# Patient Record
Sex: FEMALE | Race: WHITE
[De-identification: names, ages, dates, MRNs, and addresses within clinical notes are randomized per-mention and may not be internally consistent; named-entity substitution may affect disease eponyms.]

---

## 2017-03-21 ENCOUNTER — HOSPITAL ENCOUNTER (OUTPATIENT)
Dept: HOSPITAL 47 - ORWHC2ENDO | Age: 62
Discharge: HOME | End: 2017-03-21
Payer: MEDICARE

## 2017-03-21 VITALS — SYSTOLIC BLOOD PRESSURE: 137 MMHG | DIASTOLIC BLOOD PRESSURE: 77 MMHG

## 2017-03-21 VITALS — BODY MASS INDEX: 28.3 KG/M2

## 2017-03-21 VITALS — RESPIRATION RATE: 18 BRPM | TEMPERATURE: 98.4 F

## 2017-03-21 VITALS — HEART RATE: 77 BPM

## 2017-03-21 DIAGNOSIS — F32.9: ICD-10-CM

## 2017-03-21 DIAGNOSIS — K29.50: ICD-10-CM

## 2017-03-21 DIAGNOSIS — Z79.51: ICD-10-CM

## 2017-03-21 DIAGNOSIS — K57.30: ICD-10-CM

## 2017-03-21 DIAGNOSIS — K44.9: ICD-10-CM

## 2017-03-21 DIAGNOSIS — Z79.891: ICD-10-CM

## 2017-03-21 DIAGNOSIS — F17.200: ICD-10-CM

## 2017-03-21 DIAGNOSIS — K21.0: Primary | ICD-10-CM

## 2017-03-21 DIAGNOSIS — Z79.899: ICD-10-CM

## 2017-03-21 DIAGNOSIS — K29.80: ICD-10-CM

## 2017-03-21 PROCEDURE — 94640 AIRWAY INHALATION TREATMENT: CPT

## 2017-03-21 PROCEDURE — 88305 TISSUE EXAM BY PATHOLOGIST: CPT

## 2017-03-21 PROCEDURE — 43239 EGD BIOPSY SINGLE/MULTIPLE: CPT

## 2017-03-21 PROCEDURE — 88342 IMHCHEM/IMCYTCHM 1ST ANTB: CPT

## 2017-03-21 PROCEDURE — 45378 DIAGNOSTIC COLONOSCOPY: CPT

## 2017-03-21 NOTE — P.PCN
Date of Procedure: 03/21/17


Procedure(s) Performed: 


Procedure: 1. Esophagogastroduodenoscopy and biopsy.  2. Total colonoscopy.





Preoperative diagnosis: Unexplained weight loss and change in bowel habits.





Postoperative diagnosis: 1. Small sliding hiatal hernia with no evidence of 

esophagitis or complicated reflux disease.  2. Mild gastritis and duodenitis.  

               3. Diverticulosis of the colon with no evidence of acute 

diverticulitis, strictures, polyps or cancer.





Preparation: HalfLytely prep.





Sedation: Was provided by anesthesia.





Brief clinical history: The patient is a 60-year-old female who is referred for 

this evaluation for the above reasons.  She had a colonoscopy around 5 years 

ago.  The patient has lost around 20-25 pounds over the last couple months.  

Occasional difficulty swallowing but no history of bleeding.  No prior upper 

endoscopy.





Procedure: With the patient on her left lateral decubitus position and after 

informed consent and adequate sedation, I passed the Olympus- video 

upper endoscope through the cricopharyngeus down the esophagus.  GE junction 

was around 38 cm from the incisors and there was a small sliding hiatal hernia.

  The esophagus did not show any erosions, ulcers, strictures or Castillo's 

esophagus.  The endoscope was then passed into the stomach which was 

insufflated with air and inspected in detail including the retroflex view in 

the cardia.  There was some mottling and erythema in the antrum but no ulcers 

or erosions.  Pyloric channel did not show any ulcers.  Duodenal bulb, post 

bulbar area and descending duodenum showed minimal erythema.  I obtained 

biopsies from the duodenum, antrum and esophagus then the endoscope was 

withdrawn and I proceeded with the colonoscopy.





Perianal area did not show any fissures or fistulas.  There were no masses felt 

on digital rectal examination.  The Olympus CFQ 160L video colonoscope was then 

inserted in the rectum in the usual fashion and advanced to the cecum.  There 

were multiple diverticular orifices seen scattered along the length of the 

bowel mostly on the left side with no evidence of acute diverticulitis or 

strictures.  The mucosa appeared healthy.  No polyps or tumors were seen.  I 

retroflexed endoscope in the rectum before the endoscope was withdrawn.





The patient tolerated the procedure well.





Plan: The patient was reassured.  Will await pathology results.  She will follow

-up with you as planned and I will be happy to see in the office of her 

symptoms persist.

## 2017-05-16 ENCOUNTER — HOSPITAL ENCOUNTER (OUTPATIENT)
Dept: HOSPITAL 47 - RADCTMAIN | Age: 62
Discharge: HOME | End: 2017-05-16
Payer: MEDICARE

## 2017-05-16 DIAGNOSIS — R91.8: Primary | ICD-10-CM

## 2017-05-16 DIAGNOSIS — R59.0: ICD-10-CM

## 2017-05-16 PROCEDURE — 71260 CT THORAX DX C+: CPT

## 2017-05-16 NOTE — CT
EXAMINATION TYPE: CT chest w con

 

DATE OF EXAM: 5/16/2017 3:57 PM

 

COMPARISON: 6/25/2015

 

HISTORY: ABNORMAL FINDINGS ON CXR.

 

CT DLP: 216.4 mGycm

Automated exposure control for dose reduction was used.

 

CONTRAST: 

CT scan of the chest is performed with IV Contrast, patient injected with 90 mL of Omnipaque 300.

 

FINDINGS:

 

LUNGS: There is a new left upper lobe pulmonary mass measuring 5.4 x 4.3 cm with peripheral spiculati
on and focal extension to the pleural surface. This mass is felt to reflect malignancy until proven o
therwise. No additional pulmonary nodules or masses are seen. Small focal pleural-based calcification
 right midlung zone.

 

MEDIASTINUM: AP window adenopathy measuring up to 1 cm. Left hilar adenopathy measuring 1 cm. No cassandra
tional greater than 1 cm lymph nodes are seen at this time.

 

UPPER ABDOMEN: Heterogenous left adrenal mass is again noted and measures of 4.5 x 3.6 cm versus 4.2 
x 3.5 cm. Right adrenal gland is unremarkable.

 

OTHER:  No additional significant abnormality is seen.

 

IMPRESSION:  

1. New left upper lobe mass felt to reflect malignancy until proven otherwise. Mild mediastinal and l
eft hilar adenopathy.

2. Enlarging heterogenous left adrenal mass. Metastatic disease is not excluded.

## 2017-05-23 ENCOUNTER — HOSPITAL ENCOUNTER (OUTPATIENT)
Dept: HOSPITAL 47 - RADCTMAIN | Age: 62
Discharge: HOME | End: 2017-05-23
Payer: MEDICARE

## 2017-05-23 DIAGNOSIS — R91.8: Primary | ICD-10-CM

## 2017-05-23 PROCEDURE — 70470 CT HEAD/BRAIN W/O & W/DYE: CPT

## 2017-05-23 PROCEDURE — 80053 COMPREHEN METABOLIC PANEL: CPT

## 2017-05-23 NOTE — CT
EXAMINATION TYPE: CT brain wo/w con

 

DATE OF EXAM: 5/23/2017 6:38 PM

 

COMPARISON: NONE

 

HISTORY: Lung mass. Headache, dizziness, and weakness.

 

CT DLP: 2336.00 mGycm

Automated exposure control for dose reduction was used.

 

CONTRAST: 

CT scan of the head is performed without and with IV Contrast, patient injected with 100 mL of Omnipa
que 300.

 

FINDINGS: 

The ventricles of normal size. There is no mass effect nor midline shift. There is no sign of intracr
anial hemorrhage. The calvarium appears intact. There is no pathologic enhancement.

 

IMPRESSION: 

Negative head CT scan. No evidence of metastatic disease.

## 2017-05-31 ENCOUNTER — HOSPITAL ENCOUNTER (OUTPATIENT)
Dept: HOSPITAL 47 - RADPROMAIN | Age: 62
End: 2017-05-31
Payer: MEDICARE

## 2017-05-31 VITALS — HEART RATE: 66 BPM | SYSTOLIC BLOOD PRESSURE: 118 MMHG | TEMPERATURE: 98.2 F | DIASTOLIC BLOOD PRESSURE: 68 MMHG

## 2017-05-31 VITALS — RESPIRATION RATE: 20 BRPM

## 2017-05-31 DIAGNOSIS — R91.8: ICD-10-CM

## 2017-05-31 DIAGNOSIS — Z79.51: ICD-10-CM

## 2017-05-31 DIAGNOSIS — Z79.1: ICD-10-CM

## 2017-05-31 DIAGNOSIS — Z79.891: ICD-10-CM

## 2017-05-31 DIAGNOSIS — C34.12: Primary | ICD-10-CM

## 2017-05-31 DIAGNOSIS — Z79.899: ICD-10-CM

## 2017-05-31 DIAGNOSIS — Z87.891: ICD-10-CM

## 2017-05-31 DIAGNOSIS — M79.7: ICD-10-CM

## 2017-05-31 DIAGNOSIS — J44.9: ICD-10-CM

## 2017-05-31 LAB
INR PPP: 1.1 (ref ?–1.1)
PT BLD: 10.7 SEC (ref 9–12)

## 2017-05-31 PROCEDURE — 88173 CYTOPATH EVAL FNA REPORT: CPT

## 2017-05-31 PROCEDURE — 88305 TISSUE EXAM BY PATHOLOGIST: CPT

## 2017-05-31 PROCEDURE — 85049 AUTOMATED PLATELET COUNT: CPT

## 2017-05-31 PROCEDURE — 77012 CT SCAN FOR NEEDLE BIOPSY: CPT

## 2017-05-31 PROCEDURE — 71010: CPT

## 2017-05-31 PROCEDURE — 85610 PROTHROMBIN TIME: CPT

## 2017-05-31 PROCEDURE — 88342 IMHCHEM/IMCYTCHM 1ST ANTB: CPT

## 2017-05-31 PROCEDURE — 88341 IMHCHEM/IMCYTCHM EA ADD ANTB: CPT

## 2017-05-31 PROCEDURE — 36415 COLL VENOUS BLD VENIPUNCTURE: CPT

## 2017-05-31 NOTE — CT
EXAMINATION TYPE: CT biopsy lung LT

 

DATE OF EXAM: 5/31/2017

 

HISTORY: Left upper lobe lung mass

 

COMPARISON: CT 5/16/2017

 

Maximal barrier technique was utilized.  The skin overlying a suitable path to the lesion was localiz
ed using CT and the overlying skin was prepped and draped.  Lidocaine used for local anesthesia.  A s
kin nick made with a scalpel.  Using CT guidance, access was gained to the lesion with a 22-gauge nee
dle.  Aspirated specimen submitted to cytology.  5 passes were performed in all.  Following the proce
dure no immediate complications.   The patient is discharged in stable condition.  Hemostasis achieve
d.  

 

IMPRESSION: 

 

SUCCESSFUL CT GUIDED BIOPSY.  PATHOLOGY PENDING.  THIS PROCEDURE WAS PERFORMED BY THE UNDERSIGNED.

## 2017-05-31 NOTE — XR
EXAMINATION TYPE: XR chest 1V portable

 

DATE OF EXAM: 5/31/2017

 

COMPARISON: NONE

 

INDICATION: Left upper lobe fine-needle aspiration

 

TECHNIQUE: Single frontal view of the chest is obtained. Images upright view

 

FINDINGS:  

The heart size is normal.  

The pulmonary vasculature is normal.  

There is a 5.2 cm spiculated mass left apex. No pneumothorax is evident.  

 

 

IMPRESSION:  

1. Left apical mass.

2. No pneumothorax post needle aspiration.

## 2017-06-03 ENCOUNTER — HOSPITAL ENCOUNTER (OUTPATIENT)
Dept: HOSPITAL 47 - RADPETMAIN | Age: 62
Discharge: HOME | End: 2017-06-03
Payer: MEDICARE

## 2017-06-03 DIAGNOSIS — R91.1: Primary | ICD-10-CM

## 2017-06-03 DIAGNOSIS — Z85.118: ICD-10-CM

## 2017-06-03 PROCEDURE — 78815 PET IMAGE W/CT SKULL-THIGH: CPT

## 2017-06-05 NOTE — PE
Nuclear medicine PET/CT

 

HISTORY: Lung carcinoma

 

Patient received 13.3 mCi F-18 FDG intravenously and delayed scanning performed from the skull base t
o the mid thighs. Localization and attenuation correction CT scan was also performed.

 

Correlation to CT brain 5/23/2017, CT chest 6/25/2015 and 5/16/2017, chest x-ray 5/31/2017

 

Neck and chest: At the upper lobe lung mass is again noted and shows pleural extension as well as ext
ension toward the mediastinum at the upper aspect of the chest. Some prevascular lymph nodes are note
d. Retrocaval pretracheal nodes are not enlarged. Calcified left hilar, subcarinal nodes are present.
 Left hilar node shows SUV 5.2 and aorticopulmonary window node shows hypermetabolic uptake and aorti
copulmonary window node is enlarged. SUV is 3.8. The left upper lobe lung mass measures approximately
 6 to 7 cm in size in the axial plane, SUV is 21-22. Calcified subpleural nodule noted on the right l
aterally within the lungs, axial image 90.

 

Abdomen pelvis: There is a left adrenal mass present which shows low attenuation, there is some assoc
iated calcification, no suspicious hypermetabolic uptake, the mass measures approximately 4.2 cm in g
reatest dimension. Calcifications are associated within the spleen. No retroperitoneal adenopathy, no
 suspicious hypermetabolic uptake. Uptake within the right colon thought to be physiologic.

 

Osseous structures within normal limits.

 

IMPRESSION: Findings compatible with patient's history of lung carcinoma. Additional findings above.

## 2017-08-17 ENCOUNTER — HOSPITAL ENCOUNTER (EMERGENCY)
Dept: HOSPITAL 47 - EC | Age: 62
Discharge: HOME | End: 2017-08-17
Payer: MEDICARE

## 2017-08-17 VITALS
TEMPERATURE: 98 F | DIASTOLIC BLOOD PRESSURE: 57 MMHG | SYSTOLIC BLOOD PRESSURE: 110 MMHG | HEART RATE: 87 BPM | RESPIRATION RATE: 18 BRPM

## 2017-08-17 DIAGNOSIS — Z79.51: ICD-10-CM

## 2017-08-17 DIAGNOSIS — K21.9: ICD-10-CM

## 2017-08-17 DIAGNOSIS — Z79.899: ICD-10-CM

## 2017-08-17 DIAGNOSIS — Z87.891: ICD-10-CM

## 2017-08-17 DIAGNOSIS — Z87.448: ICD-10-CM

## 2017-08-17 DIAGNOSIS — R53.1: ICD-10-CM

## 2017-08-17 DIAGNOSIS — R11.2: Primary | ICD-10-CM

## 2017-08-17 DIAGNOSIS — J44.9: ICD-10-CM

## 2017-08-17 DIAGNOSIS — F32.9: ICD-10-CM

## 2017-08-17 DIAGNOSIS — R19.7: ICD-10-CM

## 2017-08-17 LAB
ALP SERPL-CCNC: 109 U/L (ref 38–126)
ALT SERPL-CCNC: 31 U/L (ref 9–52)
AMYLASE SERPL-CCNC: 35 U/L (ref 30–110)
ANION GAP SERPL CALC-SCNC: 11 MMOL/L
APTT BLD: 25.1 SEC (ref 22–30)
AST SERPL-CCNC: 18 U/L (ref 14–36)
BASOPHILS # BLD AUTO: 0 K/UL (ref 0–0.2)
BASOPHILS NFR BLD AUTO: 0 %
BUN SERPL-SCNC: 9 MG/DL (ref 7–17)
CALCIUM SPEC-MCNC: 9.3 MG/DL (ref 8.4–10.2)
CH: 29.3
CHCM: 33.3
CHLORIDE SERPL-SCNC: 104 MMOL/L (ref 98–107)
CO2 SERPL-SCNC: 27 MMOL/L (ref 22–30)
EOSINOPHIL # BLD AUTO: 0 K/UL (ref 0–0.7)
EOSINOPHIL NFR BLD AUTO: 0 %
ERYTHROCYTE [DISTWIDTH] IN BLOOD BY AUTOMATED COUNT: 5.16 M/UL (ref 3.8–5.4)
ERYTHROCYTE [DISTWIDTH] IN BLOOD: 18.9 % (ref 11.5–15.5)
GLUCOSE SERPL-MCNC: 128 MG/DL (ref 74–99)
HCT VFR BLD AUTO: 45.6 % (ref 34–46)
HDW: 2.55
HGB BLD-MCNC: 15.2 GM/DL (ref 11.4–16)
INR PPP: 1 (ref ?–1.2)
LUC NFR BLD AUTO: 1 %
LYMPHOCYTES # SPEC AUTO: 1 K/UL (ref 1–4.8)
LYMPHOCYTES NFR SPEC AUTO: 10 %
MAGNESIUM SPEC-SCNC: 1.8 MG/DL (ref 1.6–2.3)
MCH RBC QN AUTO: 29.4 PG (ref 25–35)
MCHC RBC AUTO-ENTMCNC: 33.3 G/DL (ref 31–37)
MCV RBC AUTO: 88.4 FL (ref 80–100)
MONOCYTES # BLD AUTO: 0.3 K/UL (ref 0–1)
MONOCYTES NFR BLD AUTO: 3 %
NEUTROPHILS # BLD AUTO: 8.6 K/UL (ref 1.3–7.7)
NEUTROPHILS NFR BLD AUTO: 85 %
NON-AFRICAN AMERICAN GFR(MDRD): >60
PARTICLE COUNT: 9185
PH UR: 8 [PH] (ref 5–8)
PHOSPHATE SERPL-MCNC: 3.5 MG/DL (ref 2.5–4.5)
POTASSIUM SERPL-SCNC: 4 MMOL/L (ref 3.5–5.1)
PROT SERPL-MCNC: 7.8 G/DL (ref 6.3–8.2)
PT BLD: 10 SEC (ref 9–12)
SODIUM SERPL-SCNC: 142 MMOL/L (ref 137–145)
SP GR UR: 1.01 (ref 1–1.03)
UA BILLING (MACRO VS. MICRO): (no result)
UROBILINOGEN UR QL STRIP: <2 MG/DL (ref ?–2)
WBC # BLD AUTO: 0.11 10*3/UL
WBC # BLD AUTO: 10.1 K/UL (ref 3.8–10.6)
WBC (PEROX): 9.95

## 2017-08-17 PROCEDURE — 85730 THROMBOPLASTIN TIME PARTIAL: CPT

## 2017-08-17 PROCEDURE — 99285 EMERGENCY DEPT VISIT HI MDM: CPT

## 2017-08-17 PROCEDURE — 96376 TX/PRO/DX INJ SAME DRUG ADON: CPT

## 2017-08-17 PROCEDURE — 83690 ASSAY OF LIPASE: CPT

## 2017-08-17 PROCEDURE — 83735 ASSAY OF MAGNESIUM: CPT

## 2017-08-17 PROCEDURE — 85025 COMPLETE CBC W/AUTO DIFF WBC: CPT

## 2017-08-17 PROCEDURE — 94640 AIRWAY INHALATION TREATMENT: CPT

## 2017-08-17 PROCEDURE — 81001 URINALYSIS AUTO W/SCOPE: CPT

## 2017-08-17 PROCEDURE — 71020: CPT

## 2017-08-17 PROCEDURE — 80053 COMPREHEN METABOLIC PANEL: CPT

## 2017-08-17 PROCEDURE — 87040 BLOOD CULTURE FOR BACTERIA: CPT

## 2017-08-17 PROCEDURE — 84100 ASSAY OF PHOSPHORUS: CPT

## 2017-08-17 PROCEDURE — 96374 THER/PROPH/DIAG INJ IV PUSH: CPT

## 2017-08-17 PROCEDURE — 36415 COLL VENOUS BLD VENIPUNCTURE: CPT

## 2017-08-17 PROCEDURE — 87086 URINE CULTURE/COLONY COUNT: CPT

## 2017-08-17 PROCEDURE — 83605 ASSAY OF LACTIC ACID: CPT

## 2017-08-17 PROCEDURE — 82150 ASSAY OF AMYLASE: CPT

## 2017-08-17 PROCEDURE — 96361 HYDRATE IV INFUSION ADD-ON: CPT

## 2017-08-17 PROCEDURE — 96375 TX/PRO/DX INJ NEW DRUG ADDON: CPT

## 2017-08-17 PROCEDURE — 74020: CPT

## 2017-08-17 PROCEDURE — 85610 PROTHROMBIN TIME: CPT

## 2017-08-17 NOTE — XR
2 view abdomen

 

HISTORY: Pain

 

2 views of the abdomen correlated with chest x-ray same date

 

Degenerative disc changes are present in the visualized spine, bone mineralization is reduced. There 
is no pneumoperitoneum or bowel obstruction. Probable vascular calcifications within the pelvis. Left
 proximal femur shows an apparent foreshortening view possibly due to rotation. Partial sacralization
 of L5 on the left.

 

IMPRESSION: No acute abnormalities evident. Additional findings above.

## 2017-08-17 NOTE — ED
General Adult HPI





- General


Chief complaint: Weakness


Stated complaint: Vomiting


Time Seen by Provider: 08/17/17 12:45


Source: patient, family, EMS, RN notes reviewed


Mode of arrival: EMS





- History of Present Illness


Initial comments: 





61-year-old female has history of lung cancer presents to the emergency 

department with a chief complaint of nausea vomiting and diarrhea.  Patient 

states she woke up yesterday morning states she's been sick with this for the 

past 24 hours.  Patient states her last chemo radiation treatment was about a 

week and a half.  Patient states she's never had this reaction to that.  

Patient states she hasn't had any fever or chills.  Patient states she does 

have soreness from throwing up so much.  Patient states she was concerned due 

to hydration. Patient denies any recent fever, chills, shortness of breath, 

chest pain,  numbness or tingling, dysuria or hematuria, headaches or visual 

changes, or any other current symptoms.





- Related Data


 Home Medications











 Medication  Instructions  Recorded  Confirmed


 


Albuterol Inhaler [Ventolin Hfa 2 puff INHALATION QID PRN 05/27/14 08/17/17





Inhaler]   


 


Cevimeline [Evoxac] 30 mg PO BID 05/27/14 08/17/17


 


Escitalopram [Lexapro] 20 mg PO DAILY 05/27/14 08/17/17


 


Esomeprazole Magnesium [NexIUM] 40 mg PO BID 05/27/14 08/17/17


 


Fesoterodine Fumarate [Toviaz] 8 mg PO DAILY 05/27/14 08/17/17


 


Ibuprofen [Motrin] 600 mg PO Q6HR PRN 05/27/14 08/17/17


 


Multivit with Calcium,Iron,Min 1 tab PO DAILY 06/22/15 08/17/17





[Women's Daily Multivitamin]   


 


Latanoprost Ophth [Xalatan 0.005%] 1 drops BOTH EYES HS 02/27/17 08/17/17


 


oxyCODONE-APAP 10-325MG [Percocet 1 tab PO Q4-6H PRN 02/27/17 08/17/17





 mg]   


 


clonazePAM [KlonoPIN] 0.5 mg PO BID PRN 05/24/17 08/17/17


 


Albuterol Nebulized [Ventolin 2.5 mg INHALATION RT-Q4H PRN 08/17/17 08/17/17





Nebulized]   


 


Budesonide-Formot 160-4.5 Mcg 2 puff INHALATION RT-BID 08/17/17 08/17/17





[Symbicort 160-4.5 Mcg Inhaler]   


 


Fluticasone Propionate 2 spray EA NOSTRIL DAILY 08/17/17 08/17/17


 


Lidocaine 5% Oint [Xylocaine 5% 1 applic TOPICAL BID PRN 08/17/17 08/17/17





Oint]   


 


Magic Mouthwash 1 dose PO AS DIRECTED PRN 08/17/17 08/17/17


 


Ondansetron Odt [Zofran ODT] 8 mg PO Q6H PRN 08/17/17 08/17/17


 


Prochlorperazine [Compazine] 10 mg PO Q6H PRN 08/17/17 08/17/17


 


Umeclidinium Bromide [Incruse 1 puff INHALATION RT-DAILY 08/17/17 08/17/17





Ellipta]   








 Previous Rx's











 Medication  Instructions  Recorded


 


Morphine Sulfate [Ms Contin] 30 mg PO BID #60 tablet.er 09/02/15











 Allergies











Allergy/AdvReac Type Severity Reaction Status Date / Time


 


No Known Allergies Allergy   Verified 08/17/17 14:31














Review of Systems


ROS Statement: 


Those systems with pertinent positive or pertinent negative responses have been 

documented in the HPI.





ROS Other: All systems not noted in ROS Statement are negative.





Past Medical History


Past Medical History: Cancer, COPD, CVA/TIA, Fibromyalgia, GERD/Reflux, 

Hyperlipidemia, Osteoarthritis (OA), Pneumonia


Additional Past Medical History / Comment(s): ADRENAL TUMOR, hx migraines, TIA, 

hiatal hernia,  constipation, urinary incontinence,. DDD, BACK PAIN.,PT STATES 

RESCHEDULED FOR EGD AND COLONOSCOPY DUE TO COLD SYMPTOMS BUT SHE IS BETTER NOW. 

Lung cancer diagnosed May 2017.


History of Any Multi-Drug Resistant Organisms: None Reported


Additional Past Surgical History / Comment(s): SINUS surgery


Past Anesthesia/Blood Transfusion Reactions: No Reported Reaction


Past Psychological History: Depression


Smoking Status: Former smoker


Past Alcohol Use History: None Reported


Past Drug Use History: None Reported





- Past Family History


  ** Mother


Family Medical History: Cancer





  ** Father


Family Medical History: Cancer





General Exam





- General Exam Comments


Initial Comments: 





General:  The patient is awake and alert, in no distress, and does not appear 

acutely ill. 


Eye:  Pupils are equal, round and reactive to light, extra-ocular movements are 

intact; there is normal conjunctiva bilaterally.  No signs of icterus.  


Ears, nose, mouth and throat:  There are moist mucous membranes.


Neck:  The neck is supple, there is no tenderness.


Cardiovascular:  There is a regular rate and rhythm. No murmur, rub or gallop 

is appreciated.


Respiratory:  Lungs are clear to auscultation, respirations are non-labored, 

breath sounds are equal.  No wheezes, stridor, rales, or rhonchi.


Gastrointestinal:  Soft, non-distended, non-tender abdomen without masses or 

organomegaly noted. There is no rebound or guarding present.  No CVA 

tenderness. Bowel sounds are unremarkable.


Back:  There is no tenderness to palpation in the midline. There is no obvious 

deformity. No rashes noted. 


Musculoskeletal:  Normal ROM, no tenderness, There is no pedal edema. There is 

no calf tenderness or swelling. Sensation intact. Pulses equal bilaterally 2+.  


Neurological:  CN II-XII intact, There are no obvious motor or sensory 

deficits. Coordination appears grossly intact. Speech is normal.


Skin:  Skin is warm and dry and no rashes or lesions are noted. 


Psychiatric:  Cooperative, appropriate mood & affect, normal judgment.  








Course


 Vital Signs











  08/17/17 08/17/17 08/17/17





  12:42 13:59 14:41


 


Temperature 98.5 F 98.3 F 97.7 F


 


Pulse Rate 64 66 81


 


Respiratory 20 18 16





Rate   


 


Blood Pressure 169/79 165/82 165/94


 


O2 Sat by Pulse 98 97 98





Oximetry   














  08/17/17 08/17/17





  15:03 15:18


 


Temperature  


 


Pulse Rate 66 69


 


Respiratory  





Rate  


 


Blood Pressure  


 


O2 Sat by Pulse  





Oximetry  














Medical Decision Making





- Medical Decision Making





61-year-old female presents to the emergency department with a chief complaint 

of nausea vomiting diarrhea.at this time patient was reevaluated states she has 

improved.  She has not had vomiting.  This time she was offered admission for 

continued supportive care.  She states she does not want this and she would 

like to go home.  At this time we will give her another fluid bolus and more 

nausea and pain medication.  We discussed close follow-up and return parameters 

all patient's questions.  She stated that she understood and she screamed this 

plan.  This time she will be discharged home.





- Lab Data


Result diagrams: 


 08/17/17 13:15





 08/17/17 13:15


 Lab Results











  08/17/17 08/17/17 08/17/17 Range/Units





  13:15 13:15 13:15 


 


WBC    10.1  (3.8-10.6)  k/uL


 


RBC    5.16  (3.80-5.40)  m/uL


 


Hgb    15.2  (11.4-16.0)  gm/dL


 


Hct    45.6  (34.0-46.0)  %


 


MCV    88.4  (80.0-100.0)  fL


 


MCH    29.4  (25.0-35.0)  pg


 


MCHC    33.3  (31.0-37.0)  g/dL


 


RDW    18.9 H  (11.5-15.5)  %


 


Plt Count    408  (150-450)  k/uL


 


Neutrophils %    85  %


 


Lymphocytes %    10  %


 


Monocytes %    3  %


 


Eosinophils %    0  %


 


Basophils %    0  %


 


Neutrophils #    8.6 H  (1.3-7.7)  k/uL


 


Lymphocytes #    1.0  (1.0-4.8)  k/uL


 


Monocytes #    0.3  (0-1.0)  k/uL


 


Eosinophils #    0.0  (0-0.7)  k/uL


 


Basophils #    0.0  (0-0.2)  k/uL


 


Anisocytosis    Slight  


 


PT     (9.0-12.0)  sec


 


INR     (<1.2)  


 


APTT     (22.0-30.0)  sec


 


Sodium   142   (137-145)  mmol/L


 


Potassium   4.0   (3.5-5.1)  mmol/L


 


Chloride   104   ()  mmol/L


 


Carbon Dioxide   27   (22-30)  mmol/L


 


Anion Gap   11   mmol/L


 


BUN   9   (7-17)  mg/dL


 


Creatinine   0.62   (0.52-1.04)  mg/dL


 


Est GFR (MDRD) Af Amer   >60   (>60 ml/min/1.73 sqM)  


 


Est GFR (MDRD) Non-Af   >60   (>60 ml/min/1.73 sqM)  


 


Glucose   128 H   (74-99)  mg/dL


 


Plasma Lactic Acid Ebenezer     (0.7-2.0)  mmol/L


 


Calcium   9.3   (8.4-10.2)  mg/dL


 


Phosphorus  3.5    (2.5-4.5)  mg/dL


 


Magnesium  1.8    (1.6-2.3)  mg/dL


 


Total Bilirubin   0.4   (0.2-1.3)  mg/dL


 


AST   18   (14-36)  U/L


 


ALT   31   (9-52)  U/L


 


Alkaline Phosphatase   109   ()  U/L


 


Total Protein   7.8   (6.3-8.2)  g/dL


 


Albumin   4.3   (3.5-5.0)  g/dL


 


Amylase   35   ()  U/L


 


Lipase   25   ()  U/L


 


Urine Color     


 


Urine Appearance     (Clear)  


 


Urine pH     (5.0-8.0)  


 


Ur Specific Gravity     (1.001-1.035)  


 


Urine Protein     (Negative)  


 


Urine Glucose (UA)     (Negative)  


 


Urine Ketones     (Negative)  


 


Urine Blood     (Negative)  


 


Urine Nitrite     (Negative)  


 


Urine Bilirubin     (Negative)  


 


Urine Urobilinogen     (<2.0)  mg/dL


 


Ur Leukocyte Esterase     (Negative)  


 


Amorphous Sediment     (None)  /hpf


 


Urine Mucus     (None)  /hpf














  08/17/17 08/17/17 08/17/17 Range/Units





  13:15 13:19 13:47 


 


WBC     (3.8-10.6)  k/uL


 


RBC     (3.80-5.40)  m/uL


 


Hgb     (11.4-16.0)  gm/dL


 


Hct     (34.0-46.0)  %


 


MCV     (80.0-100.0)  fL


 


MCH     (25.0-35.0)  pg


 


MCHC     (31.0-37.0)  g/dL


 


RDW     (11.5-15.5)  %


 


Plt Count     (150-450)  k/uL


 


Neutrophils %     %


 


Lymphocytes %     %


 


Monocytes %     %


 


Eosinophils %     %


 


Basophils %     %


 


Neutrophils #     (1.3-7.7)  k/uL


 


Lymphocytes #     (1.0-4.8)  k/uL


 


Monocytes #     (0-1.0)  k/uL


 


Eosinophils #     (0-0.7)  k/uL


 


Basophils #     (0-0.2)  k/uL


 


Anisocytosis     


 


PT  10.0    (9.0-12.0)  sec


 


INR  1.0    (<1.2)  


 


APTT  25.1    (22.0-30.0)  sec


 


Sodium     (137-145)  mmol/L


 


Potassium     (3.5-5.1)  mmol/L


 


Chloride     ()  mmol/L


 


Carbon Dioxide     (22-30)  mmol/L


 


Anion Gap     mmol/L


 


BUN     (7-17)  mg/dL


 


Creatinine     (0.52-1.04)  mg/dL


 


Est GFR (MDRD) Af Amer     (>60 ml/min/1.73 sqM)  


 


Est GFR (MDRD) Non-Af     (>60 ml/min/1.73 sqM)  


 


Glucose     (74-99)  mg/dL


 


Plasma Lactic Acid Ebenezer   1.0   (0.7-2.0)  mmol/L


 


Calcium     (8.4-10.2)  mg/dL


 


Phosphorus     (2.5-4.5)  mg/dL


 


Magnesium     (1.6-2.3)  mg/dL


 


Total Bilirubin     (0.2-1.3)  mg/dL


 


AST     (14-36)  U/L


 


ALT     (9-52)  U/L


 


Alkaline Phosphatase     ()  U/L


 


Total Protein     (6.3-8.2)  g/dL


 


Albumin     (3.5-5.0)  g/dL


 


Amylase     ()  U/L


 


Lipase     ()  U/L


 


Urine Color    Light Yellow  


 


Urine Appearance    Cloudy H  (Clear)  


 


Urine pH    8.0  (5.0-8.0)  


 


Ur Specific Gravity    1.008  (1.001-1.035)  


 


Urine Protein    Negative  (Negative)  


 


Urine Glucose (UA)    Negative  (Negative)  


 


Urine Ketones    Negative  (Negative)  


 


Urine Blood    Negative  (Negative)  


 


Urine Nitrite    Negative  (Negative)  


 


Urine Bilirubin    Negative  (Negative)  


 


Urine Urobilinogen    <2.0  (<2.0)  mg/dL


 


Ur Leukocyte Esterase    Negative  (Negative)  


 


Amorphous Sediment    Rare H  (None)  /hpf


 


Urine Mucus    Rare H  (None)  /hpf














Disposition


Clinical Impression: 


 Nausea & vomiting, Diarrhea





Disposition: HOME SELF-CARE


Condition: Stable


Instructions:  Acute Nausea and Vomiting (ED)


Additional Instructions: 


Please use medication as discussed. Please follow up with family doctor if 

symptoms have not improved over the next two days. Please return to the 

emergency room if your symptoms increase or worsen or for any other concerns. 


Referrals: 


Rebecca Amaya MD [Primary Care Provider] - 1-2 days


Time of Disposition: 15:54

## 2017-08-17 NOTE — XR
EXAMINATION TYPE: XR chest 2V

 

DATE OF EXAM: 8/17/2017

 

COMPARISON: 5/31/2017

 

HISTORY: Stage III left lung cancer treated with chemotherapy and radiation.

 

TECHNIQUE:  Frontal and lateral views of the chest are obtained.

 

FINDINGS:  Spiculated left upper lobe pulmonary mass is redemonstrated, decreased in size in the inte
rim. Prominent sub-pleural fat and pleural reaction at the cardiophrenic angle on the left is redemon
strated blunting the costophrenic angle.

 

No focal consolidation, pleural effusion, or pneumothorax are appreciated. Cardiomediastinal silhouet
te is within normal limits. Osseous structures are intact.

 

IMPRESSION:  

1. Interval decrease in size of the spiculated left upper lobe pulmonary mass corresponding to the kn
own lung carcinoma. No new focal consolidation.

## 2017-08-26 ENCOUNTER — HOSPITAL ENCOUNTER (OUTPATIENT)
Dept: HOSPITAL 47 - RADPETMAIN | Age: 62
Discharge: HOME | End: 2017-08-26
Payer: MEDICARE

## 2017-08-26 DIAGNOSIS — C34.12: Primary | ICD-10-CM

## 2017-08-26 DIAGNOSIS — E27.8: ICD-10-CM

## 2017-08-26 PROCEDURE — 78815 PET IMAGE W/CT SKULL-THIGH: CPT

## 2017-08-27 NOTE — PE
EXAMINATION TYPE: PET CT fusion skull to thigh

 

DATE OF EXAM: 8/26/2017

 

COMPARISON: 5/31/2017, 5/16/2017, CT abdomen pelvis 8/12/2009

                            Prior PET/CT: 6/3/2017

 

HISTORY: Lung cancer   

 

TECHNIQUE:  Following the intravenous administration of 15.6 mCi of F-18 FDG, whole body images are p
erformed from the skull base to the midthigh.  Images are reviewed on the computer in the coronal, ax
ial, and sagittal planes.  Reconstructed rotating images are created on independent workstation and r
eviewed on the computer.   A localization and attenuation correction CT is performed in conjunction w
ith the PET scan.

 

DLP: 285.29 mGycm

 

SCAN: Subsequent

 

Blood glucose: 97 mg/dL

 

Average Mediastinum SUV: 1.3

Average Liver SUV: 2.0

 

FINDINGS: 

NECK:  No abnormal uptake

 

THORAX: There is focal increased radiotracer accumulation within the left upper lobe mass with an SUV
 value of 4.8. No abnormal uptake within the mediastinum or hilar regions is evident.

 

ABDOMEN: No abnormal uptake the enlarged left adrenal gland has a normal radiotracer accumulation anurag
suring SUV 1.5.

 

PELVIS: No abnormal uptake

 

OSSEOUS STRUCTURES: No abnormal uptake

 

LOCALIZATION CT: Left upper lobe mass spiculated. There may be a left peribronchial lymph node which 
is not enlarged by CT criteria and has an SUV value 1.5. Previous aortopulmonic window lymph node is 
not identified. Coronary artery calcification is present. The left adrenal gland is enlarged measurin
g 4.0 cm in size.

 

COMPARISON: Left upper lobe lung mass has diminished in size. Previous aortopulmonic window lymph nod
e which are elevated SUV previous left peribronchial node which has elevated SUV are not hyperintense
 on the current study. The left adrenal gland is been enlarged since 2009.

 

IMPRESSION:

1. Solitary pulmonary mass left upper lobe compatible with neoplasm.

2. No suspicious findings suggestive for metastatic disease.

3. Enlarged left adrenal gland has normal radiotracer without uptake to suggest hypermetabolic metast
atic disease

## 2017-08-29 ENCOUNTER — HOSPITAL ENCOUNTER (OUTPATIENT)
Dept: HOSPITAL 47 - LABWHC1 | Age: 62
Discharge: HOME | End: 2017-08-29
Payer: MEDICARE

## 2017-08-29 DIAGNOSIS — R19.12: ICD-10-CM

## 2017-08-29 DIAGNOSIS — R19.4: Primary | ICD-10-CM

## 2017-08-29 PROCEDURE — 87324 CLOSTRIDIUM AG IA: CPT

## 2017-10-19 ENCOUNTER — HOSPITAL ENCOUNTER (OUTPATIENT)
Dept: HOSPITAL 47 - RADCTMAIN | Age: 62
Discharge: HOME | End: 2017-10-19
Payer: MEDICARE

## 2017-10-19 DIAGNOSIS — M79.7: ICD-10-CM

## 2017-10-19 DIAGNOSIS — G31.9: Primary | ICD-10-CM

## 2017-10-19 DIAGNOSIS — C34.12: ICD-10-CM

## 2017-10-19 DIAGNOSIS — E07.9: ICD-10-CM

## 2017-10-19 DIAGNOSIS — J44.9: ICD-10-CM

## 2017-10-19 DIAGNOSIS — R93.0: ICD-10-CM

## 2017-10-19 LAB
ALP SERPL-CCNC: 94 U/L (ref 38–126)
ALT SERPL-CCNC: 27 U/L (ref 9–52)
ANION GAP SERPL CALC-SCNC: 8 MMOL/L
AST SERPL-CCNC: 16 U/L (ref 14–36)
BUN SERPL-SCNC: 10 MG/DL (ref 7–17)
CALCIUM SPEC-MCNC: 9.2 MG/DL (ref 8.4–10.2)
CHLORIDE SERPL-SCNC: 104 MMOL/L (ref 98–107)
CO2 SERPL-SCNC: 27 MMOL/L (ref 22–30)
GLUCOSE SERPL-MCNC: 147 MG/DL (ref 74–99)
MAGNESIUM SPEC-SCNC: 2 MG/DL (ref 1.6–2.3)
NON-AFRICAN AMERICAN GFR(MDRD): >60
POTASSIUM SERPL-SCNC: 5.4 MMOL/L (ref 3.5–5.1)
PROT SERPL-MCNC: 7.2 G/DL (ref 6.3–8.2)
SODIUM SERPL-SCNC: 139 MMOL/L (ref 137–145)

## 2017-10-19 PROCEDURE — 70460 CT HEAD/BRAIN W/DYE: CPT

## 2017-10-19 PROCEDURE — 84443 ASSAY THYROID STIM HORMONE: CPT

## 2017-10-19 PROCEDURE — 83735 ASSAY OF MAGNESIUM: CPT

## 2017-10-19 PROCEDURE — 80053 COMPREHEN METABOLIC PANEL: CPT

## 2017-10-19 PROCEDURE — 84481 FREE ASSAY (FT-3): CPT

## 2017-10-19 PROCEDURE — 84439 ASSAY OF FREE THYROXINE: CPT

## 2017-10-19 PROCEDURE — 36415 COLL VENOUS BLD VENIPUNCTURE: CPT

## 2017-10-19 NOTE — CT
EXAMINATION TYPE: CT brain w con

 

DATE OF EXAM: 10/19/2017

 

COMPARISON: NONE

 

HISTORY: 61-year-old female with headache and dizziness x1 week. Patient with history of lung cancer,
 assess for metastatic disease.

 

CT DLP: 995.6 mGycm

Automated exposure control for dose reduction was used.

 

Technique:  CT scan of the head is performed with IV Contrast, patient injected with 100 mL of Omnipa
que 300.

 

FINDINGS: 

There is no abnormal enhancing mass or midline shift identified.  The ventricles and sulci are within
 normal limits in size.  

 

Mild cerebral cortical atrophy.  Dural venous sinuses are patent.

 

Incidental megacisterna magna. Also, incidental partially empty sella.

 

The globes are intact.  Some frothy partial opacification of the diminutive right sphenoid sinus.

 

 

IMPRESSION: 

 

1. No suspicious findings to suggest intracranial metastases.

2. Mild atrophy. No mass effect or midline shift.

2. Some frothy opacification of the right sphenoid sinus. Findings can be seen in the setting of acut
e viral sinusitis.

## 2018-01-12 ENCOUNTER — HOSPITAL ENCOUNTER (OUTPATIENT)
Dept: HOSPITAL 47 - RADCTMAIN | Age: 63
Discharge: HOME | End: 2018-01-12
Payer: MEDICARE

## 2018-01-12 DIAGNOSIS — C34.12: Primary | ICD-10-CM

## 2018-01-12 LAB — BUN SERPL-SCNC: 10 MG/DL (ref 7–17)

## 2018-01-12 PROCEDURE — 82565 ASSAY OF CREATININE: CPT

## 2018-01-12 PROCEDURE — 71260 CT THORAX DX C+: CPT

## 2018-01-12 PROCEDURE — 84520 ASSAY OF UREA NITROGEN: CPT

## 2018-01-12 PROCEDURE — 36415 COLL VENOUS BLD VENIPUNCTURE: CPT

## 2018-01-12 NOTE — CT
EXAMINATION TYPE: CT chest w con

 

DATE OF EXAM: 1/12/2018

 

COMPARISON: Prior CT chest 5/16/2017, PET CT 8/26/2017

 

HISTORY: Lung Cancer

 

CT DLP: 369 mGycm

Automated exposure control for dose reduction was used.

 

CONTRAST: 

CT scan of the chest is performed with IV Contrast, patient injected with 100 ml mL of Omnipaque 300.


 

FINDINGS:

 

LUNGS: The left upper lobe lung nodule now only measures approximately 2.3 x 2.9 x 2.7 cm centrally a
lthough there are some spiculated margins which extend more peripherally towards the pleura in the le
ft upper hemithorax, this is decreased in size as compared to previous exams. There is no pleural eff
usion, no pericardial effusion. No axillary, hilar, or mediastinal adenopathy. No evident additional 
lung mass. No endobronchial lesion.

 

MEDIASTINUM: There are no greater than 1 cm hilar or mediastinal lymph nodes.  Calcifications in the 
mediastinum and left hilar region compatible with old granulomatous disease. No pericardial effusion 
is seen.  

 

AORTA:  No additional significant abnormality is seen.

 

 

OTHER:  Calcifications noted within the spleen compatible with old granulomatous disease as on previo
us exam. Left adrenal mass shows benign characteristics and is stable in size.

 

IMPRESSION:  Interval improvement in the size of patient's left upper lobe lung mass.

## 2018-04-28 ENCOUNTER — HOSPITAL ENCOUNTER (OUTPATIENT)
Dept: HOSPITAL 47 - RADPETMAIN | Age: 63
Discharge: HOME | End: 2018-04-28
Payer: MEDICARE

## 2018-04-28 DIAGNOSIS — R93.3: ICD-10-CM

## 2018-04-28 DIAGNOSIS — Z92.3: ICD-10-CM

## 2018-04-28 DIAGNOSIS — C34.12: Primary | ICD-10-CM

## 2018-04-28 DIAGNOSIS — Z92.21: ICD-10-CM

## 2018-04-28 PROCEDURE — 78815 PET IMAGE W/CT SKULL-THIGH: CPT

## 2018-04-29 NOTE — PE
EXAMINATION TYPE: PET CT fusion skull to thigh

 

DATE OF EXAM: 4/28/2018

 

CLINICAL HISTORY: 62-year-old female restaging left lung cancer. Patient completed chemotherapy Decem
ber 2017 and radiation therapy August 2017.

 

TECHNIQUE:  Following the intravenous administration of  10.9 mCi of F-18 FDG, whole body images are 
performed from the skull base to the midthigh.  Images are reviewed on the computer in the coronal, a
xial, and sagittal planes.  Reconstructed rotating images are created on independent workstation and 
reviewed on the computer.   A localization and attenuation correction CT is performed in conjunction 
with the PET scan.

 

Glucose level: 99 mg/dL

CTDI: 2.05 mGy

DLP: 158.31 mGy-cm

 

COMPARISON: 8/26/2017 and CT chest 4/18/2018, 1/12/2018.

 

 

FINDINGS: 

 

PET:

Physiologic FDG uptake within the neck.

 

Patient's spiculated left upper lobe lung mass is unchanged from 4/18/2018 measuring 2.4 cm, smaller 
from PET/CT of 8/26/2017 where it measured 3.7 cm. There is minimal FDG uptake, max SUV 1.9 versus 4.
8 on prior PET/CT.

 

Average liver uptake is 2.3.

 

Stable mixed fat and soft tissue density mass measuring 4.5 cm there is no discrete FDG uptake with f
indings most compatible with a myelolipoma.

 

Variable FDG uptake in the right hemicolon with segments of intense activity along the ascending colo
n likely physiologic muscular uptake. This can be correlated with patient's routine screening colonos
copy.

 

Otherwise, physiologic FDG uptake within the abdomen and pelvis.

 

T5 and T7 endplate fractures are unchanged without discrete uptake.

 

 

 

ATTENUATION CORRECTION CT:

Paranasal sinuses and mastoid air cells are well pneumatized. Atelectatic calcifications within the b
ilateral carotid bifurcations. No cervical lymphadenopathy.

 

Heart is normal size without pericardial effusion. Borderline ectatic ascending aorta at 3.5 cm. Mild
 atherosclerotic arch calcifications with conventional arch vessel branching anatomy. No thoracic lym
phadenopathy. Some calcified subcarinal and left hilar lymph nodes suggest prior granulomatous diseas
e. Mild emphysematous change is present. Mild diffuse bronchial wall thickening is similar. No consol
idation or pleural effusion.

 

Gallbladder is hydropic measuring 4.3 cm wide. Moderate atherosclerotic calcifications infrarenal abd
ominal aorta and iliac arteries.  A couple calcified granulomas in the spleen. Mild-to-moderate stool
 scattered throughout the abdomen.  No dilated small bowel, free fluid, or free air. No mesenteric or
 retroperitoneal lymphadenopathy.

 

Bladder nondistended. No abnormal fluid collection in the pelvis.

 

Bones: Mild degenerative changes at the hips. Degenerative changes mid to lower lumbar spine. Endplat
e spondylosis mid to lower thoracic spine. Cervical spondylosis. No osseous destructive process.

 

 

 

IMPRESSION: 

 

1. Spiculated left upper lobe mass essentially stable from 1/12/2018 measuring 2.4 cm (smaller from 8
/26/2017 where it measured 3.7 cm). There is minimal residual uptake here, max SUV 1.9 (versus 4.8 on
 prior PET/CT) likely reflecting treated disease. Follow-up as clinically indicated.

2. No evidence for metastatic disease.

3. Stable probable myelolipoma left adrenal gland shows no FDG uptake.

4. Some variable uptake along the ascending colon likely physiologic muscular activity. This can be c
orrelated with routine screening colonoscopy.

5. T5 and T7 endplate fractures are unchanged from 4/18/2018 and show no discrete uptake.

6. Hydropic gallbladder likely relating to fasting state. If right upper quadrant pain or concern for
 early acute cholecystitis, follow-up ultrasound or HIDA scan.

## 2018-07-14 ENCOUNTER — HOSPITAL ENCOUNTER (INPATIENT)
Dept: HOSPITAL 47 - EC | Age: 63
LOS: 3 days | Discharge: HOME | DRG: 392 | End: 2018-07-17
Payer: MEDICARE

## 2018-07-14 DIAGNOSIS — G89.29: ICD-10-CM

## 2018-07-14 DIAGNOSIS — J98.11: ICD-10-CM

## 2018-07-14 DIAGNOSIS — Z87.01: ICD-10-CM

## 2018-07-14 DIAGNOSIS — Z80.9: ICD-10-CM

## 2018-07-14 DIAGNOSIS — M19.90: ICD-10-CM

## 2018-07-14 DIAGNOSIS — M54.9: ICD-10-CM

## 2018-07-14 DIAGNOSIS — M79.7: ICD-10-CM

## 2018-07-14 DIAGNOSIS — Z86.73: ICD-10-CM

## 2018-07-14 DIAGNOSIS — R06.00: ICD-10-CM

## 2018-07-14 DIAGNOSIS — Z79.891: ICD-10-CM

## 2018-07-14 DIAGNOSIS — K44.9: ICD-10-CM

## 2018-07-14 DIAGNOSIS — Z92.21: ICD-10-CM

## 2018-07-14 DIAGNOSIS — Z79.899: ICD-10-CM

## 2018-07-14 DIAGNOSIS — C34.12: ICD-10-CM

## 2018-07-14 DIAGNOSIS — F32.9: ICD-10-CM

## 2018-07-14 DIAGNOSIS — J44.9: ICD-10-CM

## 2018-07-14 DIAGNOSIS — K21.9: ICD-10-CM

## 2018-07-14 DIAGNOSIS — K52.9: Primary | ICD-10-CM

## 2018-07-14 DIAGNOSIS — E78.5: ICD-10-CM

## 2018-07-14 DIAGNOSIS — Z79.51: ICD-10-CM

## 2018-07-14 DIAGNOSIS — G43.909: ICD-10-CM

## 2018-07-14 DIAGNOSIS — Z87.891: ICD-10-CM

## 2018-07-14 DIAGNOSIS — E27.9: ICD-10-CM

## 2018-07-14 DIAGNOSIS — Z79.52: ICD-10-CM

## 2018-07-14 DIAGNOSIS — J90: ICD-10-CM

## 2018-07-14 DIAGNOSIS — R32: ICD-10-CM

## 2018-07-14 DIAGNOSIS — Z92.3: ICD-10-CM

## 2018-07-14 LAB
ALBUMIN SERPL-MCNC: 4.4 G/DL (ref 3.5–5)
ALP SERPL-CCNC: 62 U/L (ref 38–126)
ALT SERPL-CCNC: 40 U/L (ref 9–52)
ANION GAP SERPL CALC-SCNC: 12 MMOL/L
APTT BLD: 21.6 SEC (ref 22–30)
AST SERPL-CCNC: 27 U/L (ref 14–36)
BASOPHILS # BLD AUTO: 0 K/UL (ref 0–0.2)
BASOPHILS NFR BLD AUTO: 0 %
BUN SERPL-SCNC: 20 MG/DL (ref 7–17)
CALCIUM SPEC-MCNC: 9.7 MG/DL (ref 8.4–10.2)
CHLORIDE SERPL-SCNC: 107 MMOL/L (ref 98–107)
CO2 SERPL-SCNC: 22 MMOL/L (ref 22–30)
EOSINOPHIL # BLD AUTO: 0.1 K/UL (ref 0–0.7)
EOSINOPHIL NFR BLD AUTO: 0 %
ERYTHROCYTE [DISTWIDTH] IN BLOOD BY AUTOMATED COUNT: 5.68 M/UL (ref 3.8–5.4)
ERYTHROCYTE [DISTWIDTH] IN BLOOD: 14.5 % (ref 11.5–15.5)
GLUCOSE SERPL-MCNC: 179 MG/DL (ref 74–99)
GLUCOSE UR QL: (no result)
HCT VFR BLD AUTO: 54.1 % (ref 34–46)
HGB BLD-MCNC: 18.1 GM/DL (ref 11.4–16)
INR PPP: 1.1 (ref ?–1.2)
KETONES UR QL STRIP.AUTO: (no result)
LYMPHOCYTES # SPEC AUTO: 0.4 K/UL (ref 1–4.8)
LYMPHOCYTES NFR SPEC AUTO: 3 %
MCH RBC QN AUTO: 31.9 PG (ref 25–35)
MCHC RBC AUTO-ENTMCNC: 33.5 G/DL (ref 31–37)
MCV RBC AUTO: 95.1 FL (ref 80–100)
MONOCYTES # BLD AUTO: 0.6 K/UL (ref 0–1)
MONOCYTES NFR BLD AUTO: 4 %
NEUTROPHILS # BLD AUTO: 14.3 K/UL (ref 1.3–7.7)
NEUTROPHILS NFR BLD AUTO: 92 %
PH UR: 7.5 [PH] (ref 5–8)
PLATELET # BLD AUTO: 369 K/UL (ref 150–450)
POTASSIUM SERPL-SCNC: 3.8 MMOL/L (ref 3.5–5.1)
PROT SERPL-MCNC: 7.2 G/DL (ref 6.3–8.2)
PT BLD: 10.7 SEC (ref 9–12)
RBC UR QL: 2 /HPF (ref 0–5)
SODIUM SERPL-SCNC: 141 MMOL/L (ref 137–145)
SP GR UR: 1.01 (ref 1–1.03)
SQUAMOUS UR QL AUTO: <1 /HPF (ref 0–4)
UROBILINOGEN UR QL STRIP: <2 MG/DL (ref ?–2)
WBC # BLD AUTO: 15.5 K/UL (ref 3.8–10.6)

## 2018-07-14 PROCEDURE — 74177 CT ABD & PELVIS W/CONTRAST: CPT

## 2018-07-14 PROCEDURE — 94640 AIRWAY INHALATION TREATMENT: CPT

## 2018-07-14 PROCEDURE — 85610 PROTHROMBIN TIME: CPT

## 2018-07-14 PROCEDURE — 93005 ELECTROCARDIOGRAM TRACING: CPT

## 2018-07-14 PROCEDURE — 99291 CRITICAL CARE FIRST HOUR: CPT

## 2018-07-14 PROCEDURE — 71046 X-RAY EXAM CHEST 2 VIEWS: CPT

## 2018-07-14 PROCEDURE — 83605 ASSAY OF LACTIC ACID: CPT

## 2018-07-14 PROCEDURE — 96375 TX/PRO/DX INJ NEW DRUG ADDON: CPT

## 2018-07-14 PROCEDURE — 85730 THROMBOPLASTIN TIME PARTIAL: CPT

## 2018-07-14 PROCEDURE — 85025 COMPLETE CBC W/AUTO DIFF WBC: CPT

## 2018-07-14 PROCEDURE — 96367 TX/PROPH/DG ADDL SEQ IV INF: CPT

## 2018-07-14 PROCEDURE — 81001 URINALYSIS AUTO W/SCOPE: CPT

## 2018-07-14 PROCEDURE — 87086 URINE CULTURE/COLONY COUNT: CPT

## 2018-07-14 PROCEDURE — 36415 COLL VENOUS BLD VENIPUNCTURE: CPT

## 2018-07-14 PROCEDURE — 96365 THER/PROPH/DIAG IV INF INIT: CPT

## 2018-07-14 PROCEDURE — 87205 SMEAR GRAM STAIN: CPT

## 2018-07-14 PROCEDURE — 87070 CULTURE OTHR SPECIMN AEROBIC: CPT

## 2018-07-14 PROCEDURE — 96376 TX/PRO/DX INJ SAME DRUG ADON: CPT

## 2018-07-14 PROCEDURE — 87040 BLOOD CULTURE FOR BACTERIA: CPT

## 2018-07-14 PROCEDURE — 80053 COMPREHEN METABOLIC PANEL: CPT

## 2018-07-14 RX ADMIN — MORPHINE SULFATE SCH MG: 30 TABLET, FILM COATED, EXTENDED RELEASE ORAL at 20:38

## 2018-07-14 RX ADMIN — CEFAZOLIN SCH MLS/HR: 330 INJECTION, POWDER, FOR SOLUTION INTRAMUSCULAR; INTRAVENOUS at 14:38

## 2018-07-14 RX ADMIN — DRONABINOL SCH MG: 2.5 CAPSULE ORAL at 20:40

## 2018-07-14 RX ADMIN — CEVIMELINE HYDROCHLORIDE SCH MG: 30 CAPSULE ORAL at 20:40

## 2018-07-14 RX ADMIN — ONDANSETRON STA MG: 2 INJECTION INTRAMUSCULAR; INTRAVENOUS at 15:34

## 2018-07-14 RX ADMIN — ISODIUM CHLORIDE PRN MG: 0.03 SOLUTION RESPIRATORY (INHALATION) at 20:15

## 2018-07-14 RX ADMIN — ONDANSETRON STA MG: 2 INJECTION INTRAMUSCULAR; INTRAVENOUS at 11:31

## 2018-07-14 RX ADMIN — HYDROMORPHONE HYDROCHLORIDE PRN MG: 1 INJECTION, SOLUTION INTRAMUSCULAR; INTRAVENOUS; SUBCUTANEOUS at 22:45

## 2018-07-14 RX ADMIN — LATANOPROST SCH DROPS: 50 SOLUTION OPHTHALMIC at 20:39

## 2018-07-14 RX ADMIN — BUDESONIDE AND FORMOTEROL FUMARATE DIHYDRATE SCH PUFF: 160; 4.5 AEROSOL RESPIRATORY (INHALATION) at 20:14

## 2018-07-14 NOTE — ED
General Adult HPI





- General


Chief complaint: Shortness of Breath


Stated complaint: GARRY


Time Seen by Provider: 07/14/18 11:03


Source: patient, family, EMS, RN notes reviewed


Mode of arrival: EMS


Limitations: no limitations





- History of Present Illness


Initial comments: 





Patient is a pleasant 62-year-old female presenting to the emergency department 

with nausea vomiting diarrhea.  Onset was around 24 hours ago.  Patient has 

been fatigued.  Patient does admit also to increased dyspnea.  No fevers at 

home.  Patient is on immunosuppressive therapy with history of lung cancer.





- Related Data


 Home Medications











 Medication  Instructions  Recorded  Confirmed


 


Albuterol Inhaler [Ventolin Hfa 2 puff INHALATION RT-QID PRN 05/27/14 07/14/18





Inhaler]   


 


Cevimeline [Evoxac] 30 mg PO BID 05/27/14 07/14/18


 


Escitalopram [Lexapro] 20 mg PO DAILY 05/27/14 07/14/18


 


Esomeprazole Magnesium [NexIUM] 40 mg PO BID 05/27/14 07/14/18


 


Fesoterodine Fumarate [Toviaz] 8 mg PO DAILY 05/27/14 07/14/18


 


Ibuprofen [Motrin] 600 mg PO Q6HR PRN 05/27/14 07/14/18


 


Latanoprost Ophth [Xalatan 0.005%] 1 drops BOTH EYES HS 02/27/17 07/14/18


 


oxyCODONE-APAP 10-325MG [Percocet 1 tab PO Q6H PRN 02/27/17 07/14/18





 mg]   


 


Albuterol Nebulized [Ventolin 2.5 mg INHALATION RT-Q4H PRN 08/17/17 07/14/18





Nebulized]   


 


Budesonide-Formot 160-4.5 Mcg 2 puff INHALATION RT-DAILY 08/17/17 07/14/18





[Symbicort 160-4.5 Mcg Inhaler]   


 


Ondansetron Odt [Zofran ODT] 8 mg PO Q6H PRN 08/17/17 07/14/18


 


Prochlorperazine [Compazine] 10 mg PO Q6H PRN 08/17/17 07/14/18


 


predniSONE 5 mg PO DAILY 09/28/17 07/14/18


 


ALPRAZolam [Xanax] 0.25 mg PO BID PRN 07/14/18 07/14/18


 


Dronabinol [Marinol] 5 mg PO BID 07/14/18 07/14/18


 


Megestrol [Megace] 600 mg PO DAILY PRN 07/14/18 07/14/18


 


Pregabalin [Lyrica] 300 mg PO BID PRN 07/14/18 07/14/18








 Previous Rx's











 Medication  Instructions  Recorded


 


Morphine Sulfate [Ms Contin] 30 mg PO BID #60 tablet.er 09/02/15











 Allergies











Allergy/AdvReac Type Severity Reaction Status Date / Time


 


No Known Allergies Allergy   Verified 07/14/18 12:55














Review of Systems


ROS Statement: 


Those systems with pertinent positive or pertinent negative responses have been 

documented in the HPI.





ROS Other: All systems not noted in ROS Statement are negative.


Constitutional: Denies: fever


Eyes: Denies: eye pain


ENT: Denies: ear pain


Respiratory: Reports: cough, dyspnea


Cardiovascular: Denies: chest pain


Endocrine: Reports: fatigue


Gastrointestinal: Reports: nausea, vomiting, diarrhea.  Denies: abdominal pain


Genitourinary: Denies: dysuria


Musculoskeletal: Denies: back pain


Skin: Denies: rash


Neurological: Denies: headache





Past Medical History


Past Medical History: Cancer, COPD, CVA/TIA, Fibromyalgia, GERD/Reflux, 

Hyperlipidemia, Osteoarthritis (OA), Pneumonia


Additional Past Medical History / Comment(s): ADRENAL TUMOR, hx migraines, TIA, 

hiatal hernia,  constipation, urinary incontinence,. DDD, BACK PAIN.,PT STATES 

RESCHEDULED FOR EGD AND COLONOSCOPY DUE TO COLD SYMPTOMS BUT SHE IS BETTER NOW. 

Lung cancer diagnosed May 2017.


History of Any Multi-Drug Resistant Organisms: None Reported


Additional Past Surgical History / Comment(s): SINUS surgery


Past Anesthesia/Blood Transfusion Reactions: No Reported Reaction


Past Psychological History: Depression


Smoking Status: Former smoker


Past Alcohol Use History: None Reported


Past Drug Use History: None Reported





- Past Family History


  ** Mother


Family Medical History: Cancer





  ** Father


Family Medical History: Cancer





General Exam


Limitations: no limitations


General appearance: alert, in no apparent distress


Head exam: Present: atraumatic


Eye exam: Present: normal appearance, PERRL


ENT exam: Present: mucous membranes dry


Neck exam: Present: normal inspection


Respiratory exam: Present: rales (Mostly at the left base)


Cardiovascular Exam: Present: regular rate, normal rhythm


GI/Abdominal exam: Present: soft, normal bowel sounds.  Absent: tenderness, 

guarding, rigid


Extremities exam: Present: normal inspection


Neurological exam: Present: alert


Psychiatric exam: Present: normal affect, normal mood


Skin exam: Present: normal color





Course


 Vital Signs











  07/14/18 07/14/18 07/14/18





  10:46 11:35 11:37


 


Temperature 99.0 F  


 


Pulse Rate 94 95 87


 


Respiratory 20 24 





Rate   


 


Blood Pressure 168/103 182/88 


 


O2 Sat by Pulse 97 97 





Oximetry   














  07/14/18 07/14/18 07/14/18





  11:47 12:19 12:59


 


Temperature   100.8 F H


 


Pulse Rate 90 96 101 H


 


Respiratory  20 24





Rate   


 


Blood Pressure  192/103 176/104


 


O2 Sat by Pulse  97 92 L





Oximetry   














- Reevaluation(s)


Reevaluation #1: 





07/14/18 13:27


There is concern for possible sepsis at 1327.  Blood culture and lactic acid 

and IV antibiotics will be ordered.





EKG Findings





- EKG Comments:


EKG Findings:: Normal sinus rhythm 95.  .  QRS 78.  .  QTc 447.  

Normal axis.  Poor R-wave progression.  Right atrial enlargement.  No acute ST 

change.





Medical Decision Making





- Medical Decision Making





Patient is on immunosuppressive therapy, Imfinzi.  Case was discussed in detail 

with Dr. Higuera, who would like patient admitted.  He agrees with Levaquin.  He 

does request cultures and computed tomography scan of the abdomen and pelvis.  

Dr. Garza has been paged for admission for Dr. salvador.





- Lab Data


Result diagrams: 


 07/14/18 10:55





 07/14/18 10:55


 Lab Results











  07/14/18 07/14/18 07/14/18 Range/Units





  10:55 10:55 10:55 


 


WBC  15.5 H    (3.8-10.6)  k/uL


 


RBC  5.68 H    (3.80-5.40)  m/uL


 


Hgb  18.1 H    (11.4-16.0)  gm/dL


 


Hct  54.1 H    (34.0-46.0)  %


 


MCV  95.1    (80.0-100.0)  fL


 


MCH  31.9    (25.0-35.0)  pg


 


MCHC  33.5    (31.0-37.0)  g/dL


 


RDW  14.5    (11.5-15.5)  %


 


Plt Count  369    (150-450)  k/uL


 


Neutrophils %  92    %


 


Lymphocytes %  3    %


 


Monocytes %  4    %


 


Eosinophils %  0    %


 


Basophils %  0    %


 


Neutrophils #  14.3 H    (1.3-7.7)  k/uL


 


Lymphocytes #  0.4 L    (1.0-4.8)  k/uL


 


Monocytes #  0.6    (0-1.0)  k/uL


 


Eosinophils #  0.1    (0-0.7)  k/uL


 


Basophils #  0.0    (0-0.2)  k/uL


 


PT     (9.0-12.0)  sec


 


INR     (<1.2)  


 


APTT     (22.0-30.0)  sec


 


Sodium   141   (137-145)  mmol/L


 


Potassium   3.8   (3.5-5.1)  mmol/L


 


Chloride   107   ()  mmol/L


 


Carbon Dioxide   22   (22-30)  mmol/L


 


Anion Gap   12   mmol/L


 


BUN   20 H   (7-17)  mg/dL


 


Creatinine   0.70   (0.52-1.04)  mg/dL


 


Est GFR (CKD-EPI)AfAm   >90   (>60 ml/min/1.73 sqM)  


 


Est GFR (CKD-EPI)NonAf   >90   (>60 ml/min/1.73 sqM)  


 


Glucose   179 H   (74-99)  mg/dL


 


Plasma Lactic Acid Ebenezer    1.9  (0.7-2.0)  mmol/L


 


Calcium   9.7   (8.4-10.2)  mg/dL


 


Total Bilirubin   1.1   (0.2-1.3)  mg/dL


 


AST   27   (14-36)  U/L


 


ALT   40   (9-52)  U/L


 


Alkaline Phosphatase   62   ()  U/L


 


Total Protein   7.2   (6.3-8.2)  g/dL


 


Albumin   4.4   (3.5-5.0)  g/dL


 


Urine Color     


 


Urine Appearance     (Clear)  


 


Urine pH     (5.0-8.0)  


 


Ur Specific Gravity     (1.001-1.035)  


 


Urine Protein     (Negative)  


 


Urine Glucose (UA)     (Negative)  


 


Urine Ketones     (Negative)  


 


Urine Blood     (Negative)  


 


Urine Nitrite     (Negative)  


 


Urine Bilirubin     (Negative)  


 


Urine Urobilinogen     (<2.0)  mg/dL


 


Ur Leukocyte Esterase     (Negative)  


 


Urine RBC     (0-5)  /hpf


 


Ur Squamous Epith Cells     (0-4)  /hpf


 


Amorphous Sediment     (None)  /hpf














  07/14/18 07/14/18 Range/Units





  10:55 11:37 


 


WBC    (3.8-10.6)  k/uL


 


RBC    (3.80-5.40)  m/uL


 


Hgb    (11.4-16.0)  gm/dL


 


Hct    (34.0-46.0)  %


 


MCV    (80.0-100.0)  fL


 


MCH    (25.0-35.0)  pg


 


MCHC    (31.0-37.0)  g/dL


 


RDW    (11.5-15.5)  %


 


Plt Count    (150-450)  k/uL


 


Neutrophils %    %


 


Lymphocytes %    %


 


Monocytes %    %


 


Eosinophils %    %


 


Basophils %    %


 


Neutrophils #    (1.3-7.7)  k/uL


 


Lymphocytes #    (1.0-4.8)  k/uL


 


Monocytes #    (0-1.0)  k/uL


 


Eosinophils #    (0-0.7)  k/uL


 


Basophils #    (0-0.2)  k/uL


 


PT  10.7   (9.0-12.0)  sec


 


INR  1.1   (<1.2)  


 


APTT  21.6 L   (22.0-30.0)  sec


 


Sodium    (137-145)  mmol/L


 


Potassium    (3.5-5.1)  mmol/L


 


Chloride    ()  mmol/L


 


Carbon Dioxide    (22-30)  mmol/L


 


Anion Gap    mmol/L


 


BUN    (7-17)  mg/dL


 


Creatinine    (0.52-1.04)  mg/dL


 


Est GFR (CKD-EPI)AfAm    (>60 ml/min/1.73 sqM)  


 


Est GFR (CKD-EPI)NonAf    (>60 ml/min/1.73 sqM)  


 


Glucose    (74-99)  mg/dL


 


Plasma Lactic Acid Ebenezer    (0.7-2.0)  mmol/L


 


Calcium    (8.4-10.2)  mg/dL


 


Total Bilirubin    (0.2-1.3)  mg/dL


 


AST    (14-36)  U/L


 


ALT    (9-52)  U/L


 


Alkaline Phosphatase    ()  U/L


 


Total Protein    (6.3-8.2)  g/dL


 


Albumin    (3.5-5.0)  g/dL


 


Urine Color   Light Yellow  


 


Urine Appearance   Clear  (Clear)  


 


Urine pH   7.5  (5.0-8.0)  


 


Ur Specific Gravity   1.007  (1.001-1.035)  


 


Urine Protein   Negative  (Negative)  


 


Urine Glucose (UA)   2+ H  (Negative)  


 


Urine Ketones   1+ H  (Negative)  


 


Urine Blood   Trace H  (Negative)  


 


Urine Nitrite   Negative  (Negative)  


 


Urine Bilirubin   Negative  (Negative)  


 


Urine Urobilinogen   <2.0  (<2.0)  mg/dL


 


Ur Leukocyte Esterase   Negative  (Negative)  


 


Urine RBC   2  (0-5)  /hpf


 


Ur Squamous Epith Cells   <1  (0-4)  /hpf


 


Amorphous Sediment   Rare H  (None)  /hpf














Critical Care Time


Critical Care Time: Yes


Total Critical Care Time: 32





Disposition


Clinical Impression: 


 Diarrhea, Dyspnea





Disposition: ADMITTED AS IP TO THIS HOSP


Referrals: 


Rebecca Amaya MD [Primary Care Provider] - 1-2 days


Decision Time: 13:28

## 2018-07-14 NOTE — XR
EXAMINATION TYPE: XR chest 2V

 

DATE OF EXAM: 7/14/2018

 

HISTORY: Fever.

 

REFERENCE: Previous study dated 10/30/2017.

 

FINDINGS: The lungs are overinflated. There is a left upper lobe mass similar in size since the previ
ous study. The lungs are otherwise clear. There is a left-sided pleural reaction likely representing 
fluid. The heart is not enlarged.

 

IMPRESSION: 

1. COPD.

2. STABLE LEFT UPPER LOBE PULMONARY MASS.

3. PROBABLE LEFT EFFUSION.

## 2018-07-14 NOTE — CT
EXAMINATION TYPE: CT abdomen pelvis w con

 

DATE OF EXAM: 7/14/2018

 

HISTORY: Diarrhea, nausea, vomiting, and fever [facility].

 

CT DLP: 310mGycm  Automated Exposure Control for Dose Reduction was Utilized.

 

CONTRAST: 

CT scan of the abdomen and pelvis is performed with oral and with IV Contrast, patient injected with 
100 mL of Isovue 300.

 

COMPARISON: PET/CT August 26, 2017. CT chest August 18, 2018

 

FINDINGS:

 

LUNG BASES: No significant abnormality is appreciated.

 

LIVER/GB: No significant abnormality is appreciated.

 

PANCREAS: No significant abnormality is seen.

 

SPLEEN: A few calcifications are redemonstrated scattered throughout the spleen presumed product of o
ld granulomatous disease.

 

ADRENALS: There is persistent heterogeneous enhancing left adrenal mass measuring roughly 4.3 x 3.9 c
m significantly changed in size from most recent chest CT

 

KIDNEYS: No significant abnormality is seen.

 

BOWEL: The oral contrast does not reach colonic level making evaluation slightly suboptimal. There is
 no suspicious small or large bowel dilatation. There is mild wall thickening in the mid to distal si
gmoid colon axial images 54 through 56 for reference. Finding could be product of poor distention how
ever a mild acute colitis at this level cannot be excluded.

 

UTERUS/ADNEXA: Retroverted uterus is present. A few scattered pelvic phleboliths are present.

 

LYMPH NODES: No greater than 1cm abdominal or pelvic lymph nodes are appreciated.

 

OSSEOUS STRUCTURES: No significant abnormality is seen.

 

OTHER: No significant additional abnormality is seen.

 

IMPRESSION: No bowel obstruction is seen. Possible mild colitis mid to distal sigmoid colon. Correlat
e clinically.

## 2018-07-15 LAB
ALBUMIN SERPL-MCNC: 3.8 G/DL (ref 3.5–5)
ALP SERPL-CCNC: 42 U/L (ref 38–126)
ALT SERPL-CCNC: 43 U/L (ref 9–52)
ANION GAP SERPL CALC-SCNC: 9 MMOL/L
AST SERPL-CCNC: 41 U/L (ref 14–36)
BUN SERPL-SCNC: 26 MG/DL (ref 7–17)
CALCIUM SPEC-MCNC: 9.3 MG/DL (ref 8.4–10.2)
CHLORIDE SERPL-SCNC: 104 MMOL/L (ref 98–107)
CO2 SERPL-SCNC: 22 MMOL/L (ref 22–30)
GLUCOSE SERPL-MCNC: 126 MG/DL (ref 74–99)
POTASSIUM SERPL-SCNC: 3.7 MMOL/L (ref 3.5–5.1)
PROT SERPL-MCNC: 6.4 G/DL (ref 6.3–8.2)
SODIUM SERPL-SCNC: 135 MMOL/L (ref 137–145)

## 2018-07-15 RX ADMIN — BUDESONIDE AND FORMOTEROL FUMARATE DIHYDRATE SCH PUFF: 160; 4.5 AEROSOL RESPIRATORY (INHALATION) at 08:42

## 2018-07-15 RX ADMIN — DRONABINOL SCH: 2.5 CAPSULE ORAL at 21:56

## 2018-07-15 RX ADMIN — DRONABINOL SCH MG: 2.5 CAPSULE ORAL at 09:48

## 2018-07-15 RX ADMIN — CEFAZOLIN SCH: 330 INJECTION, POWDER, FOR SOLUTION INTRAMUSCULAR; INTRAVENOUS at 09:47

## 2018-07-15 RX ADMIN — CEFTRIAXONE SODIUM SCH MG: 1 INJECTION, POWDER, FOR SOLUTION INTRAMUSCULAR; INTRAVENOUS at 16:52

## 2018-07-15 RX ADMIN — MORPHINE SULFATE SCH MG: 30 TABLET, FILM COATED, EXTENDED RELEASE ORAL at 21:55

## 2018-07-15 RX ADMIN — HYDROMORPHONE HYDROCHLORIDE PRN MG: 1 INJECTION, SOLUTION INTRAMUSCULAR; INTRAVENOUS; SUBCUTANEOUS at 18:07

## 2018-07-15 RX ADMIN — HYDROMORPHONE HYDROCHLORIDE PRN MG: 1 INJECTION, SOLUTION INTRAMUSCULAR; INTRAVENOUS; SUBCUTANEOUS at 05:44

## 2018-07-15 RX ADMIN — HYDROMORPHONE HYDROCHLORIDE PRN MG: 1 INJECTION, SOLUTION INTRAMUSCULAR; INTRAVENOUS; SUBCUTANEOUS at 22:57

## 2018-07-15 RX ADMIN — CEFAZOLIN SCH MLS/HR: 330 INJECTION, POWDER, FOR SOLUTION INTRAMUSCULAR; INTRAVENOUS at 21:54

## 2018-07-15 RX ADMIN — TROSPIUM CHLORIDE SCH MG: 20 TABLET, FILM COATED ORAL at 21:54

## 2018-07-15 RX ADMIN — CEFAZOLIN SCH: 330 INJECTION, POWDER, FOR SOLUTION INTRAMUSCULAR; INTRAVENOUS at 06:23

## 2018-07-15 RX ADMIN — CEVIMELINE HYDROCHLORIDE SCH MG: 30 CAPSULE ORAL at 21:53

## 2018-07-15 RX ADMIN — OXYCODONE HYDROCHLORIDE AND ACETAMINOPHEN PRN EACH: 10; 325 TABLET ORAL at 07:14

## 2018-07-15 RX ADMIN — MORPHINE SULFATE SCH MG: 30 TABLET, FILM COATED, EXTENDED RELEASE ORAL at 09:48

## 2018-07-15 RX ADMIN — ESCITALOPRAM OXALATE SCH MG: 20 TABLET, FILM COATED ORAL at 09:34

## 2018-07-15 RX ADMIN — PANTOPRAZOLE SODIUM SCH MG: 40 TABLET, DELAYED RELEASE ORAL at 09:34

## 2018-07-15 RX ADMIN — LEVOFLOXACIN SCH MLS/HR: 750 INJECTION, SOLUTION INTRAVENOUS at 12:23

## 2018-07-15 RX ADMIN — HYDROMORPHONE HYDROCHLORIDE PRN MG: 1 INJECTION, SOLUTION INTRAMUSCULAR; INTRAVENOUS; SUBCUTANEOUS at 13:59

## 2018-07-15 RX ADMIN — ONDANSETRON PRN MG: 8 TABLET, ORALLY DISINTEGRATING ORAL at 11:49

## 2018-07-15 RX ADMIN — LATANOPROST SCH: 50 SOLUTION OPHTHALMIC at 21:56

## 2018-07-15 RX ADMIN — CEVIMELINE HYDROCHLORIDE SCH MG: 30 CAPSULE ORAL at 09:49

## 2018-07-15 RX ADMIN — ISODIUM CHLORIDE PRN MG: 0.03 SOLUTION RESPIRATORY (INHALATION) at 08:42

## 2018-07-15 RX ADMIN — PANTOPRAZOLE SODIUM SCH MG: 40 TABLET, DELAYED RELEASE ORAL at 18:07

## 2018-07-15 RX ADMIN — ISODIUM CHLORIDE PRN MG: 0.03 SOLUTION RESPIRATORY (INHALATION) at 17:02

## 2018-07-15 RX ADMIN — ENOXAPARIN SODIUM SCH MG: 40 INJECTION SUBCUTANEOUS at 16:52

## 2018-07-15 RX ADMIN — TROSPIUM CHLORIDE SCH MG: 20 TABLET, FILM COATED ORAL at 09:33

## 2018-07-15 NOTE — P.CONS
History of Present Illness





- Reason for Consult


Consult date: 07/15/18


Lung cancer on Immunotherapy. N/V/D/fever





- History of Present Illness





   the patient is a 62-year-old white  female, who was diagnosed with stage III 

adenocarcinoma of the lung in mid 2017.  The patient was treated with 

concurrent chemoradiation, and subsequently additional chemotherapy.  She was 

then placed on maintenance immunotherapy with Durvalumab.  She has had 4 cycles 

of the same, with the most recent on 7/12/18.   The patient developed nausea 

and vomiting as well as loose stools starting 7/13/18.  Her oral intake 

worsened to where she was unable to keep anything down.  She also noted mildly 

increased shortness of breath or baseline.  She states that she felt feverish 

at home.  She therefore came into the emergency room, where chest x-ray showed 

worsening left lower lobe atelectasis versus possible pneumonia.  The case was 

discussed with the emergency room physician.  Abdominal exam is fairly benign.  

The patient did have a low-grade fever at 100.8 in the ER.  She was started on 

antibiotics, and was admitted for further management.


  Consult was placed for further evaluation and recommendations.


  





Review of Systems


Constitutional: Reports fever, Reports weakness


Eyes: denies blurred vision, denies pain


Ears: deny: decreased hearing, ear discharge, earache, tinnitus


Ears, nose, mouth and throat: Denies headache, Denies sore throat


Cardiovascular: Reports dyspnea on exertion


Respiratory: Reports as per HPI, Reports dyspnea


Gastrointestinal: Reports diarrhea, Reports nausea, Reports vomiting


Genitourinary: Denies dysuria, Denies hematuria


Menstruation: Reports postmenopausal


Musculoskeletal: Reports muscle weakness


Integumentary: Denies pruritus, Denies rash


Neurological: Reports weakness


Psychiatric: Denies anxiety, Denies depression


Endocrine: Reports fatigue


Hematologic/Lymphatic: Reports as per HPI





Past Medical History


Past Medical History: Cancer, COPD, CVA/TIA, Fibromyalgia, GERD/Reflux, 

Hyperlipidemia, Osteoarthritis (OA), Pneumonia


Additional Past Medical History / Comment(s): Non-small cell lung cancer stage 

3 adenocarcinoma, COPD, fibromyalgia, acid reflux, hyperlipidemia, 

osteoarthritis, adrenal mass that has remained stable over some time, TIA 

history of, hiatal hernia, chronic constipation, chronic back pain, 

degenerative arthritis


History of Any Multi-Drug Resistant Organisms: None Reported


Additional Past Surgical History / Comment(s): SINUS surgery


Past Anesthesia/Blood Transfusion Reactions: No Reported Reaction


Past Psychological History: Depression


Smoking Status: Former smoker


Past Alcohol Use History: None Reported


Past Drug Use History: None Reported





- Past Family History


  ** Mother


Family Medical History: Cancer





  ** Father


Family Medical History: Cancer





Medications and Allergies


 Home Medications











 Medication  Instructions  Recorded  Confirmed  Type


 


Albuterol Inhaler [Ventolin Hfa 2 puff INHALATION RT-QID PRN 05/27/14 07/14/18 

History





Inhaler]    


 


Cevimeline [Evoxac] 30 mg PO BID 05/27/14 07/14/18 History


 


Escitalopram [Lexapro] 20 mg PO DAILY 05/27/14 07/14/18 History


 


Esomeprazole Magnesium [NexIUM] 40 mg PO BID 05/27/14 07/14/18 History


 


Fesoterodine Fumarate [Toviaz] 8 mg PO DAILY 05/27/14 07/14/18 History


 


Ibuprofen [Motrin] 600 mg PO Q6HR PRN 05/27/14 07/14/18 History


 


Morphine Sulfate [Ms Contin] 30 mg PO BID #60 tablet.er 09/02/15 07/14/18 Rx


 


Latanoprost Ophth [Xalatan 0.005%] 1 drops BOTH EYES HS 02/27/17 07/14/18 

History


 


oxyCODONE-APAP 10-325MG [Percocet 1 tab PO Q6H PRN 02/27/17 07/14/18 History





 mg]    


 


Albuterol Nebulized [Ventolin 2.5 mg INHALATION RT-Q4H PRN 08/17/17 07/14/18 

History





Nebulized]    


 


Budesonide-Formot 160-4.5 Mcg 2 puff INHALATION RT-DAILY 08/17/17 07/14/18 

History





[Symbicort 160-4.5 Mcg Inhaler]    


 


Ondansetron Odt [Zofran ODT] 8 mg PO Q6H PRN 08/17/17 07/14/18 History


 


Prochlorperazine [Compazine] 10 mg PO Q6H PRN 08/17/17 07/14/18 History


 


predniSONE 5 mg PO DAILY 09/28/17 07/14/18 History


 


ALPRAZolam [Xanax] 0.25 mg PO BID PRN 07/14/18 07/14/18 History


 


Dronabinol [Marinol] 5 mg PO BID 07/14/18 07/14/18 History


 


Megestrol [Megace] 600 mg PO DAILY PRN 07/14/18 07/14/18 History


 


Pregabalin [Lyrica] 300 mg PO BID PRN 07/14/18 07/14/18 History











 Allergies











Allergy/AdvReac Type Severity Reaction Status Date / Time


 


No Known Allergies Allergy   Verified 07/14/18 12:55














Physical Exam


Vitals: 


 Vital Signs











  Temp Pulse Pulse Resp BP Pulse Ox


 


 07/15/18 21:33   100    


 


 07/15/18 21:13   100    


 


 07/15/18 17:22   100    


 


 07/15/18 17:03   100    


 


 07/15/18 15:00  98.7 F   99  18  113/77  96


 


 07/15/18 08:55   98    


 


 07/15/18 08:42   90    


 


 07/15/18 07:00  98.5 F   81  20  125/81  97


 


 07/14/18 23:00  96.8 F L   102 H  16  139/96  95








 Intake and Output











 07/15/18 07/15/18 07/15/18





 06:59 14:59 22:59


 


Intake Total  280 


 


Balance  280 


 


Intake:   


 


  Intake, IV Titration  280 





  Amount   


 


    Levofloxacin 750Mg-D5w  150 





    Pmx 750 mg In Dextrose/   





    Water 1 150ml.bag @ 100   





    mls/hr IVPB Q24H [facility] Rx#:   





    [id number]   


 


    Sodium Chloride 0.9% 1,  130 





    000 ml @ 100 mls/hr IV .   





    Q10H [facility]ARY Rx#:[id number]   


 


Other:   


 


  Voiding Method  Toilet Toilet


 


  # Voids 2  














- Constitutional


General appearance: no acute distress





- EENT


Eyes: EOMI, PERRLA


ENT: hearing grossly normal, normal oropharynx





- Neck


Neck: no lymphadenopathy


Thyroid: bilateral: normal size





- Respiratory


Respiratory: left: diminished





- Cardiovascular


Rhythm: regular


Heart sounds: normal: S1, S2





- Gastrointestinal


General gastrointestinal: normal bowel sounds, soft





- Integumentary


Integumentary: normal





- Neurologic


Neurologic: CNII-XII intact





- Musculoskeletal


Musculoskeletal: generalized weakness, strength equal bilaterally





- Psychiatric


Psychiatric: A&O x's 3





Results


CBC & Chem 7: 


 07/14/18 10:55





 07/15/18 15:00


Labs: 


 Abnormal Lab Results - Last 24 Hours (Table)











  07/15/18 Range/Units





  15:00 


 


Sodium  135 L  (137-145)  mmol/L


 


BUN  26 H  (7-17)  mg/dL


 


Glucose  126 H  (74-99)  mg/dL


 


AST  41 H  (14-36)  U/L








 Microbiology - Last 24 Hours (Table)











 07/15/18 16:50 Urine Culture - Preliminary





 Urine,Clean Catch 


 


 07/14/18 10:55 Blood Culture Gram Stain - Preliminary





 Blood Blood Culture - Preliminary





    Coagulase Negative Staph


 


 07/14/18 10:55 Blood Culture - Final





 Blood 











Chest x-ray: report reviewed


CT scan - abdomen: report reviewed


CT scan - pelvis: report reviewed





Assessment and Plan


(1) Diarrhea


Narrative/Plan: 


 the patient's diarrhea has resolved today.  CT of the abdomen and pelvis  show 

mild thickening of the mid to distal sigmoid, felt to be more likely due to 

nondistention.  A mild colitis is not ruled out but appears to be less likely, 

given rapid resolution of diarrhea, and benign abdominal exam.  An infectious 

cause is therefore felt to be more likely.


Current Visit: Yes   Status: Acute   Code(s): R19.7 - DIARRHEA, UNSPECIFIED   

SNOMED Code(s): 62645640


   





(2) Dyspnea


Narrative/Plan: 


    The patient's respiratory status is back to baseline.  The initial exam in 

the ER, and chest x-ray there is a possibility of an infection in the left 

lower lobe.  The patient was therefore started on Levaquin.  Chest x-ray does 

not show any evidence of generalized pneumonitis.  Therefore side effects from 

her medication appears to be less likely.  Pulmonary evaluation is pending


Current Visit: Yes   Status: Acute   Code(s): R06.00 - DYSPNEA, UNSPECIFIED   

SNOMED Code(s): 661706600


   





(3) Gram-positive bacteremia


Narrative/Plan: 


  The patient's blood cultures are growing gram-positive cocci.  Fever has 

resolved on current antibiotics.  Await identification of susceptibility, to 

rule out contaminant versus true infection.


Current Visit: Yes   Status: Acute   Code(s): R78.81 - BACTEREMIA   SNOMED Code(

s): 783006062826


   





(4) Non-small cell lung cancer


Narrative/Plan: 


   therapeutic and diagnostic circumstances   as described.  The main concern 

was therefore presentation was due to her medication, which can cause immune 

mediated colitis and pneumonitis.  However the clinical presentation, 

especially rapid resolution of symptoms, without any immunosuppressive 

medications, is much more suggestive of an infection.  Therefore the plan will 

be to continue her current treatment post discharge


Current Visit: Yes   Status: Acute   Code(s): C34.90 - MALIGNANT NEOPLASM OF 

UNSP PART OF UNSP BRONCHUS OR LUNG   SNOMED Code(s): 234012788

## 2018-07-15 NOTE — P.CNPUL
History of Present Illness


Consult date: 07/15/18


Reason for consult: lung mass


History of present illness: 





62-year-old female patient with known history of adenocarcinoma of the left 

upper lobe, stage III disease, treated with accommodation of chemoradiation 

therapy following that the patient was placed on immunotherapy with a PT L1 

antagonist initially with Opdivo and later on with Infinzi.  Noted the patient 

has been responding very nicely to immunotherapy and there has been 

considerable drop in the size of the left upper lobe mass on follow-up CAT scan 

that was done in April 2018.  The patient has started on Infinzi approximately 

a month ago and she has received 3 sessions thus far.  She came into the 

hospital because of increased nausea vomiting and diarrhea.  This started 

approximately 24 hours ago.  No fever or chills.  She was fatigued.  She was 

having some increased chest congestion and no pleurisy.  No hemoptysis.  No 

hemodynamic instability pH was given IV fluids and she was given a dose of 

Levaquin.  This morning the patient is feeling much better and she is back to 

her baseline.  No other new complaints otherwise for now.  All of the blood 

work is within normal limits.  White cell count is slightly elevated at 15.5.  

The UA is showing +2 ketones and +1 blood otherwise there is no infection.





Review of Systems





12 point review of system was done and the positive findings are almost above 

in history of present illness.  The patient is feeling better today.  No nausea 

or vomiting and she was able to have breakfast and does not have any GI 

symptoms.





Past Medical History


Past Medical History: Cancer, COPD, CVA/TIA, Fibromyalgia, GERD/Reflux, 

Hyperlipidemia, Osteoarthritis (OA), Pneumonia


Additional Past Medical History / Comment(s): Non-small cell lung cancer stage 

3 adenocarcinoma, COPD, fibromyalgia, acid reflux, hyperlipidemia, 

osteoarthritis, adrenal mass that has remained stable over some time, TIA 

history of, hiatal hernia, chronic constipation, chronic back pain, 

degenerative arthritis


History of Any Multi-Drug Resistant Organisms: None Reported


Additional Past Surgical History / Comment(s): SINUS surgery


Past Anesthesia/Blood Transfusion Reactions: No Reported Reaction


Past Psychological History: Depression


Smoking Status: Former smoker


Past Alcohol Use History: None Reported


Past Drug Use History: None Reported





- Past Family History


  ** Mother


Family Medical History: Cancer





  ** Father


Family Medical History: Cancer





Medications and Allergies


 Home Medications











 Medication  Instructions  Recorded  Confirmed  Type


 


Albuterol Inhaler [Ventolin Hfa 2 puff INHALATION RT-QID PRN 05/27/14 07/14/18 

History





Inhaler]    


 


Cevimeline [Evoxac] 30 mg PO BID 05/27/14 07/14/18 History


 


Escitalopram [Lexapro] 20 mg PO DAILY 05/27/14 07/14/18 History


 


Esomeprazole Magnesium [NexIUM] 40 mg PO BID 05/27/14 07/14/18 History


 


Fesoterodine Fumarate [Toviaz] 8 mg PO DAILY 05/27/14 07/14/18 History


 


Ibuprofen [Motrin] 600 mg PO Q6HR PRN 05/27/14 07/14/18 History


 


Morphine Sulfate [Ms Contin] 30 mg PO BID #60 tablet.er 09/02/15 07/14/18 Rx


 


Latanoprost Ophth [Xalatan 0.005%] 1 drops BOTH EYES HS 02/27/17 07/14/18 

History


 


oxyCODONE-APAP 10-325MG [Percocet 1 tab PO Q6H PRN 02/27/17 07/14/18 History





 mg]    


 


Albuterol Nebulized [Ventolin 2.5 mg INHALATION RT-Q4H PRN 08/17/17 07/14/18 

History





Nebulized]    


 


Budesonide-Formot 160-4.5 Mcg 2 puff INHALATION RT-DAILY 08/17/17 07/14/18 

History





[Symbicort 160-4.5 Mcg Inhaler]    


 


Ondansetron Odt [Zofran ODT] 8 mg PO Q6H PRN 08/17/17 07/14/18 History


 


Prochlorperazine [Compazine] 10 mg PO Q6H PRN 08/17/17 07/14/18 History


 


predniSONE 5 mg PO DAILY 09/28/17 07/14/18 History


 


ALPRAZolam [Xanax] 0.25 mg PO BID PRN 07/14/18 07/14/18 History


 


Dronabinol [Marinol] 5 mg PO BID 07/14/18 07/14/18 History


 


Megestrol [Megace] 600 mg PO DAILY PRN 07/14/18 07/14/18 History


 


Pregabalin [Lyrica] 300 mg PO BID PRN 07/14/18 07/14/18 History











 Allergies











Allergy/AdvReac Type Severity Reaction Status Date / Time


 


No Known Allergies Allergy   Verified 07/14/18 12:55














Physical Exam


Vitals: 


 Vital Signs











  Temp Pulse Pulse Resp BP BP Pulse Ox


 


 07/15/18 08:55   98     


 


 07/15/18 08:42   90     


 


 07/15/18 07:00  98.5 F   81  20   125/81  97


 


 07/14/18 23:00  96.8 F L   102 H  16   139/96  95


 


 07/14/18 20:31   100     


 


 07/14/18 20:16   100     


 


 07/14/18 16:55  98.6 F   96  20   179/87  95


 


 07/14/18 16:05   92   18  174/85   98


 


 07/14/18 15:40  100.4 F H      


 


 07/14/18 15:30   91   18  185/82   99


 


 07/14/18 14:34   93   18  181/93   94 L


 


 07/14/18 12:59  100.8 F H  101 H   24  176/104   92 L


 


 07/14/18 12:19   96   20  192/103   97


 


 07/14/18 11:47   90     


 


 07/14/18 11:37   87     


 


 07/14/18 11:35   95   24  182/88   97








 Intake and Output











 07/14/18 07/15/18 07/15/18





 22:59 06:59 14:59


 


Intake Total 590  


 


Balance 590  


 


Intake:   


 


  Oral 590  


 


Other:   


 


  Voiding Method   Toilet


 


  # Voids 2 2 














The patient appeared well nourished and normally developed. Vital signs as 

documented. Head exam is unremarkable. No scleral icterus or corneal arcus 

noted. Neck is without jugular venous distension, thyromegaly, or carotid 

bruits. Carotid upstrokes are brisk bilaterally. Lungs few scattered rhonchi 

and few scattered expiratory wheezes.  Cardiac exam reveals the PMI to be 

normally sized and situated. Rhythm is regular. First and second heart sounds 

normal. No murmurs, rubs or gallops. Abdominal exam reveals normal bowel sounds

, no masses, no organomegaly and no aortic enlargement. Extremities are 

nonedematous and both femoral and pedal pulses are normal.  Neurologically the 

patient is awake and alert and there is no focal neurological deficits.  

Psychiatric appropriate.  SkinExamination of the skin revealed no evidence of 

significant rashes, suspicious appearing nevi or other concerning lesions.





Results





- Laboratory Findings


CBC and BMP: 


 07/14/18 10:55





 07/14/18 10:55


PT/INR, D-dimer











PT  10.7 sec (9.0-12.0)   07/14/18  10:55    


 


INR  1.1  (<1.2)   07/14/18  10:55    








Abnormal lab findings: 


 Abnormal Labs











  07/14/18 07/14/18 07/14/18





  10:55 10:55 10:55


 


WBC  15.5 H  


 


RBC  5.68 H  


 


Hgb  18.1 H  


 


Hct  54.1 H  


 


Neutrophils #  14.3 H  


 


Lymphocytes #  0.4 L  


 


APTT    21.6 L


 


BUN   20 H 


 


Glucose   179 H 


 


Urine Glucose (UA)   


 


Urine Ketones   


 


Urine Blood   


 


Amorphous Sediment   














  07/14/18





  11:37


 


WBC 


 


RBC 


 


Hgb 


 


Hct 


 


Neutrophils # 


 


Lymphocytes # 


 


APTT 


 


BUN 


 


Glucose 


 


Urine Glucose (UA)  2+ H


 


Urine Ketones  1+ H


 


Urine Blood  Trace H


 


Amorphous Sediment  Rare H














- Diagnostic Findings


Chest x-ray: image reviewed





Assessment and Plan


Plan: 








1 acute gastroenteritis, unlikely to be a side effect of immunotherapy for lung 

cancer.  Symptoms are improved and the patient is was resuscitated.  No signs 

of any septicemia





2 COPD currently inactive in stable





3 stage III non-small cell lung cancer/adenocarcinoma currently on immunotherapy





4 hyperlipidemia





5 degenerative arthritis





6 chronic back pain





7 migraine





8 history of TIA





9 fibromyalgia





Plan





Continue IV fluids.  Adequate resuscitation.  Increase oral intake as 

tolerated.  No need for antibiotics.  No need for further pulmonary workup.  

Chest x-ray was reviewed and the left upper lobe mass is noted however this has 

been decreasing in size and the patient has been treated with immunotherapy and 

she has responded nicely with significant interval response and decrease in the 

size of the lesion.

## 2018-07-15 NOTE — P.HPIM
History of Present Illness


H&P Date: 07/15/18


Chief Complaint: Nausea vomiting and diarrhea





Hemalatha Piper is a 62-year-old female patient of Dr. Amaya who presented to 

[facility] emergency room with a chief complaint of worsening 

nausea vomiting and diarrhea.  Patient has known history of adenocarcinoma of 

the lung stage III with known history of left upper lobe lung mass, treated 

with chemotherapy and currently started on immunotherapy with improvement in 

tumor size.  Patient states that she developed worsening nausea vomiting and 

diarrhea about 24 hours prior to presentation her symptoms were worsening and 

she decided to come to emergency room, she was evaluated in the emergency room 

white blood count was elevated at 15.5 computed tomography scan of the abdomen 

and pelvis revealed evidence of mild colitis in the distal sigmoid colon, 

patient was given a dose of Levaquin in the emergency room and was admitted to 

medical floor she was also given IV fluid.  Symptoms improved significantly 

patient has been able to tolerate diet there has been no nausea or vomiting 

since presentation, stool for C. diff was ordered in the emergency room, 

however patient did not have any further bowel movements since admission.  This 

morning blood culture came back positive for gram-positive cocci.





Past Medical History


Past Medical History: Cancer, COPD, CVA/TIA, Fibromyalgia, GERD/Reflux, 

Hyperlipidemia, Osteoarthritis (OA), Pneumonia


Additional Past Medical History / Comment(s): Non-small cell lung cancer stage 

3 adenocarcinoma, COPD, fibromyalgia, acid reflux, hyperlipidemia, 

osteoarthritis, adrenal mass that has remained stable over some time, TIA 

history of, hiatal hernia, chronic constipation, chronic back pain, 

degenerative arthritis


History of Any Multi-Drug Resistant Organisms: None Reported


Additional Past Surgical History / Comment(s): SINUS surgery


Past Anesthesia/Blood Transfusion Reactions: No Reported Reaction


Past Psychological History: Depression


Smoking Status: Former smoker


Past Alcohol Use History: None Reported


Past Drug Use History: None Reported





- Past Family History


  ** Mother


Family Medical History: Cancer





  ** Father


Family Medical History: Cancer





Medications and Allergies


 Home Medications











 Medication  Instructions  Recorded  Confirmed  Type


 


Albuterol Inhaler [Ventolin Hfa 2 puff INHALATION RT-QID PRN 05/27/14 07/14/18 

History





Inhaler]    


 


Cevimeline [Evoxac] 30 mg PO BID 05/27/14 07/14/18 History


 


Escitalopram [Lexapro] 20 mg PO DAILY 05/27/14 07/14/18 History


 


Esomeprazole Magnesium [NexIUM] 40 mg PO BID 05/27/14 07/14/18 History


 


Fesoterodine Fumarate [Toviaz] 8 mg PO DAILY 05/27/14 07/14/18 History


 


Ibuprofen [Motrin] 600 mg PO Q6HR PRN 05/27/14 07/14/18 History


 


Morphine Sulfate [Ms Contin] 30 mg PO BID #60 tablet.er 09/02/15 07/14/18 Rx


 


Latanoprost Ophth [Xalatan 0.005%] 1 drops BOTH EYES HS 02/27/17 07/14/18 

History


 


oxyCODONE-APAP 10-325MG [Percocet 1 tab PO Q6H PRN 02/27/17 07/14/18 History





 mg]    


 


Albuterol Nebulized [Ventolin 2.5 mg INHALATION RT-Q4H PRN 08/17/17 07/14/18 

History





Nebulized]    


 


Budesonide-Formot 160-4.5 Mcg 2 puff INHALATION RT-DAILY 08/17/17 07/14/18 

History





[Symbicort 160-4.5 Mcg Inhaler]    


 


Ondansetron Odt [Zofran ODT] 8 mg PO Q6H PRN 08/17/17 07/14/18 History


 


Prochlorperazine [Compazine] 10 mg PO Q6H PRN 08/17/17 07/14/18 History


 


predniSONE 5 mg PO DAILY 09/28/17 07/14/18 History


 


ALPRAZolam [Xanax] 0.25 mg PO BID PRN 07/14/18 07/14/18 History


 


Dronabinol [Marinol] 5 mg PO BID 07/14/18 07/14/18 History


 


Megestrol [Megace] 600 mg PO DAILY PRN 07/14/18 07/14/18 History


 


Pregabalin [Lyrica] 300 mg PO BID PRN 07/14/18 07/14/18 History











 Allergies











Allergy/AdvReac Type Severity Reaction Status Date / Time


 


No Known Allergies Allergy   Verified 07/14/18 12:55














Physical Exam


Vitals: 


 Vital Signs











  Temp Pulse Pulse Resp BP BP Pulse Ox


 


 07/15/18 08:55   98     


 


 07/15/18 08:42   90     


 


 07/15/18 07:00  98.5 F   81  20   125/81  97


 


 07/14/18 23:00  96.8 F L   102 H  16   139/96  95


 


 07/14/18 20:31   100     


 


 07/14/18 20:16   100     


 


 07/14/18 16:55  98.6 F   96  20   179/87  95


 


 07/14/18 16:05   92   18  174/85   98


 


 07/14/18 15:40  100.4 F H      


 


 07/14/18 15:30   91   18  185/82   99








 Intake and Output











 07/14/18 07/15/18 07/15/18





 22:59 06:59 14:59


 


Intake Total 590  280


 


Balance 590  280


 


Intake:   


 


  Intake, IV Titration   280





  Amount   


 


    Levofloxacin 750Mg-D5w   150





    Pmx 750 mg In Dextrose/   





    Water 1 150ml.bag @ 100   





    mls/hr IVPB Q24H [facility] Rx#:   





    [id number]   


 


    Sodium Chloride 0.9% 1,   130





    000 ml @ 100 mls/hr IV .   





    Q10H MARY Rx#:[id number]   


 


  Oral 590  


 


Other:   


 


  Voiding Method   Toilet


 


  # Voids 2 2 














In general patient is alert and oriented 3 in no apparent distress


HEENT head normocephalic and atraumatic


Neck is supple no JVD no goiter no lymphadenopathy


Chest exam reveals a few scattered crackles no wheezing


Cardiac exam reveals regular heart sounds no gallops no murmurs


Abdomen is soft nontender no organomegaly no palpable masses normal bowel 

sounds in all 4 quadrants


Extremity exam reveals no edema no cyanosis or clubbing





Results


CBC & Chem 7: 


 07/14/18 10:55





 07/14/18 10:55


Labs: 


 Microbiology - Last 24 Hours (Table)











 07/14/18 10:55 Blood Culture Gram Stain - Preliminary





 Blood 


 


 07/14/18 10:55 Blood Culture - Final





 Blood 














Thrombosis Risk Factor Assmnt





- Choose All That Apply


Any of the Below Risk Factors Present?: Yes


Each Factor Represents 1 point: Abnormal pulmonary function (COPD)


Each Risk Factor Represents 2 Points: Malignancy


Thrombosis Risk Factor Assessment Total Risk Factor Score: 3


Thrombosis Risk Factor Assessment Level: Moderate Risk





Assessment and Plan


Plan: 





#1 gastroenteritis with nausea vomiting and diarrhea, there is also 

leukocytosis and findings on computed tomography scan of the abdomen and pelvis 

suggestive of colitis.  At this time awaiting stool sample to check for C. diff 

patient symptoms improved significantly since admission.





#2 positive blood culture for gram-positive cocci could be a contaminant 

however in view of leukocytosis will start patient on IV Rocephin and monitor 

closely will repeat labs and blood cultures today





#3 underlying history of adenocarcinoma of the lung with left upper lobe lung 

mass received chemotherapy in the past and recently has been on immunotherapy





#4 evidence of small left sided pleural effusion and atelectasis in the left 

lower lobe without evidence of deborah infiltrate





#4 for DVT prophylaxis will place patient on subcu Lovenox for GI prophylaxis 

we will use by mouth Protonix will follow during this admission.


Pulmonary and oncology consultation requested

## 2018-07-16 VITALS — RESPIRATION RATE: 18 BRPM

## 2018-07-16 LAB
ALBUMIN SERPL-MCNC: 3.4 G/DL (ref 3.5–5)
ALP SERPL-CCNC: 48 U/L (ref 38–126)
ALT SERPL-CCNC: 38 U/L (ref 9–52)
ANION GAP SERPL CALC-SCNC: 8 MMOL/L
AST SERPL-CCNC: 30 U/L (ref 14–36)
BASOPHILS # BLD AUTO: 0 K/UL (ref 0–0.2)
BASOPHILS NFR BLD AUTO: 0 %
BUN SERPL-SCNC: 20 MG/DL (ref 7–17)
CALCIUM SPEC-MCNC: 8.7 MG/DL (ref 8.4–10.2)
CHLORIDE SERPL-SCNC: 103 MMOL/L (ref 98–107)
CO2 SERPL-SCNC: 25 MMOL/L (ref 22–30)
EOSINOPHIL # BLD AUTO: 0.1 K/UL (ref 0–0.7)
EOSINOPHIL NFR BLD AUTO: 1 %
ERYTHROCYTE [DISTWIDTH] IN BLOOD BY AUTOMATED COUNT: 5.01 M/UL (ref 3.8–5.4)
ERYTHROCYTE [DISTWIDTH] IN BLOOD: 14.1 % (ref 11.5–15.5)
GLUCOSE SERPL-MCNC: 92 MG/DL (ref 74–99)
HCT VFR BLD AUTO: 47 % (ref 34–46)
HGB BLD-MCNC: 16 GM/DL (ref 11.4–16)
LYMPHOCYTES # SPEC AUTO: 1.3 K/UL (ref 1–4.8)
LYMPHOCYTES NFR SPEC AUTO: 11 %
MCH RBC QN AUTO: 31.8 PG (ref 25–35)
MCHC RBC AUTO-ENTMCNC: 33.9 G/DL (ref 31–37)
MCV RBC AUTO: 93.9 FL (ref 80–100)
MONOCYTES # BLD AUTO: 0.7 K/UL (ref 0–1)
MONOCYTES NFR BLD AUTO: 6 %
NEUTROPHILS # BLD AUTO: 9.4 K/UL (ref 1.3–7.7)
NEUTROPHILS NFR BLD AUTO: 81 %
PLATELET # BLD AUTO: 303 K/UL (ref 150–450)
POTASSIUM SERPL-SCNC: 3.3 MMOL/L (ref 3.5–5.1)
PROT SERPL-MCNC: 5.9 G/DL (ref 6.3–8.2)
SODIUM SERPL-SCNC: 136 MMOL/L (ref 137–145)
WBC # BLD AUTO: 11.7 K/UL (ref 3.8–10.6)

## 2018-07-16 RX ADMIN — HYDROMORPHONE HYDROCHLORIDE PRN MG: 1 INJECTION, SOLUTION INTRAMUSCULAR; INTRAVENOUS; SUBCUTANEOUS at 15:59

## 2018-07-16 RX ADMIN — DRONABINOL SCH MG: 2.5 CAPSULE ORAL at 21:07

## 2018-07-16 RX ADMIN — PANTOPRAZOLE SODIUM SCH MG: 40 TABLET, DELAYED RELEASE ORAL at 17:56

## 2018-07-16 RX ADMIN — HYDROMORPHONE HYDROCHLORIDE PRN MG: 1 INJECTION, SOLUTION INTRAMUSCULAR; INTRAVENOUS; SUBCUTANEOUS at 06:29

## 2018-07-16 RX ADMIN — MORPHINE SULFATE SCH MG: 30 TABLET, FILM COATED, EXTENDED RELEASE ORAL at 08:28

## 2018-07-16 RX ADMIN — PANTOPRAZOLE SODIUM SCH MG: 40 TABLET, DELAYED RELEASE ORAL at 08:30

## 2018-07-16 RX ADMIN — CEVIMELINE HYDROCHLORIDE SCH MG: 30 CAPSULE ORAL at 21:07

## 2018-07-16 RX ADMIN — ESCITALOPRAM OXALATE SCH MG: 20 TABLET, FILM COATED ORAL at 08:29

## 2018-07-16 RX ADMIN — BUDESONIDE AND FORMOTEROL FUMARATE DIHYDRATE SCH PUFF: 160; 4.5 AEROSOL RESPIRATORY (INHALATION) at 07:54

## 2018-07-16 RX ADMIN — CEFTRIAXONE SODIUM SCH MG: 1 INJECTION, POWDER, FOR SOLUTION INTRAMUSCULAR; INTRAVENOUS at 08:30

## 2018-07-16 RX ADMIN — HYDROMORPHONE HYDROCHLORIDE PRN MG: 1 INJECTION, SOLUTION INTRAMUSCULAR; INTRAVENOUS; SUBCUTANEOUS at 02:29

## 2018-07-16 RX ADMIN — HYDROMORPHONE HYDROCHLORIDE PRN MG: 1 INJECTION, SOLUTION INTRAMUSCULAR; INTRAVENOUS; SUBCUTANEOUS at 20:10

## 2018-07-16 RX ADMIN — TROSPIUM CHLORIDE SCH MG: 20 TABLET, FILM COATED ORAL at 21:07

## 2018-07-16 RX ADMIN — TROSPIUM CHLORIDE SCH MG: 20 TABLET, FILM COATED ORAL at 08:28

## 2018-07-16 RX ADMIN — OXYCODONE HYDROCHLORIDE AND ACETAMINOPHEN PRN EACH: 10; 325 TABLET ORAL at 14:40

## 2018-07-16 RX ADMIN — DRONABINOL SCH MG: 2.5 CAPSULE ORAL at 08:29

## 2018-07-16 RX ADMIN — MORPHINE SULFATE SCH MG: 30 TABLET, FILM COATED, EXTENDED RELEASE ORAL at 21:07

## 2018-07-16 RX ADMIN — CEFAZOLIN SCH: 330 INJECTION, POWDER, FOR SOLUTION INTRAMUSCULAR; INTRAVENOUS at 16:49

## 2018-07-16 RX ADMIN — ENOXAPARIN SODIUM SCH MG: 40 INJECTION SUBCUTANEOUS at 08:29

## 2018-07-16 RX ADMIN — CEVIMELINE HYDROCHLORIDE SCH MG: 30 CAPSULE ORAL at 08:29

## 2018-07-16 RX ADMIN — OXYCODONE HYDROCHLORIDE AND ACETAMINOPHEN PRN EACH: 10; 325 TABLET ORAL at 08:39

## 2018-07-16 RX ADMIN — ONDANSETRON PRN MG: 8 TABLET, ORALLY DISINTEGRATING ORAL at 09:11

## 2018-07-16 RX ADMIN — CEFAZOLIN SCH: 330 INJECTION, POWDER, FOR SOLUTION INTRAMUSCULAR; INTRAVENOUS at 06:46

## 2018-07-16 RX ADMIN — HYDROMORPHONE HYDROCHLORIDE PRN MG: 1 INJECTION, SOLUTION INTRAMUSCULAR; INTRAVENOUS; SUBCUTANEOUS at 11:13

## 2018-07-16 RX ADMIN — LEVOFLOXACIN SCH MLS/HR: 750 INJECTION, SOLUTION INTRAVENOUS at 14:36

## 2018-07-16 NOTE — P.PN
Subjective


Progress Note Date: 07/16/18


Principal diagnosis: 





diarrhea, GARRY





Pt seen in f/u, she states feeling much better today an dis anxious to go home.

  She can ambulate without difficulty in breathing, cough persists but near 

baseline, she is tolerating food and fluids, no nausea, her diarrhea has stopped

, no abd pain, cramping, dysuria, swelling or pain.   





Objective





- Vital Signs


Vital signs: 


 Vital Signs











Temp  98.4 F   07/16/18 07:00


 


Pulse  82   07/16/18 07:00


 


Resp  16   07/16/18 07:00


 


BP  129/76   07/16/18 07:00


 


Pulse Ox  96   07/16/18 07:00








 Intake & Output











 07/15/18 07/16/18 07/16/18





 18:59 06:59 18:59


 


Intake Total 280 1780 


 


Balance 280 1780 


 


Intake:   


 


  Intake, IV Titration 280 240 





  Amount   


 


    Levofloxacin 750Mg-D5w 150  





    Pmx 750 mg In Dextrose/   





    Water 1 150ml.bag @ 100   





    mls/hr IVPB Q24H [facility] Rx#:   





    [id number]   


 


    Sodium Chloride 0.9% 1, 130 240 





    000 ml @ 100 mls/hr IV .   





    Q10H MARY Rx#:[id number]   


 


  Oral  1540 


 


Other:   


 


  Voiding Method Toilet Toilet Toilet


 


  # Voids  1 














- Constitutional


General appearance: Present: average body habitus, cooperative, no acute 

distress





- EENT


Eyes: Present: anicteric sclerae


ENT: Present: normal oropharynx





- Respiratory


Respiratory: bilateral: wheezing (scattered, expiratory)





- Cardiovascular


Heart sounds: normal: S1, S2





- Peripheral edema


  ** leg


Peripheral Edema: bilateral: None





- Gastrointestinal


General gastrointestinal: Present: normal bowel sounds, soft.  Absent: absent 

bowel sounds, decreased bowel sounds, distended, hepatomegaly, hyperactive 

bowel sounds, organomegaly, rigid, scaphoid, splenomegaly, tenderness, 

umbilical hernia, ventral hernia





- Integumentary


Integumentary: Present: normal





- Neurologic


Neurologic: Present: CNII-XII intact





- Musculoskeletal


Musculoskeletal: Present: strength equal bilaterally





- Psychiatric


Psychiatric: Present: A&O x's 3, appropriate affect, intact judgment & insight





- Labs


CBC & Chem 7: 


 07/16/18 07:26





 07/16/18 07:26


Labs: 


 Abnormal Lab Results - Last 24 Hours (Table)











  07/15/18 07/16/18 07/16/18 Range/Units





  15:00 07:26 07:26 


 


WBC   11.7 H   (3.8-10.6)  k/uL


 


Hct   47.0 H   (34.0-46.0)  %


 


Neutrophils #   9.4 H   (1.3-7.7)  k/uL


 


Sodium  135 L   136 L  (137-145)  mmol/L


 


Potassium    3.3 L  (3.5-5.1)  mmol/L


 


BUN  26 H   20 H  (7-17)  mg/dL


 


Glucose  126 H    (74-99)  mg/dL


 


AST  41 H    (14-36)  U/L


 


Total Protein    5.9 L  (6.3-8.2)  g/dL


 


Albumin    3.4 L  (3.5-5.0)  g/dL








 Microbiology - Last 24 Hours (Table)











 07/14/18 10:55 Blood Culture Gram Stain - Preliminary





 Blood Blood Culture - Preliminary





    Coagulase Negative Staph


 


 07/14/18 10:00 Gram Stain - Preliminary





 Sputum 


 


 07/15/18 16:50 Urine Culture - Preliminary





 Urine,Clean Catch 














Assessment and Plan


(1) Non-small cell lung cancer


Narrative/Plan: 


Pt is currently on maintenance therapy with durvalumab immunotherapy.  Her 

presenting symptoms were questionable as to if this was infection vs 

immunotherapy SE but, pt improved greatly with abx therapy, did end up with 

bactremia.  Her symptoms have resolved without the use of steroids so, 

recommendation is continuation of treatment.  Pt agrees and will keep her 

appts.     


Current Visit: Yes   Status: Acute   Priority: Medium   Code(s): C34.90 - 

MALIGNANT NEOPLASM OF UNSP PART OF UNSP BRONCHUS OR LUNG   SNOMED Code(s): 

871178106


   


Plan: 





Pt ok from Hem/Onc standpoint to be discharged once cleared by Attending and 

Consulting Physicians

## 2018-07-16 NOTE — P.PN
Subjective


Progress Note Date: 07/16/18


Hemalatha Piper is a 62-year-old female patient of Dr. Amaya who presented to 

[facility] emergency room with a chief complaint of worsening 

nausea vomiting and diarrhea.  Patient has known history of adenocarcinoma of 

the lung stage III with known history of left upper lobe lung mass, treated 

with chemotherapy and currently started on immunotherapy with improvement in 

tumor size.  Patient states that she developed worsening nausea vomiting and 

diarrhea about 24 hours prior to presentation her symptoms were worsening and 

she decided to come to emergency room, she was evaluated in the emergency room 

white blood count was elevated at 15.5 computed tomography scan of the abdomen 

and pelvis revealed evidence of mild colitis in the distal sigmoid colon, 

patient was given a dose of Levaquin in the emergency room and was admitted to 

medical floor she was also given IV fluid.  Symptoms improved significantly 

patient has been able to tolerate diet there has been no nausea or vomiting 

since presentation, stool for C. diff was ordered in the emergency room, 

however patient did not have any further bowel movements since admission.  This 

morning blood culture came back positive for gram-positive cocci.





On 07/16/2018 Patient currently sitting in bed without any complaints. Patient 

expresses that she is eager to go home.  Blood culture positive for Coagulase 

negative Staph.  Dr. Mendenhall has been consulted for infectious disease.  WBC 

trending down to 11.7.


Patient does state that she is not having diarrhea.  Patient denies chest pain 

or shortness of breath at this time.  Awaiting infectious disease consult.  

Patient currently on Levaquin and Rocephin.








Objective





- Vital Signs


Vital signs: 


 Vital Signs











Temp  98.4 F   07/16/18 07:00


 


Pulse  82   07/16/18 07:00


 


Resp  16   07/16/18 07:00


 


BP  129/76   07/16/18 07:00


 


Pulse Ox  96   07/16/18 07:00








 Intake & Output











 07/15/18 07/16/18 07/16/18





 18:59 06:59 18:59


 


Intake Total 280 1780 


 


Balance 280 1780 


 


Intake:   


 


  Intake, IV Titration 280 240 





  Amount   


 


    Levofloxacin 750Mg-D5w 150  





    Pmx 750 mg In Dextrose/   





    Water 1 150ml.bag @ 100   





    mls/hr IVPB Q24H [facility] Rx#:   





    [id number]   


 


    Sodium Chloride 0.9% 1, 130 240 





    000 ml @ 100 mls/hr IV .   





    Q10H [facility]ARY Rx#:[id number]   


 


  Oral  1540 


 


Other:   


 


  Voiding Method Toilet Toilet Toilet


 


  # Voids  1 














- Exam


Head normocephalic


Neck supple


Lungs clear to auscultation bilaterally no wheezing or crackles


Heart regular rate and rhythm S1-S2, no rub or gallop


Abdomen is soft nontender nondistended positive bowel sounds no 

hepatosplenomegaly


Extremities no edema


Neuro alert and orientated to 3








- Labs


CBC & Chem 7: 


 07/16/18 07:26





 07/16/18 07:26


Labs: 


 Abnormal Lab Results - Last 24 Hours (Table)











  07/15/18 07/16/18 07/16/18 Range/Units





  15:00 07:26 07:26 


 


WBC   11.7 H   (3.8-10.6)  k/uL


 


Hct   47.0 H   (34.0-46.0)  %


 


Neutrophils #   9.4 H   (1.3-7.7)  k/uL


 


Sodium  135 L   136 L  (137-145)  mmol/L


 


Potassium    3.3 L  (3.5-5.1)  mmol/L


 


BUN  26 H   20 H  (7-17)  mg/dL


 


Glucose  126 H    (74-99)  mg/dL


 


AST  41 H    (14-36)  U/L


 


Total Protein    5.9 L  (6.3-8.2)  g/dL


 


Albumin    3.4 L  (3.5-5.0)  g/dL








 Microbiology - Last 24 Hours (Table)











 07/14/18 10:55 Blood Culture Gram Stain - Preliminary





 Blood Blood Culture - Preliminary





    Coagulase Negative Staph


 


 07/14/18 10:00 Gram Stain - Preliminary





 Sputum 


 


 07/15/18 16:50 Urine Culture - Preliminary





 Urine,Clean Catch 














Assessment and Plan


Assessment: 


#1 gastroenteritis with nausea vomiting and diarrhea, there is also 

leukocytosis and findings on computed tomography scan of the abdomen and pelvis 

suggestive of colitis.  At this time awaiting stool sample to check for C. diff 

patient symptoms improved significantly since admission.





#2 positive blood culture for gram-positive cocci could be a contaminant 

however in view of leukocytosis will start patient on IV Rocephin and monitor 

closely will repeat labs and blood cultures today.  Culture positive for 

coagulase-negative staph.  Dr. Mendenhall has been consulted.





#3 underlying history of adenocarcinoma of the lung with left upper lobe lung 

mass received chemotherapy in the past and recently has been on immunotherapy.  

Dr. Bartlett has been consulted.  Plan to continue her current treatment post 

discharge per oncology





#4 evidence of small left sided pleural effusion and atelectasis in the left 

lower lobe without evidence of deborah infiltrate.  Dr. castañeda consulted for 

pulmonary no need for further pulmonary workup at this time.  Left upper lobe 

masses noted however this has been decreasing in size and the patient has been 

treated with immunotherapy per pulmonary.





#5 history of COPD.  No exacerbation at this time





#6 history of TIA





#7 history of migraine





#8 history of chronic back pain.  Home Percocet and MS Contin ordered.








 DVT prophylixis will place patient on subcu Lovenox for GI prophylaxis we will 

use by mouth Protonix will follow during this admission.





I performed an examination of the patient and discussed their management with 

the Nurse Practitioner.  I have reviewed the Nurse Practitioner's notes and 

agree with the documented findings and plan of care

## 2018-07-16 NOTE — P.PN
Subjective


Progress Note Date: 07/16/18


Principal diagnosis: 


Acute gastroenteritis








62-year-old female patient with known history of adenocarcinoma of the left 

upper lobe, stage III disease, treated with accommodation of chemoradiation 

therapy following that the patient was placed on immunotherapy with a PT L1 

antagonist initially with Opdivo and later on with Infinzi.  Noted the patient 

has been responding very nicely to immunotherapy and there has been 

considerable drop in the size of the left upper lobe mass on follow-up CAT scan 

that was done in April 2018.  The patient has started on Infinzi approximately 

a month ago and she has received 3 sessions thus far.  She came into the 

hospital because of increased nausea vomiting and diarrhea.  This started 

approximately 24 hours ago.  No fever or chills.  She was fatigued.  She was 

having some increased chest congestion and no pleurisy.  No hemoptysis.  No 

hemodynamic instability pH was given IV fluids and she was given a dose of 

Levaquin.  This morning the patient is feeling much better and she is back to 

her baseline.  No other new complaints otherwise for now.  All of the blood 

work is within normal limits.  White cell count is slightly elevated at 15.5.  

The UA is showing +2 ketones and +1 blood otherwise there is no infection.





Patient was reevaluated today on 7/16/2018, seems to be doing relatively well, 

all her GI symptoms have resolved, and she has no active pulmonary symptoms as 

her COPD seems to be quiescent and stable.  No cough no wheezing no shortness 

of breath.











Objective





- Vital Signs


Vital signs: 


 Vital Signs











Temp  98.4 F   07/16/18 07:00


 


Pulse  82   07/16/18 07:00


 


Resp  16   07/16/18 07:00


 


BP  129/76   07/16/18 07:00


 


Pulse Ox  96   07/16/18 07:00








 Intake & Output











 07/15/18 07/16/18 07/16/18





 18:59 06:59 18:59


 


Intake Total 280 1780 


 


Balance 280 1780 


 


Intake:   


 


  Intake, IV Titration 280 240 





  Amount   


 


    Levofloxacin 750Mg-D5w 150  





    Pmx 750 mg In Dextrose/   





    Water 1 150ml.bag @ 100   





    mls/hr IVPB Q24H [facility] Rx#:   





    [id number]   


 


    Sodium Chloride 0.9% 1, 130 240 





    000 ml @ 100 mls/hr IV .   





    Q10H MARY Rx#:[id number]   


 


  Oral  1540 


 


Other:   


 


  Voiding Method Toilet Toilet Toilet


 


  # Voids  1 














- Exam





Physical Exam: Revealed a 62-year-old female in no distress.


Head: Atraumatic, normocephalic.


HEENT:[Neck is supple.] [No neck masses.] [No thyromegaly.] [No JVD.]  PERRLA, 

EOMI, no icterus.


Chest: [Clear throughout, no crackles, no rhonchi, no wheezes.]


Cardiac Exam: [Normal S1 and S2, no S3 gallop, no murmur.]


Abdomen: [Soft, nontender,  no megaly, no rebound, no guarding, normal bowel 

sounds.]


Extremities: [No clubbing, no edema, no cyanosis.]


Neurological Exam: [No focal neurologic deficit.]


Psychiatric: Normal mood affect and mental status examination.


Skin: No rashes.


Lymphatics: No lymphadenopathy.





- Labs


CBC & Chem 7: 


 07/16/18 07:26





 07/16/18 07:26


Labs: 


 Abnormal Lab Results - Last 24 Hours (Table)











  07/15/18 07/16/18 07/16/18 Range/Units





  15:00 07:26 07:26 


 


WBC   11.7 H   (3.8-10.6)  k/uL


 


Hct   47.0 H   (34.0-46.0)  %


 


Neutrophils #   9.4 H   (1.3-7.7)  k/uL


 


Sodium  135 L   136 L  (137-145)  mmol/L


 


Potassium    3.3 L  (3.5-5.1)  mmol/L


 


BUN  26 H   20 H  (7-17)  mg/dL


 


Glucose  126 H    (74-99)  mg/dL


 


AST  41 H    (14-36)  U/L


 


Total Protein    5.9 L  (6.3-8.2)  g/dL


 


Albumin    3.4 L  (3.5-5.0)  g/dL








 Microbiology - Last 24 Hours (Table)











 07/14/18 10:55 Blood Culture Gram Stain - Preliminary





 Blood Blood Culture - Preliminary





    Coagulase Negative Staph


 


 07/14/18 10:00 Gram Stain - Preliminary





 Sputum 


 


 07/15/18 16:50 Urine Culture - Preliminary





 Urine,Clean Catch 














Assessment and Plan


Assessment: 





1 acute gastroenteritis, unlikely to be a side effect of immunotherapy for lung 

cancer.  Symptoms are improved and the patient is was resuscitated.  No signs 

of any septicemia





2 COPD currently inactive in stable





3 stage III non-small cell lung cancer/adenocarcinoma currently on immunotherapy





4 hyperlipidemia





5 degenerative arthritis





6 chronic back pain





7 migraine





8 history of TIA





9 fibromyalgia





Recommendation: Agree with discharge planning, follow-up on outpatient basis 

regarding her lungs cancer and COPD.





Time with Patient: Less than 30

## 2018-07-17 VITALS — DIASTOLIC BLOOD PRESSURE: 81 MMHG | TEMPERATURE: 98.1 F | SYSTOLIC BLOOD PRESSURE: 164 MMHG | HEART RATE: 86 BPM

## 2018-07-17 LAB
ALBUMIN SERPL-MCNC: 3.6 G/DL (ref 3.5–5)
ALP SERPL-CCNC: 46 U/L (ref 38–126)
ALT SERPL-CCNC: 37 U/L (ref 9–52)
ANION GAP SERPL CALC-SCNC: 5 MMOL/L
AST SERPL-CCNC: 27 U/L (ref 14–36)
BASOPHILS # BLD AUTO: 0 K/UL (ref 0–0.2)
BASOPHILS NFR BLD AUTO: 0 %
BUN SERPL-SCNC: 15 MG/DL (ref 7–17)
CALCIUM SPEC-MCNC: 8.7 MG/DL (ref 8.4–10.2)
CHLORIDE SERPL-SCNC: 105 MMOL/L (ref 98–107)
CO2 SERPL-SCNC: 27 MMOL/L (ref 22–30)
EOSINOPHIL # BLD AUTO: 0.1 K/UL (ref 0–0.7)
EOSINOPHIL NFR BLD AUTO: 1 %
ERYTHROCYTE [DISTWIDTH] IN BLOOD BY AUTOMATED COUNT: 5.03 M/UL (ref 3.8–5.4)
ERYTHROCYTE [DISTWIDTH] IN BLOOD: 14 % (ref 11.5–15.5)
GLUCOSE SERPL-MCNC: 77 MG/DL (ref 74–99)
HCT VFR BLD AUTO: 47.8 % (ref 34–46)
HGB BLD-MCNC: 15.7 GM/DL (ref 11.4–16)
LYMPHOCYTES # SPEC AUTO: 1.3 K/UL (ref 1–4.8)
LYMPHOCYTES NFR SPEC AUTO: 14 %
MCH RBC QN AUTO: 31.1 PG (ref 25–35)
MCHC RBC AUTO-ENTMCNC: 32.7 G/DL (ref 31–37)
MCV RBC AUTO: 95.1 FL (ref 80–100)
MONOCYTES # BLD AUTO: 0.7 K/UL (ref 0–1)
MONOCYTES NFR BLD AUTO: 7 %
NEUTROPHILS # BLD AUTO: 7.2 K/UL (ref 1.3–7.7)
NEUTROPHILS NFR BLD AUTO: 77 %
PLATELET # BLD AUTO: 296 K/UL (ref 150–450)
POTASSIUM SERPL-SCNC: 4.3 MMOL/L (ref 3.5–5.1)
PROT SERPL-MCNC: 6.1 G/DL (ref 6.3–8.2)
SODIUM SERPL-SCNC: 137 MMOL/L (ref 137–145)
WBC # BLD AUTO: 9.4 K/UL (ref 3.8–10.6)

## 2018-07-17 RX ADMIN — PANTOPRAZOLE SODIUM SCH MG: 40 TABLET, DELAYED RELEASE ORAL at 07:59

## 2018-07-17 RX ADMIN — ONDANSETRON PRN MG: 8 TABLET, ORALLY DISINTEGRATING ORAL at 09:58

## 2018-07-17 RX ADMIN — CEFAZOLIN SCH MLS/HR: 330 INJECTION, POWDER, FOR SOLUTION INTRAMUSCULAR; INTRAVENOUS at 06:32

## 2018-07-17 RX ADMIN — ENOXAPARIN SODIUM SCH MG: 40 INJECTION SUBCUTANEOUS at 07:59

## 2018-07-17 RX ADMIN — DRONABINOL SCH MG: 2.5 CAPSULE ORAL at 07:58

## 2018-07-17 RX ADMIN — CEVIMELINE HYDROCHLORIDE SCH MG: 30 CAPSULE ORAL at 07:59

## 2018-07-17 RX ADMIN — OXYCODONE HYDROCHLORIDE AND ACETAMINOPHEN PRN EACH: 10; 325 TABLET ORAL at 08:04

## 2018-07-17 RX ADMIN — BUDESONIDE AND FORMOTEROL FUMARATE DIHYDRATE SCH PUFF: 160; 4.5 AEROSOL RESPIRATORY (INHALATION) at 09:19

## 2018-07-17 RX ADMIN — MORPHINE SULFATE SCH MG: 30 TABLET, FILM COATED, EXTENDED RELEASE ORAL at 08:00

## 2018-07-17 RX ADMIN — HYDROMORPHONE HYDROCHLORIDE PRN MG: 1 INJECTION, SOLUTION INTRAMUSCULAR; INTRAVENOUS; SUBCUTANEOUS at 06:03

## 2018-07-17 RX ADMIN — HYDROMORPHONE HYDROCHLORIDE PRN MG: 1 INJECTION, SOLUTION INTRAMUSCULAR; INTRAVENOUS; SUBCUTANEOUS at 01:39

## 2018-07-17 RX ADMIN — LATANOPROST SCH DROPS: 50 SOLUTION OPHTHALMIC at 01:43

## 2018-07-17 RX ADMIN — CEFTRIAXONE SODIUM SCH MG: 1 INJECTION, POWDER, FOR SOLUTION INTRAMUSCULAR; INTRAVENOUS at 07:58

## 2018-07-17 RX ADMIN — TROSPIUM CHLORIDE SCH MG: 20 TABLET, FILM COATED ORAL at 07:59

## 2018-07-17 RX ADMIN — ESCITALOPRAM OXALATE SCH MG: 20 TABLET, FILM COATED ORAL at 07:59

## 2018-07-17 NOTE — CONS
CONSULTATION



DATE OF SERVICE:

07/17/2018



REASON FOR CONSULTATION:

Positive blood culture.



HISTORY OF PRESENT ILLNESS:

The patient is a 62-year-old  female with a past medical history 
significant

for stage III adenocarcinoma of the lung diagnosed in mid 2017, for which the 
patient

has been on chemoradiation, subsequently additional chemotherapy.  The patient 
received

her last chemo on 7/20/2018.  The patient is presenting to the [facility] ER on

7/14/2018 with the chief complaints of nausea, vomiting and diarrhea of one day

duration.  The patient feels very fatigued and weak; no energy.  The patient 
denies any

high-grade fever, rigors or chills.  Denies having any URI symptoms.  The 
patient did

have fever of 100.8 on presentation to the hospital.  The patient had a chest x-
ray

which showed COPD, stable left upper lobe mass and probable left effusion.  The 
patient

also had a CT of abdomen and pelvis; for the lung bases no significant 
abnormality was

appreciated.  No bowel obstruction was seen.  Mild possible colitis, mid to 
distal

sigmoid colon.  The patient did have elevated white count when admitted to 
hospital of

15.5.  She had blood cultures obtained which were showing gram-positive cocci; 
hence

Infectious Disease was consulted.  She had a sputum culture obtained which has 
been

negative.  The patient has been treated with ceftriaxone, levofloxacin. 
Infectious

Disease was consulted for further recommendations regarding antibiotic therapy. 
The

patient was seen on rounds this morning.  The patient has been feeling much 
better.

Her nausea and vomiting have resolved.  Denies having any chest pain or any 
cough.  No

abdominal pain or any diarrhea.



REVIEW OF SYSTEMS:

CONSTITUTIONAL: Positive for weakness and fever that resolved.

EYES: No complaint.

ENT: No complaint.

RESPIRATORY: As per HPI.

CARDIOVASCULAR: No complaint.

GENITOURINARY: No complaint.

GASTROINTESTINAL: As per HPI.

MUSCULOSKELETAL:  No complaint.

INTEGUMENTARY: No complaint.

PSYCHOLOGICAL: No complaint.

ENDOCRINE: No complaint.

NEUROLOGICAL: No complaint.



PAST MEDICAL HISTORY:

1. COPD.

2. CVA, TIA.

3. Fibromyalgia.

4. Gastroesophageal reflux disease.

5. Hyperlipidemia.

6. Osteoarthritis.

7. Pneumonia.

8. Stage III non-small-cell lung cancer.

9. Hiatal hernia.

10.Chronic back pain.

11.Osteoarthritis.

12.Depression.



PAST SURGICAL HISTORY:

1. Sinus surgery.

2. Lung biopsy.



SOCIAL HISTORY:

Positive for smoking. No drinking or drug use.



FAMILY HISTORY:

Positive for history of cancer, type unknown.



ALLERGIES:

NO KNOWN DRUG ALLERGIES.



CURRENT MEDICATIONS:

1. Sanctura.

2. Compazine.

3. Lyrica.

4. Prednisone.

5. Protonix.

6. Percocet.

7. Zofran.

8. Megace.

9. Levaquin.

10.Dilaudid.

11.Rocephin.

12.Symbicort.

13.Xanax.

14.Ventolin.



PHYSICAL EXAMINATION:

Blood pressure is 164/81 with a pulse of 86, temperature 98.1. She is 96% on 
room air.

General description is an elderly female lying in bed in no distress.

HEENT examination shows no pallor or scleral icterus.  Oral mucosa membrane is 
dry.  No

pharyngeal erythema or thrush.

NECK: Trachea is central. No thyromegaly.

LUNGS: Unlabored breathing. Clear to auscultation anteriorly.  No wheeze or 
crackle.

HEART: S1, S2.  Regular rate and rhythm.

ABDOMEN:  Soft. No tenderness.  No guarding or rigidity.

EXTREMITIES:  No edema of feet.

SKIN EXAMINATION: No rash or mass palpable.

Neurologically the patient is awake, alert oriented 2 mood and affect is normal



LABS:

Hemoglobin 15.7, white count 9.4 with a BUN of 15, creatinine 0.72.  
Electrolytes have

been normal.  Urine is negative.  Blood culture with coagulase-negative staph.  
X-ray

report as mentioned above.



DIAGNOSTIC IMPRESSION AND PLAN:

1. Patient admitted to hospital with acute nausea, vomiting and diarrhea.  
Patient has

    recently gotten her chemotherapy. Could have been related to chemotherapy. 
CT of

    abdomen and pelvis did show questionable mild colitis, though symptoms 
resolved.

2. Patient with a question of possible pneumonia as patient has responded to the

    Rocephin and Levaquin.

3. Positive blood culture with coagulase-negative Staphylococcus, likely a skin

    contamination.



PLAN:

1. No need for antibiotic therapy for the positive blood culture.

2. In view of the overall improvement and the patient's resolution of symptoms, 
to

    finish therapy with oral Levaquin for another 7days.  Patient was cleared 
to be

    discharged from an infectious disease standpoint, as the patient is 
insisting on

    going home. Communicated with nurse practitioner for the primary team.





MMODL / IJN: [id number] / Job#: [id number]

SERGE

## 2018-07-17 NOTE — P.PN
Subjective


Progress Note Date: 07/17/18


Principal diagnosis: 


Acute gastroenteritis, likely related to a side effect of immunotherapy for 

lung cancer, recovered





62-year-old female patient with known history of adenocarcinoma of the left 

upper lobe, stage III disease, treated with accommodation of chemoradiation 

therapy following that the patient was placed on immunotherapy with a PT L1 

antagonist initially with Opdivo and later on with Infinzi.  Noted the patient 

has been responding very nicely to immunotherapy and there has been 

considerable drop in the size of the left upper lobe mass on follow-up CAT scan 

that was done in April 2018.  The patient has started on Infinzi approximately 

a month ago and she has received 3 sessions thus far.  She came into the 

hospital because of increased nausea vomiting and diarrhea.  This started 

approximately 24 hours ago.  No fever or chills.  She was fatigued.  She was 

having some increased chest congestion and no pleurisy.  No hemoptysis.  No 

hemodynamic instability pH was given IV fluids and she was given a dose of 

Levaquin.  This morning the patient is feeling much better and she is back to 

her baseline.  No other new complaints otherwise for now.  All of the blood 

work is within normal limits.  White cell count is slightly elevated at 15.5.  

The UA is showing +2 ketones and +1 blood otherwise there is no infection.





Patient was reevaluated today on 7/16/2018, seems to be doing relatively well, 

all her GI symptoms have resolved, and she has no active pulmonary symptoms as 

her COPD seems to be quiescent and stable.  No cough no wheezing no shortness 

of breath.





On 07/17/2018 patient seen in follow-up on medical surgical floor.  Her nausea 

vomiting and diarrhea have resolved, she states she only had a 1 day of symptoms

, and that seems to have resolved completely.  She is tolerating oral intake, 

she denies any pacific complaints, denies any dyspnea, her pulse ox is 96%, 

vital signs are stable, she is afebrile.  Lungs are clear to auscultation.  His 

lab work shows WBC of 9.4, hemoglobin of 15.7, electrolytes and renal profile 

are all within normal limits, and is ambulating within the room, and tolerating 

activity well, blood cultures showed coagulase-negative staph, negative sputum, 

and urine cultures, follow-up blood cultures were negative.  Patient responded 

well to medical treatments, and she is hoping to be able to go home today.








Objective





- Vital Signs


Vital signs: 


 Vital Signs











Temp  98.1 F   07/17/18 06:18


 


Pulse  86   07/17/18 06:18


 


Resp  18   07/17/18 06:18


 


BP  164/81   07/17/18 06:18


 


Pulse Ox  96   07/17/18 06:18








 Intake & Output











 07/16/18 07/17/18 07/17/18





 18:59 06:59 18:59


 


Intake Total  880 


 


Balance  880 


 


Intake:   


 


  Intake, IV Titration  880 





  Amount   


 


    Sodium Chloride 0.9% 1,  880 





    000 ml @ 100 mls/hr IV .   





    Q10H Replaced by [facility] Rx#:[id number]   


 


Other:   


 


  Voiding Method Toilet Toilet Toilet


 


  # Voids 2  














- Exam


Physical Exam: Revealed a 62-year-old female in no distress.


Head: Atraumatic, normocephalic.


HEENT:[Neck is supple.] [No neck masses.] [No thyromegaly.] [No JVD.]  PERRLA, 

EOMI, no icterus.


Chest: [Clear throughout, no crackles, no rhonchi, no wheezes.]


Cardiac Exam: [Normal S1 and S2, no S3 gallop, no murmur.]


Abdomen: [Soft, nontender,  no megaly, no rebound, no guarding, normal bowel 

sounds.]


Extremities: [No clubbing, no edema, no cyanosis.]


Neurological Exam: [No focal neurologic deficit.]


Psychiatric: Normal mood affect and mental status examination.


Skin: No rashes.


Lymphatics: No lymphadenopathy.








- Labs


CBC & Chem 7: 


 07/17/18 07:49





 07/17/18 07:49


Labs: 


 Abnormal Lab Results - Last 24 Hours (Table)











  07/17/18 07/17/18 Range/Units





  07:49 07:49 


 


Hct  47.8 H   (34.0-46.0)  %


 


Total Protein   6.1 L  (6.3-8.2)  g/dL








 Microbiology - Last 24 Hours (Table)











 07/14/18 10:00 Gram Stain - Final





 Sputum Sputum Culture - Final


 


 07/15/18 16:50 Urine Culture - Final





 Urine,Clean Catch 


 


 07/14/18 10:55 Blood Culture Gram Stain - Final





 Blood Blood Culture - Final





    Coagulase Negative Staph


 


 07/15/18 15:00 Blood Culture - Preliminary





 Blood    No Growth after 24 hours














Assessment and Plan


Plan: 


Assessment:





1 acute gastroenteritis, unlikely to be a side effect of immunotherapy for lung 

cancer.  Symptoms are improved and the patient is was resuscitated.  No signs 

of any septicemia





2 COPD currently inactive in stable





3 stage III non-small cell lung cancer/adenocarcinoma currently on immunotherapy





4 hyperlipidemia





5 degenerative arthritis





6 chronic back pain





7 migraine





8 history of TIA





9 fibromyalgia





Recommendation: Patient's GI symptoms have completely resolved, she is 

tolerating oral intake, no specific complaints, denies any dyspnea.  Her 

pulmonary standpoint patient is stable for discharge home today. 





I performed a history & physical examination of the patient and discussed their 

management with my nurse practitioner, Jhoana Winston.  I reviewed the nurse 

practitioner's note and agree with the documented findings and plan of care.  

Lung sounds are clear.  The findings and the impression was discussed with the 

patient.  I attest to the documentation by the nurse practitioner. 





Time with Patient: Less than 30

## 2018-07-17 NOTE — P.DS
Providers


Date of admission: 


07/14/18 13:29





Expected date of discharge: 07/17/18


Attending physician: 


Miguel Garza





Consults: 





 





07/14/18 13:28


Consult Physician Stat 


   Consulting Provider: Cosmo Bartlett


   Consult Reason/Comments: Oncological care


   Do you want consulting provider notified?: Already Contacted





07/15/18 05:27


Consult Physician Routine 


   Consulting Provider: Kenna Love


   Consult Reason/Comments: dyspnea


   Do you want consulting provider notified?: Yes, Notify in am





07/16/18 13:08


Consult Physician Routine 


   Consulting Provider: Darya Mendenhall


   Consult Reason/Comments: BC- coagulase negative staph


   Do you want consulting provider notified?: Yes











Primary care physician: 


Rebecca Amaya





Hospital Course: 


Discharge diagnosis


#1 gastroenteritis with nausea vomiting and diarrhea, there is also 

leukocytosis and findings on computed tomography scan of the abdomen and pelvis 

suggestive of colitis.  At this time awaiting stool sample to check for C. diff 

patient symptoms improved significantly since admission.  No complaints at this 

time of any nausea, vomiting or diarrhea





#2 positive blood culture for gram-positive cocci could be a contaminant 

however in view of leukocytosis will start patient on IV Rocephin and monitor 

closely will repeat labs and blood cultures today.  Culture positive for 

coagulase-negative staph.  Dr. Mendenhall has been consulted.  Per nursing staff Dr. Mendenhall believes it is to be contamination.  Patient will be discharged home on 

levoquin antibiotic 





#3 underlying history of adenocarcinoma of the lung with left upper lobe lung 

mass received chemotherapy in the past and recently has been on immunotherapy.  

Dr. Bartlett has been consulted.  Plan to continue her current treatment post 

discharge per oncology





#4 evidence of small left sided pleural effusion and atelectasis in the left 

lower lobe without evidence of deborah infiltrate.  Dr. castañeda consulted for 

pulmonary no need for further pulmonary workup at this time.  Left upper lobe 

masses noted however this has been decreasing in size and the patient has been 

treated with immunotherapy per pulmonary.  Cleared for discharge from pulmonary 

standpoint





#5 history of COPD.  No exacerbation at this time





#6 history of TIA





#7 history of migraine





#8 history of chronic back pain.  Home Percocet and MS Contin ordered.








Hospital Course





Hemalatha Piper is a 62-year-old female patient of Dr. Amaya who presented to 

[facility] emergency room with a chief complaint of worsening 

nausea vomiting and diarrhea.  Patient has known history of adenocarcinoma of 

the lung stage III with known history of left upper lobe lung mass, treated 

with chemotherapy and currently started on immunotherapy with improvement in 

tumor size.  Patient states that she developed worsening nausea vomiting and 

diarrhea about 24 hours prior to presentation her symptoms were worsening and 

she decided to come to emergency room, she was evaluated in the emergency room 

white blood count was elevated at 15.5 computed tomography scan of the abdomen 

and pelvis revealed evidence of mild colitis in the distal sigmoid colon, 

patient was given a dose of Levaquin in the emergency room and was admitted to 

medical floor she was also given IV fluid.  Symptoms improved significantly 

patient has been able to tolerate diet there has been no nausea or vomiting 

since presentation, stool for C. diff was ordered in the emergency room, 

however patient did not have any further bowel movements since admission.  This 

morning blood culture came back positive for gram-positive cocci.





On 07/16/2018 Patient currently sitting in bed without any complaints. Patient 

expresses that she is eager to go home.  Blood culture positive for Coagulase 

negative Staph.  Dr. Mendenhall has been consulted for infectious disease.  WBC 

trending down to 11.7.


Patient does state that she is not having diarrhea.  Patient denies chest pain 

or shortness of breath at this time.  Awaiting infectious disease consult.  

Patient currently on Levaquin and Rocephin.





On 07/17/2018 patient currently sitting in bed eager to go home.  Per nursing 

staff Dr. Mendenhall has rounded on patient and believes blood culture to be 

contamination.  Patient has been cleared for discharge.  patient will be 

prescribed Levaquin antibiotic





I performed an examination of the patient and discussed their management with 

the Nurse Practitioner.  I have reviewed the Nurse Practitioner's notes and 

agree with the documented findings and plan of care


Patient Condition at Discharge: Stable





Plan - Discharge Summary


Discharge Rx Participant: No


New Discharge Prescriptions: 


New


   Levofloxacin [Levaquin] 500 mg PO DAILY 3 Days #3 tab





Continue


   Ibuprofen [Motrin] 600 mg PO Q6HR PRN


     PRN Reason: Pain


   Cevimeline [Evoxac] 30 mg PO BID


   Albuterol Inhaler [Ventolin Hfa Inhaler] 2 puff INHALATION RT-QID PRN


     PRN Reason: Shortness Of Breath


   Esomeprazole Magnesium [NexIUM] 40 mg PO BID


   Escitalopram [Lexapro] 20 mg PO DAILY


   Fesoterodine Fumarate [Toviaz] 8 mg PO DAILY


   Morphine Sulfate [Ms Contin] 30 mg PO BID #60 tablet.er


   Latanoprost Ophth [Xalatan 0.005%] 1 drops BOTH EYES HS


   oxyCODONE-APAP 10-325MG [Percocet  mg] 1 tab PO Q6H PRN


     PRN Reason: Pain


   Prochlorperazine [Compazine] 10 mg PO Q6H PRN


     PRN Reason: Nausea


   Budesonide-Formot 160-4.5 Mcg [Symbicort 160-4.5 Mcg Inhaler] 2 puff 

INHALATION RT-DAILY


   Albuterol Nebulized [Ventolin Nebulized] 2.5 mg INHALATION RT-Q4H PRN


     PRN Reason: Shortness Of Breath


   Ondansetron Odt [Zofran ODT] 8 mg PO Q6H PRN


     PRN Reason: Nausea


   predniSONE 5 mg PO DAILY


   Pregabalin [Lyrica] 300 mg PO BID PRN


     PRN Reason: Pain


   ALPRAZolam [Xanax] 0.25 mg PO BID PRN


     PRN Reason: Anxiety


   Dronabinol [Marinol] 5 mg PO BID


   Megestrol [Megace] 600 mg PO DAILY PRN


     PRN Reason: appetite


Discharge Medication List





Albuterol Inhaler [Ventolin Hfa Inhaler] 2 puff INHALATION RT-QID PRN 05/27/14 [

History]


Cevimeline [Evoxac] 30 mg PO BID 05/27/14 [History]


Escitalopram [Lexapro] 20 mg PO DAILY 05/27/14 [History]


Esomeprazole Magnesium [NexIUM] 40 mg PO BID 05/27/14 [History]


Fesoterodine Fumarate [Toviaz] 8 mg PO DAILY 05/27/14 [History]


Ibuprofen [Motrin] 600 mg PO Q6HR PRN 05/27/14 [History]


Morphine Sulfate [Ms Contin] 30 mg PO BID #60 tablet.er 09/02/15 [Rx]


Latanoprost Ophth [Xalatan 0.005%] 1 drops BOTH EYES HS 02/27/17 [History]


oxyCODONE-APAP 10-325MG [Percocet  mg] 1 tab PO Q6H PRN 02/27/17 [History]


Albuterol Nebulized [Ventolin Nebulized] 2.5 mg INHALATION RT-Q4H PRN 08/17/17 [

History]


Budesonide-Formot 160-4.5 Mcg [Symbicort 160-4.5 Mcg Inhaler] 2 puff INHALATION 

RT-DAILY 08/17/17 [History]


Ondansetron Odt [Zofran ODT] 8 mg PO Q6H PRN 08/17/17 [History]


Prochlorperazine [Compazine] 10 mg PO Q6H PRN 08/17/17 [History]


predniSONE 5 mg PO DAILY 09/28/17 [History]


ALPRAZolam [Xanax] 0.25 mg PO BID PRN 07/14/18 [History]


Dronabinol [Marinol] 5 mg PO BID 07/14/18 [History]


Megestrol [Megace] 600 mg PO DAILY PRN 07/14/18 [History]


Pregabalin [Lyrica] 300 mg PO BID PRN 07/14/18 [History]


Levofloxacin [Levaquin] 500 mg PO DAILY 3 Days #3 tab 07/17/18 [Rx]








Follow up Appointment(s)/Referral(s): 


Rebecca Amaya MD [Primary Care Provider] - 1-2 days


Nahun Segura MD [STAFF PHYSICIAN] - 07/26/18 11:00 am (this is a treatment 

appt)


Patient Instructions/Handouts:  Levofloxacin (By mouth), Dyspnea (GEN)


Activity/Diet/Wound Care/Special Instructions: 


Diet- as tolerated


Activity- as tolerated

## 2018-07-18 NOTE — CDI
Last Revision, December 2017





Documentation Clarification Form



Date:  7/18/18 

From: Denise Cervantes

Phone: If you have a question regarding this query, please contact Luisa Ariza at [phone] between 8am and 5pm.

MRN: [id number]

Admit Date: 7/14/2018 1:29:00 PM

Patient Name: Hemalatha Piper

Visit Number: [id number]

Discharge Date:  7/17/18



ATTENTION: The Clinical Documentation Specialists (CDI) and [facility] Coding Staff 
appreciate your assistance in clarifying documentation. Please respond to the 
clarification below the line at the bottom and electronically sign. The CDI & 
[facility] Coding staff will review the response and follow-up if needed. Please note: 
Queries are made part of the Legal Health Record. If you have any questions, 
please contact the author of this message via ITS.



Dr. Miguel Garza



Colitis is documented in the H&P, Dr. Bartlett's consult note, Dr. Mendenhall's consult 
note, Veronica Ramírez's 7/16 progress note and in the discharge summary.

Patient history/risk factors: Patient was admitted due to diarrhea, nausea and 
vomiting and diagnosed with acute gastroenteritis and possible colitis  Patient 
has a history of lung cancer, COPD, GERD.

Clinical Indicators: Diarrhea

Radiology:  7/14 abdomen: No bowel obstruction is seen.  Possible mild colitis 
mid to distal sigmoid colon.

Consult:  Dr. Bartlett documented that a mild colitis is not ruled out but appears 
to be less likely, given the rapid resolution of diarrhea and benign abdominal 
exam.  An infectious cause is therefor felt to be more likely. 

Treatment:  

Medication:  Patient got IV and PO Levaquin and IV Rocephin



In your professional opinion, can you please further specify the following 
regarding the colitis, if known?  



Type

Cause

Any related complications

Other, please specify __________

Unable to determine

___________________________________________________________________
MTDD

## 2018-07-23 NOTE — CDI
Last Revision, December 2017





Documentation Clarification Form



Date: 7/23/18

From: Denise Cervantes

Phone: If you have a question regarding this query, please contact Luisa Ariza at [phone] between 8am and 5pm.

MRN: [id number]

Admit Date: 7/14/2018 1:29:00 PM

Patient Name: Hemalatha Piper

Visit Number: [id number]

Discharge Date:  7/17/18



ATTENTION: The Clinical Documentation Specialists (CDI) and [facility] Coding Staff 
appreciate your assistance in clarifying documentation. Please respond to the 
clarification below the line at the bottom and electronically sign. The CDI & 
[facility] Coding staff will review the response and follow-up if needed. Please note: 
Queries are made part of the Legal Health Record. If you have any questions, 
please contact the author of this message via ITS.



Dr. Miguel Garza



Thank you for signing your previous query.  Please document a response before 
signing this query.

Colitis is documented in the H&P, Dr. Bartlett's consult note, Dr. Mendenhall's consult 
note, Veronica Ramírez's 7/16 progress note and in the discharge summary.

Patient history/risk factors: Patient was admitted due to diarrhea, nausea and 
vomiting and diagnosed with acute gastroenteritis and possible colitis  Patient 
has a history of lung cancer, COPD, GERD.

Clinical Indicators: Diarrhea

Radiology:  7/14 abdomen: No bowel obstruction is seen.  Possible mild colitis 
mid to distal sigmoid colon.

Consult:  Dr. Bartlett documented that a mild colitis is not ruled out but appears 
to be less likely, given the rapid resolution of diarrhea and benign abdominal 
exam.  An infectious cause is therefor felt to be more likely. 

Treatment:  

Medication:  Patient got IV and PO Levaquin and IV Rocephin



In your professional opinion, can you please further specify the following 
regarding the colitis, if known?  



Type

Cause

Any related complications

Other, please specify __________

Unable to determine

___________________________________________________________________
MTDD

## 2018-07-26 ENCOUNTER — HOSPITAL ENCOUNTER (OUTPATIENT)
Dept: HOSPITAL 47 - RADMRIMAIN | Age: 63
Discharge: HOME | End: 2018-07-26
Attending: INTERNAL MEDICINE
Payer: MEDICARE

## 2018-07-26 DIAGNOSIS — C34.12: ICD-10-CM

## 2018-07-26 DIAGNOSIS — G31.9: Primary | ICD-10-CM

## 2018-07-26 DIAGNOSIS — R90.82: ICD-10-CM

## 2018-07-26 PROCEDURE — 70553 MRI BRAIN STEM W/O & W/DYE: CPT

## 2018-07-26 NOTE — MR
EXAMINATION TYPE: MR brain wo/w con

 

DATE OF EXAM: 7/26/2018

 

COMPARISON: 1/18/2013

 

HISTORY: Hx of Lung CA, Headaches, Dizzy

 

TECHNIQUE: 

Multiplanar, multisequence images of the brain and brainstem is performed without and with IV contras
t, utilizing 5 mL intravenous Gadavist .

 

FINDINGS: There is some cerebral cortical atrophy. There is no mass effect nor midline shift. There i
s no sign of intracranial hemorrhage. There is patchy increased signal in the periventricular white m
atter on the T2 and FLAIR images. There are are multiple small foci of the gray-white matter junction
 of both cerebral hemispheres. Ventricles have normal size. There are small foci of increased signal 
in the brainstem on the FLAIR images. Sella turcica appears normal. There is no evidence of cortical 
infarct. There is no pathologic enhancement.

 

IMPRESSION: Cerebral atrophy. White matter changes in both cerebral hemispheres show some progression
 compared to old exam and are consistent with chronic small vessel ischemia. Demyelinating disease is
 possible. No evidence of metastatic disease.

## 2018-07-30 NOTE — CDI
Last Revision, December 2017





Documentation Clarification Form



Date: 7/30/18

From: Denise Cervantes

Phone: If you have a question regarding this query, please contact Luisa Ariza at [phone] between 8 am and 5pm.

MRN: [id number]

Admit Date: 7/14/2018 1:29:00 PM

Patient Name: Hemalatha Piper

Visit Number: [id number]

Discharge Date:  7/17/18



ATTENTION: The Clinical Documentation Specialists (CDI) and [facility] Coding Staff 
appreciate your assistance in clarifying documentation. Please respond to the 
clarification below the line at the bottom and electronically sign. The CDI & 
[facility] Coding staff will review the response and follow-up if needed. Please note: 
Queries are made part of the Legal Health Record. If you have any questions, 
please contact the author of this message via ITS.



Dr. Miguel Garza



Thank you for signing your previous query.  Please document a resonse before 
signing this query.

Colitis is documented in the H&P, Dr. Bartlett's consult note, Dr. Mendenhall's consult 
note, Veronica Ramírez's 7/16 progress note and in the discharge summary.

Patient history/risk factors: Patient was admitted due to diarrhea, nausea and 
vomiting and diagnosed with acute gastroenteritis and possible colitis  Patient 
has a history of lung cancer, COPD, GERD.

Clinical Indicators: Diarrhea

Radiology:  7/14 abdomen: No bowel obstruction is seen.  Possible mild colitis 
mid to distal sigmoid colon.

Consult:  Dr. Bartlett documented that a mild colitis is not ruled out but appears 
to be less likely, given the rapid resolution of diarrhea and benign abdominal 
exam.  An infectious cause is therefor felt to be more likely. 

Treatment:  

Medication:  Patient got IV and PO Levaquin and IV Rocephin



In your professional opinion, can you please further specify the following 
regarding the colitis, if known?  



Type

Cause

Any related complications

Other, please specify __________

Unable to determine

____________________________________________________________
MTDD

## 2019-01-17 ENCOUNTER — HOSPITAL ENCOUNTER (OUTPATIENT)
Dept: HOSPITAL 47 - RADCTMAIN | Age: 64
Discharge: HOME | End: 2019-01-17
Attending: INTERNAL MEDICINE
Payer: MEDICARE

## 2019-01-17 DIAGNOSIS — Z03.89: Primary | ICD-10-CM

## 2019-01-17 DIAGNOSIS — M43.8X4: ICD-10-CM

## 2019-01-17 DIAGNOSIS — R91.8: ICD-10-CM

## 2019-01-17 DIAGNOSIS — C34.12: ICD-10-CM

## 2019-01-17 LAB — BUN SERPL-SCNC: 18 MG/DL (ref 7–17)

## 2019-01-17 PROCEDURE — 84520 ASSAY OF UREA NITROGEN: CPT

## 2019-01-17 PROCEDURE — 36415 COLL VENOUS BLD VENIPUNCTURE: CPT

## 2019-01-17 PROCEDURE — 82565 ASSAY OF CREATININE: CPT

## 2019-01-17 PROCEDURE — 71260 CT THORAX DX C+: CPT

## 2019-01-17 NOTE — CT
EXAMINATION TYPE: CT chest w con

 

DATE OF EXAM: 1/17/2019

 

COMPARISON: PET/CT 9/1/2018 and 4/28/2018

 

HISTORY: 63-year-old female lung cancer, observation for suspected metastases, Follow up scan per pat
ient

 

TECHNIQUE: Contiguous axial scanning of the chest after the administration of 100 mL of Isovue 300.  
Coronal/sagittal reconstructions performed.

 

CT DLP: 193.6mGycm. Automatic exposure control utilized for a dose reduction.

 

 

FINDINGS:

Heart normal size without pericardial effusion.

 

Aorta normal caliber with mild arthritic arch calcifications and conventional arch vessel branching a
natomy.

 

No thoracic lymphadenopathy by CT size criteria. Calcified lymph nodes are present in the left infrah
ilar region suggesting prior granulomatous disease.

 

Redemonstrated spiculated mass left upper lobe measuring 2.5 x 2.4 cm versus approximately 3.0 x 2.6 
cm on 9/1/2018 and 2.9 x 2.4 cm unfortunately 2018 overall smaller from prior exams.

 

No consolidation or pleural effusion. No new suspicious pulmonary nodule identified at this time.

 

Redemonstrated tissue density mass of the left adrenal gland measuring up to 4.2 cm suggesting a myel
olipoma. Scattered calcified granulomas within the spleen.

 

Superior endplate deformity of T4 with mild overall height loss appears new from the patient's 9/1/20
18 PET/CT. Superior endplate deformity of L2 is new from patient's prior PET/CT.

 

Inferior compression fracture of T5 redemonstrated but now shows a horizontally oriented cleft of air
. Minimal anterior wedging of T7 is unchanged.

 

 

IMPRESSION:  

 

1. Stable to slightly smaller spiculated mass in the left upper lobe currently measuring 2.5 x 2.4 cm
 versus 3.0 x 2.6 cm, previously. Findings suggest site of treated disease. No evident disease recurr
ence or metastases.

2. Vertebral compression deformities. Minimal, chronic T7 anterior wedging. While the inferior endpla
te compression deformity of T5 was present previously, there is a new horizontal cleft of air here th
at could represent Kummel's disease.

3. New superior endplate deformities of T4 and L2 as compared to 9/1/2018 suggest possible acute or s
ubacute endplate fractures.

## 2019-03-07 ENCOUNTER — HOSPITAL ENCOUNTER (INPATIENT)
Dept: HOSPITAL 47 - EC | Age: 64
LOS: 3 days | Discharge: HOME | DRG: 190 | End: 2019-03-10
Payer: MEDICARE

## 2019-03-07 VITALS — BODY MASS INDEX: 25.4 KG/M2

## 2019-03-07 DIAGNOSIS — Z79.51: ICD-10-CM

## 2019-03-07 DIAGNOSIS — J96.20: ICD-10-CM

## 2019-03-07 DIAGNOSIS — M19.90: ICD-10-CM

## 2019-03-07 DIAGNOSIS — M79.7: ICD-10-CM

## 2019-03-07 DIAGNOSIS — Z79.899: ICD-10-CM

## 2019-03-07 DIAGNOSIS — E78.5: ICD-10-CM

## 2019-03-07 DIAGNOSIS — Z92.21: ICD-10-CM

## 2019-03-07 DIAGNOSIS — K59.00: ICD-10-CM

## 2019-03-07 DIAGNOSIS — F32.9: ICD-10-CM

## 2019-03-07 DIAGNOSIS — Z80.9: ICD-10-CM

## 2019-03-07 DIAGNOSIS — Z79.890: ICD-10-CM

## 2019-03-07 DIAGNOSIS — G89.4: ICD-10-CM

## 2019-03-07 DIAGNOSIS — J44.0: ICD-10-CM

## 2019-03-07 DIAGNOSIS — Z87.891: ICD-10-CM

## 2019-03-07 DIAGNOSIS — C34.12: ICD-10-CM

## 2019-03-07 DIAGNOSIS — K21.9: ICD-10-CM

## 2019-03-07 DIAGNOSIS — E03.9: ICD-10-CM

## 2019-03-07 DIAGNOSIS — J44.1: Primary | ICD-10-CM

## 2019-03-07 DIAGNOSIS — Z92.3: ICD-10-CM

## 2019-03-07 DIAGNOSIS — Z85.118: ICD-10-CM

## 2019-03-07 DIAGNOSIS — Z86.73: ICD-10-CM

## 2019-03-07 DIAGNOSIS — Z87.01: ICD-10-CM

## 2019-03-07 DIAGNOSIS — Z99.81: ICD-10-CM

## 2019-03-07 DIAGNOSIS — J20.9: ICD-10-CM

## 2019-03-07 LAB
ALBUMIN SERPL-MCNC: 3.8 G/DL (ref 3.5–5)
ALP SERPL-CCNC: 219 U/L (ref 38–126)
ALT SERPL-CCNC: 47 U/L (ref 9–52)
ANION GAP SERPL CALC-SCNC: 3 MMOL/L
APTT BLD: 22.4 SEC (ref 22–30)
AST SERPL-CCNC: 36 U/L (ref 14–36)
BASOPHILS # BLD AUTO: 0 K/UL (ref 0–0.2)
BASOPHILS NFR BLD AUTO: 0 %
BUN SERPL-SCNC: 15 MG/DL (ref 7–17)
CALCIUM SPEC-MCNC: 9.3 MG/DL (ref 8.4–10.2)
CHLORIDE SERPL-SCNC: 98 MMOL/L (ref 98–107)
CO2 SERPL-SCNC: 35 MMOL/L (ref 22–30)
EOSINOPHIL # BLD AUTO: 0.1 K/UL (ref 0–0.7)
EOSINOPHIL NFR BLD AUTO: 1 %
ERYTHROCYTE [DISTWIDTH] IN BLOOD BY AUTOMATED COUNT: 4.17 M/UL (ref 3.8–5.4)
ERYTHROCYTE [DISTWIDTH] IN BLOOD: 15 % (ref 11.5–15.5)
GLUCOSE SERPL-MCNC: 113 MG/DL (ref 74–99)
HCT VFR BLD AUTO: 40.8 % (ref 34–46)
HGB BLD-MCNC: 13.3 GM/DL (ref 11.4–16)
INR PPP: 1 (ref ?–1.2)
LYMPHOCYTES # SPEC AUTO: 0.6 K/UL (ref 1–4.8)
LYMPHOCYTES NFR SPEC AUTO: 5 %
MAGNESIUM SPEC-SCNC: 1.9 MG/DL (ref 1.6–2.3)
MCH RBC QN AUTO: 31.8 PG (ref 25–35)
MCHC RBC AUTO-ENTMCNC: 32.5 G/DL (ref 31–37)
MCV RBC AUTO: 97.8 FL (ref 80–100)
MONOCYTES # BLD AUTO: 0.7 K/UL (ref 0–1)
MONOCYTES NFR BLD AUTO: 6 %
NEUTROPHILS # BLD AUTO: 10.8 K/UL (ref 1.3–7.7)
NEUTROPHILS NFR BLD AUTO: 88 %
PLATELET # BLD AUTO: 334 K/UL (ref 150–450)
POTASSIUM SERPL-SCNC: 4.3 MMOL/L (ref 3.5–5.1)
PROT SERPL-MCNC: 6.5 G/DL (ref 6.3–8.2)
PT BLD: 10.5 SEC (ref 9–12)
SODIUM SERPL-SCNC: 136 MMOL/L (ref 137–145)
WBC # BLD AUTO: 12.3 K/UL (ref 3.8–10.6)

## 2019-03-07 PROCEDURE — 85730 THROMBOPLASTIN TIME PARTIAL: CPT

## 2019-03-07 PROCEDURE — 85025 COMPLETE CBC W/AUTO DIFF WBC: CPT

## 2019-03-07 PROCEDURE — 36415 COLL VENOUS BLD VENIPUNCTURE: CPT

## 2019-03-07 PROCEDURE — 83735 ASSAY OF MAGNESIUM: CPT

## 2019-03-07 PROCEDURE — 84439 ASSAY OF FREE THYROXINE: CPT

## 2019-03-07 PROCEDURE — 93306 TTE W/DOPPLER COMPLETE: CPT

## 2019-03-07 PROCEDURE — 84484 ASSAY OF TROPONIN QUANT: CPT

## 2019-03-07 PROCEDURE — 94640 AIRWAY INHALATION TREATMENT: CPT

## 2019-03-07 PROCEDURE — 71046 X-RAY EXAM CHEST 2 VIEWS: CPT

## 2019-03-07 PROCEDURE — 96374 THER/PROPH/DIAG INJ IV PUSH: CPT

## 2019-03-07 PROCEDURE — 80053 COMPREHEN METABOLIC PANEL: CPT

## 2019-03-07 PROCEDURE — 87040 BLOOD CULTURE FOR BACTERIA: CPT

## 2019-03-07 PROCEDURE — 85610 PROTHROMBIN TIME: CPT

## 2019-03-07 PROCEDURE — 93005 ELECTROCARDIOGRAM TRACING: CPT

## 2019-03-07 PROCEDURE — 83880 ASSAY OF NATRIURETIC PEPTIDE: CPT

## 2019-03-07 PROCEDURE — 94760 N-INVAS EAR/PLS OXIMETRY 1: CPT

## 2019-03-07 PROCEDURE — 94644 CONT INHLJ TX 1ST HOUR: CPT

## 2019-03-07 PROCEDURE — 84443 ASSAY THYROID STIM HORMONE: CPT

## 2019-03-07 PROCEDURE — 96375 TX/PRO/DX INJ NEW DRUG ADDON: CPT

## 2019-03-07 PROCEDURE — 99291 CRITICAL CARE FIRST HOUR: CPT

## 2019-03-07 PROCEDURE — 71260 CT THORAX DX C+: CPT

## 2019-03-07 NOTE — ED
General Adult HPI





- General


Chief complaint: Shortness of Breath


Stated complaint: GARRY


Time Seen by Provider: 03/07/19 16:00


Source: patient, EMS, RN notes reviewed


Mode of arrival: EMS


Limitations: no limitations





- History of Present Illness


Initial comments: 





This is a 63-year-old female who has a past medical history significant for 

stage III lung cancer and COPD.  Patient comes in to the emergency department 

today complaining of shortness of breath for about a week.  Patient states 

progressively work.  Patient also states that the swelling in the legs are worse

than normal.  Patient denies any fever but states she has been coughing quite a 

bit.  Patient denies any sputum production.  Patient denies any chest pain 

pressure or heaviness.  Patient denies headache patient denies numbness 

weakness.  Patient denies any abdominal pain patient denies nausea vomiting 

diarrhea.  Patient states she's been taking breathing treatments at home but 

they have not been helpful.  Patient states she gets up and ambulates her pulse 

ox drops to about 88%.





- Related Data


                                Home Medications











 Medication  Instructions  Recorded  Confirmed


 


Albuterol Inhaler [Ventolin Hfa 2 puff INHALATION RT-QID PRN 05/27/14 03/07/19





Inhaler]   


 


Cevimeline [Evoxac] 30 mg PO BID 05/27/14 03/07/19


 


Escitalopram [Lexapro] 20 mg PO DAILY 05/27/14 03/07/19


 


Esomeprazole Magnesium [NexIUM] 40 mg PO BID 05/27/14 03/07/19


 


oxyCODONE-APAP 10-325MG [Percocet 1 tab PO Q6H PRN 02/27/17 03/07/19





 mg]   


 


Albuterol Nebulized [Ventolin 2.5 mg INHALATION RT-QID PRN 08/17/17 03/07/19





Nebulized]   


 


Budesonide-Formot 160-4.5 Mcg 2 puff INHALATION RT-BID 08/17/17 03/07/19





[Symbicort 160-4.5 Mcg Inhaler]   


 


Ondansetron Odt [Zofran ODT] 8 mg PO Q6H PRN 08/17/17 03/07/19


 


Prochlorperazine [Compazine] 10 mg PO Q6H PRN 08/17/17 03/07/19


 


ALPRAZolam [Xanax] 0.25 mg PO Q6H PRN 07/14/18 03/07/19


 


Cholecalciferol [Vitamin D3] 1,000 unit PO DAILY 03/07/19 03/07/19


 


Cyanocobalamin (Vitamin B-12) 1,000 mcg PO DAILY 03/07/19 03/07/19





[Vitamin B-12]   


 


Folic Acid 0.4 mg PO DAILY 03/07/19 03/07/19


 


Furosemide [Lasix] 20 mg PO DAILY PRN 03/07/19 03/07/19


 


Levothyroxine Sodium [Synthroid] 25 mcg PO DAILY 03/07/19 03/07/19


 


Pyridoxine HCl (Vitamin B6) 100 mg PO DAILY 03/07/19 03/07/19





[Vitamin B-6]   


 


Tolterodine Tartrate [Detrol LA] 4 mg PO DAILY 03/07/19 03/07/19


 


Umeclidinium Bromide [Incruse 1 puff INHALATION RT-DAILY 03/07/19 03/07/19





Ellipta]   


 


guaiFENesin [Mucinex] 600 mg PO DAILY 03/07/19 03/07/19








                                  Previous Rx's











 Medication  Instructions  Recorded


 


Morphine Sulfate [Ms Contin] 30 mg PO BID #60 tablet.er 09/02/15











                                    Allergies











Allergy/AdvReac Type Severity Reaction Status Date / Time


 


No Known Allergies Allergy   Verified 03/07/19 15:55














Review of Systems


ROS Statement: 


Those systems with pertinent positive or pertinent negative responses have been 

documented in the HPI.





ROS Other: All systems not noted in ROS Statement are negative.





Past Medical History


Past Medical History: Cancer, COPD, CVA/TIA, Fibromyalgia, GERD/Reflux, Hyperlip

idemia, Osteoarthritis (OA), Pneumonia


Additional Past Medical History / Comment(s): Non-small cell lung cancer stage 3

adenocarcinoma, COPD, fibromyalgia, acid reflux, hyperlipidemia, osteoarthritis,

adrenal mass that has remained stable over some time, TIA history of, hiatal 

hernia, chronic constipation, chronic back pain, degenerative arthritis


History of Any Multi-Drug Resistant Organisms: None Reported


Additional Past Surgical History / Comment(s): SINUS surgery


Past Anesthesia/Blood Transfusion Reactions: No Reported Reaction


Past Psychological History: Depression


Smoking Status: Former smoker


Past Alcohol Use History: None Reported


Past Drug Use History: Marijuana





- Past Family History


  ** Mother


Family Medical History: Cancer





  ** Father


Family Medical History: Cancer





General Exam





- General Exam Comments


Initial Comments: 





GENERAL:


Patient is well-developed and well-nourished.  Patient is nontoxic and well-

hydrated and is in smile distress.





ENT:


Neck is soft and supple.  No significant lymphadenopathy is noted.  Oropharynx 

is clear.  Moist mucous membranes.  Neck has full range of motion without 

eliciting any pain. 





EYES:


The sclera were anicteric and conjunctiva were pink and moist.  Extraocular 

movements were intact and pupils were equal round and reactive to light.  

Eyelids were unremarkable.





PULMONARY:


Crackles bilaterally with symmetric very wheezing.





CARDIOVASCULAR:


There is a regular rate and rhythm without any murmurs gallops or rubs. 





ABDOMEN:


Soft and nontender with normal bowel sounds.  No palpable organomegaly was 

noted.  There is no palpable pulsatile mass.





SKIN:


Skin is clear with no lesions or rashes and otherwise unremarkable.





NEUROLOGIC:


Patient is alert and oriented x3.  Cranial nerves II through XII are grossly 

intact.  Motor and sensory are also intact.  Normal speech, volume and content. 

Symmetrical smile.





MUSCULOSKELETAL:


Normal extremities with adequate strength and full range of motion.  2+ edema 

bilaterally





LYMPHATICS:


No significant lymphadenopathy is noted





PSYCHIATRIC:


Normal psychiatric evaluation.  


Limitations: no limitations





Course


                                   Vital Signs











  03/07/19 03/07/19 03/07/19





  15:55 15:58 16:00


 


Temperature 98.4 F  


 


Pulse Rate 71  86


 


Respiratory 18  24





Rate   


 


Blood Pressure 164/90  164/90


 


O2 Sat by Pulse 98 100 100





Oximetry   














  03/07/19 03/07/19 03/07/19





  16:30 17:00 17:11


 


Temperature   


 


Pulse Rate 85 81 82


 


Respiratory 20 20 





Rate   


 


Blood Pressure 144/86 152/89 


 


O2 Sat by Pulse 99 99 





Oximetry   














  03/07/19 03/07/19 03/07/19





  17:30 17:32 17:40


 


Temperature   


 


Pulse Rate 88 88 90


 


Respiratory 19  





Rate   


 


Blood Pressure 158/84  


 


O2 Sat by Pulse 99  





Oximetry   














Medical Decision Making





- Medical Decision Making





EKG shows normal sinus rhythm at 80 bpm AL interval 212 QRS 76 QT interval 364 

QTC is 419.  Patient's EKG shows no ST segment elevation or depression.  Patient

does have Q waves in inferior leads.  These were seen on an old EKG.





Chest x-ray shows no acute abnormality.





Patient received 3 breathing treatments in the emergency department and 

steroids.  However.  Her move the option patient drops her sats down into the 

80s.





I spoke with Dr. Garza he agreed to admit the patient admitted the patient I 

wrote admitting orders I continued steroids and albuterol on the floor.  I 

consulted pulmonary





- Lab Data


Result diagrams: 


                                 03/07/19 17:00





                                 03/07/19 17:00


                                   Lab Results











  03/07/19 03/07/19 03/07/19 Range/Units





  17:00 17:00 17:00 


 


WBC  12.3 H    (3.8-10.6)  k/uL


 


RBC  4.17    (3.80-5.40)  m/uL


 


Hgb  13.3    (11.4-16.0)  gm/dL


 


Hct  40.8    (34.0-46.0)  %


 


MCV  97.8    (80.0-100.0)  fL


 


MCH  31.8    (25.0-35.0)  pg


 


MCHC  32.5    (31.0-37.0)  g/dL


 


RDW  15.0    (11.5-15.5)  %


 


Plt Count  334    (150-450)  k/uL


 


Neutrophils %  88    %


 


Lymphocytes %  5    %


 


Monocytes %  6    %


 


Eosinophils %  1    %


 


Basophils %  0    %


 


Neutrophils #  10.8 H    (1.3-7.7)  k/uL


 


Lymphocytes #  0.6 L    (1.0-4.8)  k/uL


 


Monocytes #  0.7    (0-1.0)  k/uL


 


Eosinophils #  0.1    (0-0.7)  k/uL


 


Basophils #  0.0    (0-0.2)  k/uL


 


PT     (9.0-12.0)  sec


 


INR     (<1.2)  


 


APTT     (22.0-30.0)  sec


 


Sodium   136 L   (137-145)  mmol/L


 


Potassium   4.3   (3.5-5.1)  mmol/L


 


Chloride   98   ()  mmol/L


 


Carbon Dioxide   35 H   (22-30)  mmol/L


 


Anion Gap   3   mmol/L


 


BUN   15   (7-17)  mg/dL


 


Creatinine   0.61   (0.52-1.04)  mg/dL


 


Est GFR (CKD-EPI)AfAm   >90   (>60 ml/min/1.73 sqM)  


 


Est GFR (CKD-EPI)NonAf   >90   (>60 ml/min/1.73 sqM)  


 


Glucose   113 H   (74-99)  mg/dL


 


Calcium   9.3   (8.4-10.2)  mg/dL


 


Magnesium   1.9   (1.6-2.3)  mg/dL


 


Total Bilirubin   0.7   (0.2-1.3)  mg/dL


 


AST   36   (14-36)  U/L


 


ALT   47   (9-52)  U/L


 


Alkaline Phosphatase   219 H   ()  U/L


 


Troponin I     (0.000-0.034)  ng/mL


 


NT-Pro-B Natriuret Pep    244  pg/mL


 


Total Protein   6.5   (6.3-8.2)  g/dL


 


Albumin   3.8   (3.5-5.0)  g/dL














  03/07/19 03/07/19 Range/Units





  17:00 17:00 


 


WBC    (3.8-10.6)  k/uL


 


RBC    (3.80-5.40)  m/uL


 


Hgb    (11.4-16.0)  gm/dL


 


Hct    (34.0-46.0)  %


 


MCV    (80.0-100.0)  fL


 


MCH    (25.0-35.0)  pg


 


MCHC    (31.0-37.0)  g/dL


 


RDW    (11.5-15.5)  %


 


Plt Count    (150-450)  k/uL


 


Neutrophils %    %


 


Lymphocytes %    %


 


Monocytes %    %


 


Eosinophils %    %


 


Basophils %    %


 


Neutrophils #    (1.3-7.7)  k/uL


 


Lymphocytes #    (1.0-4.8)  k/uL


 


Monocytes #    (0-1.0)  k/uL


 


Eosinophils #    (0-0.7)  k/uL


 


Basophils #    (0-0.2)  k/uL


 


PT  10.5   (9.0-12.0)  sec


 


INR  1.0   (<1.2)  


 


APTT  22.4   (22.0-30.0)  sec


 


Sodium    (137-145)  mmol/L


 


Potassium    (3.5-5.1)  mmol/L


 


Chloride    ()  mmol/L


 


Carbon Dioxide    (22-30)  mmol/L


 


Anion Gap    mmol/L


 


BUN    (7-17)  mg/dL


 


Creatinine    (0.52-1.04)  mg/dL


 


Est GFR (CKD-EPI)AfAm    (>60 ml/min/1.73 sqM)  


 


Est GFR (CKD-EPI)NonAf    (>60 ml/min/1.73 sqM)  


 


Glucose    (74-99)  mg/dL


 


Calcium    (8.4-10.2)  mg/dL


 


Magnesium    (1.6-2.3)  mg/dL


 


Total Bilirubin    (0.2-1.3)  mg/dL


 


AST    (14-36)  U/L


 


ALT    (9-52)  U/L


 


Alkaline Phosphatase    ()  U/L


 


Troponin I   <0.012  (0.000-0.034)  ng/mL


 


NT-Pro-B Natriuret Pep    pg/mL


 


Total Protein    (6.3-8.2)  g/dL


 


Albumin    (3.5-5.0)  g/dL














Critical Care Time


Critical Care Time: Yes


Total Critical Care Time: 35





Disposition


Clinical Impression: 


 Acute exacerbation of chronic obstructive airways disease, Pedal edema





Disposition: ADMITTED AS IP TO THIS HOSP


Referrals: 


Rebecca Amaya MD [Primary Care Provider] - 1-2 days


Time of Disposition: 18:37

## 2019-03-07 NOTE — XR
EXAMINATION TYPE: XR chest 2V

 

DATE OF EXAM: 3/7/2019

 

COMPARISON: 1/16/2019

 

HISTORY: Difficulty breathing

 

TECHNIQUE:  Frontal and lateral views of the chest are obtained.

 

FINDINGS:  There is a small infiltrate at the lateral left lung base. The other lung fields are fairl
y clear. There is no heart failure. Heart size is normal. Thoracic aorta is atheromatous. There are c
hest leads. There is osteopenia and compression deformity of several thoracic vertebra.

 

IMPRESSION:  There is a chronic infiltrate lateral left lung base unchanged compared to old exam. No 
heart failure.

## 2019-03-08 LAB
ALBUMIN SERPL-MCNC: 3.6 G/DL (ref 3.5–5)
ALP SERPL-CCNC: 215 U/L (ref 38–126)
ALT SERPL-CCNC: 47 U/L (ref 9–52)
ANION GAP SERPL CALC-SCNC: 4 MMOL/L
AST SERPL-CCNC: 26 U/L (ref 14–36)
BASOPHILS # BLD AUTO: 0 K/UL (ref 0–0.2)
BASOPHILS NFR BLD AUTO: 0 %
BUN SERPL-SCNC: 19 MG/DL (ref 7–17)
CALCIUM SPEC-MCNC: 9.2 MG/DL (ref 8.4–10.2)
CHLORIDE SERPL-SCNC: 98 MMOL/L (ref 98–107)
CO2 SERPL-SCNC: 35 MMOL/L (ref 22–30)
EOSINOPHIL # BLD AUTO: 0 K/UL (ref 0–0.7)
EOSINOPHIL NFR BLD AUTO: 0 %
ERYTHROCYTE [DISTWIDTH] IN BLOOD BY AUTOMATED COUNT: 4.25 M/UL (ref 3.8–5.4)
ERYTHROCYTE [DISTWIDTH] IN BLOOD: 15.3 % (ref 11.5–15.5)
GLUCOSE BLD-MCNC: 131 MG/DL (ref 75–99)
GLUCOSE BLD-MCNC: 242 MG/DL (ref 75–99)
GLUCOSE BLD-MCNC: 270 MG/DL (ref 75–99)
GLUCOSE SERPL-MCNC: 157 MG/DL (ref 74–99)
HCT VFR BLD AUTO: 41.8 % (ref 34–46)
HGB BLD-MCNC: 13.5 GM/DL (ref 11.4–16)
LYMPHOCYTES # SPEC AUTO: 0.3 K/UL (ref 1–4.8)
LYMPHOCYTES NFR SPEC AUTO: 4 %
MCH RBC QN AUTO: 31.7 PG (ref 25–35)
MCHC RBC AUTO-ENTMCNC: 32.3 G/DL (ref 31–37)
MCV RBC AUTO: 98.3 FL (ref 80–100)
MONOCYTES # BLD AUTO: 0.5 K/UL (ref 0–1)
MONOCYTES NFR BLD AUTO: 6 %
NEUTROPHILS # BLD AUTO: 7.1 K/UL (ref 1.3–7.7)
NEUTROPHILS NFR BLD AUTO: 89 %
PLATELET # BLD AUTO: 347 K/UL (ref 150–450)
POTASSIUM SERPL-SCNC: 3.7 MMOL/L (ref 3.5–5.1)
PROT SERPL-MCNC: 6.2 G/DL (ref 6.3–8.2)
SODIUM SERPL-SCNC: 137 MMOL/L (ref 137–145)
WBC # BLD AUTO: 8.1 K/UL (ref 3.8–10.6)

## 2019-03-08 RX ADMIN — LEVOTHYROXINE SODIUM SCH MCG: 25 TABLET ORAL at 09:07

## 2019-03-08 RX ADMIN — PANTOPRAZOLE SODIUM SCH MG: 40 TABLET, DELAYED RELEASE ORAL at 17:52

## 2019-03-08 RX ADMIN — INSULIN ASPART SCH: 100 INJECTION, SOLUTION INTRAVENOUS; SUBCUTANEOUS at 17:37

## 2019-03-08 RX ADMIN — MORPHINE SULFATE SCH MG: 30 TABLET, FILM COATED, EXTENDED RELEASE ORAL at 21:36

## 2019-03-08 RX ADMIN — METHYLPREDNISOLONE SODIUM SUCCINATE SCH MG: 125 INJECTION, POWDER, FOR SOLUTION INTRAMUSCULAR; INTRAVENOUS at 00:05

## 2019-03-08 RX ADMIN — ESCITALOPRAM OXALATE SCH MG: 20 TABLET, FILM COATED ORAL at 09:06

## 2019-03-08 RX ADMIN — INSULIN ASPART SCH: 100 INJECTION, SOLUTION INTRAVENOUS; SUBCUTANEOUS at 14:17

## 2019-03-08 RX ADMIN — CYANOCOBALAMIN TAB 500 MCG SCH MCG: 500 TAB at 21:30

## 2019-03-08 RX ADMIN — IPRATROPIUM BROMIDE AND ALBUTEROL SULFATE PRN ML: .5; 3 SOLUTION RESPIRATORY (INHALATION) at 07:45

## 2019-03-08 RX ADMIN — FOLIC ACID SCH MG: 1 TABLET ORAL at 09:08

## 2019-03-08 RX ADMIN — CYANOCOBALAMIN TAB 500 MCG SCH MCG: 500 TAB at 09:07

## 2019-03-08 RX ADMIN — MORPHINE SULFATE SCH MG: 30 TABLET, FILM COATED, EXTENDED RELEASE ORAL at 09:06

## 2019-03-08 RX ADMIN — METHYLPREDNISOLONE SODIUM SUCCINATE SCH MG: 125 INJECTION, POWDER, FOR SOLUTION INTRAMUSCULAR; INTRAVENOUS at 17:52

## 2019-03-08 RX ADMIN — OXYCODONE HYDROCHLORIDE AND ACETAMINOPHEN PRN EACH: 10; 325 TABLET ORAL at 17:57

## 2019-03-08 RX ADMIN — INSULIN ASPART SCH UNIT: 100 INJECTION, SOLUTION INTRAVENOUS; SUBCUTANEOUS at 21:29

## 2019-03-08 RX ADMIN — CEVIMELINE HYDROCHLORIDE SCH MG: 30 CAPSULE ORAL at 09:06

## 2019-03-08 RX ADMIN — Medication SCH UNIT: at 09:07

## 2019-03-08 RX ADMIN — BUDESONIDE AND FORMOTEROL FUMARATE DIHYDRATE SCH PUFF: 160; 4.5 AEROSOL RESPIRATORY (INHALATION) at 21:15

## 2019-03-08 RX ADMIN — METHYLPREDNISOLONE SODIUM SUCCINATE SCH MG: 125 INJECTION, POWDER, FOR SOLUTION INTRAMUSCULAR; INTRAVENOUS at 12:49

## 2019-03-08 RX ADMIN — METHYLPREDNISOLONE SODIUM SUCCINATE SCH MG: 125 INJECTION, POWDER, FOR SOLUTION INTRAMUSCULAR; INTRAVENOUS at 06:11

## 2019-03-08 RX ADMIN — CEVIMELINE HYDROCHLORIDE SCH MG: 30 CAPSULE ORAL at 22:01

## 2019-03-08 RX ADMIN — METHYLPREDNISOLONE SODIUM SUCCINATE SCH MG: 125 INJECTION, POWDER, FOR SOLUTION INTRAMUSCULAR; INTRAVENOUS at 21:30

## 2019-03-08 RX ADMIN — IPRATROPIUM BROMIDE AND ALBUTEROL SULFATE PRN ML: .5; 3 SOLUTION RESPIRATORY (INHALATION) at 21:15

## 2019-03-08 NOTE — P.CNPUL
History of Present Illness


Consult date: 03/08/19


Requesting physician: Miguel Garza


Reason for consult: dyspnea


Chief complaint: Shortness of breath, weakness, myalgias


History of present illness: 





This is a very pleasant 63-year-old female patient who follows with Dr. Amaya 

as her primary care physician.  She has a history of CVA/TIA, fibromyalgia, 

gastroesophageal reflux disease, hyperlipidemia, hypothyroidism, chronic back 

pain, depression.  She also has a history of chronic tobacco dependence, oxygen 

dependent chronic obstructive pulmonary disease with an FEV1 value of 56% of 

predicted, and non-small cell lung cancer stage III adenocarcinoma.  She follows

with Dr. Pastor in our office. This was diagnosed by an FNA of a left upper lobe 

lesion by IR in May 2017.  She had received concurrent chemoradiation with 

subsequent additional chemotherapy and then placed on maintenance mmunotherapy 

of Imfinzi back in June 2018.  Her last PET scan in September 2018 revealed 

continued left upper lobe mass with irregular margins extending to the pleural 

surface, spiculated appearance but was stable compared to previous and April 2018.  The SUV value was only 1.7.  Subsequent computed tomography scan of the 

chest in January 2019 revealed a stable and slightly smaller spiculated mass of 

the left upper lobe measuring 2.5 x 2.4 cm versus 3.0 x 2.6 previous.  No 

evidence of recurrence or metastasis.  Approximately 2-3 weeks ago the patient 

had seen Dr. Segura.  She was having ongoing issues with nausea vomiting 

profound weakness and fatigue.  She decided to stop taking the immunotherapy. 

She had been treated for his COPD exacerbation 02/13/2019 in our office by Dr. Pastor where she received steroids and Bactrim.  She did improve for the first 

week or so. She presented to the emergency room yesterday with complaints of 

increasing shortness of breath cough and congestion.  She did have some lower 

extremity edema.  She was still quite weak and fatigued.  Chest x-ray revealed 

some small infiltrate of the left lung base, which is chronic in nature.  White 

count 12.3.  Hemoglobin 13.3.  Creatinine 0.61.  ProBNP 244.  She's been ini

tiated on DuoNeb inhalations, Symbicort, IV Solu-Medrol.  She was given IV 

diuretics as well.  Presently, she is resting quite comfortably in bed.  She is 

awake and alert in no acute distress.  She is breathing a bit easier today as 

compared to yesterday.  Not quite back to her baseline.  She has a loose 

nonproductive congestive cough, some dyspnea on exertion.





Review of Systems





REVIEW OF SYSTEMS:


CONSTITUTIONAL: Well-nourished 63-year-old female Denies any recent significant 

weight loss or weight gain.


EYES: Denies change in vision.


EARS, NOSE, MOUTH, THROAT: Denies headaches, denies sore throat.


CARDIOVASCULAR: Denies chest pain, palpitations or syncopal episodes.


RESPIRATORY: Positive for shortness of breath, cough, congestion without 

hemoptysis.


GASTROINTESTINAL: Denies change in appetite, denies abdominal pain


GENITOURINARY: Denies hematuria, denies infections.


MUSKULOSKELETAL: Positive for muscle fatigue and weakness, generalized malaise


INTEGUMENTARY: Denies rash, denies eczema.


NEUROLOGICAL: Denies recent memory loss, no recent seizure activity. 


PSYCHIATRIC: Positive for depression.


HEMATOLOGIC/LYMPHATIC: Denies anemia, denies enlarged lymph nodes.





Eyes: 





Past Medical History


Past Medical History: Cancer, COPD, CVA/TIA, Fibromyalgia, GERD/Reflux, 

Hyperlipidemia, Osteoarthritis (OA), Pneumonia


Additional Past Medical History / Comment(s): Non-small cell lung cancer stage 3

adenocarcinoma, COPD, fibromyalgia, acid reflux, hyperlipidemia, osteoarthritis,

adrenal mass that has remained stable over some time, TIA history of, hiatal 

hernia, chronic constipation, chronic back pain, degenerative arthritis


History of Any Multi-Drug Resistant Organisms: None Reported


Additional Past Surgical History / Comment(s): SINUS surgery


Past Anesthesia/Blood Transfusion Reactions: No Reported Reaction


Past Psychological History: Depression


Smoking Status: Former smoker


Past Alcohol Use History: None Reported


Additional Past Alcohol Use History / Comment(s): smokes < 1/2 PPD for past 30 

yrs


Past Drug Use History: Marijuana





- Past Family History


  ** Mother


Family Medical History: Cancer





  ** Father


Family Medical History: Cancer





Medications and Allergies


                                Home Medications











 Medication  Instructions  Recorded  Confirmed  Type


 


Albuterol Inhaler [Ventolin Hfa 2 puff INHALATION RT-QID PRN 05/27/14 03/07/19 

History





Inhaler]    


 


Cevimeline [Evoxac] 30 mg PO BID 05/27/14 03/07/19 History


 


Escitalopram [Lexapro] 20 mg PO DAILY 05/27/14 03/07/19 History


 


Esomeprazole Magnesium [NexIUM] 40 mg PO BID 05/27/14 03/07/19 History


 


Morphine Sulfate [Ms Contin] 30 mg PO BID #60 tablet.er 09/02/15 03/07/19 Rx


 


oxyCODONE-APAP 10-325MG [Percocet 1 tab PO Q6H PRN 02/27/17 03/07/19 History





 mg]    


 


Albuterol Nebulized [Ventolin 2.5 mg INHALATION RT-QID PRN 08/17/17 03/07/19 

History





Nebulized]    


 


Budesonide-Formot 160-4.5 Mcg 2 puff INHALATION RT-BID 08/17/17 03/07/19 History





[Symbicort 160-4.5 Mcg Inhaler]    


 


Ondansetron Odt [Zofran ODT] 8 mg PO Q6H PRN 08/17/17 03/07/19 History


 


Prochlorperazine [Compazine] 10 mg PO Q6H PRN 08/17/17 03/07/19 History


 


ALPRAZolam [Xanax] 0.25 mg PO Q6H PRN 07/14/18 03/07/19 History


 


Cholecalciferol [Vitamin D3] 1,000 unit PO DAILY 03/07/19 03/07/19 History


 


Cyanocobalamin (Vitamin B-12) 1,000 mcg PO BID 03/07/19 03/07/19 History





[Vitamin B-12]    


 


Folic Acid 0.4 mg PO DAILY 03/07/19 03/07/19 History


 


Furosemide [Lasix] 20 mg PO DAILY PRN 03/07/19 03/07/19 History


 


Levothyroxine Sodium [Synthroid] 25 mcg PO DAILY 03/07/19 03/07/19 History


 


Umeclidinium Bromide [Incruse 1 puff INHALATION RT-DAILY 03/07/19 03/07/19 

History





Ellipta]    


 


guaiFENesin [Mucinex] 600 mg PO DAILY 03/07/19 03/07/19 History








                                    Allergies











Allergy/AdvReac Type Severity Reaction Status Date / Time


 


No Known Allergies Allergy   Verified 03/07/19 15:55














Physical Exam


Vitals: 


                                   Vital Signs











  Temp Pulse Pulse Resp BP BP Pulse Ox


 


 03/08/19 08:05  98.5 F   85  16   153/78  99


 


 03/08/19 08:00    81  16   


 


 03/08/19 07:54   94     


 


 03/08/19 07:46   88     


 


 03/07/19 21:29  98.1 F   81  16   158/83  98


 


 03/07/19 20:30     16   


 


 03/07/19 19:45  97.9 F      


 


 03/07/19 19:00   89   16  122/99   97


 


 03/07/19 18:30  98.2 F  89   16  143/87   98


 


 03/07/19 18:00   87   19  164/90   99


 


 03/07/19 17:40   90     


 


 03/07/19 17:32   88     


 


 03/07/19 17:30   88   19  158/84   99


 


 03/07/19 17:11   82     


 


 03/07/19 17:00   81   20  152/89   99


 


 03/07/19 16:30   85   20  144/86   99


 


 03/07/19 16:00   86   24  164/90   100


 


 03/07/19 15:58        100


 


 03/07/19 15:55  98.4 F  71   18  164/90   98








                                Intake and Output











 03/07/19 03/08/19 03/08/19





 22:59 06:59 14:59


 


Intake Total   200


 


Balance   200


 


Intake:   


 


  Oral   200


 


Other:   


 


  # Voids 2 1 1


 


  Weight 57.153 kg  














GENERAL EXAM: Alert, active, comfortable in no apparent distress.  On 4 L nasal 

cannula


HEAD: Normocephalic.


EYES: Normal reaction of pupils, equal size.


NOSE: Clear with pink turbinates.


THROAT: No erythema or exudates.


NECK: No masses, no JVD.


CHEST: No chest wall deformity.


LUNGS: Equal air entry with few scattered rhonchi, crackles in the bases.  

Diminished.


CVS: S1 and S2 normal with no audible murmur, regular rhythm.


ABDOMEN: No hepatosplenomegaly, normal bowel sounds, no guarding or rigidity.


SPINE: No scoliosis or deformity


SKIN: No rashes


CENTRAL NERVOUS SYSTEM: No focal deficits, tone is normal in all 4 extremities.


EXTREMITIES: There is no peripheral edema.  No clubbing, no cyanosis.  

Peripheral pulses are intact.





Results





- Laboratory Findings


CBC and BMP: 


                                 03/08/19 10:38





                                 03/08/19 10:38


PT/INR, D-dimer











PT  10.5 sec (9.0-12.0)   03/07/19  17:00    


 


INR  1.0  (<1.2)   03/07/19  17:00    








Abnormal lab findings: 


                                  Abnormal Labs











  03/07/19 03/07/19 03/08/19





  17:00 17:00 10:38


 


WBC  12.3 H  


 


Neutrophils #  10.8 H  


 


Lymphocytes #  0.6 L   0.3 L


 


Sodium   136 L 


 


Carbon Dioxide   35 H 


 


BUN   


 


Glucose   113 H 


 


Alkaline Phosphatase   219 H 


 


Total Protein   














  03/08/19





  10:38


 


WBC 


 


Neutrophils # 


 


Lymphocytes # 


 


Sodium 


 


Carbon Dioxide  35 H


 


BUN  19 H


 


Glucose  157 H


 


Alkaline Phosphatase  215 H


 


Total Protein  6.2 L














- Diagnostic Findings


Chest x-ray: image reviewed





Assessment and Plan


Assessment: 





Impression:





#1 Acute exacerbation of severe oxygen dependent chronic obstructive pulmonary 

disease, complicated by purulent tracheobronchitis.  No clear evidence of 

pneumonia.





#2 History of chronic tobacco dependence, states quit about 6 months ago.





#3 History of stage III non-small cell lung cancer, adenocarcinoma diagnosed in 

May 2017.  Status post chemoradiation with subsequent chemotherapy and eventual 

immunotherapy.  Most recently on Imfinzi but having issues with profound 

weakness and fatigue.  Most recent computed tomography scan from January 2019 

showed stable and slightly smaller left upper lobe spiculated lesion currently 

measuring 2.5 x 2.4 cm.





#4 History of CVA/TIA.





#5 Hyperlipidemia.





#6 Hypothyroidism.





#7 Fibromyalgia.





#8 Chronic pain syndrome.





#9 History of depression.





#10 History of marijuana use.





Plan:





The patient was seen and evaluated by Dr. Moe.  Chest x-ray and labs were 

reviewed.  We'll continue with the current treatment plan including DuoNeb inhal

ations, Symbicort, IV Solu-Medrol.  We'll add empiric antibiotics in the form of

 azithromycin.  She is encouraged regarding continued complete smoking 

cessation.  We will continue to titrate down the FiO2 as tolerated.  We will 

increase her activity as tolerated.  We will continue to follow and make further

 recommendations based on her clinical status.





I, the cosigning physician, performed a history & physical examination of the 

patient. Lungs sounds with few scattered rhonchi, end expiratory wheeze, 

diminished.  Maintaining good O2 saturations in the 90s on 4 L/m per nasal 

cannula.  I discussed the assessment and plan of care with my nurse 

practitioner, Nguyen Guaman. I attest to the above consultation as dictated by her.

 


Time with Patient: Greater than 30

## 2019-03-08 NOTE — P.CONS
History of Present Illness





- Reason for Consult


Consult date: 03/08/19


Lung Cancer


Requesting physician: Veronica Ramírez





- Chief Complaint


Shortness of Breath and BLE Edema





- History of Present Illness


Ms. Piper is a pleasant female patient well known to our practive for treatment 

of her known stage IIIA Adenocarcinoma of the Lung, originally diagnosed in June 2017. Primary Oncologist Dr. Segura. In 2017, she was originally referred by Dr Amaya after CT Scan of chest was suggestive of a neoplastic process in L upper 

lobe. She presented to Dr Amaya with progressive weakness, lack of stamina, 

anorexia and weight loss of 40 Lbs over 6 months. She had recent negative 

Colonoscopy, but CXR revealed DESTIN mass, CT Scan of chest done on 5/16/2017 

revealed 5.4X4.3 spiculated Pleural-based lesion highly suggestive of primary 

Bronchogenic carcinoma extending to pleural surface. AP window Lymphadenopathy 

measured 1 cm was noted, as well as, L Adrenal mass measured 4.5X3.6 cm (Was 

present on CT of June 2015 and minimally changed).


The patient C/O L shoulder pain and left upper chest wall pain X 4-6 months, as 

well as, diffuse headache and nausea X 2-3 months at the time of diagnosis.


She smoked 1 PPD X 45 years, denied ETOH , resides with her partner of 38 years.





On 6/6/17: PET Scan : Stage III-A disease, Bx: Adenocarcinoma, CT of head 

negative. Quit smoking.


June 14 July 26, 2017 - Underwent Concurrent radiation Therapy to lung and 

Systemic Weekly Chrmotherapy with Carboplatin and Taxol. 


September 28, 2017 - November 2017 - Received Carboplatin and Almta x3 cycles. 


She was placed on Observation and remained stable from October 2017 to current. 


In May 2018, CT scan failed to show active or Metastatic disease, therefore she 

was placed on maintenance therapy with Imfinzi (Darvolumab), tolerating well 

with the exception of acute COPD and Bronchitis exacerbations. 


9/2018 - Mental Status Changes and Increased body aches and MRI of Brain was 

negative for Metastatic Disease and PET Negative for active Disease. 


February 2019 - Continues on Maintenance Imfinizi and most recent CT Chest shows

further reduction in the inactive malignancy





The past three months patient has been struggling with increased weakness and 

fatigue, acute infections which she presents with acute mental status changes 

(treated with antibiotics and in past have quickly resolved to baseline). She 

now presents to Emergency department on 3/8/19 with complaints of increased 

shortness of breath that is worsened with activity, as well as increased 

Bilateral Lower Extremity edema. She wears 2Liters of oxygen at home. She has a 

known history of Depression, COPD, Recurrent COPD and Acute Bronchitis 

Exacerbations, Lifelong Tobacco Abuse, Hyperlipidema, CVA, Fibromyalgia, GERD, 

and progressive chronic illness myopathy and weakness. 





She has been on Immune therapy for over a year, will need to consider immune 

related events with hospitalizations, last treatment February 6th. Seen this 

evening and she was returning from CT, still increased respiratory effort, 

although son and daughter at bedside stated she appears improved. 





Review of Systems





A 14 point review of systems assessed and completed and all negative except HPI





Past Medical History


Past Medical History: Cancer, COPD, CVA/TIA, Fibromyalgia, GERD/Reflux, 

Hyperlipidemia, Osteoarthritis (OA), Pneumonia


Additional Past Medical History / Comment(s): Non-small cell lung cancer stage 3

adenocarcinoma, COPD, fibromyalgia, acid reflux, hyperlipidemia, osteoarthritis,

adrenal mass that has remained stable over some time, TIA history of, hiatal 

hernia, chronic constipation, chronic back pain, degenerative arthritis


History of Any Multi-Drug Resistant Organisms: None Reported


Additional Past Surgical History / Comment(s): SINUS surgery


Past Anesthesia/Blood Transfusion Reactions: No Reported Reaction


Past Psychological History: Depression


Smoking Status: Former smoker


Past Alcohol Use History: None Reported


Additional Past Alcohol Use History / Comment(s): smokes < 1/2 PPD for past 30 

yrs


Past Drug Use History: Marijuana





- Past Family History


  ** Mother


Family Medical History: Cancer





  ** Father


Family Medical History: Cancer





Medications and Allergies


                                Home Medications











 Medication  Instructions  Recorded  Confirmed  Type


 


Albuterol Inhaler [Ventolin Hfa 2 puff INHALATION RT-QID PRN 05/27/14 03/07/19 

History





Inhaler]    


 


Cevimeline [Evoxac] 30 mg PO BID 05/27/14 03/07/19 History


 


Escitalopram [Lexapro] 20 mg PO DAILY 05/27/14 03/07/19 History


 


Esomeprazole Magnesium [NexIUM] 40 mg PO BID 05/27/14 03/07/19 History


 


Morphine Sulfate [Ms Contin] 30 mg PO BID #60 tablet.er 09/02/15 03/07/19 Rx


 


oxyCODONE-APAP 10-325MG [Percocet 1 tab PO Q6H PRN 02/27/17 03/07/19 History





 mg]    


 


Albuterol Nebulized [Ventolin 2.5 mg INHALATION RT-QID PRN 08/17/17 03/07/19 

History





Nebulized]    


 


Budesonide-Formot 160-4.5 Mcg 2 puff INHALATION RT-BID 08/17/17 03/07/19 History





[Symbicort 160-4.5 Mcg Inhaler]    


 


Ondansetron Odt [Zofran ODT] 8 mg PO Q6H PRN 08/17/17 03/07/19 History


 


Prochlorperazine [Compazine] 10 mg PO Q6H PRN 08/17/17 03/07/19 History


 


ALPRAZolam [Xanax] 0.25 mg PO Q6H PRN 07/14/18 03/07/19 History


 


Cholecalciferol [Vitamin D3] 1,000 unit PO DAILY 03/07/19 03/07/19 History


 


Cyanocobalamin (Vitamin B-12) 1,000 mcg PO BID 03/07/19 03/07/19 History





[Vitamin B-12]    


 


Folic Acid 0.4 mg PO DAILY 03/07/19 03/07/19 History


 


Furosemide [Lasix] 20 mg PO DAILY PRN 03/07/19 03/07/19 History


 


Levothyroxine Sodium [Synthroid] 25 mcg PO DAILY 03/07/19 03/07/19 History


 


Umeclidinium Bromide [Incruse 1 puff INHALATION RT-DAILY 03/07/19 03/07/19 

History





Ellipta]    


 


guaiFENesin [Mucinex] 600 mg PO DAILY 03/07/19 03/07/19 History








                                    Allergies











Allergy/AdvReac Type Severity Reaction Status Date / Time


 


No Known Allergies Allergy   Verified 03/07/19 15:55














Physical Exam


Vitals: 


                                   Vital Signs











  Temp Pulse Pulse Resp BP BP Pulse Ox


 


 03/08/19 13:53    88  18   


 


 03/08/19 12:45  98.1 F   88  18   166/91  98


 


 03/08/19 08:05  98.5 F   85  16   153/78  99


 


 03/08/19 08:00    81  16   


 


 03/08/19 07:54   94     


 


 03/08/19 07:46   88     


 


 03/07/19 21:29  98.1 F   81  16   158/83  98


 


 03/07/19 20:30     16   


 


 03/07/19 19:45  97.9 F      


 


 03/07/19 19:00   89   16  122/99   97


 


 03/07/19 18:30  98.2 F  89   16  143/87   98


 


 03/07/19 18:00   87   19  164/90   99


 


 03/07/19 17:40   90     


 


 03/07/19 17:32   88     


 


 03/07/19 17:30   88   19  158/84   99


 


 03/07/19 17:11   82     


 


 03/07/19 17:00   81   20  152/89   99


 


 03/07/19 16:30   85   20  144/86   99


 


 03/07/19 16:00   86   24  164/90   100


 


 03/07/19 15:58        100


 


 03/07/19 15:55  98.4 F  71   18  164/90   98








                                Intake and Output











 03/08/19 03/08/19 03/08/19





 06:59 14:59 22:59


 


Intake Total  200 


 


Balance  200 


 


Intake:   


 


  Oral  200 


 


Other:   


 


  # Voids 1 1 


 


  Weight  57.153 kg 














Gen: Alert and Oriented, Mild Increased effort, although no acute distress


Neck: Supple, Trachea Midline, No palpable Cervical Adenopathy


Mucus Membranes: Dry with dry cracked lips, no lesions or thrush noted


Heart Tachycardia, Regular


Lungs: Expiratory wheezes, diminished bibasilar, mild increased resp effort


Abdomen: Soft, Nondistended, Non tender


Extremities: Weak although equal strength, BLE non-pitting edema. 





Results


CBC & Chem 7: 


                                 03/08/19 10:38





                                 03/08/19 10:38


Labs: 


                  Abnormal Lab Results - Last 24 Hours (Table)











  03/07/19 03/07/19 03/08/19 Range/Units





  17:00 17:00 10:38 


 


WBC  12.3 H    (3.8-10.6)  k/uL


 


Neutrophils #  10.8 H    (1.3-7.7)  k/uL


 


Lymphocytes #  0.6 L   0.3 L  (1.0-4.8)  k/uL


 


Sodium   136 L   (137-145)  mmol/L


 


Carbon Dioxide   35 H   (22-30)  mmol/L


 


BUN     (7-17)  mg/dL


 


Glucose   113 H   (74-99)  mg/dL


 


POC Glucose (mg/dL)     (75-99)  mg/dL


 


Alkaline Phosphatase   219 H   ()  U/L


 


Total Protein     (6.3-8.2)  g/dL














  03/08/19 03/08/19 Range/Units





  10:38 12:50 


 


WBC    (3.8-10.6)  k/uL


 


Neutrophils #    (1.3-7.7)  k/uL


 


Lymphocytes #    (1.0-4.8)  k/uL


 


Sodium    (137-145)  mmol/L


 


Carbon Dioxide  35 H   (22-30)  mmol/L


 


BUN  19 H   (7-17)  mg/dL


 


Glucose  157 H   (74-99)  mg/dL


 


POC Glucose (mg/dL)   242 H  (75-99)  mg/dL


 


Alkaline Phosphatase  215 H   ()  U/L


 


Total Protein  6.2 L   (6.3-8.2)  g/dL











Chest x-ray: report reviewed (Chronic Left Infiltrate. )





Assessment and Plan


Plan: 





Assessment and Recommendations:





1. Adenocarcinoma of the Lung - Stage IIIA Disease Diagnosed in June 2017


 - Received Concurrent definitive treatment with Radiation and chemotherapy 

followed by 3 cycles of chemotherapy adjuvant to definitive radiation


 - Stable disease since this time without evidence of recurrence or metastatic 

cancer


 - Maintained on Immune Therapy with Imfinzi since 5/2018. 





2. Acute on Chronic Respiratory Failure secondary to exacerbation of COPD versus

 effect of Immune therapy


 - Presentation appears most consistent with that of exacerbation of COPD, 

although given she has been receiving immune therapy for over 9 months must 

consider the differential of pneumonitis which can be induced with immune 

therapy. Given the fact she was recently treated in February for COPD exac

erbation and improved while on Higher dose steroids, now recurred when steroids 

were tapered off increases the potential relation of immune related event. 


 - Pulmonary is following and providing supportive treatments related to 

respiratory status: Diuresis, Solumedrol, Abx, Nebulizers and increased supp 

oxygen. 


 - Last CT scan in January of this year again revealed no evidence of recurrent 

or metastatic disease, discussed with NP with Pulmonology will order CT with 

Contrast to reassess for pneumonitis. If present will require from longer taper,

 high dosed steroid regimen at discharge and discontinuation or extended hold on

 immune therapy depending on severity. 





3. Lower Extremity Edema: Improved after Diuresis, continue to monitor and 

consider LE doppler if concern for underlying thrombus. 





4. Chronic Illness Myopathy: Increased weakness and decreased performance status

 over the past few months. 


 - Encourage to increase activity, may benefit from PT/OT inpatient and to 

continue as outpatient for stamina and safety





5. Tobacco Abuse: It is felt she is still smoking on and off, continued 

cessation encouraged. 





Physician Attestation: I have discussed the complete history and physical, and 

developed the complete impression and plan above. Agree with dictation, dictated

 as a scribe.

## 2019-03-08 NOTE — CT
EXAMINATION TYPE: CT chest w con

 

DATE OF EXAM: 3/8/2019

 

COMPARISON: 1/17/2019

 

HISTORY: Pneumonitis. History of lung cancer.

 

CT DLP: 329.8 mGycm

Automated exposure control for dose reduction was used.

 

CONTRAST: CT scan of the chest is performed with IV Contrast, patient injected with 100ml mL of Isovu
e 300.

 

FINDINGS:

AIRWAYS AND LUNG PARENCHYMA: Redemonstrated spiculated mass left upper lobe measuring 2.5 x 2.4 cm, s
imilar in measurements when compared with the prior study 1/17/2019.

No new suspicious pulmonary nodule identified at this time.

No consolidation or pleural effusion.

 

MEDIASTINUM/ISABEL: No adenopathy. No cardiomegaly or pericardial effusion. Coronary calcifications are
 redemonstrated. No acute aortic finding. Pulmonary arterial tree is widely patent.

 

SKELETAL STRUCTURES: No focal lesions. 

 

VISUALIZED SUBDIAPHRAGMATIC STRUCTURES: Redemonstrated tissue density mass of the left adrenal gland 
measuring up to 4.2 cm suggesting a myelolipoma. No other visualized focal visceral lesion. No visual
ized adenopathy. No abnormal fluid collections.

 

 

IMPRESSION:  

1. STABLE LEFT UPPER LOBE SPICULATED BRONCHOGENIC MASS. 

2. STABLE ADRENAL LEFT MYELOLIPOMA. 

3. NO NEW PROCESS.

## 2019-03-08 NOTE — P.HPIM
History of Present Illness


H&P Date: 03/08/19





This is a 63-year-old female patient of Dr. Amaya.  Patient presented to the ER

with complaints of increased shortness of breath.  Patient reports that over the

past week she's had increased shortness of breath with activity.  Patient denies

any significant sputum production.  Patient also states she's had increased 

lower peripheral edema.  She does have a past medical history of lung cancer 

stage III when she follows with Dr. Segura.  Patient reports that her last was 

proximally 1 month ago with immunotherapy.  Patient also went for routine CT in 

January.  Patient is home O2 dependent with 2 L.  Additional medical history 

includes COPD, CVA, fibromyalgia, GERD, hyperlipidemia, osteoarthritis, 

pneumonia, chronic back pain, constipation, hiatal hernia and depression.  

Patient also has a smoking history of half a pack per day for 30 years.  Patient

has stopped smoking at this time.  Chest x-ray completed showing chronic 

infiltrate left lateral lung base unchanged compared to old exam no heart 

failure.  EKG completed showing normal sinus rhythm, right atrial enlargement.  

At this time pulmonary and oncology service is consulted.  Patient started on IV

Solu-Medrol.  Patient reports significant improvement with shortness of breath. 

Patient denies chest pain.  Patient denies nausea vomiting or diarrhea.  Patient

denies any urinary burning or frequency





Review of Systems





 Please refer to HPI otherwise unremarkable





Past Medical History


Past Medical History: Cancer, COPD, CVA/TIA, Fibromyalgia, GERD/Reflux, Hyperl

ipidemia, Osteoarthritis (OA), Pneumonia


Additional Past Medical History / Comment(s): Non-small cell lung cancer stage 3

adenocarcinoma, COPD, fibromyalgia, acid reflux, hyperlipidemia, osteoarthritis,

adrenal mass that has remained stable over some time, TIA history of, hiatal 

hernia, chronic constipation, chronic back pain, degenerative arthritis


History of Any Multi-Drug Resistant Organisms: None Reported


Additional Past Surgical History / Comment(s): SINUS surgery


Past Anesthesia/Blood Transfusion Reactions: No Reported Reaction


Past Psychological History: Depression


Smoking Status: Former smoker


Past Alcohol Use History: None Reported


Additional Past Alcohol Use History / Comment(s): smokes < 1/2 PPD for past 30 

yrs


Past Drug Use History: Marijuana





- Past Family History


  ** Mother


Family Medical History: Cancer





  ** Father


Family Medical History: Cancer





Medications and Allergies


                                Home Medications











 Medication  Instructions  Recorded  Confirmed  Type


 


Albuterol Inhaler [Ventolin Hfa 2 puff INHALATION RT-QID PRN 05/27/14 03/07/19 

History





Inhaler]    


 


Cevimeline [Evoxac] 30 mg PO BID 05/27/14 03/07/19 History


 


Escitalopram [Lexapro] 20 mg PO DAILY 05/27/14 03/07/19 History


 


Esomeprazole Magnesium [NexIUM] 40 mg PO BID 05/27/14 03/07/19 History


 


Morphine Sulfate [Ms Contin] 30 mg PO BID #60 tablet.er 09/02/15 03/07/19 Rx


 


oxyCODONE-APAP 10-325MG [Percocet 1 tab PO Q6H PRN 02/27/17 03/07/19 History





 mg]    


 


Albuterol Nebulized [Ventolin 2.5 mg INHALATION RT-QID PRN 08/17/17 03/07/19 

History





Nebulized]    


 


Budesonide-Formot 160-4.5 Mcg 2 puff INHALATION RT-BID 08/17/17 03/07/19 History





[Symbicort 160-4.5 Mcg Inhaler]    


 


Ondansetron Odt [Zofran ODT] 8 mg PO Q6H PRN 08/17/17 03/07/19 History


 


Prochlorperazine [Compazine] 10 mg PO Q6H PRN 08/17/17 03/07/19 History


 


ALPRAZolam [Xanax] 0.25 mg PO Q6H PRN 07/14/18 03/07/19 History


 


Cholecalciferol [Vitamin D3] 1,000 unit PO DAILY 03/07/19 03/07/19 History


 


Cyanocobalamin (Vitamin B-12) 1,000 mcg PO BID 03/07/19 03/07/19 History





[Vitamin B-12]    


 


Folic Acid 0.4 mg PO DAILY 03/07/19 03/07/19 History


 


Furosemide [Lasix] 20 mg PO DAILY PRN 03/07/19 03/07/19 History


 


Levothyroxine Sodium [Synthroid] 25 mcg PO DAILY 03/07/19 03/07/19 History


 


Umeclidinium Bromide [Incruse 1 puff INHALATION RT-DAILY 03/07/19 03/07/19 

History





Ellipta]    


 


guaiFENesin [Mucinex] 600 mg PO DAILY 03/07/19 03/07/19 History








                                    Allergies











Allergy/AdvReac Type Severity Reaction Status Date / Time


 


No Known Allergies Allergy   Verified 03/07/19 15:55














Physical Exam


Vitals: 


                                   Vital Signs











  Temp Pulse Pulse Resp BP BP Pulse Ox


 


 03/08/19 08:00    81  16   


 


 03/08/19 07:54   94     


 


 03/08/19 07:46   88     


 


 03/07/19 21:29  98.1 F   81  16   158/83  98


 


 03/07/19 20:30     16   


 


 03/07/19 19:45  97.9 F      


 


 03/07/19 19:00   89   16  122/99   97


 


 03/07/19 18:30  98.2 F  89   16  143/87   98


 


 03/07/19 18:00   87   19  164/90   99


 


 03/07/19 17:40   90     


 


 03/07/19 17:32   88     


 


 03/07/19 17:30   88   19  158/84   99


 


 03/07/19 17:11   82     


 


 03/07/19 17:00   81   20  152/89   99


 


 03/07/19 16:30   85   20  144/86   99


 


 03/07/19 16:00   86   24  164/90   100


 


 03/07/19 15:58        100


 


 03/07/19 15:55  98.4 F  71   18  164/90   98








                                Intake and Output











 03/07/19 03/08/19 03/08/19





 22:59 06:59 14:59


 


Other:   


 


  # Voids 2 1 1


 


  Weight 57.153 kg  














Head normocephalic


Neck supple


Lungs diminished bilaterally with expiratory wheezing


Heart regular rate and rhythm S1-S2, no rub or gallop


Abdomen is soft nontender nondistended positive bowel sounds no 

hepatosplenomegaly


Extremities no edema


Neuro alert and orientated to 3





Results


CBC & Chem 7: 


                                 03/07/19 17:00





                                 03/07/19 17:00


Labs: 


                  Abnormal Lab Results - Last 24 Hours (Table)











  03/07/19 03/07/19 Range/Units





  17:00 17:00 


 


WBC  12.3 H   (3.8-10.6)  k/uL


 


Neutrophils #  10.8 H   (1.3-7.7)  k/uL


 


Lymphocytes #  0.6 L   (1.0-4.8)  k/uL


 


Sodium   136 L  (137-145)  mmol/L


 


Carbon Dioxide   35 H  (22-30)  mmol/L


 


Glucose   113 H  (74-99)  mg/dL


 


Alkaline Phosphatase   219 H  ()  U/L














Thrombosis Risk Factor Assmnt





- Choose All That Apply


Any of the Below Risk Factors Present?: Yes


Each Factor Represents 1 point: Abnormal pulmonary function (COPD), Obesity (BMI

 >25), Serious lung disease incl. pneumonia (< 1month), Swollen legs (current)


Other Risk Factors: Yes


Each Risk Factor Represents 2 Points: Age 61-74 years, Malignancy


Thrombosis Risk Factor Assessment Total Risk Factor Score: 8


Thrombosis Risk Factor Assessment Level: High Risk





Assessment and Plan


Assessment: 





1.  Shortness of breath related to COPD exacerbation.  Chest x-ray completed 

showing chronic infiltrate lateral left lung base unchanged compared to old exam

.  Normal heart failure.  Dr. Moe consulted for pulmonary services.  Patient 

started on IV steroids.  Continue DuoNeb breathing treatment





2.  Non-small cell lung cancer stage III adenocarcinoma.  Patient follows with 

Dr. Segura oncology services.  Patient reports that she had immunotherapy prox

imally 1 month ago.  Pathology services have been consulted





3.  History of COPD





4.  History of CVA





5.  History of hyperlipidemia





6.  History of fibromyalgia





7.  History of osteoarthritis





8.  History of depression





9.  ex-Smoker.  Patient smoked approximately half pack per day for 30 years





DVT prophylaxis Lovenox.  GI prophylaxis Protonix








Time with Patient: Greater than 30 (Greater than 60% of the total time spent in 

counseling and coordination of care. I performed an examination of the patient 

and discussed their management with the Nurse Practitioner.  I have reviewed the

 Nurse Practitioner's notes and agree with the documented findings and plan of 

care)

## 2019-03-09 LAB
ALBUMIN SERPL-MCNC: 3.8 G/DL (ref 3.5–5)
ALP SERPL-CCNC: 218 U/L (ref 38–126)
ALT SERPL-CCNC: 43 U/L (ref 9–52)
ANION GAP SERPL CALC-SCNC: 7 MMOL/L
AST SERPL-CCNC: 28 U/L (ref 14–36)
BASOPHILS # BLD AUTO: 0 K/UL (ref 0–0.2)
BASOPHILS NFR BLD AUTO: 0 %
BUN SERPL-SCNC: 24 MG/DL (ref 7–17)
CALCIUM SPEC-MCNC: 9.3 MG/DL (ref 8.4–10.2)
CHLORIDE SERPL-SCNC: 98 MMOL/L (ref 98–107)
CO2 SERPL-SCNC: 35 MMOL/L (ref 22–30)
EOSINOPHIL # BLD AUTO: 0.1 K/UL (ref 0–0.7)
EOSINOPHIL NFR BLD AUTO: 1 %
ERYTHROCYTE [DISTWIDTH] IN BLOOD BY AUTOMATED COUNT: 4.37 M/UL (ref 3.8–5.4)
ERYTHROCYTE [DISTWIDTH] IN BLOOD: 15.2 % (ref 11.5–15.5)
GLUCOSE BLD-MCNC: 160 MG/DL (ref 75–99)
GLUCOSE BLD-MCNC: 186 MG/DL (ref 75–99)
GLUCOSE BLD-MCNC: 225 MG/DL (ref 75–99)
GLUCOSE BLD-MCNC: 242 MG/DL (ref 75–99)
GLUCOSE SERPL-MCNC: 263 MG/DL (ref 74–99)
HCT VFR BLD AUTO: 43.3 % (ref 34–46)
HGB BLD-MCNC: 13.7 GM/DL (ref 11.4–16)
LYMPHOCYTES # SPEC AUTO: 0.3 K/UL (ref 1–4.8)
LYMPHOCYTES NFR SPEC AUTO: 2 %
MCH RBC QN AUTO: 31.3 PG (ref 25–35)
MCHC RBC AUTO-ENTMCNC: 31.6 G/DL (ref 31–37)
MCV RBC AUTO: 99.1 FL (ref 80–100)
MONOCYTES # BLD AUTO: 0.6 K/UL (ref 0–1)
MONOCYTES NFR BLD AUTO: 4 %
NEUTROPHILS # BLD AUTO: 12.2 K/UL (ref 1.3–7.7)
NEUTROPHILS NFR BLD AUTO: 92 %
PLATELET # BLD AUTO: 374 K/UL (ref 150–450)
POTASSIUM SERPL-SCNC: 3.7 MMOL/L (ref 3.5–5.1)
PROT SERPL-MCNC: 6.5 G/DL (ref 6.3–8.2)
SODIUM SERPL-SCNC: 140 MMOL/L (ref 137–145)
T4 FREE SERPL-MCNC: 1.08 NG/DL (ref 0.78–2.19)
WBC # BLD AUTO: 13.3 K/UL (ref 3.8–10.6)

## 2019-03-09 RX ADMIN — MORPHINE SULFATE SCH MG: 30 TABLET, FILM COATED, EXTENDED RELEASE ORAL at 20:38

## 2019-03-09 RX ADMIN — METHYLPREDNISOLONE SODIUM SUCCINATE SCH MG: 125 INJECTION, POWDER, FOR SOLUTION INTRAMUSCULAR; INTRAVENOUS at 06:10

## 2019-03-09 RX ADMIN — PANTOPRAZOLE SODIUM SCH MG: 40 TABLET, DELAYED RELEASE ORAL at 07:46

## 2019-03-09 RX ADMIN — BUDESONIDE AND FORMOTEROL FUMARATE DIHYDRATE SCH PUFF: 160; 4.5 AEROSOL RESPIRATORY (INHALATION) at 20:24

## 2019-03-09 RX ADMIN — IPRATROPIUM BROMIDE AND ALBUTEROL SULFATE PRN ML: .5; 3 SOLUTION RESPIRATORY (INHALATION) at 01:53

## 2019-03-09 RX ADMIN — OXYCODONE HYDROCHLORIDE AND ACETAMINOPHEN PRN EACH: 10; 325 TABLET ORAL at 20:44

## 2019-03-09 RX ADMIN — INSULIN ASPART SCH UNIT: 100 INJECTION, SOLUTION INTRAVENOUS; SUBCUTANEOUS at 12:42

## 2019-03-09 RX ADMIN — PANTOPRAZOLE SODIUM SCH MG: 40 TABLET, DELAYED RELEASE ORAL at 17:47

## 2019-03-09 RX ADMIN — IPRATROPIUM BROMIDE SCH MG: 0.5 SOLUTION RESPIRATORY (INHALATION) at 16:07

## 2019-03-09 RX ADMIN — ESCITALOPRAM OXALATE SCH MG: 20 TABLET, FILM COATED ORAL at 07:46

## 2019-03-09 RX ADMIN — IPRATROPIUM BROMIDE SCH MG: 0.5 SOLUTION RESPIRATORY (INHALATION) at 08:20

## 2019-03-09 RX ADMIN — CEVIMELINE HYDROCHLORIDE SCH MG: 30 CAPSULE ORAL at 20:38

## 2019-03-09 RX ADMIN — MORPHINE SULFATE SCH MG: 30 TABLET, FILM COATED, EXTENDED RELEASE ORAL at 07:47

## 2019-03-09 RX ADMIN — CYANOCOBALAMIN TAB 500 MCG SCH MCG: 500 TAB at 20:38

## 2019-03-09 RX ADMIN — BUDESONIDE AND FORMOTEROL FUMARATE DIHYDRATE SCH PUFF: 160; 4.5 AEROSOL RESPIRATORY (INHALATION) at 08:20

## 2019-03-09 RX ADMIN — IPRATROPIUM BROMIDE SCH MG: 0.5 SOLUTION RESPIRATORY (INHALATION) at 12:18

## 2019-03-09 RX ADMIN — METHYLPREDNISOLONE SODIUM SUCCINATE SCH MG: 125 INJECTION, POWDER, FOR SOLUTION INTRAMUSCULAR; INTRAVENOUS at 17:47

## 2019-03-09 RX ADMIN — METHYLPREDNISOLONE SODIUM SUCCINATE SCH MG: 125 INJECTION, POWDER, FOR SOLUTION INTRAMUSCULAR; INTRAVENOUS at 12:42

## 2019-03-09 RX ADMIN — OXYCODONE HYDROCHLORIDE AND ACETAMINOPHEN PRN EACH: 10; 325 TABLET ORAL at 12:43

## 2019-03-09 RX ADMIN — CYANOCOBALAMIN TAB 500 MCG SCH MCG: 500 TAB at 07:45

## 2019-03-09 RX ADMIN — LEVOTHYROXINE SODIUM SCH MCG: 25 TABLET ORAL at 06:10

## 2019-03-09 RX ADMIN — INSULIN ASPART SCH UNIT: 100 INJECTION, SOLUTION INTRAVENOUS; SUBCUTANEOUS at 17:47

## 2019-03-09 RX ADMIN — INSULIN ASPART SCH UNIT: 100 INJECTION, SOLUTION INTRAVENOUS; SUBCUTANEOUS at 07:45

## 2019-03-09 RX ADMIN — INSULIN ASPART SCH UNIT: 100 INJECTION, SOLUTION INTRAVENOUS; SUBCUTANEOUS at 20:39

## 2019-03-09 RX ADMIN — FOLIC ACID SCH MG: 1 TABLET ORAL at 07:47

## 2019-03-09 RX ADMIN — ENOXAPARIN SODIUM SCH MG: 40 INJECTION SUBCUTANEOUS at 07:45

## 2019-03-09 RX ADMIN — METHYLPREDNISOLONE SODIUM SUCCINATE SCH MG: 125 INJECTION, POWDER, FOR SOLUTION INTRAMUSCULAR; INTRAVENOUS at 23:30

## 2019-03-09 RX ADMIN — IPRATROPIUM BROMIDE SCH MG: 0.5 SOLUTION RESPIRATORY (INHALATION) at 20:24

## 2019-03-09 RX ADMIN — CEVIMELINE HYDROCHLORIDE SCH MG: 30 CAPSULE ORAL at 07:46

## 2019-03-09 RX ADMIN — Medication SCH UNIT: at 07:46

## 2019-03-09 RX ADMIN — FUROSEMIDE SCH MG: 20 TABLET ORAL at 07:47

## 2019-03-09 NOTE — P.PN
Subjective


Progress Note Date: 03/09/19





The patient's breathing feels improved.  She denies significant cough or 

expectoration at this time.  No obvious bleeding.  No fever/chills





Objective





- Vital Signs


Vital signs: 


                                   Vital Signs











Temp  98.2 F   03/09/19 04:36


 


Pulse  88   03/09/19 08:38


 


Resp  20   03/09/19 04:36


 


BP  148/87   03/09/19 04:36


 


Pulse Ox  97   03/09/19 04:36








                                 Intake & Output











 03/08/19 03/09/19 03/09/19





 18:59 06:59 18:59


 


Intake Total 200  


 


Balance 200  


 


Weight 57.153 kg  


 


Intake:   


 


  Oral 200  


 


Other:   


 


  Voiding Method  Bedside Commode 


 


  # Voids 1 1 














- Constitutional


General appearance: Present: no acute distress





- EENT


Eyes: Present: EOMI


ENT: Present: hearing grossly normal, normal oropharynx





- Respiratory


Respiratory: right: wheezing (Mild, right middle zone), bilateral: diminished





- Cardiovascular


Rhythm: regular


Heart sounds: normal: S1, S2





- Gastrointestinal


General gastrointestinal: Present: normal bowel sounds, soft





- Neurologic


Neurologic: Present: CNII-XII intact





- Musculoskeletal


Musculoskeletal: Present: generalized weakness, strength equal bilaterally





- Psychiatric


Psychiatric: Present: A&O x's 3, appropriate affect





- Labs


CBC & Chem 7: 


                                 03/09/19 09:01





                                 03/09/19 09:01


Labs: 


                  Abnormal Lab Results - Last 24 Hours (Table)











  03/08/19 03/08/19 03/08/19 Range/Units





  10:38 10:38 12:50 


 


WBC     (3.8-10.6)  k/uL


 


Neutrophils #     (1.3-7.7)  k/uL


 


Lymphocytes #  0.3 L    (1.0-4.8)  k/uL


 


Carbon Dioxide   35 H   (22-30)  mmol/L


 


BUN   19 H   (7-17)  mg/dL


 


Glucose   157 H   (74-99)  mg/dL


 


POC Glucose (mg/dL)    242 H  (75-99)  mg/dL


 


Alkaline Phosphatase   215 H   ()  U/L


 


Total Protein   6.2 L   (6.3-8.2)  g/dL


 


TSH     (0.465-4.680)  mIU/L














  03/08/19 03/08/19 03/09/19 Range/Units





  17:28 19:42 07:35 


 


WBC     (3.8-10.6)  k/uL


 


Neutrophils #     (1.3-7.7)  k/uL


 


Lymphocytes #     (1.0-4.8)  k/uL


 


Carbon Dioxide     (22-30)  mmol/L


 


BUN     (7-17)  mg/dL


 


Glucose     (74-99)  mg/dL


 


POC Glucose (mg/dL)  131 H  270 H  186 H  (75-99)  mg/dL


 


Alkaline Phosphatase     ()  U/L


 


Total Protein     (6.3-8.2)  g/dL


 


TSH     (0.465-4.680)  mIU/L














  03/09/19 03/09/19 Range/Units





  09:01 09:01 


 


WBC  13.3 H   (3.8-10.6)  k/uL


 


Neutrophils #  12.2 H   (1.3-7.7)  k/uL


 


Lymphocytes #  0.3 L   (1.0-4.8)  k/uL


 


Carbon Dioxide   35 H  (22-30)  mmol/L


 


BUN   24 H  (7-17)  mg/dL


 


Glucose   263 H  (74-99)  mg/dL


 


POC Glucose (mg/dL)    (75-99)  mg/dL


 


Alkaline Phosphatase   218 H  ()  U/L


 


Total Protein    (6.3-8.2)  g/dL


 


TSH   0.030 L  (0.465-4.680)  mIU/L








                      Microbiology - Last 24 Hours (Table)











 03/07/19 17:00 Blood Culture - Final





 Blood 














Assessment and Plan


(1) Acute exacerbation of chronic obstructive airways disease


Narrative/Plan: 


The patient is improved with ongoing treatment.  Computed tomography scan of the

chest was discussed with her.  This does not show any evidence of pneumonitis at

this time.  Therefore her respiratory complains are more likely to be due to 

COPD exacerbation, rather than drug-induced pneumonitis.


- Continue treatment according to the admitting service and pulmonary medicine.


Current Visit: Yes   Status: Acute   Code(s): J44.1 - CHRONIC OBSTRUCTIVE 

PULMONARY DISEASE W (ACUTE) EXACERBATION   SNOMED Code(s): 655148784


   





(2) Non-small cell lung cancer


Narrative/Plan: 


As CT of the chest does not show any definite pneumonitis, it would be 

reasonable to consider resuming her immunotherapy, once her acute situation is 

sufficiently resolved.  The patient has follow-up scheduled in the office 

already.


Current Visit: No   Status: Acute   Priority: Medium   Code(s): C34.90 - 

MALIGNANT NEOPLASM OF UNSP PART OF UNSP BRONCHUS OR LUNG   SNOMED Code(s): 

286456788

## 2019-03-09 NOTE — P.PN
Subjective


Progress Note Date: 03/09/19


Principal diagnosis: 





acute exacerbation of severe oxygen dependent chronic obstructive pulmonary 

disease complicated by purulent tracheobronchitis.


This is a very pleasant 63-year-old female patient who follows with Dr. Amaya 

as her primary care physician.  She has a history of CVA/TIA, fibromyalgia, 

gastroesophageal reflux disease, hyperlipidemia, hypothyroidism, chronic back 

pain, depression.  She also has a history of chronic tobacco dependence, oxygen 

dependent chronic obstructive pulmonary disease with an FEV1 value of 56% of 

predicted, and non-small cell lung cancer stage III adenocarcinoma.  She follows

with Dr. Pastor in our office. This was diagnosed by an FNA of a left upper lobe 

lesion by IR in May 2017.  She had received concurrent chemoradiation with 

subsequent additional chemotherapy and then placed on maintenance mmunotherapy 

of Imfinzi back in June 2018.  Her last PET scan in September 2018 revealed 

continued left upper lobe mass with irregular margins extending to the pleural 

surface, spiculated appearance but was stable compared to previous and April 2018.  The SUV value was only 1.7.  Subsequent computed tomography scan of the 

chest in January 2019 revealed a stable and slightly smaller spiculated mass of 

the left upper lobe measuring 2.5 x 2.4 cm versus 3.0 x 2.6 previous.  No 

evidence of recurrence or metastasis.  Approximately 2-3 weeks ago the patient 

had seen Dr. Segura.  She was having ongoing issues with nausea vomiting 

profound weakness and fatigue.  She decided to stop taking the immunotherapy. 

She had been treated for his COPD exacerbation 02/13/2019 in our office by Dr. Pastor where she received steroids and Bactrim.  She did improve for the first 

week or so. She presented to the emergency room yesterday with complaints of 

increasing shortness of breath cough and congestion.  She did have some lower 

extremity edema.  She was still quite weak and fatigued.  Chest x-ray revealed 

some small infiltrate of the left lung base, which is chronic in nature.  White 

count 12.3.  Hemoglobin 13.3.  Creatinine 0.61.  ProBNP 244.  She's been 

initiated on DuoNeb inhalations, Symbicort, IV Solu-Medrol.  She was given IV 

diuretics as well.  Presently, she is resting quite comfortably in bed.  She is 

awake and alert in no acute distress.  She is breathing a bit easier today as 

compared to yesterday.  Not quite back to her baseline.  She has a loose 

nonproductive congestive cough, some dyspnea on exertion.





Patient was reevaluated today on 3/9/2019, tells me today that she is feeling 

much better, breathing a lot easier.  Hardly any cough, no wheezing, no 

shortness of breath, she remains on 4 L nasal cannula.  She is on bro

nchodilators, steroids, antibiotics, and according to her she has made a 

significant improvement in the last 24 hours.  Patient is asking me to clear her

for discharge, and based on the clinical findings, I felt that the patient could

be discharged home if agreeable with the admitting physician.labs were reviewed 

she had a relatively normal basic metabolic profile is normal CBC WBC count is 

13.3.  Rest of the labs were unremarkable.CT of the chest was reviewed and it 

showed a stable left upper lobe spiculated bronchogenic mass, measuring 2.52.4 

cm, similar to previous CT done in January of 2019.  No new abnormalities noted.








Objective





- Vital Signs


Vital signs: 


                                   Vital Signs











Temp  98.2 F   03/09/19 04:36


 


Pulse  88   03/09/19 08:38


 


Resp  20   03/09/19 04:36


 


BP  148/87   03/09/19 04:36


 


Pulse Ox  97   03/09/19 04:36








                                 Intake & Output











 03/08/19 03/09/19 03/09/19





 18:59 06:59 18:59


 


Intake Total 200  


 


Balance 200  


 


Weight 57.153 kg  


 


Intake:   


 


  Oral 200  


 


Other:   


 


  Voiding Method  Bedside Commode Toilet


 


  # Voids 1 1 














- Exam





Physical Exam: Revealed a 63-year-old female, pleasant, in no distress, on 4 L 

nasal cannula.


Head: Atraumatic, normocephalic.


HEENT:[Neck is supple.] [No neck masses.] [No thyromegaly.] [No JVD.]PERRLA, 

EOMI, no icterus.


Chest: [diminished breath sound bilaterally, minimal wheezing on forced 

expiratory maneuver otherwise good breath sounds throughout.


Cardiac Exam: [Normal S1 and S2, no S3 gallop, no murmur.]


Abdomen: [Soft, nontender,  no megaly, no rebound, no guarding, normal bowel 

sounds.]


Extremities: [No clubbing, no edema, no cyanosis.]


Neurological Exam: [No focal neurologic deficit.


psychiatric: Normal mood affect and mental status examination.


Skin: No rashes.


Spine: No scoliosis or deformity]





- Labs


CBC & Chem 7: 


                                 03/09/19 09:01





                                 03/09/19 09:01


Labs: 


                  Abnormal Lab Results - Last 24 Hours (Table)











  03/08/19 03/08/19 03/08/19 Range/Units





  12:50 17:28 19:42 


 


WBC     (3.8-10.6)  k/uL


 


Neutrophils #     (1.3-7.7)  k/uL


 


Lymphocytes #     (1.0-4.8)  k/uL


 


Carbon Dioxide     (22-30)  mmol/L


 


BUN     (7-17)  mg/dL


 


Glucose     (74-99)  mg/dL


 


POC Glucose (mg/dL)  242 H  131 H  270 H  (75-99)  mg/dL


 


Alkaline Phosphatase     ()  U/L


 


TSH     (0.465-4.680)  mIU/L














  03/09/19 03/09/19 03/09/19 Range/Units





  07:35 09:01 09:01 


 


WBC   13.3 H   (3.8-10.6)  k/uL


 


Neutrophils #   12.2 H   (1.3-7.7)  k/uL


 


Lymphocytes #   0.3 L   (1.0-4.8)  k/uL


 


Carbon Dioxide    35 H  (22-30)  mmol/L


 


BUN    24 H  (7-17)  mg/dL


 


Glucose    263 H  (74-99)  mg/dL


 


POC Glucose (mg/dL)  186 H    (75-99)  mg/dL


 


Alkaline Phosphatase    218 H  ()  U/L


 


TSH    0.030 L  (0.465-4.680)  mIU/L














  03/09/19 Range/Units





  11:47 


 


WBC   (3.8-10.6)  k/uL


 


Neutrophils #   (1.3-7.7)  k/uL


 


Lymphocytes #   (1.0-4.8)  k/uL


 


Carbon Dioxide   (22-30)  mmol/L


 


BUN   (7-17)  mg/dL


 


Glucose   (74-99)  mg/dL


 


POC Glucose (mg/dL)  225 H  (75-99)  mg/dL


 


Alkaline Phosphatase   ()  U/L


 


TSH   (0.465-4.680)  mIU/L








                      Microbiology - Last 24 Hours (Table)











 03/07/19 17:00 Blood Culture Gram Stain - Preliminary





 Blood 


 


 03/07/19 17:00 Blood Culture - Final





 Blood 














Assessment and Plan


Assessment: 





#1 Acute exacerbation of severe oxygen dependent chronic obstructive pulmonary 

disease, complicated by purulent tracheobronchitis.  No clear evidence of 

pneumonia.CT of the chest was reviewed.





#2 History of chronic tobacco dependence, states quit about 6 months ago.





#3 History of stage III non-small cell lung cancer, adenocarcinoma diagnosed in 

May 2017.  Status post chemoradiation with subsequent chemotherapy and eventual 

immunotherapy.  Most recently on Imfinzi but having issues with profound 

weakness and fatigue.  follow-up CT of the chest on this admission showed stable

lesion unchanged compared to CT in January of 2019.





#4 History of CVA/TIA.





#5 Hyperlipidemia.





#6 Hypothyroidism.





#7 Fibromyalgia.





#8 Chronic pain syndrome.





#9 History of depression.





#10 History of marijuana use.





Recommendation: Continue present bronchodilators including DuoNeb Symbicort, 

antibiotics, could switch patient to prednisone at 30 mg tapered over 2-3 weeks.

 Agent will be cleared from my perspective to be discharged home if agreeable 

with the admitting physician.  And she could follow-up with Dr. Pastor in 1-2 

weeks.








Time with Patient: Less than 30

## 2019-03-09 NOTE — P.PN
Subjective


Progress Note Date: 03/09/19





This is a 63-year-old female patient of Dr. Amaya.  Patient presented to the ER

with complaints of increased shortness of breath.  Patient reports that over the

past week she's had increased shortness of breath with activity.  Patient denies

any significant sputum production.  Patient also states she's had increased 

lower peripheral edema.  She does have a past medical history of lung cancer 

stage III when she follows with Dr. Segura.  Patient reports that her last was 

proximally 1 month ago with immunotherapy.  Patient also went for routine CT in 

January.  Patient is home O2 dependent with 2 L.  Additional medical history 

includes COPD, CVA, fibromyalgia, GERD, hyperlipidemia, osteoarthritis, p

neumonia, chronic back pain, constipation, hiatal hernia and depression.  

Patient also has a smoking history of half a pack per day for 30 years.  Patient

has stopped smoking at this time.  Chest x-ray completed showing chronic 

infiltrate left lateral lung base unchanged compared to old exam no heart 

failure.  EKG completed showing normal sinus rhythm, right atrial enlargement.  

At this time pulmonary and oncology service is consulted.  Patient started on IV

Solu-Medrol.  Patient reports significant improvement with shortness of breath. 

Patient denies chest pain.  Patient denies nausea vomiting or diarrhea.  Patient

denies any urinary burning or frequency.





On 03/09/2019 patient is alert and oriented 3 in no apparent distress she 

states she feels better she has less shortness of breath and less cough 

otherwise she denies any other symptoms there is no fever or chills no headache 

or dizziness no chest pain no palpitation no nausea or vomiting no abdominal 

pain no diarrhea and no urinary symptoms.  Earlier this morning patient had a 

positive blood culture for gram-positive cocci IV vancomycin was added to her 

regimen.














Objective





- Vital Signs


Vital signs: 


                                   Vital Signs











Temp  98.2 F   03/09/19 04:36


 


Pulse  88   03/09/19 12:19


 


Resp  20   03/09/19 04:36


 


BP  148/87   03/09/19 04:36


 


Pulse Ox  97   03/09/19 04:36








                                 Intake & Output











 03/08/19 03/09/19 03/09/19





 18:59 06:59 18:59


 


Intake Total 200  


 


Balance 200  


 


Weight 57.153 kg  


 


Intake:   


 


  Oral 200  


 


Other:   


 


  Voiding Method  Bedside Commode Toilet


 


  # Voids 1 1 














- Exam








Head normocephalic and atraumatic 


Neck supple no JVD no goiter


Lungs diminished bilaterally with expiratory wheezing


Heart regular rate and rhythm S1-S2, no rub or gallop


Abdomen is soft nontender nondistended positive bowel sounds no 

hepatosplenomegaly


Extremities no edema no cyanosis or clubbing


Neuro alert and orientated to 3








- Labs


CBC & Chem 7: 


                                 03/09/19 09:01





                                 03/09/19 09:01


Labs: 


                  Abnormal Lab Results - Last 24 Hours (Table)











  03/08/19 03/08/19 03/08/19 Range/Units





  12:50 17:28 19:42 


 


WBC     (3.8-10.6)  k/uL


 


Neutrophils #     (1.3-7.7)  k/uL


 


Lymphocytes #     (1.0-4.8)  k/uL


 


Carbon Dioxide     (22-30)  mmol/L


 


BUN     (7-17)  mg/dL


 


Glucose     (74-99)  mg/dL


 


POC Glucose (mg/dL)  242 H  131 H  270 H  (75-99)  mg/dL


 


Alkaline Phosphatase     ()  U/L


 


TSH     (0.465-4.680)  mIU/L














  03/09/19 03/09/19 03/09/19 Range/Units





  07:35 09:01 09:01 


 


WBC   13.3 H   (3.8-10.6)  k/uL


 


Neutrophils #   12.2 H   (1.3-7.7)  k/uL


 


Lymphocytes #   0.3 L   (1.0-4.8)  k/uL


 


Carbon Dioxide    35 H  (22-30)  mmol/L


 


BUN    24 H  (7-17)  mg/dL


 


Glucose    263 H  (74-99)  mg/dL


 


POC Glucose (mg/dL)  186 H    (75-99)  mg/dL


 


Alkaline Phosphatase    218 H  ()  U/L


 


TSH    0.030 L  (0.465-4.680)  mIU/L














  03/09/19 Range/Units





  11:47 


 


WBC   (3.8-10.6)  k/uL


 


Neutrophils #   (1.3-7.7)  k/uL


 


Lymphocytes #   (1.0-4.8)  k/uL


 


Carbon Dioxide   (22-30)  mmol/L


 


BUN   (7-17)  mg/dL


 


Glucose   (74-99)  mg/dL


 


POC Glucose (mg/dL)  225 H  (75-99)  mg/dL


 


Alkaline Phosphatase   ()  U/L


 


TSH   (0.465-4.680)  mIU/L








                      Microbiology - Last 24 Hours (Table)











 03/07/19 17:00 Blood Culture Gram Stain - Preliminary





 Blood 


 


 03/07/19 17:00 Blood Culture - Final





 Blood 














Assessment and Plan


Plan: 





1.  Shortness of breath related to COPD exacerbation.  Chest x-ray completed 

showing chronic infiltrate lateral left lung base unchanged compared to old 

exam.  Normal heart failure.  Dr. Moe consulted for pulmonary services.  Gianluca mary started on IV steroids.  Continue DuoNeb breathing treatment





2.  Non-small cell lung cancer stage III adenocarcinoma.  Patient follows with 

Dr. Segura oncology services.  Patient reports that she had immunotherapy 

proximally 1 month ago.  Pathology services have been consulted





3.  History of COPD





4.  History of CVA





5.  History of hyperlipidemia





6.  History of fibromyalgia





7.  History of osteoarthritis





8.  History of depression





9.  ex-Smoker.  Patient smoked approximately half pack per day for 30 years.





10. positive blood culture for gram-positive cocci patient was started on IV 

vancomycin infectious disease consultation requested.





DVT prophylaxis Lovenox.  GI prophylaxis Protonix

## 2019-03-09 NOTE — ECHOF
Referral Reason:increase perpherial edema



MEASUREMENTS

--------

HEIGHT: 132.1 cm

WEIGHT: 57.2 kg

BP: 

RVIDd:   2.2 cm     (< 3.3)

IVSd:   1.0 cm     (0.6 - 1.1)

LVIDd:   3.5 cm     (3.9 - 5.3)

LVPWd:   1.4 cm     (0.6 - 1.1)

IVSs:   1.4 cm

LVIDs:   2.6 cm

LVPWs:   1.1 cm

LA Diam:   2.7 cm     (2.7 - 3.8)

Ao Diam:   2.9 cm     (2.0 - 3.7)

AV Cusp:   1.9 cm     (1.5 - 2.6)

LA Diam:   3.4 cm     (2.7 - 3.8)

MV EXCURSION:   14.577 mm     (> 18.000)

MV EF SLOPE:   106 mm/s     (70 - 150)

EPSS:   0.4 cm

MV E Cezar:   0.59 m/s

MV DecT:   213 ms

MV A Cezar:   0.92 m/s

MV E/A Ratio:   0.65 

RAP:   5.00 mmHg

RVSP:   18.74 mmHg







FINDINGS

--------

Sinus rhythm.

This was a technically adequate study.

LV size, wall thickness and systolic function are normal, with an EF greater than 55%.   The left bere
tricular size is normal.

The right ventricle is normal in size.

The left atrial size is normal.

The right atrial size is normal.

There is mild aortic valve sclerosis.   There is no evidence of aortic regurgitation.

Mild mitral annular calcification present.   Mild mitral regurgitation is present.

Mild tricuspid regurgitation present.   There is no evidence of pulmonary hypertension.   The right v
entricular systolic pressure, as measured by Doppler, is 18.74mmHg.

There is no pulmonic regurgitation present.

The aortic root size is normal.

Echo free space indicative of a pericardial fat pad.



CONCLUSIONS

--------

1. LV size, wall thickness and systolic function are normal, with an EF greater than 55%.

2. The left ventricular size is normal.

3. The right ventricle is normal in size.

4. The left atrial size is normal.

5. The right atrial size is normal.

6. There is mild aortic valve sclerosis.

7. Mild mitral annular calcification present.

8. Mild mitral regurgitation is present.

9. Mild tricuspid regurgitation present.

10. There is no evidence of pulmonary hypertension.

11. The right ventricular systolic pressure, as measured by Doppler, is 18.74mmHg.

12. There is no pulmonic regurgitation present.

13. The aortic root size is normal.

14. Echo free space indicative of a pericardial fat pad.





SONOGRAPHER: Nicki Che RDCS

## 2019-03-10 VITALS — DIASTOLIC BLOOD PRESSURE: 93 MMHG | SYSTOLIC BLOOD PRESSURE: 151 MMHG | TEMPERATURE: 98.1 F

## 2019-03-10 VITALS — HEART RATE: 100 BPM

## 2019-03-10 VITALS — RESPIRATION RATE: 16 BRPM

## 2019-03-10 LAB
ALBUMIN SERPL-MCNC: 3.4 G/DL (ref 3.5–5)
ALP SERPL-CCNC: 200 U/L (ref 38–126)
ALT SERPL-CCNC: 40 U/L (ref 9–52)
ANION GAP SERPL CALC-SCNC: 3 MMOL/L
AST SERPL-CCNC: 25 U/L (ref 14–36)
BASOPHILS # BLD AUTO: 0 K/UL (ref 0–0.2)
BASOPHILS NFR BLD AUTO: 0 %
BUN SERPL-SCNC: 20 MG/DL (ref 7–17)
CALCIUM SPEC-MCNC: 9.2 MG/DL (ref 8.4–10.2)
CHLORIDE SERPL-SCNC: 95 MMOL/L (ref 98–107)
CO2 SERPL-SCNC: 38 MMOL/L (ref 22–30)
EOSINOPHIL # BLD AUTO: 0.1 K/UL (ref 0–0.7)
EOSINOPHIL NFR BLD AUTO: 1 %
ERYTHROCYTE [DISTWIDTH] IN BLOOD BY AUTOMATED COUNT: 4.28 M/UL (ref 3.8–5.4)
ERYTHROCYTE [DISTWIDTH] IN BLOOD: 15.1 % (ref 11.5–15.5)
GLUCOSE BLD-MCNC: 133 MG/DL (ref 75–99)
GLUCOSE BLD-MCNC: 153 MG/DL (ref 75–99)
GLUCOSE SERPL-MCNC: 131 MG/DL (ref 74–99)
HCT VFR BLD AUTO: 41.9 % (ref 34–46)
HGB BLD-MCNC: 13.7 GM/DL (ref 11.4–16)
LYMPHOCYTES # SPEC AUTO: 0.4 K/UL (ref 1–4.8)
LYMPHOCYTES NFR SPEC AUTO: 3 %
MCH RBC QN AUTO: 32.1 PG (ref 25–35)
MCHC RBC AUTO-ENTMCNC: 32.7 G/DL (ref 31–37)
MCV RBC AUTO: 98.1 FL (ref 80–100)
MONOCYTES # BLD AUTO: 0.7 K/UL (ref 0–1)
MONOCYTES NFR BLD AUTO: 5 %
NEUTROPHILS # BLD AUTO: 11.7 K/UL (ref 1.3–7.7)
NEUTROPHILS NFR BLD AUTO: 91 %
PLATELET # BLD AUTO: 354 K/UL (ref 150–450)
POTASSIUM SERPL-SCNC: 4.2 MMOL/L (ref 3.5–5.1)
PROT SERPL-MCNC: 6 G/DL (ref 6.3–8.2)
SODIUM SERPL-SCNC: 136 MMOL/L (ref 137–145)
WBC # BLD AUTO: 12.9 K/UL (ref 3.8–10.6)

## 2019-03-10 RX ADMIN — METHYLPREDNISOLONE SODIUM SUCCINATE SCH MG: 125 INJECTION, POWDER, FOR SOLUTION INTRAMUSCULAR; INTRAVENOUS at 12:15

## 2019-03-10 RX ADMIN — ESCITALOPRAM OXALATE SCH MG: 20 TABLET, FILM COATED ORAL at 07:34

## 2019-03-10 RX ADMIN — OXYCODONE HYDROCHLORIDE AND ACETAMINOPHEN PRN EACH: 10; 325 TABLET ORAL at 05:41

## 2019-03-10 RX ADMIN — IPRATROPIUM BROMIDE SCH MG: 0.5 SOLUTION RESPIRATORY (INHALATION) at 08:13

## 2019-03-10 RX ADMIN — IPRATROPIUM BROMIDE SCH MG: 0.5 SOLUTION RESPIRATORY (INHALATION) at 15:25

## 2019-03-10 RX ADMIN — BUDESONIDE AND FORMOTEROL FUMARATE DIHYDRATE SCH PUFF: 160; 4.5 AEROSOL RESPIRATORY (INHALATION) at 08:13

## 2019-03-10 RX ADMIN — FUROSEMIDE SCH MG: 20 TABLET ORAL at 07:34

## 2019-03-10 RX ADMIN — INSULIN ASPART SCH UNIT: 100 INJECTION, SOLUTION INTRAVENOUS; SUBCUTANEOUS at 12:15

## 2019-03-10 RX ADMIN — Medication SCH UNIT: at 07:34

## 2019-03-10 RX ADMIN — METHYLPREDNISOLONE SODIUM SUCCINATE SCH MG: 125 INJECTION, POWDER, FOR SOLUTION INTRAMUSCULAR; INTRAVENOUS at 05:41

## 2019-03-10 RX ADMIN — FOLIC ACID SCH MG: 1 TABLET ORAL at 07:34

## 2019-03-10 RX ADMIN — INSULIN ASPART SCH UNIT: 100 INJECTION, SOLUTION INTRAVENOUS; SUBCUTANEOUS at 07:33

## 2019-03-10 RX ADMIN — CYANOCOBALAMIN TAB 500 MCG SCH MCG: 500 TAB at 07:34

## 2019-03-10 RX ADMIN — PANTOPRAZOLE SODIUM SCH MG: 40 TABLET, DELAYED RELEASE ORAL at 07:33

## 2019-03-10 RX ADMIN — LEVOTHYROXINE SODIUM SCH MCG: 25 TABLET ORAL at 05:41

## 2019-03-10 RX ADMIN — ENOXAPARIN SODIUM SCH MG: 40 INJECTION SUBCUTANEOUS at 07:33

## 2019-03-10 RX ADMIN — IPRATROPIUM BROMIDE SCH MG: 0.5 SOLUTION RESPIRATORY (INHALATION) at 11:42

## 2019-03-10 RX ADMIN — MORPHINE SULFATE SCH MG: 30 TABLET, FILM COATED, EXTENDED RELEASE ORAL at 07:35

## 2019-03-10 RX ADMIN — CEVIMELINE HYDROCHLORIDE SCH MG: 30 CAPSULE ORAL at 07:34

## 2019-03-10 NOTE — P.PN
Subjective


Progress Note Date: 03/10/19


Principal diagnosis: 





acute exacerbation of severe oxygen dependent chronic obstructive pulmonary 

disease complicated by purulent tracheobronchitis.


This is a very pleasant 63-year-old female patient who follows with Dr. Amaya 

as her primary care physician.  She has a history of CVA/TIA, fibromyalgia, 

gastroesophageal reflux disease, hyperlipidemia, hypothyroidism, chronic back 

pain, depression.  She also has a history of chronic tobacco dependence, oxygen 

dependent chronic obstructive pulmonary disease with an FEV1 value of 56% of 

predicted, and non-small cell lung cancer stage III adenocarcinoma.  She follows

with Dr. Pastor in our office. This was diagnosed by an FNA of a left upper lobe 

lesion by IR in May 2017.  She had received concurrent chemoradiation with 

subsequent additional chemotherapy and then placed on maintenance mmunotherapy 

of Imfinzi back in June 2018.  Her last PET scan in September 2018 revealed 

continued left upper lobe mass with irregular margins extending to the pleural 

surface, spiculated appearance but was stable compared to previous and April 2018.  The SUV value was only 1.7.  Subsequent computed tomography scan of the 

chest in January 2019 revealed a stable and slightly smaller spiculated mass of 

the left upper lobe measuring 2.5 x 2.4 cm versus 3.0 x 2.6 previous.  No 

evidence of recurrence or metastasis.  Approximately 2-3 weeks ago the patient 

had seen Dr. Segura.  She was having ongoing issues with nausea vomiting 

profound weakness and fatigue.  She decided to stop taking the immunotherapy. 

She had been treated for his COPD exacerbation 02/13/2019 in our office by Dr. Pastor where she received steroids and Bactrim.  She did improve for the first 

week or so. She presented to the emergency room yesterday with complaints of 

increasing shortness of breath cough and congestion.  She did have some lower 

extremity edema.  She was still quite weak and fatigued.  Chest x-ray revealed 

some small infiltrate of the left lung base, which is chronic in nature.  White 

count 12.3.  Hemoglobin 13.3.  Creatinine 0.61.  ProBNP 244.  She's been 

initiated on DuoNeb inhalations, Symbicort, IV Solu-Medrol.  She was given IV 

diuretics as well.  Presently, she is resting quite comfortably in bed.  She is 

awake and alert in no acute distress.  She is breathing a bit easier today as 

compared to yesterday.  Not quite back to her baseline.  She has a loose 

nonproductive congestive cough, some dyspnea on exertion.





Patient was reevaluated today on 3/9/2019, tells me today that she is feeling 

much better, breathing a lot easier.  Hardly any cough, no wheezing, no 

shortness of breath, she remains on 4 L nasal cannula.  She is on bro

nchodilators, steroids, antibiotics, and according to her she has made a 

significant improvement in the last 24 hours.  Patient is asking me to clear her

for discharge, and based on the clinical findings, I felt that the patient could

be discharged home if agreeable with the admitting physician.labs were reviewed 

she had a relatively normal basic metabolic profile is normal CBC WBC count is 

13.3.  Rest of the labs were unremarkable.CT of the chest was reviewed and it 

showed a stable left upper lobe spiculated bronchogenic mass, measuring 2.52.4 

cm, similar to previous CT done in January of 2019.  No new abnormalities noted.





Patient was reevaluated today on 3/10/2019, continues to do well, hardly any 

cough no wheezing no shortness of breath, patient tells me that she is back to 

her baseline.  Her discharge was delayed yesterday because of her positive blood

culture, however it's clearly a contamination because it is coagulase-negative 

staph.  All labs were reviewed today, relatively unremarkable.








Objective





- Vital Signs


Vital signs: 


                                   Vital Signs











Temp  98.1 F   03/10/19 12:51


 


Pulse  91   03/10/19 12:51


 


Resp  16   03/10/19 12:51


 


BP  151/93   03/10/19 12:51


 


Pulse Ox  98   03/10/19 12:51








                                 Intake & Output











 03/09/19 03/10/19 03/10/19





 17:59 06:59 18:59


 


Intake Total   


 


Balance   


 


Intake:   


 


  Intake, IV Titration   





  Amount   


 


    Vancomycin 1,000 mg In   





    Sodium Chloride 0.9% 250   





    ml @ 125 mls/hr IVPB Q12H   





    [facility] Rx#:[id number]   


 


    cefTRIAXone 1 gm In   





    Sodium Chloride 0.9% 50   





    ml @ 100 mls/hr IVPB   





    Q24HR [facility] Rx#:[id number]   


 


Other:   


 


  Voiding Method   Toilet


 


  # Voids   














- Exam





Physical Exam: Revealed a 63-year-old female, pleasant, in no distress, on 4 L 

nasal cannula.


Head: Atraumatic, normocephalic.


HEENT:[Neck is supple.] [No neck masses.] [No thyromegaly.] [No JVD.]PERRLA, 

EOMI, no icterus.


Chest: [diminished breath sound bilaterally, no crackles, no rhonchi and no 

wheezes.


Cardiac Exam: [Normal S1 and S2, no S3 gallop, no murmur.]


Abdomen: [Soft, nontender,  no megaly, no rebound, no guarding, normal bowel 

sounds.]


Extremities: [No clubbing, no edema, no cyanosis.]


Neurological Exam: [No focal neurologic deficit.


psychiatric: Normal mood affect and mental status examination.


Skin: No rashes.


Spine: No scoliosis or deformity]





- Labs


CBC & Chem 7: 


                                 03/10/19 07:20





                                 03/10/19 07:20


Labs: 


                  Abnormal Lab Results - Last 24 Hours (Table)











  03/09/19 03/09/19 03/10/19 Range/Units





  17:04 20:21 06:36 


 


WBC     (3.8-10.6)  k/uL


 


Neutrophils #     (1.3-7.7)  k/uL


 


Lymphocytes #     (1.0-4.8)  k/uL


 


Sodium     (137-145)  mmol/L


 


Chloride     ()  mmol/L


 


Carbon Dioxide     (22-30)  mmol/L


 


BUN     (7-17)  mg/dL


 


Glucose     (74-99)  mg/dL


 


POC Glucose (mg/dL)  160 H  242 H  133 H  (75-99)  mg/dL


 


Alkaline Phosphatase     ()  U/L


 


Total Protein     (6.3-8.2)  g/dL


 


Albumin     (3.5-5.0)  g/dL














  03/10/19 03/10/19 03/10/19 Range/Units





  07:20 07:20 10:59 


 


WBC  12.9 H    (3.8-10.6)  k/uL


 


Neutrophils #  11.7 H    (1.3-7.7)  k/uL


 


Lymphocytes #  0.4 L    (1.0-4.8)  k/uL


 


Sodium   136 L   (137-145)  mmol/L


 


Chloride   95 L   ()  mmol/L


 


Carbon Dioxide   38 H   (22-30)  mmol/L


 


BUN   20 H   (7-17)  mg/dL


 


Glucose   131 H   (74-99)  mg/dL


 


POC Glucose (mg/dL)    153 H  (75-99)  mg/dL


 


Alkaline Phosphatase   200 H   ()  U/L


 


Total Protein   6.0 L   (6.3-8.2)  g/dL


 


Albumin   3.4 L   (3.5-5.0)  g/dL








                      Microbiology - Last 24 Hours (Table)











 03/07/19 17:00 Blood Culture Gram Stain - Preliminary





 Blood Blood Culture - Preliminary





    Coagulase Negative Staph














Assessment and Plan


Assessment: 





#1 Acute exacerbation of severe oxygen dependent chronic obstructive pulmonary 

disease, complicated by purulent tracheobronchitis.  No clear evidence of 

pneumonia.CT of the chest was reviewed.





#2 History of chronic tobacco dependence, states quit about 6 months ago.





#3 History of stage III non-small cell lung cancer, adenocarcinoma diagnosed in 

May 2017.  Status post chemoradiation with subsequent chemotherapy and eventual 

immunotherapy.  Most recently on Imfinzi but having issues with profound 

weakness and fatigue.  follow-up CT of the chest on this admission showed stable

lesion unchanged compared to CT in January of 2019.





#4 History of CVA/TIA.





#5 Hyperlipidemia.





#6 Hypothyroidism.





#7 Fibromyalgia.





#8 Chronic pain syndrome.





#9 History of depression.





#10 History of marijuana use.





Recommendation: Continue present course of bronchodilators, switch patient to 

prednisone and tapered over the next 2-3 weeks, cleared for discharge from the 

pulmonary perspective, follow up on outpatient basis.






































Time with Patient: Less than 30

## 2019-03-10 NOTE — CONS
CONSULTATION



DATE OF SERVICE:

03/09/2019.



REASON FOR CONSULTATION:

Positive blood culture.



HISTORY OF PRESENT ILLNESS:

The patient is a 63-year-old  female with past medical history significant for

non-small cell left upper lobe cancer for which the patient has received chemoradiation

therapy and subsequent started on immunotherapy which apparently the patient stopped

taking in December with concern for GI side effect.  The patient now presenting to the

ER at [facility] on 03/07/2019 with chief complaint of increasing

shortness of breath.  Her breathing has been getting worse with a few days before she

was in the hospital.  The patient did have a cough which is much worse recently, but

not bringing up any sputum.  Denies having any choking on food.  No nausea, no

vomiting.  No diarrhea. On presentation to the hospital patient was afebrile and has

remained to be afebrile.  The patient did have a chest x-ray which shows chronic

infiltrate in the lateral lung base, unchanged compared to old exam.  The patient has

been diagnosed with COPD exacerbation and tracheobronchitis.  She has been treated with

Rocephin.  She did have blood cultures obtained which are now coming positive gram-

positive cocci. The patient was started on vancomycin.  Infectious Disease was

consulted for further recommendation of antibiotic therapy. The patient did have a CT

of the chest which showed stable left upper lobe mass. No acute process.



REVIEW OF SYSTEMS:

Positive points have been mentioned in HPI, otherwise have been negative.



PAST MEDICAL HISTORY:

Non-small cell lung cancer stage 3, COPD, CVA, TIA, fibromyalgia, ______,

hyperlipidemia, pneumonia.



PAST SURGICAL HISTORY:

Bronchoscopy with biopsy and sinus surgery.



SOCIAL HISTORY:

Remote history of smoking.  Has about 30 pack year smoking. Occasional marijuana use.



FAMILY HISTORY:

Family history of cancer.



ALLERGIES:

No known drug allergies.



MEDICATIONS:

Medications include the patient is currently on DuoNeb, Xanax, Symbicort, Rocephin 1 g

daily, _____, vitamin D3, B12, Lovenox, Lexapro, folic acid, Lasix, Mucinex, Dilaudid,

NovoLog, Synthroid, Solu-Medrol, MS Contin, Zofran, Protonix, Compazine, vancomycin

pharmacy to dose.



PHYSICAL EXAMINATION:

Blood pressure 156/95 with a pulse of 85, temperature of 98 ,she is 98% on 4 L nasal

cannula.

GENERAL DESCRIPTION: An elderly female up in the chair in no distress.  No tachypnea or

accessory muscle of respiration use.

HEENT:  Shows no pallor or scleral icterus.  Oral mucosa is moist.

NECK: Trachea central. No thyromegaly.

LUNGS: Unlabored breathing, decreased breath sounds at the bases. No wheeze.

HEART: S1, S2.  Regular rate and rhythm.

ABDOMEN:  Soft.

EXTREMITIES: Some trace edema of the feet. Multiple bruises.  No rash or mass palpable.

NEUROLOGIC: The patient is awake, alert, oriented.  Mood and affect normal.



LABS:

Hemoglobin is 13.7, white count 13.3 with a BUN of 24, creatinine 0.63.  Blood culture

with coagulase-negative staph. CT report as mentioned above.



DIAGNOSTIC IMPRESSION AND PLAN:

1. Patient with a positive blood culture with coagulase negative staph with no ______

    to go along, more likely skin contamination.

2. Patient admitted to the hospital with increasing shortness of breath and cough,

    more likely COPD exacerbation with tracheobronchitis.  No evidence of pneumonia on

    chest x-ray or CT.



PLAN:

We will discontinue the vancomycin and Rocephin can be safely discontinued.  Patient to

continue with _____ per Pulmonary. We will monitor the patient closely off antibiotic

therapy. Thank you for this consultation. Will follow this patient along with you.





MMODL / IJN: [id number] / Job#: [id number]

## 2019-03-11 NOTE — P.DS
Providers


Date of admission: 


03/07/19 18:37





Expected date of discharge: 03/10/19


Attending physician: 


Miguel Garza





Consults: 





                                        





03/07/19 18:37


Consult Physician Routine 


   Consulting Provider: Naresh Patsor


   Consult Reason/Comments: COPD exacerbation


   Do you want consulting provider notified?: Yes





03/08/19 08:12


Consult Physician Routine 


   Consulting Provider: Cosmo Bartlett


   Consult Reason/Comments: lung CA


   Do you want consulting provider notified?: Yes





03/09/19 12:42


Consult Physician Routine 


   Consulting Provider: Darya Mendenhall


   Consult Reason/Comments: positive blood culture


   Do you want consulting provider notified?: Yes











Primary care physician: 


Rebecca Amaya





Hospital Course: 





Discharge Diagnosis





1.  Shortness of breath related to COPD exacerbation.  





2.  Non-small cell lung cancer stage III adenocarcinoma.  Patient follows with 

Dr. Segura oncology services.  Patient reports that she had immunotherapy 

proximally 1 month ago.  Oncology services have been consulted. follow-up CT of 

the chest on this admission showed stable lesion unchanged compared to CT in 

January of 2019.





3.  History of COPD





4.  History of CVA





5.  History of hyperlipidemia





6.  History of fibromyalgia





7.  History of osteoarthritis





8.  History of depression





9.  ex-Smoker.  Patient smoked approximately half pack per day for 30 years.





10. positive blood culture with coagulase negative staph, likely skin 

contamination.  Patient seen by ID no antibiotics needed.








Hospital Course





This is a 63-year-old female patient of Dr. Amaya.  Patient presented to the ER

with complaints of increased shortness of breath.  Patient reports that over the

past week she's had increased shortness of breath with activity.  Patient denies

any significant sputum production.  Patient also states she's had increased 

lower peripheral edema.  She does have a past medical history of lung cancer 

stage III when she follows with Dr. Segura.  Patient reports that her last was 

proximally 1 month ago with immunotherapy.  Patient also went for routine CT in 

January.  Patient is home O2 dependent with 2 L.  Additional medical history 

includes COPD, CVA, fibromyalgia, GERD, hyperlipidemia, osteoarthritis, pne

umonia, chronic back pain, constipation, hiatal hernia and depression.  Patient 

also has a smoking history of half a pack per day for 30 years.  Patient has 

stopped smoking at this time.  Chest x-ray completed showing chronic infiltrate 

left lateral lung base unchanged compared to old exam no heart failure.  EKG 

completed showing normal sinus rhythm, right atrial enlargement.  At this time 

pulmonary and oncology service is consulted.  Patient started on IV Solu-Medrol.

 Patient reports significant improvement with shortness of breath.  Patient 

denies chest pain.  Patient denies nausea vomiting or diarrhea.  Patient denies 

any urinary burning or frequency.





On 03/09/2019 patient is alert and oriented 3 in no apparent distress she 

states she feels better she has less shortness of breath and less cough 

otherwise she denies any other symptoms there is no fever or chills no headache 

or dizziness no chest pain no palpitation no nausea or vomiting no abdominal 

pain no diarrhea and no urinary symptoms.  Earlier this morning patient had a 

positive blood culture for gram-positive cocci IV vancomycin was added to her 

regimen.








3/10/2019 patient is medically stable for discharge. Patient was treated for an 

acute COPD exacerbation. She was seen by pulmonary service and started on IV 

steroids and bronchodilators.  She did improve.  And they are recommending a 

prednisone taper at discharge.  Also, patient seen by ID service regarding blood

culture. The blood culture grew coagulase negative staph, likely skin 

contamination. Patient seen by ID no antibiotics needed.  Also, seen by Dr. Bartlett 

who will follow up outpatient and possibly restart immunotherapy.


Patient Condition at Discharge: Stable





Plan - Discharge Summary


New Discharge Prescriptions: 


Continue


   Cevimeline [Evoxac] 30 mg PO BID


   Albuterol Inhaler [Ventolin Hfa Inhaler] 2 puff INHALATION RT-QID PRN


     PRN Reason: Shortness Of Breath


   Esomeprazole Magnesium [NexIUM] 40 mg PO BID


   Escitalopram [Lexapro] 20 mg PO DAILY


   Morphine Sulfate [Ms Contin] 30 mg PO BID #60 tablet.er


   oxyCODONE-APAP 10-325MG [Percocet  mg] 1 tab PO Q6H PRN


     PRN Reason: Pain


   Prochlorperazine [Compazine] 10 mg PO Q6H PRN


     PRN Reason: Nausea


   Budesonide-Formot 160-4.5 Mcg [Symbicort 160-4.5 Mcg Inhaler] 2 puff 

INHALATION RT-BID


   Albuterol Nebulized [Ventolin Nebulized] 2.5 mg INHALATION RT-QID PRN


     PRN Reason: Shortness Of Breath


   Ondansetron Odt [Zofran ODT] 8 mg PO Q6H PRN


     PRN Reason: Nausea


   ALPRAZolam [Xanax] 0.25 mg PO Q6H PRN


     PRN Reason: Anxiety


   Folic Acid 0.4 mg PO DAILY


   Cholecalciferol [Vitamin D3] 1,000 unit PO DAILY


   Cyanocobalamin (Vitamin B-12) [Vitamin B-12] 1,000 mcg PO BID


   Furosemide [Lasix] 20 mg PO DAILY PRN


     PRN Reason: Edema


   Umeclidinium Bromide [Incruse Ellipta] 1 puff INHALATION RT-DAILY


   Levothyroxine Sodium [Synthroid] 25 mcg PO DAILY


   guaiFENesin [Mucinex] 600 mg PO DAILY


Discharge Medication List





Albuterol Inhaler [Ventolin Hfa Inhaler] 2 puff INHALATION RT-QID PRN 05/27/14 

[History]


Cevimeline [Evoxac] 30 mg PO BID 05/27/14 [History]


Escitalopram [Lexapro] 20 mg PO DAILY 05/27/14 [History]


Esomeprazole Magnesium [NexIUM] 40 mg PO BID 05/27/14 [History]


Morphine Sulfate [Ms Contin] 30 mg PO BID #60 tablet.er 09/02/15 [Rx]


oxyCODONE-APAP 10-325MG [Percocet  mg] 1 tab PO Q6H PRN 02/27/17 [History]


Albuterol Nebulized [Ventolin Nebulized] 2.5 mg INHALATION RT-QID PRN 08/17/17 

[History]


Budesonide-Formot 160-4.5 Mcg [Symbicort 160-4.5 Mcg Inhaler] 2 puff INHALATION 

RT-BID 08/17/17 [History]


Ondansetron Odt [Zofran ODT] 8 mg PO Q6H PRN 08/17/17 [History]


Prochlorperazine [Compazine] 10 mg PO Q6H PRN 08/17/17 [History]


ALPRAZolam [Xanax] 0.25 mg PO Q6H PRN 07/14/18 [History]


Cholecalciferol [Vitamin D3] 1,000 unit PO DAILY 03/07/19 [History]


Cyanocobalamin (Vitamin B-12) [Vitamin B-12] 1,000 mcg PO BID 03/07/19 [History]


Folic Acid 0.4 mg PO DAILY 03/07/19 [History]


Furosemide [Lasix] 20 mg PO DAILY PRN 03/07/19 [History]


Levothyroxine Sodium [Synthroid] 25 mcg PO DAILY 03/07/19 [History]


Umeclidinium Bromide [Incruse Ellipta] 1 puff INHALATION RT-DAILY 03/07/19 

[History]


guaiFENesin [Mucinex] 600 mg PO DAILY 03/07/19 [History]








Follow up Appointment(s)/Referral(s): 


Rebecca Amaya MD [Primary Care Provider] - 1-2 days


[facility], [NON-STAFF] - 


Patient Instructions/Handouts:  Cefuroxime (By mouth), Prednisone (By mouth), 

COPD (Chronic Obstructive Pulmonary Disease) (DC), Edema (DC), Chronic Lung 

Disease and Infection Prevention (DC), Energy Conservation Techniques (DC), 

Nutrition Guidelines for People with COPD (DC)


Discharge Disposition: HOME SELF-CARE

## 2019-03-22 ENCOUNTER — HOSPITAL ENCOUNTER (INPATIENT)
Dept: HOSPITAL 47 - EC | Age: 64
LOS: 5 days | Discharge: ANOTHER HOSPITAL | DRG: 70 | End: 2019-03-27
Payer: MEDICARE

## 2019-03-22 VITALS — BODY MASS INDEX: 28 KG/M2

## 2019-03-22 DIAGNOSIS — M19.90: ICD-10-CM

## 2019-03-22 DIAGNOSIS — K21.9: ICD-10-CM

## 2019-03-22 DIAGNOSIS — K59.09: ICD-10-CM

## 2019-03-22 DIAGNOSIS — J90: ICD-10-CM

## 2019-03-22 DIAGNOSIS — G89.29: ICD-10-CM

## 2019-03-22 DIAGNOSIS — E27.9: ICD-10-CM

## 2019-03-22 DIAGNOSIS — J96.01: ICD-10-CM

## 2019-03-22 DIAGNOSIS — J44.1: ICD-10-CM

## 2019-03-22 DIAGNOSIS — M79.7: ICD-10-CM

## 2019-03-22 DIAGNOSIS — Z92.3: ICD-10-CM

## 2019-03-22 DIAGNOSIS — Z79.899: ICD-10-CM

## 2019-03-22 DIAGNOSIS — I10: ICD-10-CM

## 2019-03-22 DIAGNOSIS — Z99.81: ICD-10-CM

## 2019-03-22 DIAGNOSIS — E78.5: ICD-10-CM

## 2019-03-22 DIAGNOSIS — K81.1: ICD-10-CM

## 2019-03-22 DIAGNOSIS — E03.9: ICD-10-CM

## 2019-03-22 DIAGNOSIS — Z79.51: ICD-10-CM

## 2019-03-22 DIAGNOSIS — E87.2: ICD-10-CM

## 2019-03-22 DIAGNOSIS — R74.8: ICD-10-CM

## 2019-03-22 DIAGNOSIS — K44.9: ICD-10-CM

## 2019-03-22 DIAGNOSIS — Z79.890: ICD-10-CM

## 2019-03-22 DIAGNOSIS — F32.9: ICD-10-CM

## 2019-03-22 DIAGNOSIS — Z92.21: ICD-10-CM

## 2019-03-22 DIAGNOSIS — G93.41: Primary | ICD-10-CM

## 2019-03-22 DIAGNOSIS — Z87.891: ICD-10-CM

## 2019-03-22 DIAGNOSIS — M54.9: ICD-10-CM

## 2019-03-22 DIAGNOSIS — J96.02: ICD-10-CM

## 2019-03-22 DIAGNOSIS — S00.83XA: ICD-10-CM

## 2019-03-22 DIAGNOSIS — Z86.73: ICD-10-CM

## 2019-03-22 DIAGNOSIS — Z87.01: ICD-10-CM

## 2019-03-22 DIAGNOSIS — D72.829: ICD-10-CM

## 2019-03-22 DIAGNOSIS — E87.6: ICD-10-CM

## 2019-03-22 DIAGNOSIS — J98.11: ICD-10-CM

## 2019-03-22 DIAGNOSIS — C34.12: ICD-10-CM

## 2019-03-22 LAB
ALBUMIN SERPL-MCNC: 3.6 G/DL (ref 3.5–5)
ALP SERPL-CCNC: 225 U/L (ref 38–126)
ALT SERPL-CCNC: 58 U/L (ref 9–52)
ANION GAP SERPL CALC-SCNC: 5 MMOL/L
APTT BLD: 21.7 SEC (ref 22–30)
AST SERPL-CCNC: 45 U/L (ref 14–36)
BASOPHILS # BLD AUTO: 0 K/UL (ref 0–0.2)
BASOPHILS NFR BLD AUTO: 0 %
BUN SERPL-SCNC: 15 MG/DL (ref 7–17)
CALCIUM SPEC-MCNC: 8.9 MG/DL (ref 8.4–10.2)
CHLORIDE SERPL-SCNC: 96 MMOL/L (ref 98–107)
CO2 SERPL-SCNC: 37 MMOL/L (ref 22–30)
EOSINOPHIL # BLD AUTO: 0.1 K/UL (ref 0–0.7)
EOSINOPHIL NFR BLD AUTO: 1 %
ERYTHROCYTE [DISTWIDTH] IN BLOOD BY AUTOMATED COUNT: 4.54 M/UL (ref 3.8–5.4)
ERYTHROCYTE [DISTWIDTH] IN BLOOD: 15.2 % (ref 11.5–15.5)
GLUCOSE SERPL-MCNC: 126 MG/DL (ref 74–99)
HCO3 BLDV-SCNC: 38 MMOL/L (ref 24–28)
HCT VFR BLD AUTO: 44.7 % (ref 34–46)
HGB BLD-MCNC: 13.9 GM/DL (ref 11.4–16)
HYALINE CASTS UR QL AUTO: 5 /LPF (ref 0–2)
INR PPP: 0.9 (ref ?–1.2)
LYMPHOCYTES # SPEC AUTO: 0.3 K/UL (ref 1–4.8)
LYMPHOCYTES NFR SPEC AUTO: 2 %
MAGNESIUM SPEC-SCNC: 2 MG/DL (ref 1.6–2.3)
MCH RBC QN AUTO: 30.7 PG (ref 25–35)
MCHC RBC AUTO-ENTMCNC: 31.2 G/DL (ref 31–37)
MCV RBC AUTO: 98.5 FL (ref 80–100)
MONOCYTES # BLD AUTO: 0.6 K/UL (ref 0–1)
MONOCYTES NFR BLD AUTO: 3 %
NEUTROPHILS # BLD AUTO: 18.5 K/UL (ref 1.3–7.7)
NEUTROPHILS NFR BLD AUTO: 95 %
PCO2 BLDV: 57 MMHG (ref 37–51)
PH BLDV: 7.44 [PH] (ref 7.31–7.41)
PH UR: 7 [PH] (ref 5–8)
PLATELET # BLD AUTO: 265 K/UL (ref 150–450)
POTASSIUM SERPL-SCNC: 3.4 MMOL/L (ref 3.5–5.1)
PROT SERPL-MCNC: 6.2 G/DL (ref 6.3–8.2)
PT BLD: 9.9 SEC (ref 9–12)
RBC UR QL: <1 /HPF (ref 0–5)
SODIUM SERPL-SCNC: 138 MMOL/L (ref 137–145)
SP GR UR: 1.01 (ref 1–1.03)
SQUAMOUS UR QL AUTO: 1 /HPF (ref 0–4)
UROBILINOGEN UR QL STRIP: <2 MG/DL (ref ?–2)
WBC # BLD AUTO: 19.5 K/UL (ref 3.8–10.6)
WBC #/AREA URNS HPF: 4 /HPF (ref 0–5)

## 2019-03-22 PROCEDURE — 84484 ASSAY OF TROPONIN QUANT: CPT

## 2019-03-22 PROCEDURE — 96375 TX/PRO/DX INJ NEW DRUG ADDON: CPT

## 2019-03-22 PROCEDURE — 71045 X-RAY EXAM CHEST 1 VIEW: CPT

## 2019-03-22 PROCEDURE — 87502 INFLUENZA DNA AMP PROBE: CPT

## 2019-03-22 PROCEDURE — 83605 ASSAY OF LACTIC ACID: CPT

## 2019-03-22 PROCEDURE — 94640 AIRWAY INHALATION TREATMENT: CPT

## 2019-03-22 PROCEDURE — 86592 SYPHILIS TEST NON-TREP QUAL: CPT

## 2019-03-22 PROCEDURE — 87040 BLOOD CULTURE FOR BACTERIA: CPT

## 2019-03-22 PROCEDURE — 0BH17EZ INSERTION OF ENDOTRACHEAL AIRWAY INTO TRACHEA, VIA NATURAL OR ARTIFICIAL OPENING: ICD-10-PCS

## 2019-03-22 PROCEDURE — 87529 HSV DNA AMP PROBE: CPT

## 2019-03-22 PROCEDURE — 36415 COLL VENOUS BLD VENIPUNCTURE: CPT

## 2019-03-22 PROCEDURE — 82140 ASSAY OF AMMONIA: CPT

## 2019-03-22 PROCEDURE — 84157 ASSAY OF PROTEIN OTHER: CPT

## 2019-03-22 PROCEDURE — 99291 CRITICAL CARE FIRST HOUR: CPT

## 2019-03-22 PROCEDURE — 80202 ASSAY OF VANCOMYCIN: CPT

## 2019-03-22 PROCEDURE — 94003 VENT MGMT INPAT SUBQ DAY: CPT

## 2019-03-22 PROCEDURE — 84100 ASSAY OF PHOSPHORUS: CPT

## 2019-03-22 PROCEDURE — 88108 CYTOPATH CONCENTRATE TECH: CPT

## 2019-03-22 PROCEDURE — 85730 THROMBOPLASTIN TIME PARTIAL: CPT

## 2019-03-22 PROCEDURE — 80053 COMPREHEN METABOLIC PANEL: CPT

## 2019-03-22 PROCEDURE — 87205 SMEAR GRAM STAIN: CPT

## 2019-03-22 PROCEDURE — 89050 BODY FLUID CELL COUNT: CPT

## 2019-03-22 PROCEDURE — 74177 CT ABD & PELVIS W/CONTRAST: CPT

## 2019-03-22 PROCEDURE — 70450 CT HEAD/BRAIN W/O DYE: CPT

## 2019-03-22 PROCEDURE — 87070 CULTURE OTHR SPECIMN AEROBIC: CPT

## 2019-03-22 PROCEDURE — 93005 ELECTROCARDIOGRAM TRACING: CPT

## 2019-03-22 PROCEDURE — 5A1945Z RESPIRATORY VENTILATION, 24-96 CONSECUTIVE HOURS: ICD-10-PCS

## 2019-03-22 PROCEDURE — 96376 TX/PRO/DX INJ SAME DRUG ADON: CPT

## 2019-03-22 PROCEDURE — 85610 PROTHROMBIN TIME: CPT

## 2019-03-22 PROCEDURE — 83735 ASSAY OF MAGNESIUM: CPT

## 2019-03-22 PROCEDURE — 80074 ACUTE HEPATITIS PANEL: CPT

## 2019-03-22 PROCEDURE — 95819 EEG AWAKE AND ASLEEP: CPT

## 2019-03-22 PROCEDURE — 96374 THER/PROPH/DIAG INJ IV PUSH: CPT

## 2019-03-22 PROCEDURE — 82805 BLOOD GASES W/O2 SATURATION: CPT

## 2019-03-22 PROCEDURE — 82803 BLOOD GASES ANY COMBINATION: CPT

## 2019-03-22 PROCEDURE — 87522 HEPATITIS C REVRS TRNSCRPJ: CPT

## 2019-03-22 PROCEDURE — 82945 GLUCOSE OTHER FLUID: CPT

## 2019-03-22 PROCEDURE — 83036 HEMOGLOBIN GLYCOSYLATED A1C: CPT

## 2019-03-22 PROCEDURE — 94002 VENT MGMT INPAT INIT DAY: CPT

## 2019-03-22 PROCEDURE — 85025 COMPLETE CBC W/AUTO DIFF WBC: CPT

## 2019-03-22 PROCEDURE — 84132 ASSAY OF SERUM POTASSIUM: CPT

## 2019-03-22 PROCEDURE — 87086 URINE CULTURE/COLONY COUNT: CPT

## 2019-03-22 PROCEDURE — 76705 ECHO EXAM OF ABDOMEN: CPT

## 2019-03-22 PROCEDURE — 96361 HYDRATE IV INFUSION ADD-ON: CPT

## 2019-03-22 PROCEDURE — 36600 WITHDRAWAL OF ARTERIAL BLOOD: CPT

## 2019-03-22 PROCEDURE — 81001 URINALYSIS AUTO W/SCOPE: CPT

## 2019-03-22 NOTE — CT
EXAMINATION: CT brain wo con

DATE AND TIME:  3/22/2019 9:11 PM

 

CLINICAL INDICATION: PHH; Pain 

 

TECHNIQUE: Standard departmental protocol.; 1260.4;

 

COMPARISON: None.

 

FINDINGS: 

Streak-like artifacts, limiting visualization, are from the patient's upper extremities. 

 

The calvarium is intact, and there is no intracranial hemorrhage. 

 

There is no intracranial mass or mass effect. No definite new intra-axial or extra-axial attenuation 
defect. 

 

The paranasal sinuses, middle ear cavities, and mastoid sinus air cells are clear. 

 

The orbits are unremarkable.

 

IMPRESSION:

NO ACUTE PROCESS.

## 2019-03-22 NOTE — XR
EXAMINATION: XR chest 2V portable

DATE AND TIME:  3/22/2019 6:55 PM

 

CLINICAL INDICATION: PHH; nuzhat

 

TECHNIQUE: 2 AP portable supine views

 

COMPARISON: 3/7/2019 at 6:02 PM

 

FINDINGS: 

Lungs: There is no evidence of pulmonary edema or major atelectasis. Spiculated left upper lobe bronc
hogenic mass noted, unchanged.

 

Pleural spaces: Blunted left costophrenic angle noted, nonspecific. 

 

Cardiomediastinal silhouette and bones and soft tissues are negative for acute findings.

 

Note: Supine radiography cannot exclude abnormal gas collections.

 

IMPRESSION: 

No new process.

## 2019-03-22 NOTE — CT
EXAMINATION TYPE: CT abdomen pelvis w con

 

DATE OF EXAM: 3/22/2019

 

COMPARISON: 7/14/2018

 

HISTORY: ams

 

CT DLP: 776.8 mGycm

Automated exposure control for dose reduction was used.

 

TECHNIQUE:  Helical acquisition of images was performed from the lung bases through the pelvis.

 

CONTRAST: 

Performed without Oral Contrast and with IV Contrast, patient injected with 100 mL of Isovue 300.

 

FINDINGS: 

 

LUNG BASES: No significant abnormality is appreciated.

 

LIVER/GB: No significant abnormality is appreciated.

 

PANCREAS: No significant abnormality is seen.

 

SPLEEN: No significant abnormality is seen.

 

ADRENALS: 4.2 cm left adrenal mass is redemonstrated, consistent with myelolipoma.

 

KIDNEYS: No significant abnormality is seen.

 

FREE AIR:  No free air is visualized.

 

RETROPERITONEAL ADENOPATHY:  None visualized

 

REPRODUCTIVE ORGANS: No significant abnormality is seen

 

URINARY BLADDER:  The bladder is moderately distended, etiology unclear.

 

PELVIC ADENOPATHY:  None visualized.

 

OSSEOUS STRUCTURES:  No significant abnormality is seen.

 

BOWEL:  No bowel obstruction, but excessive colonic stool is noted.

 

OTHER: No acute vascular findings.

 

IMPRESSION: 

URINARY BLADDER DISTENTION AND EXCESSIVE COLONIC STOOL.

## 2019-03-23 LAB
ALBUMIN SERPL-MCNC: 3.8 G/DL (ref 3.5–5)
ALP SERPL-CCNC: 219 U/L (ref 38–126)
ALT SERPL-CCNC: 79 U/L (ref 9–52)
ANION GAP SERPL CALC-SCNC: 8 MMOL/L
AST SERPL-CCNC: 65 U/L (ref 14–36)
BASOPHILS # BLD AUTO: 0 K/UL (ref 0–0.2)
BASOPHILS NFR BLD AUTO: 0 %
BUN SERPL-SCNC: 14 MG/DL (ref 7–17)
CALCIUM SPEC-MCNC: 9 MG/DL (ref 8.4–10.2)
CHLORIDE SERPL-SCNC: 92 MMOL/L (ref 98–107)
CO2 BLDA-SCNC: 36 MMOL/L (ref 19–24)
CO2 BLDA-SCNC: 40 MMOL/L (ref 19–24)
CO2 SERPL-SCNC: 37 MMOL/L (ref 22–30)
EOSINOPHIL # BLD AUTO: 0.1 K/UL (ref 0–0.7)
EOSINOPHIL NFR BLD AUTO: 0 %
ERYTHROCYTE [DISTWIDTH] IN BLOOD BY AUTOMATED COUNT: 4.5 M/UL (ref 3.8–5.4)
ERYTHROCYTE [DISTWIDTH] IN BLOOD: 15 % (ref 11.5–15.5)
GLUCOSE BLD-MCNC: 113 MG/DL (ref 75–99)
GLUCOSE BLD-MCNC: 134 MG/DL (ref 75–99)
GLUCOSE BLD-MCNC: 156 MG/DL (ref 75–99)
GLUCOSE CSF-MCNC: 103 MG/DL (ref 40–70)
GLUCOSE SERPL-MCNC: 152 MG/DL (ref 74–99)
HCO3 BLDA-SCNC: 34 MMOL/L (ref 21–25)
HCO3 BLDA-SCNC: 39 MMOL/L (ref 21–25)
HCO3 BLDV-SCNC: 35 MMOL/L (ref 24–28)
HCT VFR BLD AUTO: 43.5 % (ref 34–46)
HGB BLD-MCNC: 14.1 GM/DL (ref 11.4–16)
LYMPHOCYTES # SPEC AUTO: 0.3 K/UL (ref 1–4.8)
LYMPHOCYTES NFR SPEC AUTO: 2 %
MCH RBC QN AUTO: 31.2 PG (ref 25–35)
MCHC RBC AUTO-ENTMCNC: 32.3 G/DL (ref 31–37)
MCV RBC AUTO: 96.7 FL (ref 80–100)
MONOCYTES # BLD AUTO: 0.6 K/UL (ref 0–1)
MONOCYTES NFR BLD AUTO: 4 %
NEUTROPHILS # BLD AUTO: 13.8 K/UL (ref 1.3–7.7)
NEUTROPHILS NFR BLD AUTO: 93 %
NUC CELL # CSF: 1 U/L (ref 0–5)
PCO2 BLDA: 38 MMHG (ref 35–45)
PCO2 BLDA: 47 MMHG (ref 35–45)
PCO2 BLDV: 39 MMHG (ref 37–51)
PH BLDA: 7.53 [PH] (ref 7.35–7.45)
PH BLDA: 7.56 [PH] (ref 7.35–7.45)
PH BLDV: 7.57 [PH] (ref 7.31–7.41)
PLATELET # BLD AUTO: 208 K/UL (ref 150–450)
PO2 BLDA: 136 MMHG (ref 83–108)
PO2 BLDA: >400 MMHG (ref 83–108)
POTASSIUM SERPL-SCNC: 2.8 MMOL/L (ref 3.5–5.1)
PROT SERPL-MCNC: 6.2 G/DL (ref 6.3–8.2)
RBC # CSF: 1 U/L (ref 0–10)
SODIUM SERPL-SCNC: 137 MMOL/L (ref 137–145)
SPECIMEN VOL CSF: 1.1 ML
WBC # BLD AUTO: 14.8 K/UL (ref 3.8–10.6)

## 2019-03-23 PROCEDURE — 009U3ZX DRAINAGE OF SPINAL CANAL, PERCUTANEOUS APPROACH, DIAGNOSTIC: ICD-10-PCS

## 2019-03-23 PROCEDURE — 03HY32Z INSERTION OF MONITORING DEVICE INTO UPPER ARTERY, PERCUTANEOUS APPROACH: ICD-10-PCS

## 2019-03-23 PROCEDURE — 02HV33Z INSERTION OF INFUSION DEVICE INTO SUPERIOR VENA CAVA, PERCUTANEOUS APPROACH: ICD-10-PCS

## 2019-03-23 RX ADMIN — METHYLPREDNISOLONE SODIUM SUCCINATE SCH: 125 INJECTION, POWDER, FOR SOLUTION INTRAMUSCULAR; INTRAVENOUS at 12:00

## 2019-03-23 RX ADMIN — PROPOFOL SCH MLS/HR: 10 INJECTION, EMULSION INTRAVENOUS at 15:20

## 2019-03-23 RX ADMIN — HYDROMORPHONE HYDROCHLORIDE PRN MG: 1 INJECTION, SOLUTION INTRAMUSCULAR; INTRAVENOUS; SUBCUTANEOUS at 04:58

## 2019-03-23 RX ADMIN — MORPHINE SULFATE SCH: 30 TABLET, FILM COATED, EXTENDED RELEASE ORAL at 01:16

## 2019-03-23 RX ADMIN — IPRATROPIUM BROMIDE AND ALBUTEROL SULFATE SCH: .5; 3 SOLUTION RESPIRATORY (INHALATION) at 12:26

## 2019-03-23 RX ADMIN — SODIUM CHLORIDE SCH MLS/HR: 9 INJECTION, SOLUTION INTRAVENOUS at 18:10

## 2019-03-23 RX ADMIN — IPRATROPIUM BROMIDE AND ALBUTEROL SULFATE PRN ML: .5; 3 SOLUTION RESPIRATORY (INHALATION) at 23:16

## 2019-03-23 RX ADMIN — IPRATROPIUM BROMIDE AND ALBUTEROL SULFATE SCH ML: .5; 3 SOLUTION RESPIRATORY (INHALATION) at 16:55

## 2019-03-23 RX ADMIN — CLEVIPIDINE SCH MLS/HR: 0.5 EMULSION INTRAVENOUS at 12:20

## 2019-03-23 RX ADMIN — SODIUM CHLORIDE SCH MLS/HR: 900 INJECTION, SOLUTION INTRAVENOUS at 16:22

## 2019-03-23 RX ADMIN — POTASSIUM CHLORIDE SCH MLS/HR: 7.46 INJECTION, SOLUTION INTRAVENOUS at 11:21

## 2019-03-23 RX ADMIN — METHYLPREDNISOLONE SODIUM SUCCINATE SCH MG: 125 INJECTION, POWDER, FOR SOLUTION INTRAMUSCULAR; INTRAVENOUS at 20:16

## 2019-03-23 RX ADMIN — LEVOTHYROXINE SODIUM ANHYDROUS SCH MCG: 100 INJECTION, POWDER, LYOPHILIZED, FOR SOLUTION INTRAVENOUS at 09:25

## 2019-03-23 RX ADMIN — SODIUM CHLORIDE SCH MLS/HR: 900 INJECTION, SOLUTION INTRAVENOUS at 17:29

## 2019-03-23 RX ADMIN — IPRATROPIUM BROMIDE AND ALBUTEROL SULFATE SCH: .5; 3 SOLUTION RESPIRATORY (INHALATION) at 08:40

## 2019-03-23 RX ADMIN — HALOPERIDOL LACTATE PRN MG: 5 INJECTION, SOLUTION INTRAMUSCULAR at 15:20

## 2019-03-23 RX ADMIN — IPRATROPIUM BROMIDE AND ALBUTEROL SULFATE SCH ML: .5; 3 SOLUTION RESPIRATORY (INHALATION) at 19:30

## 2019-03-23 RX ADMIN — POTASSIUM CHLORIDE SCH MLS/HR: 7.46 INJECTION, SOLUTION INTRAVENOUS at 17:30

## 2019-03-23 RX ADMIN — POTASSIUM CHLORIDE SCH MLS/HR: 7.46 INJECTION, SOLUTION INTRAVENOUS at 16:23

## 2019-03-23 RX ADMIN — MORPHINE SULFATE SCH: 30 TABLET, FILM COATED, EXTENDED RELEASE ORAL at 21:08

## 2019-03-23 RX ADMIN — HALOPERIDOL LACTATE PRN MG: 5 INJECTION, SOLUTION INTRAMUSCULAR at 15:00

## 2019-03-23 RX ADMIN — METHYLPREDNISOLONE SODIUM SUCCINATE SCH MG: 125 INJECTION, POWDER, FOR SOLUTION INTRAMUSCULAR; INTRAVENOUS at 01:13

## 2019-03-23 RX ADMIN — PROPOFOL SCH MLS/HR: 10 INJECTION, EMULSION INTRAVENOUS at 19:16

## 2019-03-23 RX ADMIN — POTASSIUM CHLORIDE SCH MLS/HR: 7.46 INJECTION, SOLUTION INTRAVENOUS at 10:12

## 2019-03-23 RX ADMIN — METHYLPREDNISOLONE SODIUM SUCCINATE SCH MG: 125 INJECTION, POWDER, FOR SOLUTION INTRAMUSCULAR; INTRAVENOUS at 04:58

## 2019-03-23 RX ADMIN — HYDROMORPHONE HYDROCHLORIDE PRN MG: 1 INJECTION, SOLUTION INTRAMUSCULAR; INTRAVENOUS; SUBCUTANEOUS at 17:20

## 2019-03-23 RX ADMIN — POTASSIUM CHLORIDE SCH MLS/HR: 7.46 INJECTION, SOLUTION INTRAVENOUS at 19:13

## 2019-03-23 RX ADMIN — MORPHINE SULFATE SCH: 30 TABLET, FILM COATED, EXTENDED RELEASE ORAL at 09:52

## 2019-03-23 RX ADMIN — HYDROMORPHONE HYDROCHLORIDE PRN MG: 1 INJECTION, SOLUTION INTRAMUSCULAR; INTRAVENOUS; SUBCUTANEOUS at 01:08

## 2019-03-23 RX ADMIN — POTASSIUM CHLORIDE SCH MLS/HR: 7.46 INJECTION, SOLUTION INTRAVENOUS at 21:08

## 2019-03-23 NOTE — XR
EXAMINATION TYPE: XR chest 1V portable

 

DATE OF EXAM: 3/23/2019

 

CLINICAL HISTORY: Line placement.  

 

TECHNIQUE: Single AP portable supine view of the chest is obtained.

 

COMPARISON: Chest x-ray from one day earlier and older studies. CT chest March 8, 2019.

 

FINDINGS:  There is new left subclavian central venous catheter terminating in SVC. There is new orog
astric tube projecting below diaphragm. There is new endotracheal tube at aortic knob level, approxim
ately 3 cm above suzette.

 

There is chronic parenchymal change with chronic blunting left lateral costophrenic angle corresponds
 to prominent pleural-based fat. There is no new suspicious focal airspace opacity or pneumothorax se
en bilaterally. Cardiac silhouette size is stable and within normal limits with atherosclerotic malloy
e in aortic knob. Osseous structures are intact.

 

 

IMPRESSION: 

1. New tubes and lines satisfactory in position as detailed above. No pneumothorax is evident.

2. No suspicious acute pulmonary process.

## 2019-03-23 NOTE — CT
EXAM:

  CT Head Without Intravenous Contrast

 

CLINICAL HISTORY:

  mental status changes

 

TECHNIQUE:

  Axial computed tomography images of the head/brain without intravenous 

contrast.  CTDI is 0.085, 0.085, 51.7 mGy and DLP is 1336.4 mGy-cm.  This 

CT exam was performed using one or more of the following dose reduction 

techniques: automated exposure control, adjustment of the mA and/or kV 

according to patient size, and/or use of iterative reconstruction 

technique.

 

COMPARISON:

  3/22/2019

 

FINDINGS:

  Brain:  No acute intracranial hemorrhage, large hyperdensity, or 

significant mass effect.  Nonspecific areas of hypoattenuation in the 

periventricular white matter likely represent the sequela of chronic 

small vessel ischemic disease.  Retrocerebellar cyst.

  Ventricles:  Ventricular and sulcal prominence commensurate with the 

patient's age.

  Bones/joints:  Unremarkable.  No acute fracture.

  Soft tissues:  Unremarkable.

  Sinuses:  Unremarkable.

  Mastoid air cells:  Unremarkable.

 

IMPRESSION:     

  No acute intracranial abnormality.

## 2019-03-23 NOTE — P.HPIM
History of Present Illness


H&P Date: 03/23/19





This is a 63-year-old female patient of Dr. bustillo.  Patient presented to the 

hospital with complaints of altered mental status changes.  She per patient's 

sister patient started having altered mental status possibly 2 days ago.   

Patient has a known past medical history of stage III lung cancer.  Per patient 

patient has not received any recent treatment for cancer.  Past medical history 

includes COPD, CVA, fibromyalgia, GERD, hyperlipidemia, osteoarthritis and 

pneumonia.  Chest x-ray completed showing no new process.   Head CT completed 

showing no acute process.  CT of abdomen and pelvis dated showing urinary 

bladder distention excessive colonic stool.  Liver enzymes slightly elevated AST

65, ALT 79 and alkaline phosphatase 219.  Ammonia level  28.  Urine and blood 

cultures have been ordered.  Patient's pH on this admission 7.44 pCO2 57 HCO3 

38.   WBC 19.5.  Influenza ordered.  Patient started on Rocephin.  At this time 

patient remains confused and are not able to follow commands.  Discussed case 

with Dr. Trejo per critical care.  [facility] patient be transferred to the 

intensive care unit for closer monitoring.  Discussed with patient's sister at 

bedside CODE STATUS.  At this time requesting patient be full code.  Patient's 

potassium also low at 2.8.  Replacement has been ordered.  Infectious disease 

will be consulted.  Oncology service is consulted.





Review of Systems





please refer to HPI otherwise unremarkable





Past Medical History


Past Medical History: Cancer, COPD, CVA/TIA, Fibromyalgia, GERD/Reflux, Hyp

erlipidemia, Osteoarthritis (OA), Pneumonia


Additional Past Medical History / Comment(s): Non-small cell lung cancer stage 3

adenocarcinoma, COPD, fibromyalgia, acid reflux, hyperlipidemia, osteoarthritis,

adrenal mass that has remained stable over some time, TIA history of, hiatal 

hernia, chronic constipation, chronic back pain, degenerative arthritis


History of Any Multi-Drug Resistant Organisms: None Reported


Additional Past Surgical History / Comment(s): SINUS surgery


Past Anesthesia/Blood Transfusion Reactions: No Reported Reaction


Past Psychological History: Depression


Smoking Status: Former smoker


Past Alcohol Use History: None Reported


Additional Past Alcohol Use History / Comment(s): smokes < 1/2 PPD for past 30 

yrs


Past Drug Use History: None Reported





- Past Family History


  ** Mother


Family Medical History: Cancer





  ** Father


Family Medical History: Cancer





Medications and Allergies


                                Home Medications











 Medication  Instructions  Recorded  Confirmed  Type


 


Albuterol Inhaler [Ventolin Hfa 2 puff INHALATION RT-QID PRN 05/27/14 03/22/19 

History





Inhaler]    


 


Cevimeline [Evoxac] 30 mg PO BID 05/27/14 03/22/19 History


 


Escitalopram [Lexapro] 20 mg PO DAILY 05/27/14 03/22/19 History


 


Esomeprazole Magnesium [NexIUM] 40 mg PO BID 05/27/14 03/22/19 History


 


Morphine Sulfate [Ms Contin] 30 mg PO BID #60 tablet.er 09/02/15 03/22/19 Rx


 


oxyCODONE-APAP 10-325MG [Percocet 1 tab PO Q6H PRN 02/27/17 03/22/19 History





 mg]    


 


Albuterol Nebulized [Ventolin 2.5 mg INHALATION RT-QID PRN 08/17/17 03/22/19 

History





Nebulized]    


 


Budesonide-Formot 160-4.5 Mcg 2 puff INHALATION RT-BID 08/17/17 03/22/19 History





[Symbicort 160-4.5 Mcg Inhaler]    


 


Ondansetron Odt [Zofran ODT] 8 mg PO Q6H PRN 08/17/17 03/22/19 History


 


Prochlorperazine [Compazine] 10 mg PO Q6H PRN 08/17/17 03/22/19 History


 


ALPRAZolam [Xanax] 0.25 mg PO AS DIRECTED PRN 07/14/18 03/22/19 History


 


Cholecalciferol [Vitamin D3] 1,000 unit PO DAILY 03/07/19 03/22/19 History


 


Cyanocobalamin (Vitamin B-12) 1,000 mcg PO BID 03/07/19 03/22/19 History





[Vitamin B-12]    


 


Folic Acid 0.4 mg PO DAILY 03/07/19 03/22/19 History


 


Furosemide [Lasix] 40 mg PO TID 03/07/19 03/22/19 History


 


Levothyroxine Sodium [Synthroid] 25 mcg PO DAILY 03/07/19 03/22/19 History


 


Umeclidinium Bromide [Incruse 1 puff INHALATION RT-DAILY 03/07/19 03/22/19 

History





Ellipta]    


 


guaiFENesin [Mucinex] 600 mg PO DAILY PRN 03/07/19 03/22/19 History


 


LORazepam [Ativan] 0.5 mg PO Q6H PRN 03/22/19 03/22/19 History


 


Pyridoxine HCl (Vitamin B6) 100 mg PO DAILY 03/22/19 03/22/19 History





[Vitamin B-6]    


 


Tolterodine Tartrate [Detrol LA] 4 mg PO DAILY 03/22/19 03/22/19 History








                                    Allergies











Allergy/AdvReac Type Severity Reaction Status Date / Time


 


No Known Allergies Allergy   Verified 03/22/19 17:39














Physical Exam


Vitals: 


                                   Vital Signs











  Temp Pulse Pulse Pulse Resp BP BP


 


 03/23/19 05:00  99.1 F    102 H  22   164/90


 


 03/23/19 00:20    94   18  


 


 03/22/19 22:28  98.4 F    97  18  


 


 03/22/19 21:23  99.0 F  86    18  147/90 


 


 03/22/19 19:13   101 H    26 H  165/105 


 


 03/22/19 17:59   92     


 


 03/22/19 17:45   90     


 


 03/22/19 17:33  99 F  99    28 H  166/116 














  Pulse Ox


 


 03/23/19 05:00  97


 


 03/23/19 00:20 


 


 03/22/19 22:28  99


 


 03/22/19 21:23  99


 


 03/22/19 19:13 


 


 03/22/19 17:59 


 


 03/22/19 17:45 


 


 03/22/19 17:33  90 L








                                Intake and Output











 03/22/19 03/23/19 03/23/19





 22:59 06:59 14:59


 


Intake Total 700  


 


Balance 700  


 


Intake:   


 


  Intake, IV Titration 700  





  Amount   


 


    Sodium Chloride 0.9% 1, 200  





    000 ml @ 100 mls/hr IV .   





    Q10H STA Rx#:[id number]   


 


    Sodium Chloride 0.9% 500 500  





    ml 500 ml @ 999 mls/hr IV   





    .Q31M STA Rx#:[id number]   


 


Other:   


 


  Voiding Method  Diaper 


 


  # Voids  2 1


 


  Weight 54.431 kg  














Head normocephalic


Neck supple


Lungs tachypenic Diminished breath sounds


Heart regular rate and rhythm S1-S2, no rub or gallop


Abdomen is soft nontender nondistended positive bowel sounds no 

hepatosplenomegaly


Extremities no edema


Neuro infused not able to follow commands





Results


CBC & Chem 7: 


                                 03/23/19 07:04





                                 03/23/19 07:04


Labs: 


                  Abnormal Lab Results - Last 24 Hours (Table)











  03/22/19 03/22/19 03/22/19 Range/Units





  17:39 17:39 17:39 


 


WBC  19.5 H    (3.8-10.6)  k/uL


 


Neutrophils #  18.5 H    (1.3-7.7)  k/uL


 


Lymphocytes #  0.3 L    (1.0-4.8)  k/uL


 


APTT    21.7 L  (22.0-30.0)  sec


 


VBG pH     (7.31-7.41)  


 


VBG pCO2     (37-51)  mmHg


 


VBG HCO3     (24-28)  mmol/L


 


Potassium   3.4 L   (3.5-5.1)  mmol/L


 


Chloride   96 L   ()  mmol/L


 


Carbon Dioxide   37 H   (22-30)  mmol/L


 


Glucose   126 H   (74-99)  mg/dL


 


AST   45 H   (14-36)  U/L


 


ALT   58 H   (9-52)  U/L


 


Alkaline Phosphatase   225 H   ()  U/L


 


Total Protein   6.2 L   (6.3-8.2)  g/dL


 


Ur Leukocyte Esterase     (Negative)  


 


Urine Bacteria     (None)  /hpf


 


Hyaline Casts     (0-2)  /lpf


 


Urine Mucus     (None)  /hpf














  03/22/19 03/22/19 03/23/19 Range/Units





  17:39 17:39 07:04 


 


WBC    14.8 H  (3.8-10.6)  k/uL


 


Neutrophils #    13.8 H  (1.3-7.7)  k/uL


 


Lymphocytes #    0.3 L  (1.0-4.8)  k/uL


 


APTT     (22.0-30.0)  sec


 


VBG pH   7.44 H   (7.31-7.41)  


 


VBG pCO2   57 H   (37-51)  mmHg


 


VBG HCO3   38 H   (24-28)  mmol/L


 


Potassium     (3.5-5.1)  mmol/L


 


Chloride     ()  mmol/L


 


Carbon Dioxide     (22-30)  mmol/L


 


Glucose     (74-99)  mg/dL


 


AST     (14-36)  U/L


 


ALT     (9-52)  U/L


 


Alkaline Phosphatase     ()  U/L


 


Total Protein     (6.3-8.2)  g/dL


 


Ur Leukocyte Esterase  Trace H    (Negative)  


 


Urine Bacteria  Rare H    (None)  /hpf


 


Hyaline Casts  5 H    (0-2)  /lpf


 


Urine Mucus  Rare H    (None)  /hpf














  03/23/19 03/23/19 Range/Units





  07:04 07:04 


 


WBC    (3.8-10.6)  k/uL


 


Neutrophils #    (1.3-7.7)  k/uL


 


Lymphocytes #    (1.0-4.8)  k/uL


 


APTT    (22.0-30.0)  sec


 


VBG pH   7.57 H  (7.31-7.41)  


 


VBG pCO2    (37-51)  mmHg


 


VBG HCO3   35 H  (24-28)  mmol/L


 


Potassium  2.8 L   (3.5-5.1)  mmol/L


 


Chloride  92 L   ()  mmol/L


 


Carbon Dioxide  37 H   (22-30)  mmol/L


 


Glucose  152 H   (74-99)  mg/dL


 


AST  65 H   (14-36)  U/L


 


ALT  79 H   (9-52)  U/L


 


Alkaline Phosphatase  219 H   ()  U/L


 


Total Protein  6.2 L   (6.3-8.2)  g/dL


 


Ur Leukocyte Esterase    (Negative)  


 


Urine Bacteria    (None)  /hpf


 


Hyaline Casts    (0-2)  /lpf


 


Urine Mucus    (None)  /hpf














Thrombosis Risk Factor Assmnt





- Choose All That Apply


Any of the Below Risk Factors Present?: Yes


Each Factor Represents 1 point: Abnormal pulmonary function (COPD), Medical pt 

on bed rest


Other Risk Factors: Yes


Each Risk Factor Represents 2 Points: Age 61-74 years, Patient confined to bed


Other congenital or acquired thrombophilia - If yes, enter type in comment: No


Thrombosis Risk Factor Assessment Total Risk Factor Score: 6


Thrombosis Risk Factor Assessment Level: High Risk





Assessment and Plan


Assessment: 





1.  Altered mental status changes.  Ammonia level 28.  Head CT completed showing

no acute process.  Abdomen and pelvis CT completed showing urinary bladder d

istention excessive colonic stool.  Urine and blood cultures ordered.  Influenza

ordered.





2.  Hypercapnia and hypoxic respiratory failure with known history of COPD.  

Initial pH on venous blood gas 7.44 pCO2 57 and HCO3 38.  pulmonary services 

following





3.  Leukocytosis.  Initial white blood cell 19.5.  Chest x-ray completed showing

no new process.  Patient started on Rocephin.  Infectious disease consulted.  

Urine and blood cultures ordered.  Influenza ordered





4.  Hypokalemia.  Potassium 2.8.  Will replace per protocol





5.  Non-small cell lung cancer stage III adenocarcinoma.  Per patient's family 

patient has not received treatment for multiple months.  Oncology service is 

consulted.  Pulmonary service is consulted





6.  History of COPD.  Patient currently on Solu-Medrol and DuoNeb breathing 

treatments





7.  History of CVA





8.  History of osteoarthritis





9.  History of hyperlipidemia





10.  History of fibromyalgia





11.  History of depression





12.  Ex-smoker.  Patient's both proximally half pack per day for 30 years





13.  Elevated liver enzymes.  AST is 65 ALT 79 alkaline phosphatase





DVT prophylaxis Lovenox.  GI prophylaxis Protonix





Patient's sister is at bedside.  Patient's sister states that patient is a full 

code


Discussed case with Dr. Trejo per critical care.  At this time recommend 

patient be transferred to the intensive care unit for closer monitoring


Critical care, oncology service and infectious disease has been consulted


Blood, urine and influenza has been ordered

















Time with Patient: Greater than 30 (Greater than 60% of the total time spent in 

counseling and coordination of care. I performed an examination of the patient 

and discussed their management with the Nurse Practitioner.  I have reviewed the

Nurse Practitioner's notes and agree with the documented findings and plan of 

care)

## 2019-03-23 NOTE — PCN
PROCEDURE NOTE



PROCEDURE:

Insertion of arterial line catheter.



SITE OF INSERTION:

The right radial artery.



ARTERIAL LINE PLACEMENT:

Indications:  Hemodynamic monitoring.



A time-out was completed verifying correct patient, procedure, site, positioning, and

implant(s) or special equipment if applicable.



Hussein's test was performed to ensure adequate perfusion.  The patient's right wrist was

prepped and draped in sterile fashion.  1% Lidocaine was used to anesthetize the area.

An 18G Arrow arterial line was introduced into the radial artery.  The catheter was

threaded over the guide wire and the needle was removed with appropriate pulsatile

blood return.  Blood loss was minimal.  The catheter was then sutured in place to the

skin and a sterile dressing applied.  Perfusion to the extremity distal to the point of

catheter insertion was checked and found to be adequate.



The patient tolerated the procedure well and there were no complications.



No bedside complications or bleeding.





MMODL / IJN: [id number] / Job#: [id number]

## 2019-03-23 NOTE — PCN
PROCEDURE NOTE



PROCEDURE:

Intubation.



PREOP DIAGNOSES:

1. Acute respiratory failure.

2. Altered mental status.



POSTOP DIAGNOSES:

1. Acute respiratory failure.

2. Altered mental status.



ENDOTRACHEAL INTUBATION:

A time-out was completed verifying correct patient, procedure, site, positioning, and

implant(s) or special equipment if applicable.



The patient was positioned appropriately and a #4 MAC blade to intubate this patient.

I intubated the patient with a #8 endotracheal tube. Endotracheal tube was placed under

direct laryngoscopy.  The tube was anchored at 22 cm at the teeth.  Correct placement

was confirmed by presence of bilateral breath sounds without air sounds in the abdomen

on auscultation.  An end-tidal CO2 monitor was also used to confirm tracheal placement

of the ET tube.



A chest x-ray was ordered to assess for pneumothorax and verify endotracheal tube

placement.



The patient tolerated the procedure well and there were no complications.





MMODL / IJN: [id number] / Job#: [id number]

## 2019-03-23 NOTE — P.CNPUL
History of Present Illness


Consult date: 03/23/19


Chief complaint: Altered mental status, lung cancer


History of present illness: 








This is a very pleasant 63-year-old female patient who follows with Dr. Amaya 

as her primary care physician.  She has a history of CVA/TIA, fibromyalgia, 

gastroesophageal reflux disease, hyperlipidemia, hypothyroidism, chronic back 

pain, depression.  She also has a history of chronic tobacco dependence, oxygen 

dependent chronic obstructive pulmonary disease with an FEV1 value of 56% of 

predicted, and non-small cell lung cancer stage III adenocarcinoma.  She follows

with Dr. Pastor in our office. This was diagnosed by an FNA of a left upper lobe 

lesion by IR in May 2017.  She had received concurrent chemoradiation with 

subsequent additional chemotherapy and then placed on maintenance mmunotherapy 

of Imfinzi back in June 2018.  Her last PET scan in September 2018 revealed 

continued left upper lobe mass with irregular margins extending to the pleural 

surface, spiculated appearance but was stable compared to previous and April 2018.  The SUV value was only 1.7.  Subsequent computed tomography scan of the 

chest in January 2019 revealed a stable and slightly smaller spiculated mass of 

the left upper lobe measuring 2.5 x 2.4 cm versus 3.0 x 2.6 previous.  No 

evidence of recurrence or metastasis.  The patient was in the hospital back in 

early March 2019 and she was having ongoing issues with nausea vomiting profound

weakness and fatigue.  She decided to stop taking the immunotherapy. She had 

been treated for his COPD exacerbation 02/13/2019 in our office by Dr. Pastor 

where she received steroids and Bactrim.  She did improve for the first week or 

so. She presented to the emergency room yesterday with complaints of increasing 

shortness of breath cough and congestion.  She did have some lower extremity 

edema.  She was still quite weak and fatigued.  ome dyspnea on exertion.  The 

patient was discharged home on 03/11/2019 as the patient shortness of breath 

improved and the patient recovered from her acute COPD exacerbation and she was 

discharged home on her usual routine medications which included also morphine 

sulfate 30 mg by mouth twice a day Percocet 10/03/2025 every 6 hours and a when 

necessary basis.  In terms of her lung medication she was maintained on a 

combination of Symbicort and Incruse.  She is also on Synthroid 25 g by mouth 

daily.  She was given Lasix to be taken on an as needed basis.





The patient came in yesterday to the burst department 3 day history of altered 

mental status.  According to the family the patient was acting weird.  She was 

acting unusual and she was confused.  She was also very weak and she was having 

episodes of fall.  I do see a bruise over her forehead.  The family tells that 

she did not have any active had trauma.  No seizure activity.  No neck 

stiffness.  No documented fever.  She was thrashing significantly yesterday.  A 

CAT scan of the head was done that showed no acute process.  CAT scan of abdomen

and pelvis was done that showed urinary bladder distention along with extensive 

colonic stool.  The patient's ammonia level was at 28.  Urine and blood cultures

were ordered.  The influenza screen was negative.  The wife that was at 19.7 the

patient was given a dose of 1 g of Rocephin and she was admitted to the oncology

unit.  Overnight the patient continued to be the same.  Earlier this morning I 

was informed about this patient by the nurse practitioners and I was able to see

it on the floor and I asked her to be transferred to the intensive care unit.  

She did not have any neck stiffness.  No fever.  She was at times morning.  

Other times she would open up her eyes and follows some simple commands.  I was 

told that this is an improvement in her condition compared to yesterday.  She 

was able to follow some simple commands however she has not been consistent in 

following orders.  His speech is minimal and the patient may safely worse.  She 

denies being in pain.  Urine toxin was ordered.  She was given a dose of Narcan 

here in the ICU without much improvement.  Blood gases showed a pH of 7.3 with a

pCO2 of 47 and pO2 of 136 and there is no evidence of any CO2 narcosis.  Her 

white cell count is down to 14.8.  The rest of the blood work is all within 

normal limits with a exception of some mild elevation of the LFTs with an AST of

65, ALT of 79 and alkaline phosphatase of 219.  Troponins of 0.04.  Serum 

albumin is at 3.8.  The coagulation profile is within normal.  The chest x-ray 

is not showing any acute process.





Review of Systems








CONSTITUTIONAL: Looked okay and a full review of system.  I interviewed the 

family.  The patient is currently altered in terms of her mentation.  Most of 

the positive findings and the negative findings were mentioned in my HPI and in 

my review of system.  A 14 point review of system was done along with the 

family.


EYES: Denies change in vision.


EARS, NOSE, MOUTH, THROAT: Denies headaches, denies sore throat.


CARDIOVASCULAR: Denies chest pain, palpitations or syncopal episodes.


RESPIRATORY: Positive for shortness of breath, cough, congestion without 

hemoptysis.


GASTROINTESTINAL: Denies change in appetite, denies abdominal pain


GENITOURINARY: Denies hematuria, denies infections.


MUSKULOSKELETAL: Positive for muscle fatigue and weakness, generalized malaise


INTEGUMENTARY: Denies rash, denies eczema.


NEUROLOGICAL: Denies recent memory loss, no recent seizure activity.  The 

patient was altered in terms of her mentation over the past 3-4 days.  She has 

been progressively getting worse and weak and she has been falling and has 

become unsteady in her gait also.  No seizure activity was noted.  No headaches.

 No neck stiffness.


PSYCHIATRIC: Positive for depression.


HEMATOLOGIC/LYMPHATIC: Denies anemia, denies enlarged lymph nodes.





Past Medical History


Past Medical History: Cancer, COPD, CVA/TIA, Fibromyalgia, GERD/Reflux, 

Hyperlipidemia, Osteoarthritis (OA), Pneumonia


Additional Past Medical History / Comment(s): Non-small cell lung cancer stage 3

adenocarcinoma, COPD, fibromyalgia, acid reflux, hyperlipidemia, osteoarthritis,

adrenal mass that has remained stable over some time, TIA history of, hiatal 

hernia, chronic constipation, chronic back pain, degenerative arthritis


History of Any Multi-Drug Resistant Organisms: None Reported


Additional Past Surgical History / Comment(s): SINUS surgery


Past Anesthesia/Blood Transfusion Reactions: No Reported Reaction


Past Psychological History: Depression


Smoking Status: Former smoker


Past Alcohol Use History: None Reported


Additional Past Alcohol Use History / Comment(s): smokes < 1/2 PPD for past 30 

yrs


Past Drug Use History: None Reported





- Past Family History


  ** Mother


Family Medical History: Cancer





  ** Father


Family Medical History: Cancer





Medications and Allergies


                                Home Medications











 Medication  Instructions  Recorded  Confirmed  Type


 


Albuterol Inhaler [Ventolin Hfa 2 puff INHALATION RT-QID PRN 05/27/14 03/22/19 

History





Inhaler]    


 


Cevimeline [Evoxac] 30 mg PO BID 05/27/14 03/22/19 History


 


Escitalopram [Lexapro] 20 mg PO DAILY 05/27/14 03/22/19 History


 


Esomeprazole Magnesium [NexIUM] 40 mg PO BID 05/27/14 03/22/19 History


 


Morphine Sulfate [Ms Contin] 30 mg PO BID #60 tablet.er 09/02/15 03/22/19 Rx


 


oxyCODONE-APAP 10-325MG [Percocet 1 tab PO Q6H PRN 02/27/17 03/22/19 History





 mg]    


 


Albuterol Nebulized [Ventolin 2.5 mg INHALATION RT-QID PRN 08/17/17 03/22/19 

History





Nebulized]    


 


Budesonide-Formot 160-4.5 Mcg 2 puff INHALATION RT-BID 08/17/17 03/22/19 History





[Symbicort 160-4.5 Mcg Inhaler]    


 


Ondansetron Odt [Zofran ODT] 8 mg PO Q6H PRN 08/17/17 03/22/19 History


 


Prochlorperazine [Compazine] 10 mg PO Q6H PRN 08/17/17 03/22/19 History


 


ALPRAZolam [Xanax] 0.25 mg PO AS DIRECTED PRN 07/14/18 03/22/19 History


 


Cholecalciferol [Vitamin D3] 1,000 unit PO DAILY 03/07/19 03/22/19 History


 


Cyanocobalamin (Vitamin B-12) 1,000 mcg PO BID 03/07/19 03/22/19 History





[Vitamin B-12]    


 


Folic Acid 0.4 mg PO DAILY 03/07/19 03/22/19 History


 


Furosemide [Lasix] 40 mg PO TID 03/07/19 03/22/19 History


 


Levothyroxine Sodium [Synthroid] 25 mcg PO DAILY 03/07/19 03/22/19 History


 


Umeclidinium Bromide [Incruse 1 puff INHALATION RT-DAILY 03/07/19 03/22/19 

History





Ellipta]    


 


guaiFENesin [Mucinex] 600 mg PO DAILY PRN 03/07/19 03/22/19 History


 


LORazepam [Ativan] 0.5 mg PO Q6H PRN 03/22/19 03/22/19 History


 


Pyridoxine HCl (Vitamin B6) 100 mg PO DAILY 03/22/19 03/22/19 History





[Vitamin B-6]    


 


Tolterodine Tartrate [Detrol LA] 4 mg PO DAILY 03/22/19 03/22/19 History








                                    Allergies











Allergy/AdvReac Type Severity Reaction Status Date / Time


 


No Known Allergies Allergy   Verified 03/22/19 17:39














Physical Exam


Vitals: 


                                   Vital Signs











  Temp Pulse Pulse Pulse Resp BP BP


 


 03/23/19 13:33      32 H  


 


 03/23/19 05:00  99.1 F    102 H  22   164/90


 


 03/23/19 00:20    94   18  


 


 03/22/19 22:28  98.4 F    97  18  


 


 03/22/19 21:23  99.0 F  86    18  147/90 


 


 03/22/19 19:13   101 H    26 H  165/105 


 


 03/22/19 17:59   92     


 


 03/22/19 17:45   90     


 


 03/22/19 17:33  99 F  99    28 H  166/116 














  Pulse Ox


 


 03/23/19 13:33 


 


 03/23/19 05:00  97


 


 03/23/19 00:20 


 


 03/22/19 22:28  99


 


 03/22/19 21:23  99


 


 03/22/19 19:13 


 


 03/22/19 17:59 


 


 03/22/19 17:45 


 


 03/22/19 17:33  90 L








                                Intake and Output











 03/22/19 03/23/19 03/23/19





 22:59 06:59 14:59


 


Intake Total 700  0.333


 


Balance 700  0.333


 


Intake:   


 


  Intake, IV Titration 700  0.333





  Amount   


 


    Clevidipine Butyrate 25   0.333





    mg In Empty Bag 1 bag @ 1   





    MG/HR 2 mls/hr IV .Q24H   





    [facility] Rx#:[id number]   


 


    Sodium Chloride 0.9% 1, 200  





    000 ml @ 100 mls/hr IV .   





    Q10H STA Rx#:[id number]   


 


    Sodium Chloride 0.9% 500 500  





    ml 500 ml @ 999 mls/hr IV   





    .Q31M STA Rx#:[id number]   


 


Other:   


 


  Voiding Method  Diaper Indwelling Catheter


 


  # Voids  2 1


 


  Weight 54.431 kg  54.431 kg

















Gen. appearance the patient is quite restless in bed.  She tries to sleep on her

right side or left side.  She was sleeping on her back.  She opens up her eyes 

upon demand.  She follows some commands however this has not been a consistent 

finding.  I noticed that the patient is withdrawing to painful diminished in all

4 extremities.  She will grimace to painful stimulation.  No seizure activity 

has been noted.  He is tachypneic.  However, she is not seem to be in acute 

respiratory distress.


Head exam was generally normal. There was no scleral icterus or corneal arcus. 

Mucous membranes were moist.


Neck was supple and without jugular venous distension, thyromegaly, or carotid 

bruits. Carotids were easily palpable bilaterally. There was no adenopathy.


Lungs were clear to auscultation and percussion, and with normal diaphragmatic 

excursion. No wheezes or rales were noted.  There is some few scattered 

expiratory wheezes throughout the lung fields bilaterally.


Heart sounds are tachycardic, normal S1-S2,Cardiac exam revealed the PMI to be 

normally situated and sized. The rhythm was regular and no extrasystoles were 

noted during several minutes of auscultation. The first and second heart sounds 

were normal and physiologic splitting of the second heart sound was noted. There

were no murmurs, rubs, clicks, or gallops.


Abdominal exam revealed normal bowel sounds. The abdomen was soft, non-tender, 

and without masses, organomegaly, or appreciable enlargement of the abdominal 

aorta.


Examination of the extremities revealed easily palpable radial, femoral and 

pedal pulses. There was no cyanosis, clubbing or edema.


Examination of the skin revealed no evidence of significant rashes, suspicious 

appearing nevi or other concerning lesions.


Neurologically, the patient is able to sense painful stimulation she is 

withdrawing her extremities all 4 without any limitation from painful 

stimulation.  No Babinski.  No clonus.  No facial asymmetry.  Pupils around 5 mm

in size and they're reactive to light.  No nystagmus.  She would follow commands

on and off however this has not been consistent.  She is restless.  She was 

thrush in bed.  She is encephalopathic obviously for now.





Results





- Laboratory Findings


CBC and BMP: 


                                 03/23/19 07:04





                                 03/23/19 07:04


ABG











ABG pH  7.53  (7.35-7.45)  H  03/23/19  12:14    


 


ABG pCO2  47 mmHg (35-45)  H  03/23/19  12:14    


 


ABG pO2  136 mmHg ()  H  03/23/19  12:14    


 


ABG O2 Saturation  98.0 % (94-97)  H  03/23/19  12:14    





PT/INR, D-dimer











PT  9.9 sec (9.0-12.0)   03/22/19  17:39    


 


INR  0.9  (<1.2)   03/22/19  17:39    








Abnormal lab findings: 


                                  Abnormal Labs











  03/22/19 03/22/19 03/22/19





  17:39 17:39 17:39


 


WBC  19.5 H  


 


Neutrophils #  18.5 H  


 


Lymphocytes #  0.3 L  


 


APTT    21.7 L


 


ABG pH   


 


ABG pCO2   


 


ABG pO2   


 


ABG HCO3   


 


ABG Total CO2   


 


ABG O2 Saturation   


 


VBG pH   


 


VBG pCO2   


 


VBG HCO3   


 


Potassium   3.4 L 


 


Chloride   96 L 


 


Carbon Dioxide   37 H 


 


Glucose   126 H 


 


POC Glucose (mg/dL)   


 


AST   45 H 


 


ALT   58 H 


 


Alkaline Phosphatase   225 H 


 


Total Protein   6.2 L 


 


Ur Leukocyte Esterase   


 


Urine Bacteria   


 


Hyaline Casts   


 


Urine Mucus   














  03/22/19 03/22/19 03/23/19





  17:39 17:39 07:04


 


WBC    14.8 H


 


Neutrophils #    13.8 H


 


Lymphocytes #    0.3 L


 


APTT   


 


ABG pH   


 


ABG pCO2   


 


ABG pO2   


 


ABG HCO3   


 


ABG Total CO2   


 


ABG O2 Saturation   


 


VBG pH   7.44 H 


 


VBG pCO2   57 H 


 


VBG HCO3   38 H 


 


Potassium   


 


Chloride   


 


Carbon Dioxide   


 


Glucose   


 


POC Glucose (mg/dL)   


 


AST   


 


ALT   


 


Alkaline Phosphatase   


 


Total Protein   


 


Ur Leukocyte Esterase  Trace H  


 


Urine Bacteria  Rare H  


 


Hyaline Casts  5 H  


 


Urine Mucus  Rare H  














  03/23/19 03/23/19 03/23/19





  07:04 07:04 11:50


 


WBC   


 


Neutrophils #   


 


Lymphocytes #   


 


APTT   


 


ABG pH   


 


ABG pCO2   


 


ABG pO2   


 


ABG HCO3   


 


ABG Total CO2   


 


ABG O2 Saturation   


 


VBG pH   7.57 H 


 


VBG pCO2   


 


VBG HCO3   35 H 


 


Potassium  2.8 L  


 


Chloride  92 L  


 


Carbon Dioxide  37 H  


 


Glucose  152 H  


 


POC Glucose (mg/dL)    156 H


 


AST  65 H  


 


ALT  79 H  


 


Alkaline Phosphatase  219 H  


 


Total Protein  6.2 L  


 


Ur Leukocyte Esterase   


 


Urine Bacteria   


 


Hyaline Casts   


 


Urine Mucus   














  03/23/19





  12:14


 


WBC 


 


Neutrophils # 


 


Lymphocytes # 


 


APTT 


 


ABG pH  7.53 H


 


ABG pCO2  47 H


 


ABG pO2  136 H


 


ABG HCO3  39 H


 


ABG Total CO2  40 H


 


ABG O2 Saturation  98.0 H


 


VBG pH 


 


VBG pCO2 


 


VBG HCO3 


 


Potassium 


 


Chloride 


 


Carbon Dioxide 


 


Glucose 


 


POC Glucose (mg/dL) 


 


AST 


 


ALT 


 


Alkaline Phosphatase 


 


Total Protein 


 


Ur Leukocyte Esterase 


 


Urine Bacteria 


 


Hyaline Casts 


 


Urine Mucus 














- Diagnostic Findings


Chest x-ray: image reviewed





Assessment and Plan


Plan: 











Assessment





1 acute/subacute mental status change with a negative CAT scan of the brain.  

Consider underlying encephalopathy which could be multifactorial.  Consider drug

induced encephalopathy.  Consider viral encephalitis.  The patient does not show

any signs of bacterial meningitis at this point in time.  Metabolic 

encephalopathy with probably underlying infection disorders cannot be completely

ruled out also.  No seizure activity.  No signs of any CVA as the patient's 

neurologic exam is nonfocal.





2 stage III non-small cell lung cancer post-chemoradiation therapy and the 

patient was receiving immunotherapy on outpatient basis





3 previous history of CVA/TIA





4 COPD





5 hypertension





6 hyperlipidemia





7 osteoarthritis





8 fibromyalgia





9 chronic back pain





10 depression





11 history of marijuana use





Plan





The patient will be moved to the intensive care unit.  We'll obtain a follow-up 

CAT scan of the next 24 hours to obtain a follow-up on the original CAT scan of 

the head.  We'll obtain urine cultures.  Obtain blood cultures.  However this 

patient with a combination of Rocephin and vancomycin as an empiric antibiotic 

coverage.  May the lumbar puncture in regards to her ongoing altered mentation. 

May consider even an MRI of the brain if the patient is able to tolerate the 

test.  Awaiting urine dressing.  We'll give a trial of Narcan.  DVT and GI 

prophylaxis.  Close monitoring here in the ICU.  We'll continue to follow.

## 2019-03-23 NOTE — PCN
PROCEDURE NOTE



PROCEDURE:

Insertion of a triple-lumen catheter.



SITE OF INSERTION:

The left subclavian vein.



PREOP DIAGNOSES:

1. Acute respiratory failure.

2. Altered mental status.



POSTOP DIAGNOSES:

1. Acute respiratory failure.

2. Altered mental status.



TRIPLE LUMEN CATHETER PLACEMENT:

Indication  Hemodynamic monitoring/Intravenous access.



A time-out was completed verifying correct patient, procedure, site, positioning, and

implant(s) or special equipment if applicable.



The patient was placed in a dependent position appropriate for triple lumen catheter

placement based on the vein to be cannulated.  The patient's left shoulder was prepped

and draped in sterile fashion.  1% Lidocaine was used to anesthetize the surrounding

skin area.  A triple lumen 9F Cordis catheter was introduced into the subclavian vein

using Seldinger technique.  The catheter was threaded smoothly over the guide wire and

appropriate blood return was obtained.  Each lumen of the catheter was evacuated of air

and flushed with sterile saline.  The catheter was then sutured in place to the skin

and a sterile dressing applied.  Perfusion to the extremity distal to the point of

catheter insertion was checked and found to be adequate.



No complications.





MMODL / IJN: [id number] / Job#: [id number]

## 2019-03-23 NOTE — PCN
PROCEDURE NOTE

Indication for the procedure: Altered mental status



The procedure is 03/23/2019



Procedure was done by DENISE Linder



PROCEDURE:

Lumbar puncture.



Lower lumbar puncture was done while the patient was intubated on mechanical

ventilator.  The patient was positioned on her right lateral side with the hips 
and the

knees flexed.  The back was cleaned with ChloraPrep x2.  Following that, the 
skin was

anesthetized using 1% lidocaine.  A lumbar puncture needle was successfully 
inserted

into the CSF space and a total of 10 mL of cerebrospinal fluid was aspirated 
without

any complications.  The fluid was clear and white.  The patient tolerated the 
procedure

well.  The needle was removed.  An appropriate dressing was applied.





MMODL / IJN: [id number] / Job#: [id number]

MTDD

## 2019-03-24 LAB
ALBUMIN SERPL-MCNC: 2.3 G/DL (ref 3.5–5)
ALP SERPL-CCNC: 148 U/L (ref 38–126)
ALT SERPL-CCNC: 55 U/L (ref 9–52)
ANION GAP SERPL CALC-SCNC: 1 MMOL/L
AST SERPL-CCNC: 37 U/L (ref 14–36)
BASOPHILS # BLD AUTO: 0 K/UL (ref 0–0.2)
BASOPHILS NFR BLD AUTO: 0 %
BUN SERPL-SCNC: 14 MG/DL (ref 7–17)
CALCIUM SPEC-MCNC: 8.1 MG/DL (ref 8.4–10.2)
CHLORIDE SERPL-SCNC: 105 MMOL/L (ref 98–107)
CO2 BLDA-SCNC: 32 MMOL/L (ref 19–24)
CO2 SERPL-SCNC: 31 MMOL/L (ref 22–30)
EOSINOPHIL # BLD AUTO: 0 K/UL (ref 0–0.7)
EOSINOPHIL NFR BLD AUTO: 0 %
ERYTHROCYTE [DISTWIDTH] IN BLOOD BY AUTOMATED COUNT: 3.6 M/UL (ref 3.8–5.4)
ERYTHROCYTE [DISTWIDTH] IN BLOOD: 15.2 % (ref 11.5–15.5)
GLUCOSE BLD-MCNC: 125 MG/DL (ref 75–99)
GLUCOSE BLD-MCNC: 133 MG/DL (ref 75–99)
GLUCOSE BLD-MCNC: 155 MG/DL (ref 75–99)
GLUCOSE BLD-MCNC: 162 MG/DL (ref 75–99)
GLUCOSE SERPL-MCNC: 119 MG/DL (ref 74–99)
HCO3 BLDA-SCNC: 31 MMOL/L (ref 21–25)
HCT VFR BLD AUTO: 35 % (ref 34–46)
HGB BLD-MCNC: 11.4 GM/DL (ref 11.4–16)
LYMPHOCYTES # SPEC AUTO: 0.2 K/UL (ref 1–4.8)
LYMPHOCYTES NFR SPEC AUTO: 1 %
MAGNESIUM SPEC-SCNC: 1.7 MG/DL (ref 1.6–2.3)
MCH RBC QN AUTO: 31.7 PG (ref 25–35)
MCHC RBC AUTO-ENTMCNC: 32.6 G/DL (ref 31–37)
MCV RBC AUTO: 97.1 FL (ref 80–100)
MONOCYTES # BLD AUTO: 0.5 K/UL (ref 0–1)
MONOCYTES NFR BLD AUTO: 3 %
NEUTROPHILS # BLD AUTO: 14.2 K/UL (ref 1.3–7.7)
NEUTROPHILS NFR BLD AUTO: 95 %
PCO2 BLDA: 44 MMHG (ref 35–45)
PH BLDA: 7.45 [PH] (ref 7.35–7.45)
PLATELET # BLD AUTO: 165 K/UL (ref 150–450)
PO2 BLDA: 195 MMHG (ref 83–108)
POTASSIUM SERPL-SCNC: 2.9 MMOL/L (ref 3.5–5.1)
PROT SERPL-MCNC: 4.3 G/DL (ref 6.3–8.2)
SODIUM SERPL-SCNC: 137 MMOL/L (ref 137–145)
WBC # BLD AUTO: 14.9 K/UL (ref 3.8–10.6)

## 2019-03-24 RX ADMIN — SODIUM CHLORIDE SCH MLS/HR: 9 INJECTION, SOLUTION INTRAVENOUS at 12:32

## 2019-03-24 RX ADMIN — SODIUM CHLORIDE SCH MLS/HR: 900 INJECTION, SOLUTION INTRAVENOUS at 11:26

## 2019-03-24 RX ADMIN — POTASSIUM BICARBONATE SCH MEQ: 782 TABLET, EFFERVESCENT ORAL at 20:22

## 2019-03-24 RX ADMIN — METHYLPREDNISOLONE SODIUM SUCCINATE SCH MG: 125 INJECTION, POWDER, FOR SOLUTION INTRAMUSCULAR; INTRAVENOUS at 18:26

## 2019-03-24 RX ADMIN — NOREPINEPHRINE BITARTRATE SCH: 1 INJECTION, SOLUTION, CONCENTRATE INTRAVENOUS at 00:03

## 2019-03-24 RX ADMIN — SODIUM CHLORIDE SCH MLS/HR: 900 INJECTION, SOLUTION INTRAVENOUS at 00:09

## 2019-03-24 RX ADMIN — CEFAZOLIN SCH MLS/HR: 330 INJECTION, POWDER, FOR SOLUTION INTRAMUSCULAR; INTRAVENOUS at 00:24

## 2019-03-24 RX ADMIN — METHYLPREDNISOLONE SODIUM SUCCINATE SCH MG: 125 INJECTION, POWDER, FOR SOLUTION INTRAMUSCULAR; INTRAVENOUS at 12:33

## 2019-03-24 RX ADMIN — CEFAZOLIN SCH MLS/HR: 330 INJECTION, POWDER, FOR SOLUTION INTRAMUSCULAR; INTRAVENOUS at 20:26

## 2019-03-24 RX ADMIN — PROPOFOL SCH MLS/HR: 10 INJECTION, EMULSION INTRAVENOUS at 18:27

## 2019-03-24 RX ADMIN — POTASSIUM BICARBONATE SCH MEQ: 782 TABLET, EFFERVESCENT ORAL at 21:48

## 2019-03-24 RX ADMIN — IPRATROPIUM BROMIDE AND ALBUTEROL SULFATE SCH ML: .5; 3 SOLUTION RESPIRATORY (INHALATION) at 07:50

## 2019-03-24 RX ADMIN — SODIUM CHLORIDE SCH MLS/HR: 900 INJECTION, SOLUTION INTRAVENOUS at 18:26

## 2019-03-24 RX ADMIN — PANTOPRAZOLE SODIUM SCH MG: 40 INJECTION, POWDER, FOR SOLUTION INTRAVENOUS at 09:50

## 2019-03-24 RX ADMIN — IPRATROPIUM BROMIDE AND ALBUTEROL SULFATE SCH ML: .5; 3 SOLUTION RESPIRATORY (INHALATION) at 20:00

## 2019-03-24 RX ADMIN — SODIUM CHLORIDE SCH MLS/HR: 9 INJECTION, SOLUTION INTRAVENOUS at 01:55

## 2019-03-24 RX ADMIN — IPRATROPIUM BROMIDE AND ALBUTEROL SULFATE SCH ML: .5; 3 SOLUTION RESPIRATORY (INHALATION) at 12:08

## 2019-03-24 RX ADMIN — IPRATROPIUM BROMIDE AND ALBUTEROL SULFATE PRN ML: .5; 3 SOLUTION RESPIRATORY (INHALATION) at 03:11

## 2019-03-24 RX ADMIN — NOREPINEPHRINE BITARTRATE SCH: 1 INJECTION, SOLUTION, CONCENTRATE INTRAVENOUS at 20:27

## 2019-03-24 RX ADMIN — MAGNESIUM SULFATE IN DEXTROSE SCH MLS/HR: 10 INJECTION, SOLUTION INTRAVENOUS at 09:30

## 2019-03-24 RX ADMIN — INSULIN ASPART SCH UNIT: 100 INJECTION, SOLUTION INTRAVENOUS; SUBCUTANEOUS at 23:39

## 2019-03-24 RX ADMIN — INSULIN ASPART SCH UNIT: 100 INJECTION, SOLUTION INTRAVENOUS; SUBCUTANEOUS at 12:50

## 2019-03-24 RX ADMIN — INSULIN ASPART SCH UNIT: 100 INJECTION, SOLUTION INTRAVENOUS; SUBCUTANEOUS at 17:30

## 2019-03-24 RX ADMIN — POTASSIUM CHLORIDE SCH MLS/HR: 14.9 INJECTION, SOLUTION INTRAVENOUS at 06:08

## 2019-03-24 RX ADMIN — POTASSIUM CHLORIDE SCH MLS/HR: 14.9 INJECTION, SOLUTION INTRAVENOUS at 09:31

## 2019-03-24 RX ADMIN — POTASSIUM CHLORIDE SCH MLS/HR: 14.9 INJECTION, SOLUTION INTRAVENOUS at 11:25

## 2019-03-24 RX ADMIN — SODIUM CHLORIDE SCH MLS/HR: 900 INJECTION, SOLUTION INTRAVENOUS at 23:40

## 2019-03-24 RX ADMIN — PROPOFOL SCH MLS/HR: 10 INJECTION, EMULSION INTRAVENOUS at 01:36

## 2019-03-24 RX ADMIN — LEVOTHYROXINE SODIUM ANHYDROUS SCH MCG: 100 INJECTION, POWDER, LYOPHILIZED, FOR SOLUTION INTRAVENOUS at 09:50

## 2019-03-24 RX ADMIN — SODIUM CHLORIDE SCH MLS/HR: 900 INJECTION, SOLUTION INTRAVENOUS at 09:50

## 2019-03-24 RX ADMIN — METHYLPREDNISOLONE SODIUM SUCCINATE SCH MG: 125 INJECTION, POWDER, FOR SOLUTION INTRAMUSCULAR; INTRAVENOUS at 23:38

## 2019-03-24 RX ADMIN — METHYLPREDNISOLONE SODIUM SUCCINATE SCH MG: 125 INJECTION, POWDER, FOR SOLUTION INTRAMUSCULAR; INTRAVENOUS at 00:08

## 2019-03-24 RX ADMIN — IPRATROPIUM BROMIDE AND ALBUTEROL SULFATE SCH ML: .5; 3 SOLUTION RESPIRATORY (INHALATION) at 15:49

## 2019-03-24 RX ADMIN — MORPHINE SULFATE SCH: 30 TABLET, FILM COATED, EXTENDED RELEASE ORAL at 09:54

## 2019-03-24 RX ADMIN — PROPOFOL SCH MLS/HR: 10 INJECTION, EMULSION INTRAVENOUS at 11:24

## 2019-03-24 RX ADMIN — PROPOFOL SCH MLS/HR: 10 INJECTION, EMULSION INTRAVENOUS at 23:41

## 2019-03-24 RX ADMIN — CLEVIPIDINE SCH: 0.5 EMULSION INTRAVENOUS at 12:27

## 2019-03-24 RX ADMIN — SODIUM CHLORIDE SCH MLS/HR: 9 INJECTION, SOLUTION INTRAVENOUS at 20:25

## 2019-03-24 RX ADMIN — METHYLPREDNISOLONE SODIUM SUCCINATE SCH MG: 125 INJECTION, POWDER, FOR SOLUTION INTRAMUSCULAR; INTRAVENOUS at 06:28

## 2019-03-24 RX ADMIN — PROPOFOL SCH MLS/HR: 10 INJECTION, EMULSION INTRAVENOUS at 06:08

## 2019-03-24 RX ADMIN — MORPHINE SULFATE SCH: 30 TABLET, FILM COATED, EXTENDED RELEASE ORAL at 20:26

## 2019-03-24 RX ADMIN — SODIUM CHLORIDE SCH MLS/HR: 900 INJECTION, SOLUTION INTRAVENOUS at 05:43

## 2019-03-24 RX ADMIN — SODIUM CHLORIDE SCH MLS/HR: 900 INJECTION, SOLUTION INTRAVENOUS at 23:38

## 2019-03-24 RX ADMIN — MAGNESIUM SULFATE IN DEXTROSE SCH MLS/HR: 10 INJECTION, SOLUTION INTRAVENOUS at 06:36

## 2019-03-24 RX ADMIN — SODIUM CHLORIDE SCH MLS/HR: 900 INJECTION, SOLUTION INTRAVENOUS at 00:20

## 2019-03-24 RX ADMIN — CEFAZOLIN SCH MLS/HR: 330 INJECTION, POWDER, FOR SOLUTION INTRAMUSCULAR; INTRAVENOUS at 12:45

## 2019-03-24 RX ADMIN — ENOXAPARIN SODIUM SCH MG: 40 INJECTION SUBCUTANEOUS at 09:50

## 2019-03-24 NOTE — XR
EXAMINATION TYPE: XR chest 1V portable

 

DATE OF EXAM: 3/24/2019

 

Comparison: 3/23/2019

 

Clinical History: 63-year-old female Lung CA/mechanical ventilation

 

Findings:

ET tube satisfactory. NG tube courses below the diaphragm. Left subclavian CVC tip in the lower SVC. 
Heart normal size. Aorta within normal limits.

Small left pleural effusion with patchy left basilar opacity.

 

 

Impression:

New small left pleural effusion with adjacent atelectasis and/or consolidation.

## 2019-03-24 NOTE — P.PN
Subjective


Progress Note Date: 03/24/19


This is a 63-year-old female patient of Dr. bustillo.  Patient presented to the 

hospital with complaints of altered mental status changes.  She per patient's 

sister patient started having altered mental status possibly 2 days ago.   

Patient has a known past medical history of stage III lung cancer.  Per patient 

patient has not received any recent treatment for cancer.  Past medical history 

includes COPD, CVA, fibromyalgia, GERD, hyperlipidemia, osteoarthritis and 

pneumonia.  Chest x-ray completed showing no new process.   Head CT completed 

showing no acute process.  CT of abdomen and pelvis dated showing urinary 

bladder distention excessive colonic stool.  Liver enzymes slightly elevated AST

65, ALT 79 and alkaline phosphatase 219.  Ammonia level  28.  Urine and blood 

cultures have been ordered.  Patient's pH on this admission 7.44 pCO2 57 HCO3 

38.   WBC 19.5.  Influenza ordered.  Patient started on Rocephin.  At this time 

patient remains confused and are not able to follow commands.  Discussed case 

with Dr. Trejo per critical care.  [facility] patient be transferred to the 

intensive care unit for closer monitoring.  Discussed with patient's sister at 

bedside CODE STATUS.  At this time requesting patient be full code.  Patient's 

potassium also low at 2.8.  Replacement has been ordered.  Infectious disease 

will be consulted.  Oncology service is consulted.








On 03/24/2019 patient is currently in the intensive care unit.  Patient was 

intubated yesterday per critical care.  At this time patient is sedated.  

Discussed case with critical care Dr. Trejo, plan to attempt to wake patient u

p tomorrow.  Lumbar puncture was performed yesterday repeat head CT also 

performed.  At this time vitals do remain stable.  Patient patient currently on 

acylovir, vancomycin and Rocephin.  All cultures pending 








Objective





- Vital Signs


Vital signs: 


                                   Vital Signs











Temp  98.3 F   03/24/19 08:00


 


Pulse  78   03/24/19 09:00


 


Resp  14   03/24/19 09:00


 


BP  104/62   03/24/19 04:30


 


Pulse Ox  98   03/24/19 09:00








                                 Intake & Output











 03/23/19 03/24/19 03/24/19





 18:59 06:59 18:59


 


Intake Total 808.103 4245.977 400


 


Output Total 335 875 180


 


Balance 539.990 3313.977 220


 


Weight 54.431 kg 54.1 kg 


 


Intake:   


 


   1000 200


 


    Potassium Chloride 20 meq   200





    In Water For Injection 1   





    100ml.bag @ 50 mls/hr   





    IVPB Q2H MARY Rx#:   





    [id number]   


 


    Sodium Chloride 0.9% 1, 550 1000 





    000 ml @ 100 mls/hr IV .   





    Q10H STA Rx#:[id number]   


 


  Intake, IV Titration 11.310 1488.977 200





  Amount   


 


    Acyclovir Sodium 500 mg  100 





    In Sodium Chloride 0.9%   





    100 ml @ 100 mls/hr IVPB   





    Q8HR MARY Rx#:[id number]   


 


    Clevidipine Butyrate 25 0.333 28 





    mg In Empty Bag 1 bag @ 1   





    MG/HR 2 mls/hr IV .Q24H   





    MARY Rx#:[id number]   


 


    Magnesium Sulfate-D5w Pmx  100 100





    1 gm In Dextrose/Water 1   





    100ml.bag @ 100 mls/hr   





    IVPB Q1H MARY Rx#:   





    [id number]   


 


    Potassium Chloride 10 meq  100 





    In Water For Injection 1   





    100ml.bag @ 100 mls/hr   





    IVPB Q1HR MARY Rx#:   





    [id number]   


 


    Potassium Chloride 20 meq  100 





    In Water For Injection 1   





    100ml.bag @ 50 mls/hr   





    IVPB Q2H MARY Rx#:   





    [id number]   


 


    Propofol 1,000 mg In 5.443 231.449 





    Empty Bag 1 bag @ Titrate   





    IV .Q0M MARY Rx#:   





    [id number]   


 


    Sodium Chloride 0.9% 1,  400 100





    000 ml @ 100 mls/hr IV .   





    Q10H MARY Rx#:[id number]   


 


    Vancomycin 1,000 mg In  250 





    Sodium Chloride 0.9% 250   





    ml @ 125 mls/hr IVPB Q8H   





    MARY Rx#:[id number]   


 


    fentaNYL (PF) 1,000 mcg 5.534 179.528 





    In Sodium Chloride 0.9%   





    80 ml @ 1 MCG/KG/HR 5.443   





    mls/hr IV .[id number] MARY   





    Rx#:[id number]   


 


Output:   


 


  Urine 335 875 180


 


Other:   


 


  Voiding Method Indwelling Catheter Indwelling Catheter 


 


  # Voids 1  








                       ABP, PAP, CO, CI - Last Documented











Arterial Blood Pressure        113/58

















- Exam





Head normocephalic


Neck supple


Lungs mechanically vented


Heart regular rate and rhythm S1-S2, no rub or gallop


Abdomen is soft nontender nondistended positive bowel sounds no 

hepatosplenomegaly


Extremities no edema


Neuro sedated at this time





- Labs


CBC & Chem 7: 


                                 03/24/19 04:15





                                 03/24/19 04:15


Labs: 


                  Abnormal Lab Results - Last 24 Hours (Table)











  03/23/19 03/23/19 03/23/19 Range/Units





  11:50 12:14 15:20 


 


WBC     (3.8-10.6)  k/uL


 


RBC     (3.80-5.40)  m/uL


 


Neutrophils #     (1.3-7.7)  k/uL


 


Lymphocytes #     (1.0-4.8)  k/uL


 


ABG pH   7.53 H   (7.35-7.45)  


 


ABG pCO2   47 H   (35-45)  mmHg


 


ABG pO2   136 H   ()  mmHg


 


ABG HCO3   39 H   (21-25)  mmol/L


 


ABG Total CO2   40 H   (19-24)  mmol/L


 


ABG O2 Saturation   98.0 H   (94-97)  %


 


Potassium     (3.5-5.1)  mmol/L


 


Carbon Dioxide     (22-30)  mmol/L


 


Creatinine     (0.52-1.04)  mg/dL


 


Glucose     (74-99)  mg/dL


 


POC Glucose (mg/dL)  156 H    (75-99)  mg/dL


 


Calcium     (8.4-10.2)  mg/dL


 


AST     (14-36)  U/L


 


ALT     (9-52)  U/L


 


Alkaline Phosphatase     ()  U/L


 


Total Protein     (6.3-8.2)  g/dL


 


Albumin     (3.5-5.0)  g/dL


 


CSF Glucose    103 H  (40-70)  mg/dL


 


CSF Total Protein    67 H  (12-60)  mg/dL














  03/23/19 03/23/19 03/23/19 Range/Units





  15:32 18:54 23:27 


 


WBC     (3.8-10.6)  k/uL


 


RBC     (3.80-5.40)  m/uL


 


Neutrophils #     (1.3-7.7)  k/uL


 


Lymphocytes #     (1.0-4.8)  k/uL


 


ABG pH  7.56 H*    (7.35-7.45)  


 


ABG pCO2     (35-45)  mmHg


 


ABG pO2  >400 H    ()  mmHg


 


ABG HCO3  34 H    (21-25)  mmol/L


 


ABG Total CO2  36 H    (19-24)  mmol/L


 


ABG O2 Saturation  98.9 H    (94-97)  %


 


Potassium     (3.5-5.1)  mmol/L


 


Carbon Dioxide     (22-30)  mmol/L


 


Creatinine     (0.52-1.04)  mg/dL


 


Glucose     (74-99)  mg/dL


 


POC Glucose (mg/dL)   134 H  113 H  (75-99)  mg/dL


 


Calcium     (8.4-10.2)  mg/dL


 


AST     (14-36)  U/L


 


ALT     (9-52)  U/L


 


Alkaline Phosphatase     ()  U/L


 


Total Protein     (6.3-8.2)  g/dL


 


Albumin     (3.5-5.0)  g/dL


 


CSF Glucose     (40-70)  mg/dL


 


CSF Total Protein     (12-60)  mg/dL














  03/24/19 03/24/19 03/24/19 Range/Units





  04:15 04:15 04:58 


 


WBC  14.9 H    (3.8-10.6)  k/uL


 


RBC  3.60 L    (3.80-5.40)  m/uL


 


Neutrophils #  14.2 H    (1.3-7.7)  k/uL


 


Lymphocytes #  0.2 L    (1.0-4.8)  k/uL


 


ABG pH     (7.35-7.45)  


 


ABG pCO2     (35-45)  mmHg


 


ABG pO2    195 H  ()  mmHg


 


ABG HCO3    31 H  (21-25)  mmol/L


 


ABG Total CO2    32 H  (19-24)  mmol/L


 


ABG O2 Saturation    99.5 H  (94-97)  %


 


Potassium   2.9 L   (3.5-5.1)  mmol/L


 


Carbon Dioxide   31 H   (22-30)  mmol/L


 


Creatinine   0.47 L   (0.52-1.04)  mg/dL


 


Glucose   119 H   (74-99)  mg/dL


 


POC Glucose (mg/dL)     (75-99)  mg/dL


 


Calcium   8.1 L   (8.4-10.2)  mg/dL


 


AST   37 H   (14-36)  U/L


 


ALT   55 H   (9-52)  U/L


 


Alkaline Phosphatase   148 H   ()  U/L


 


Total Protein   4.3 L   (6.3-8.2)  g/dL


 


Albumin   2.3 L   (3.5-5.0)  g/dL


 


CSF Glucose     (40-70)  mg/dL


 


CSF Total Protein     (12-60)  mg/dL














  03/24/19 Range/Units





  06:03 


 


WBC   (3.8-10.6)  k/uL


 


RBC   (3.80-5.40)  m/uL


 


Neutrophils #   (1.3-7.7)  k/uL


 


Lymphocytes #   (1.0-4.8)  k/uL


 


ABG pH   (7.35-7.45)  


 


ABG pCO2   (35-45)  mmHg


 


ABG pO2   ()  mmHg


 


ABG HCO3   (21-25)  mmol/L


 


ABG Total CO2   (19-24)  mmol/L


 


ABG O2 Saturation   (94-97)  %


 


Potassium   (3.5-5.1)  mmol/L


 


Carbon Dioxide   (22-30)  mmol/L


 


Creatinine   (0.52-1.04)  mg/dL


 


Glucose   (74-99)  mg/dL


 


POC Glucose (mg/dL)  125 H  (75-99)  mg/dL


 


Calcium   (8.4-10.2)  mg/dL


 


AST   (14-36)  U/L


 


ALT   (9-52)  U/L


 


Alkaline Phosphatase   ()  U/L


 


Total Protein   (6.3-8.2)  g/dL


 


Albumin   (3.5-5.0)  g/dL


 


CSF Glucose   (40-70)  mg/dL


 


CSF Total Protein   (12-60)  mg/dL








                      Microbiology - Last 24 Hours (Table)











 03/22/19 17:39 Blood Culture - Preliminary





 Blood    No Growth after 24 hours


 


 03/23/19 10:11 Urine Culture - Preliminary





 Urine,Catheterized 














Assessment and Plan


Assessment: 





1.  Altered mental status changes.  Ammonia level 28.  Head CT completed showing

no acute process.  Abdomen and pelvis CT completed showing urinary bladder 

distention excessive colonic stool.  Urine and blood cultures ordered.  

Influenza ordered.  Lumbar puncture was performed per critical care.  Acyclovir 

has been added per infectious disease.  Repeat head CT also completed showing no

acute intracranial abnormality.  At this time patient remains on mechanical 

ventilation sedated.  Discussed case with critical care team will attempt to 

wean patient from mechanical ventilation and sedation tomorrow 03/25/2019





2.  Hypercapnia and hypoxic respiratory failure with known history of COPD.  

Initial pH on venous blood gas 7.44 pCO2 57 and HCO3 38.  pulmonary services 

following





3.  Leukocytosis.  Initial white blood cell 19.5.  Chest x-ray completed showing

no new process.  Patient started on Rocephin, vancomycin and Acyclovir.  

Infectious disease consulted.  Urine and blood cultures ordered.





4.  Hypokalemia.  Potassium 2.8.  Will replace per protocol





5.  Non-small cell lung cancer stage III adenocarcinoma.  Per patient's family 

patient has not received treatment for multiple months.  Oncology service is 

consulted.  Pulmonary service is consulted





6.  History of COPD.  Patient currently on Solu-Medrol and DuoNeb breathing 

treatments





7.  History of CVA





8.  History of osteoarthritis





9.  History of hyperlipidemia





10.  History of fibromyalgia





11.  History of depression





12.  Ex-smoker.  Patient's both proximally half pack per day for 30 years





13.  Elevated liver enzymes.  AST trending down to 37 ALT 55 and alkaline 

phosphatase 148





DVT prophylaxis Lovenox.  GI prophylaxis Protonix





At This time patient remains in the intensive care unit.


Patient is mechanically ventilated on station.


Critical care and  infectious disease is following








I performed an examination of the patient and discussed their management with 

the Nurse Practitioner.  I have reviewed the Nurse Practitioner's notes and 

agree with the documented findings and plan of care

## 2019-03-24 NOTE — PN
PROGRESS NOTE



DATE OF SERVICE:

03/24/2019.



REASON FOR FOLLOW UP:

Mental status changes, question of encephalitis.



INTERVAL HISTORY:

The patient is currently afebrile.  Patient is hemodynamically stable, not requiring

pressor support.  FiO2 is stable down to 40%.  No significant drainage of the patient

through the ET per the RN. The patient has been started on tube feeds and no diarrhea

reported.



PHYSICAL EXAMINATION:

Blood pressure 133/62 with a pulse of 68. Temperature 98.3. She is 99% on 40% FiO2.

General description is an elderly female lying in bed in no distress.

RESPIRATORY SYSTEM:  Unlabored breathing with decreased breath sounds at bases. No

wheeze.

HEART: S1, S2.  Regular rate.

ABDOMEN:  Soft, no tenderness.



LABS:

Hemoglobin 11.4, white count 14.9, BUN of 14, creatinine 0.47.



DIAGNOSTIC IMPRESSION AND PLAN:

Patient with mental status changes, concern for possible encephalitis.  CSF finding not

significantly positive.  _____ is currently pending.  Currently on acyclovir, Rocephin

and will continue while waiting for the culture to finalize.  Check an ultrasound of

the liver area in view of elevated liver enzymes.  Continue supportive care.





MMODL / IJN: [id number] / Job#: [id number]

## 2019-03-24 NOTE — P.PN
Subjective


Progress Note Date: 03/24/19








Today's evaluation of 03/24/2019 I'm seeing this patient for a follow-up.  

Events from yesterday were noted.  The patient came in to the ICU.  She was 

extremely agitated and restless, having tachypnea tachycardia and blood pressure

was up.  She received several doses of Haldol without any improvement.  She 

continued to be altered and tomorrow mentation.  At that point, the patient was 

given propofol and subsequently she was intubated and placed on a mechanical 

ventilator.  Her sedation requirements stayed quite high throughout the night.  

The patient was receiving maximum dose of propofol which is currently running at

50 g per KG per minute.  Fentanyl had to be also added for sedation and fentan

yl is at high as 160 g KG per hour.  She is much well rested for now.  She is 

very symptoms with the mechanical ventilator.  She is set at a tidal volume of 

400 and FiO2 of 40% and PEEP of 5 and a rate of 14.  Blood gases from today 

showed a pH of 7. raphae with a pCO2 of 44 and pO2 of 195.  This was on FiO2 of 

50% and subsequently she was weaned down to 40%.  His x-ray from today shows 

this small new left-sided pleural effusion.





No reported fever.  No chills.  No hemodynamic instability.  No need for any 

pressors.  She is resting comfortably in bed.  She is also on IV fluids 

maintenance normal saline at the rate of 50 mL an hour.  She is producing 

adequate amount of urine output.  She is covered with empiric antibiotic 

coverage.  With a definite vancomycin.  I also performed a lumbar puncture on 

this patient.  LP shows only 1 WBC, 1 RBC, protein was elevated, glucose was 

nonelevated.  HSV by PCR was sent and the patient was covered with IV acyclovir.

 A repeat CAT scan was also done overnight.  Findings of non-acute.  There is no

evidence of any stroke





Objective





- Vital Signs


Vital signs: 


                                   Vital Signs











Temp  98.3 F   03/24/19 08:00


 


Pulse  78   03/24/19 09:00


 


Resp  14   03/24/19 09:00


 


BP  104/62   03/24/19 04:30


 


Pulse Ox  98   03/24/19 09:00








                                 Intake & Output











 03/23/19 03/24/19 03/24/19





 18:59 06:59 18:59


 


Intake Total 910.460 0724.977 400


 


Output Total 335 875 180


 


Balance 499.312 5107.977 220


 


Weight 54.431 kg 54.1 kg 


 


Intake:   


 


   1000 200


 


    Potassium Chloride 20 meq   200





    In Water For Injection 1   





    100ml.bag @ 50 mls/hr   





    IVPB Q2H MARY Rx#:   





    [id number]   


 


    Sodium Chloride 0.9% 1, 550 1000 





    000 ml @ 100 mls/hr IV .   





    Q10H STA Rx#:[id number]   


 


  Intake, IV Titration 11.310 1488.977 200





  Amount   


 


    Acyclovir Sodium 500 mg  100 





    In Sodium Chloride 0.9%   





    100 ml @ 100 mls/hr IVPB   





    Q8HR MARY Rx#:[id number]   


 


    Clevidipine Butyrate 25 0.333 28 





    mg In Empty Bag 1 bag @ 1   





    MG/HR 2 mls/hr IV .Q24H   





    MARY Rx#:[id number]   


 


    Magnesium Sulfate-D5w Pmx  100 100





    1 gm In Dextrose/Water 1   





    100ml.bag @ 100 mls/hr   





    IVPB Q1H MARY Rx#:   





    [id number]   


 


    Potassium Chloride 10 meq  100 





    In Water For Injection 1   





    100ml.bag @ 100 mls/hr   





    IVPB Q1HR MARY Rx#:   





    [id number]   


 


    Potassium Chloride 20 meq  100 





    In Water For Injection 1   





    100ml.bag @ 50 mls/hr   





    IVPB Q2H MARY Rx#:   





    [id number]   


 


    Propofol 1,000 mg In 5.443 231.449 





    Empty Bag 1 bag @ Titrate   





    IV .Q0M MARY Rx#:   





    [id number]   


 


    Sodium Chloride 0.9% 1,  400 100





    000 ml @ 100 mls/hr IV .   





    Q10H MARY Rx#:[id number]   


 


    Vancomycin 1,000 mg In  250 





    Sodium Chloride 0.9% 250   





    ml @ 125 mls/hr IVPB Q8H   





    MARY Rx#:[id number]   


 


    fentaNYL (PF) 1,000 mcg 5.534 179.528 





    In Sodium Chloride 0.9%   





    80 ml @ 1 MCG/KG/HR 5.443   





    mls/hr IV .[id number] MARY   





    Rx#:[id number]   


 


Output:   


 


  Urine 335 875 180


 


Other:   


 


  Voiding Method Indwelling Catheter Indwelling Catheter 


 


  # Voids 1  








                       ABP, PAP, CO, CI - Last Documented











Arterial Blood Pressure        113/58

















- Exam





Appearance the patient is well sedated, comfortable intubated on a mechanical 

ventilator.  Orogastric and orotracheal tube are both in place.


Head exam was generally normal. There was no scleral icterus or corneal arcus. 

Mucous membranes were moist.


Neck was supple and without jugular venous distension, thyromegaly, or carotid 

bruits. Carotids were easily palpable bilaterally. There was no adenopathy.


Lungs sounds are diminished bilaterally along with some few scattered external 

wheezes heard throughout the lung fields.  Peak airway pressures around 32.  The

patient is on the floor of 60.  PEEP is at 5 and FiO2 has been drop down to 40%.


Cardiac exam revealed the PMI to be normally situated and sized. The rhythm was 

regular and no extrasystoles were noted during several minutes of auscultation. 

The first and second heart sounds were normal and physiologic splitting of the 

second heart sound was noted. There were no murmurs, rubs, clicks, or gallops.


Abdominal exam revealed normal bowel sounds. The abdomen was soft, non-tender, 

and without masses, organomegaly, or appreciable enlargement of the abdominal 

aorta.


Examination of the extremities revealed easily palpable radial, femoral and 

pedal pulses. There was no cyanosis, clubbing or edema.


Examination of the skin revealed no evidence of significant rashes, suspicious 

appearing nevi or other concerning lesions.


Neurologically she is sedated yet she is arousable upon being given a sedation 

holiday.





- Labs


CBC & Chem 7: 


                                 03/24/19 04:15





                                 03/24/19 04:15


Labs: 


                  Abnormal Lab Results - Last 24 Hours (Table)











  03/23/19 03/23/19 03/23/19 Range/Units





  11:50 12:14 15:20 


 


WBC     (3.8-10.6)  k/uL


 


RBC     (3.80-5.40)  m/uL


 


Neutrophils #     (1.3-7.7)  k/uL


 


Lymphocytes #     (1.0-4.8)  k/uL


 


ABG pH   7.53 H   (7.35-7.45)  


 


ABG pCO2   47 H   (35-45)  mmHg


 


ABG pO2   136 H   ()  mmHg


 


ABG HCO3   39 H   (21-25)  mmol/L


 


ABG Total CO2   40 H   (19-24)  mmol/L


 


ABG O2 Saturation   98.0 H   (94-97)  %


 


Potassium     (3.5-5.1)  mmol/L


 


Carbon Dioxide     (22-30)  mmol/L


 


Creatinine     (0.52-1.04)  mg/dL


 


Glucose     (74-99)  mg/dL


 


POC Glucose (mg/dL)  156 H    (75-99)  mg/dL


 


Calcium     (8.4-10.2)  mg/dL


 


AST     (14-36)  U/L


 


ALT     (9-52)  U/L


 


Alkaline Phosphatase     ()  U/L


 


Total Protein     (6.3-8.2)  g/dL


 


Albumin     (3.5-5.0)  g/dL


 


CSF Glucose    103 H  (40-70)  mg/dL


 


CSF Total Protein    67 H  (12-60)  mg/dL














  03/23/19 03/23/19 03/23/19 Range/Units





  15:32 18:54 23:27 


 


WBC     (3.8-10.6)  k/uL


 


RBC     (3.80-5.40)  m/uL


 


Neutrophils #     (1.3-7.7)  k/uL


 


Lymphocytes #     (1.0-4.8)  k/uL


 


ABG pH  7.56 H*    (7.35-7.45)  


 


ABG pCO2     (35-45)  mmHg


 


ABG pO2  >400 H    ()  mmHg


 


ABG HCO3  34 H    (21-25)  mmol/L


 


ABG Total CO2  36 H    (19-24)  mmol/L


 


ABG O2 Saturation  98.9 H    (94-97)  %


 


Potassium     (3.5-5.1)  mmol/L


 


Carbon Dioxide     (22-30)  mmol/L


 


Creatinine     (0.52-1.04)  mg/dL


 


Glucose     (74-99)  mg/dL


 


POC Glucose (mg/dL)   134 H  113 H  (75-99)  mg/dL


 


Calcium     (8.4-10.2)  mg/dL


 


AST     (14-36)  U/L


 


ALT     (9-52)  U/L


 


Alkaline Phosphatase     ()  U/L


 


Total Protein     (6.3-8.2)  g/dL


 


Albumin     (3.5-5.0)  g/dL


 


CSF Glucose     (40-70)  mg/dL


 


CSF Total Protein     (12-60)  mg/dL














  03/24/19 03/24/19 03/24/19 Range/Units





  04:15 04:15 04:58 


 


WBC  14.9 H    (3.8-10.6)  k/uL


 


RBC  3.60 L    (3.80-5.40)  m/uL


 


Neutrophils #  14.2 H    (1.3-7.7)  k/uL


 


Lymphocytes #  0.2 L    (1.0-4.8)  k/uL


 


ABG pH     (7.35-7.45)  


 


ABG pCO2     (35-45)  mmHg


 


ABG pO2    195 H  ()  mmHg


 


ABG HCO3    31 H  (21-25)  mmol/L


 


ABG Total CO2    32 H  (19-24)  mmol/L


 


ABG O2 Saturation    99.5 H  (94-97)  %


 


Potassium   2.9 L   (3.5-5.1)  mmol/L


 


Carbon Dioxide   31 H   (22-30)  mmol/L


 


Creatinine   0.47 L   (0.52-1.04)  mg/dL


 


Glucose   119 H   (74-99)  mg/dL


 


POC Glucose (mg/dL)     (75-99)  mg/dL


 


Calcium   8.1 L   (8.4-10.2)  mg/dL


 


AST   37 H   (14-36)  U/L


 


ALT   55 H   (9-52)  U/L


 


Alkaline Phosphatase   148 H   ()  U/L


 


Total Protein   4.3 L   (6.3-8.2)  g/dL


 


Albumin   2.3 L   (3.5-5.0)  g/dL


 


CSF Glucose     (40-70)  mg/dL


 


CSF Total Protein     (12-60)  mg/dL














  03/24/19 Range/Units





  06:03 


 


WBC   (3.8-10.6)  k/uL


 


RBC   (3.80-5.40)  m/uL


 


Neutrophils #   (1.3-7.7)  k/uL


 


Lymphocytes #   (1.0-4.8)  k/uL


 


ABG pH   (7.35-7.45)  


 


ABG pCO2   (35-45)  mmHg


 


ABG pO2   ()  mmHg


 


ABG HCO3   (21-25)  mmol/L


 


ABG Total CO2   (19-24)  mmol/L


 


ABG O2 Saturation   (94-97)  %


 


Potassium   (3.5-5.1)  mmol/L


 


Carbon Dioxide   (22-30)  mmol/L


 


Creatinine   (0.52-1.04)  mg/dL


 


Glucose   (74-99)  mg/dL


 


POC Glucose (mg/dL)  125 H  (75-99)  mg/dL


 


Calcium   (8.4-10.2)  mg/dL


 


AST   (14-36)  U/L


 


ALT   (9-52)  U/L


 


Alkaline Phosphatase   ()  U/L


 


Total Protein   (6.3-8.2)  g/dL


 


Albumin   (3.5-5.0)  g/dL


 


CSF Glucose   (40-70)  mg/dL


 


CSF Total Protein   (12-60)  mg/dL








                      Microbiology - Last 24 Hours (Table)











 03/22/19 17:39 Blood Culture - Preliminary





 Blood    No Growth after 24 hours


 


 03/23/19 10:11 Urine Culture - Preliminary





 Urine,Catheterized 














Assessment and Plan


Plan: 











Assessment





1 acute/subacute mental status change with a negative CAT scan of the brain.  

Consider underlying encephalopathy which could be multifactorial.  Consider drug

induced encephalopathy.  Consider viral encephalitis.  The patient does not show

any signs of bacterial meningitis at this point in time.  Metabolic 

encephalopathy with probably underlying infection disorders cannot be completely

ruled out also.  No seizure activity.  No signs of any CVA as the patient's n

eurologic exam is nonfocal.





03/24/2019 the patient is intubated on a mechanical ventilator.  He is on 

extensive autonomic response and alternate mentation, we had to sedate the 

patient put on a mechanical ventilator.  Meanwhile, repeat CAT scan was done and

showed no acute abnormalities.  We'll proceed with a EEG.  Will also proceed 

with covering this patient with acyclovir as the patient underwent a lumbar 

puncture in the CSF protein was slightly elevated.  Consider HSV encephalitis.  

The CSF will be also sent for cytologic evaluation.  Currently she is heavily 

sedated with a combination of propofol and fentanyl and she is very cooperative 

with the mechanical ventilator.  She is well rested for now.





2 stage III non-small cell lung cancer post-chemoradiation therapy and the 

patient was receiving immunotherapy on outpatient basis





3 previous history of CVA/TIA





4 COPD





5 hypertension





6 hyperlipidemia





7 osteoarthritis





8 fibromyalgia





9 chronic back pain





10 depression





11 history of marijuana use





Plan





The patient will be kept on a mechanical ventilator.  She'll be kept sedated for

now.  I'm going to order an EEG.  During this patient with acyclovir considering

and HSV encephalitis.  Continue the rest of the hepatic antibiotic coverage.  

Continue Rocephin.  Continue vancomycin.  Continue vent support.  No need for 

any ventilator changes.  FiO2 has been drop down to 40%.  We'll try to given a 

sedation holiday and her underlying mentation.  We'll initiate tube feeds with 

Jevity 1.5 starting at 10 mL an hour.  She is on DVT and GI prophylaxis.  She is

on bronchodilators features of systemic steroids.  Family has been updated on 

the condition.  We'll continue to follow make further recommendations based on 

her progress.  Critically care evaluation that was done and more than 30 

minutes.  


Time with Patient: Greater than 30

## 2019-03-24 NOTE — CONS
CONSULTATION



DATE OF SERVICE:

03/23/2019.



REASON FOR CONSULTATION:

Leukocytosis with mental status changes.



HISTORY OF PRESENT ILLNESS:

The patient is a 63-year-old  female, with past medical history significant

for a stage III lung cancer for which the patient _____ receive any chemo.  The patient

has been brought into the ER at [facility] yesterday afternoon with chief

complaint of mental status changes.  The patient was brought in by her sister with the

complaint of the patient having mental status changes for the last 2 days.  There is no

history of any trauma or any falls or any high-grade fever, no nausea, vomiting or any

other symptoms reported.  On arrival to the ER.  The patient did have a chest x-ray

that was negative for any acute process.  CT of the brain did not show any bleed.  The

patient's highest temperature has been 99 degrees Fahrenheit on presentation and

afterward the patient has been afebrile.  The patient has been hemodynamically stable.

Not on any pressor support.  The patient noticed to have elevated white count 19.5.

Repeat today is 14.8.  Patient kidney function has been normal.  The potassium was

slightly low side and liver enzymes was observed mildly elevated.  Urine was negative

and influenza serology was negative.  The patient also had a CT abdominal pelvis that

did show distended bladder and excessive stool in the colon, but no other acute

abnormality.  The patient was initially on the medical floor, subsequently has been

transferred to the ICU for closer observation.  However, later on the patient ended up

getting intubated to protect the airways.  _____ was subsequently completed which did

show only one white cell, glucose was high at 103 with protein slightly at 67.  The

patient was started on vancomycin, Rocephin and _____.  Infectious disease was

consulted for further recommendations.  Most of the information has been obtained from

thorough review of the chart, talking to the nursing staff with the patient currently

intubated on the vent and unable to provide any history.  No family member was

available at the bedside.



REVIEW OF SYSTEMS:

Could not be reliably obtained.  The positive points have been mentioned in HPI.



PAST MEDICAL HISTORY:

Stage III lung cancer, _____ CVA, TIA, fibromyalgia, gastroesophageal reflux disease,

hyperlipidemia, osteoarthritis and pneumonia.



PAST SURGICAL HISTORY:

Bronchoscopy with biopsy, sinus surgery.



SOCIAL HISTORY:

_____ for 30 years.  No drinking or drug use.



FAMILY HISTORY:

Both parents with history of cancer, origin was not documented.



ALLERGIES:

No known drug allergies.



MEDICATIONS:

The patient is currently on acyclovir, DuoNeb, Rocephin, Lovenox, Lasix, Haldol,

Dilaudid. Synthroid, Ativan, Solu-Medrol, MS Contin, norepinephrine, Protonix,

propofol, vancomycin.



PHYSICAL EXAMINATION:

Blood pressure is 109/72 with a pulse of 80, temperature 98.3.  She is 100% on 50%

FiO2.

General description is a middle-aged female lying in bed in no distress.  No tachypnea

or accessory muscle of respiration use.

HEENT:  Shows no pallor or scleral icterus.  The patient is orally intubated.

Examination of oral cavity:  Neck trachea central.  No thyromegaly. Lungs unlabored

breathing with decreased breath sounds at the base. No wheeze.  Heart S1, S2.  Regular

rate and rhythm.

ABDOMEN:  Soft, no tenderness.  No organomegaly.  Extremities: No edema of the  feet.

_____ mass palpable.  Neurological:  The patient is _____.



LAB:

Hemoglobin is 14.1, white count 14, admission _____ 19.5, BUN of 14, creatinine 0.56,

_____ mildly elevated.  Urine has been negative.  Chest x-ray _____with no new process.



DIAGNOSTIC IMPRESSION AND PLAN:

Patient with leukocytosis which is likely multifactorial in this patient who has been

brought to the hospital for mental status changes, but subsequently the patient ended

up getting intubated to protect the airway.  The patient clinically did not have a

clear focus of infection in this chest x-ray reported negative for any acute process.

Her abdominal soft on clinical examination.  UA has been negative.  No evidence of any

cellulitis.  The patient did have mildly elevated protein on the CSF, underlying

encephalitis such as herpes, not likely but _____ entirely excluded.



PLAN:

1. Acyclovir 10 mg per kg q.8 hours while waiting for the HSV DNA PCR finalized or the

    CSF which has already been sent.

2. The patient will be monitored closely, cultures monitored and antibiotic adjusted

    if needed.

Thank you for this consultation.  Will follow this patient along with you.





MMODL / IJN: [id number] / Job#: [id number]

## 2019-03-24 NOTE — P.CONS
History of Present Illness





- Reason for Consult


Consult date: 03/24/19





- History of Present Illness





Ms. Piper is a 63 yr old female patient well known to our practice for treatment

of her known stage IIIA Adenocarcinoma of the Lung, originally diagnosed in June 2017. Primary Oncologist Dr. Segura. In 2017, she was originally referred by Dr Amaya after CT Scan of chest was suggestive of a neoplastic process in L upper 

lobe. She presented to Dr Amaya with progressive weakness, lack of stamina, 

anorexia and weight loss of 40 Lbs over 6 months. She had recent negative 

Colonoscopy, but CXR revealed DESTIN mass, CT Scan of chest done on 5/16/2017 

revealed 5.4X4.3 spiculated Pleural-based lesion highly suggestive of primary 

Bronchogenic carcinoma extending to pleural surface. AP window Lymphadenopathy 

measured 1 cm was noted, as well as, L Adrenal mass measured 4.5X3.6 cm (Was 

present on CT of June 2015 and minimally changed).


The patient C/O L shoulder pain and left upper chest wall pain X 4-6 months, as 

well as, diffuse headache and nausea X 2-3 months at the time of diagnosis.


She smoked 1 PPD X 45 years, denied ETOH , resides with her partner of 38 years.





On 6/6/17: PET Scan : Stage III-A disease, Bx: Adenocarcinoma, CT of head 

negative. Quit smoking.


June 14 July 26, 2017 - Underwent Concurrent radiation Therapy to lung and 

Systemic Weekly Chemotherapy with Carboplatin and Taxol. 


September 28, 2017 - November 2017 - Received Carboplatin and Alimta x3 cycles. 


She was placed on Observation and remained stable from October 2017 to current. 


In May 2018, CT scan failed to show active or Metastatic disease, therefore she 

was placed on maintenance therapy with Imfinzi (Darvolumab), tolerating well 

with the exception of acute COPD and Bronchitis exacerbations. 


9/2018 - Mental Status Changes and Increased body aches and MRI of Brain was 

negative for Metastatic Disease and PET Negative for active Disease. 


February 2019 - Continues on Maintenance Imfinizi and most recent CT Chest shows

further reduction in the inactive malignancy





 Since early 2019, the patient has been struggling with increased weakness and 

fatigue, acute infections which she presents with acute mental status changes 

(treated with antibiotics and in past have quickly resolved to baseline). She 

was admitted on 3/8/19 with complaints of increased shortness of breath worsened

with activity, as well as increased Bilateral Lower Extremity edema. 


 There was no evidence of treatment induced pneumonitis.  The patient improved 

with treatment for COPD/bronchitis.


 She was subsequently seen in the office, and immunotherapy was stopped.  She 

has not had a does for about 2 months currently.


  The patient came back to the emergency room, with mental status changes that 

had started within the past 24 hours or so and gotten progressively worse.  The 

patient was felt to be delirious, and was quite agitated and combative.  

Respiratory rate was also increased.  The patient ultimately had to be intubated

to protect her airway.  Consult was placed for further evaluation and 

recommendations


 The patient was unable to provide any history, and this was obtained from the 

EMR, as well as her sister and significant other who were present at the 

bedside.  They denied any trauma, fever, chills, nausea or vomiting.





Review of Systems





 Obtained from the EMR, and patient's sister and significant other


Constitutional: Reports as per HPI, Reports weakness


Eyes: denies blurred vision, denies pain


Ears: deny: decreased hearing, ear discharge, earache, tinnitus


Ears, nose, mouth and throat: Denies headache, Denies sore throat


Cardiovascular: Reports decreased exercise tolerance


Respiratory: Reports as per HPI, Reports cough, Reports dyspnea (on 2 L O2)


Gastrointestinal: Denies abdominal pain, Denies diarrhea, Denies nausea, Denies 

vomiting


Genitourinary: Denies dysuria, Denies hematuria


Menstruation: Reports postmenopausal


Musculoskeletal: Reports muscle weakness


Integumentary: Denies pruritus, Denies rash


Neurological: Reports change in mentation, Reports confusion, Reports weakness


Psychiatric: Reports as per HPI, Reports confusion, Reports disorientation


Endocrine: Reports fatigue


Hematologic/Lymphatic: Reports as per HPI





Past Medical History


Past Medical History: Cancer, COPD, CVA/TIA, Fibromyalgia, GERD/Reflux, Hyperlip

idemia, Osteoarthritis (OA), Pneumonia


Additional Past Medical History / Comment(s): Non-small cell lung cancer stage 3

adenocarcinoma, COPD, fibromyalgia, acid reflux, hyperlipidemia, osteoarthritis,

adrenal mass that has remained stable over some time, TIA history of, hiatal 

hernia, chronic constipation, chronic back pain, degenerative arthritis


History of Any Multi-Drug Resistant Organisms: None Reported


Additional Past Surgical History / Comment(s): SINUS surgery


Past Anesthesia/Blood Transfusion Reactions: No Reported Reaction


Past Psychological History: Depression


Smoking Status: Former smoker


Past Alcohol Use History: None Reported


Past Drug Use History: None Reported





- Past Family History


  ** Mother


Family Medical History: Cancer





  ** Father


Family Medical History: Cancer





Medications and Allergies


                                Home Medications











 Medication  Instructions  Recorded  Confirmed  Type


 


Albuterol Inhaler [Ventolin Hfa 2 puff INHALATION RT-QID PRN 05/27/14 03/22/19 

History





Inhaler]    


 


Cevimeline [Evoxac] 30 mg PO BID 05/27/14 03/22/19 History


 


Escitalopram [Lexapro] 20 mg PO DAILY 05/27/14 03/22/19 History


 


Esomeprazole Magnesium [NexIUM] 40 mg PO BID 05/27/14 03/22/19 History


 


Morphine Sulfate [Ms Contin] 30 mg PO BID #60 tablet.er 09/02/15 03/22/19 Rx


 


oxyCODONE-APAP 10-325MG [Percocet 1 tab PO Q6H PRN 02/27/17 03/22/19 History





 mg]    


 


Albuterol Nebulized [Ventolin 2.5 mg INHALATION RT-QID PRN 08/17/17 03/22/19 

History





Nebulized]    


 


Budesonide-Formot 160-4.5 Mcg 2 puff INHALATION RT-BID 08/17/17 03/22/19 History





[Symbicort 160-4.5 Mcg Inhaler]    


 


Ondansetron Odt [Zofran ODT] 8 mg PO Q6H PRN 08/17/17 03/22/19 History


 


Prochlorperazine [Compazine] 10 mg PO Q6H PRN 08/17/17 03/22/19 History


 


ALPRAZolam [Xanax] 0.25 mg PO AS DIRECTED PRN 07/14/18 03/22/19 History


 


Cholecalciferol [Vitamin D3] 1,000 unit PO DAILY 03/07/19 03/22/19 History


 


Cyanocobalamin (Vitamin B-12) 1,000 mcg PO BID 03/07/19 03/22/19 History





[Vitamin B-12]    


 


Folic Acid 0.4 mg PO DAILY 03/07/19 03/22/19 History


 


Furosemide [Lasix] 40 mg PO TID 03/07/19 03/22/19 History


 


Levothyroxine Sodium [Synthroid] 25 mcg PO DAILY 03/07/19 03/22/19 History


 


Umeclidinium Bromide [Incruse 1 puff INHALATION RT-DAILY 03/07/19 03/22/19 

History





Ellipta]    


 


guaiFENesin [Mucinex] 600 mg PO DAILY PRN 03/07/19 03/22/19 History


 


LORazepam [Ativan] 0.5 mg PO Q6H PRN 03/22/19 03/22/19 History


 


Pyridoxine HCl (Vitamin B6) 100 mg PO DAILY 03/22/19 03/22/19 History





[Vitamin B-6]    


 


Tolterodine Tartrate [Detrol LA] 4 mg PO DAILY 03/22/19 03/22/19 History








                                    Allergies











Allergy/AdvReac Type Severity Reaction Status Date / Time


 


No Known Allergies Allergy   Verified 03/22/19 17:39














Physical Exam


Vitals: 


                                   Vital Signs











  Temp Pulse Pulse Resp BP BP Pulse Ox


 


 03/24/19 20:08   63     


 


 03/24/19 20:00   85     


 


 03/24/19 19:30   76   14    99


 


 03/24/19 19:00   71   14    99


 


 03/24/19 18:30   63   14    99


 


 03/24/19 18:00   71   14    99


 


 03/24/19 17:30   67   14    99


 


 03/24/19 17:00   66   14    99


 


 03/24/19 16:30   68   14    99


 


 03/24/19 16:01   65     


 


 03/24/19 16:00  98.3 F  65   14    100


 


 03/24/19 15:49   63     


 


 03/24/19 15:30   65   14    99


 


 03/24/19 15:00   69   14    99


 


 03/24/19 14:30   69   14    99


 


 03/24/19 14:00   72   14    99


 


 03/24/19 13:30   76   14    98


 


 03/24/19 13:00   77   14    98


 


 03/24/19 12:30   79   14    98


 


 03/24/19 12:19   76     


 


 03/24/19 12:08   74     


 


 03/24/19 12:00  98.3 F  75   14    98


 


 03/24/19 11:30   81   14    98


 


 03/24/19 11:00   84   15    98


 


 03/24/19 10:30   79   14    98


 


 03/24/19 10:00   76   14    98


 


 03/24/19 09:30   76   14    98


 


 03/24/19 09:00   78   14    98


 


 03/24/19 08:30   76   10 L    99


 


 03/24/19 08:06   85     


 


 03/24/19 08:00  98.3 F  72   14    99


 


 03/24/19 07:51   83     


 


 03/24/19 07:30   74   14    98


 


 03/24/19 07:00   78   14    99


 


 03/24/19 06:30   83   14    99


 


 03/24/19 06:00   77   16    98


 


 03/24/19 05:30   79   14    100


 


 03/24/19 05:00   81   15    99


 


 03/24/19 04:30   106 H   15  104/62   99


 


 03/24/19 04:00  98.6 F  82   14  117/75   99


 


 03/24/19 03:38   81     


 


 03/24/19 03:30   84   14  117/75   99


 


 03/24/19 03:11   81     


 


 03/24/19 03:00   85   14  117/75   99


 


 03/24/19 02:30   81   14  90/61   99


 


 03/24/19 02:00   95   15  120/63   99


 


 03/24/19 01:30   95   14  120/63   99


 


 03/24/19 01:00   90   14  112/67   99


 


 03/24/19 00:30   87   14  112/67   99


 


 03/24/19 00:01   85   14  112/67   100


 


 03/24/19 00:00  98.7 F  84   14  98/66   99


 


 03/23/19 23:40   75     


 


 03/23/19 23:30   86   14  98/66   100


 


 03/23/19 23:16   85     


 


 03/23/19 23:00   85   14  121/81   100


 


 03/23/19 22:00   85   14  91/53   100


 


 03/23/19 21:30   80   14  91/53   100


 


 03/23/19 21:00   85   14  91/53   100


 


 03/23/19 20:30   80   14  88/57   100


 


 03/23/19 20:20  98.3 F   91  14   117/62  100








                                Intake and Output











 03/24/19 03/24/19 03/24/19





 06:59 14:59 22:59


 


Intake Total 1796.296 878.825 710


 


Output Total 550 415 190


 


Balance 1246.296 463.825 520


 


Intake:   


 


   200 500


 


    Potassium Chloride 20 meq  200 





    In Water For Injection 1   





    100ml.bag @ 50 mls/hr   





    IVPB Q2H [facility] Rx#:   





    [id number]   


 


    Sodium Chloride 0.9% 1, 500  500





    000 ml @ 100 mls/hr IV .   





    Q10H [facility] Rx#:[id number]   


 


  Intake, IV Titration 1296.296 678.825 200





  Amount   


 


    Acyclovir Sodium 500 mg 100  





    In Sodium Chloride 0.9%   





    100 ml @ 100 mls/hr IVPB   





    Q8HR [facility] Rx#:[id number]   


 


    Magnesium Sulfate-D5w Pmx 100 400 





    1 gm In Dextrose/Water 1   





    100ml.bag @ 100 mls/hr   





    IVPB Q1H [facility] Rx#:   





    [id number]   


 


    Potassium Chloride 20 meq 100  





    In Water For Injection 1   





    100ml.bag @ 50 mls/hr   





    IVPB Q2H [facility] Rx#:   





    [id number]   


 


    Propofol 1,000 mg In 174.025 85.478 100





    Empty Bag 1 bag @ Titrate   





    IV .Q0M [facility] Rx#:   





    [id number]   


 


    Sodium Chloride 0.9% 1, 400 100 





    000 ml @ 100 mls/hr IV .   





    Q10H [facility] Rx#:[id number]   


 


    Vancomycin 1,000 mg In 250  





    Sodium Chloride 0.9% 250   





    ml @ 125 mls/hr IVPB Q8H   





    [facility] Rx#:[id number]   


 


    fentaNYL (PF) 1,000 mcg 172.271 93.347 100





    In Sodium Chloride 0.9%   





    80 ml @ 1 MCG/KG/HR 5.443   





    mls/hr IV .[id number] [facility]   





    Rx#:[id number]   


 


  Tube Feeding   10


 


Output:   


 


  Urine 550 415 190


 


Other:   


 


  Voiding Method Indwelling Catheter Indwelling Catheter Indwelling Catheter


 


  Weight 54.1 kg 54.1 kg 








                         ABP, PAP, CO, CI - Last 8 Hours











Arterial Blood Pressure        139/61


 


Arterial Blood Pressure        143/62


 


Arterial Blood Pressure        135/62


 


Arterial Blood Pressure        155/70


 


Arterial Blood Pressure        141/63


 


Arterial Blood Pressure        126/63


 


Arterial Blood Pressure        133/62


 


Arterial Blood Pressure        126/61


 


Arterial Blood Pressure        124/60


 


Arterial Blood Pressure        131/62


 


Arterial Blood Pressure        107/58


 


Arterial Blood Pressure        112/59


 


Arterial Blood Pressure        113/60


 


Arterial Blood Pressure        118/60


 


Arterial Blood Pressure        119/62

















Results


CBC & Chem 7: 


                                 03/24/19 04:15





                                 03/24/19 16:40


Labs: 


                  Abnormal Lab Results - Last 24 Hours (Table)











  03/23/19 03/24/19 03/24/19 Range/Units





  23:27 04:15 04:15 


 


WBC   14.9 H   (3.8-10.6)  k/uL


 


RBC   3.60 L   (3.80-5.40)  m/uL


 


Neutrophils #   14.2 H   (1.3-7.7)  k/uL


 


Lymphocytes #   0.2 L   (1.0-4.8)  k/uL


 


ABG pO2     ()  mmHg


 


ABG HCO3     (21-25)  mmol/L


 


ABG Total CO2     (19-24)  mmol/L


 


ABG O2 Saturation     (94-97)  %


 


Potassium    2.9 L  (3.5-5.1)  mmol/L


 


Carbon Dioxide    31 H  (22-30)  mmol/L


 


Creatinine    0.47 L  (0.52-1.04)  mg/dL


 


Glucose    119 H  (74-99)  mg/dL


 


POC Glucose (mg/dL)  113 H    (75-99)  mg/dL


 


Calcium    8.1 L  (8.4-10.2)  mg/dL


 


Phosphorus     (2.5-4.5)  mg/dL


 


AST    37 H  (14-36)  U/L


 


ALT    55 H  (9-52)  U/L


 


Alkaline Phosphatase    148 H  ()  U/L


 


Total Protein    4.3 L  (6.3-8.2)  g/dL


 


Albumin    2.3 L  (3.5-5.0)  g/dL














  03/24/19 03/24/19 03/24/19 Range/Units





  04:15 04:58 06:03 


 


WBC     (3.8-10.6)  k/uL


 


RBC     (3.80-5.40)  m/uL


 


Neutrophils #     (1.3-7.7)  k/uL


 


Lymphocytes #     (1.0-4.8)  k/uL


 


ABG pO2   195 H   ()  mmHg


 


ABG HCO3   31 H   (21-25)  mmol/L


 


ABG Total CO2   32 H   (19-24)  mmol/L


 


ABG O2 Saturation   99.5 H   (94-97)  %


 


Potassium     (3.5-5.1)  mmol/L


 


Carbon Dioxide     (22-30)  mmol/L


 


Creatinine     (0.52-1.04)  mg/dL


 


Glucose     (74-99)  mg/dL


 


POC Glucose (mg/dL)    125 H  (75-99)  mg/dL


 


Calcium     (8.4-10.2)  mg/dL


 


Phosphorus  2.1 L    (2.5-4.5)  mg/dL


 


AST     (14-36)  U/L


 


ALT     (9-52)  U/L


 


Alkaline Phosphatase     ()  U/L


 


Total Protein     (6.3-8.2)  g/dL


 


Albumin     (3.5-5.0)  g/dL














  03/24/19 03/24/19 Range/Units





  12:03 17:00 


 


WBC    (3.8-10.6)  k/uL


 


RBC    (3.80-5.40)  m/uL


 


Neutrophils #    (1.3-7.7)  k/uL


 


Lymphocytes #    (1.0-4.8)  k/uL


 


ABG pO2    ()  mmHg


 


ABG HCO3    (21-25)  mmol/L


 


ABG Total CO2    (19-24)  mmol/L


 


ABG O2 Saturation    (94-97)  %


 


Potassium    (3.5-5.1)  mmol/L


 


Carbon Dioxide    (22-30)  mmol/L


 


Creatinine    (0.52-1.04)  mg/dL


 


Glucose    (74-99)  mg/dL


 


POC Glucose (mg/dL)  155 H  133 H  (75-99)  mg/dL


 


Calcium    (8.4-10.2)  mg/dL


 


Phosphorus    (2.5-4.5)  mg/dL


 


AST    (14-36)  U/L


 


ALT    (9-52)  U/L


 


Alkaline Phosphatase    ()  U/L


 


Total Protein    (6.3-8.2)  g/dL


 


Albumin    (3.5-5.0)  g/dL








                      Microbiology - Last 24 Hours (Table)











 03/22/19 17:39 Blood Culture - Preliminary





 Blood    No Growth after 48 hours


 


 03/23/19 15:20 CSF Gram Stain - Preliminary





 Cerebral Spinal Fluid 


 


 03/23/19 10:11 Urine Culture - Final





 Urine,Catheterized 











Chest x-ray: report reviewed


CT scan - abdomen: report reviewed


CT Scan - head: report reviewed


CT scan - pelvis: report reviewed





Assessment and Plan


(1) Altered mental status


Narrative/Plan: 


  The patient has had episodes of mental status changes in the last few months, 

which seemed to have been related to infections, and have improved with 

antibiotics but the current episode was much more severe, and required 

intubation because of anticipated inability to protect her airway.  At this time

there is no definite evidence of infection, on chest x-ray, or CT of the abdomen

and pelvis.  The patient had a lumbar puncture, which showed mild protein 

elevation, but no increase in inflammatory cells.


  The case was discussed in detail with [facility], and also the patient's family.  PD-

1 immunotherapy can cause neurologic symptoms, due to autoimmune cerebritis and 

meningitis.  However this is comparatively quite rare.  In addition, the patient

has not received this treatment for almost 2 months.  CSF does not show any 

inflammatory changes.  Therefore this appears to be unlikely at this point.


 Await additional studies to rule out CSF infection.  The patient is being 

covered with antibiotics and is also being followed by ID.


  From the malignancy standpoint, concern could be metastatic disease in the 

brain.  Plan for MRI with the patient is stable enough to have study with 

gadolinium.


Current Visit: Yes   Status: Acute   Code(s): R41.82 - ALTERED MENTAL STATUS, 

UNSPECIFIED   SNOMED Code(s): 258840824


   





(2) Non-small cell lung cancer


Narrative/Plan: 


  The patient does not have any evidence of progression so far.  Therapeutic and

diagnostic circumstances as described above.  She has been off immunotherapy 

from 2 months now.  Plan for MRI brain, as noted above.  I will also check 

cytology on the CSF fluid in this has not been ordered


Current Visit: No   Status: Acute   Priority: Medium   Code(s): C34.90 - 

MALIGNANT NEOPLASM OF UNSP PART OF UNSP BRONCHUS OR LUNG   SNOMED Code(s): 

511094115


   


Plan: 





 The patient has multiple other medical problems, as noted in the PMH.  Defer to

the admitting service, [facility] and other consultants for management off the above

## 2019-03-25 LAB
ALBUMIN SERPL-MCNC: 2.1 G/DL (ref 3.5–5)
ALP SERPL-CCNC: 126 U/L (ref 38–126)
ALT SERPL-CCNC: 43 U/L (ref 9–52)
ANION GAP SERPL CALC-SCNC: -1 MMOL/L
AST SERPL-CCNC: 24 U/L (ref 14–36)
BASOPHILS # BLD AUTO: 0 K/UL (ref 0–0.2)
BASOPHILS NFR BLD AUTO: 0 %
BUN SERPL-SCNC: 15 MG/DL (ref 7–17)
CALCIUM SPEC-MCNC: 8.4 MG/DL (ref 8.4–10.2)
CHLORIDE SERPL-SCNC: 110 MMOL/L (ref 98–107)
CO2 BLDA-SCNC: 31 MMOL/L (ref 19–24)
CO2 SERPL-SCNC: 27 MMOL/L (ref 22–30)
EOSINOPHIL # BLD AUTO: 0 K/UL (ref 0–0.7)
EOSINOPHIL NFR BLD AUTO: 0 %
ERYTHROCYTE [DISTWIDTH] IN BLOOD BY AUTOMATED COUNT: 3.28 M/UL (ref 3.8–5.4)
ERYTHROCYTE [DISTWIDTH] IN BLOOD: 15.3 % (ref 11.5–15.5)
GLUCOSE BLD-MCNC: 145 MG/DL (ref 75–99)
GLUCOSE BLD-MCNC: 187 MG/DL (ref 75–99)
GLUCOSE BLD-MCNC: 206 MG/DL (ref 75–99)
GLUCOSE SERPL-MCNC: 176 MG/DL (ref 74–99)
HBA1C MFR BLD: 6.2 % (ref 4–6)
HCO3 BLDA-SCNC: 29 MMOL/L (ref 21–25)
HCT VFR BLD AUTO: 32.7 % (ref 34–46)
HGB BLD-MCNC: 10.2 GM/DL (ref 11.4–16)
LYMPHOCYTES # SPEC AUTO: 0.1 K/UL (ref 1–4.8)
LYMPHOCYTES NFR SPEC AUTO: 1 %
MAGNESIUM SPEC-SCNC: 2.3 MG/DL (ref 1.6–2.3)
MCH RBC QN AUTO: 31 PG (ref 25–35)
MCHC RBC AUTO-ENTMCNC: 31.1 G/DL (ref 31–37)
MCV RBC AUTO: 99.7 FL (ref 80–100)
MONOCYTES # BLD AUTO: 0.4 K/UL (ref 0–1)
MONOCYTES NFR BLD AUTO: 4 %
NEUTROPHILS # BLD AUTO: 9.8 K/UL (ref 1.3–7.7)
NEUTROPHILS NFR BLD AUTO: 94 %
PCO2 BLDA: 49 MMHG (ref 35–45)
PH BLDA: 7.38 [PH] (ref 7.35–7.45)
PLATELET # BLD AUTO: 146 K/UL (ref 150–450)
PO2 BLDA: 145 MMHG (ref 83–108)
POTASSIUM SERPL-SCNC: 4 MMOL/L (ref 3.5–5.1)
PROT SERPL-MCNC: 3.9 G/DL (ref 6.3–8.2)
SODIUM SERPL-SCNC: 136 MMOL/L (ref 137–145)
WBC # BLD AUTO: 10.4 K/UL (ref 3.8–10.6)

## 2019-03-25 RX ADMIN — IPRATROPIUM BROMIDE AND ALBUTEROL SULFATE SCH ML: .5; 3 SOLUTION RESPIRATORY (INHALATION) at 15:49

## 2019-03-25 RX ADMIN — INSULIN ASPART SCH UNIT: 100 INJECTION, SOLUTION INTRAVENOUS; SUBCUTANEOUS at 06:06

## 2019-03-25 RX ADMIN — PANTOPRAZOLE SODIUM SCH MG: 40 INJECTION, POWDER, FOR SOLUTION INTRAVENOUS at 09:00

## 2019-03-25 RX ADMIN — MORPHINE SULFATE SCH: 30 TABLET, FILM COATED, EXTENDED RELEASE ORAL at 09:00

## 2019-03-25 RX ADMIN — INSULIN ASPART SCH UNIT: 100 INJECTION, SOLUTION INTRAVENOUS; SUBCUTANEOUS at 12:33

## 2019-03-25 RX ADMIN — CEFAZOLIN SCH MLS/HR: 330 INJECTION, POWDER, FOR SOLUTION INTRAMUSCULAR; INTRAVENOUS at 06:07

## 2019-03-25 RX ADMIN — CHLORHEXIDINE GLUCONATE SCH ML: 1.2 RINSE ORAL at 19:54

## 2019-03-25 RX ADMIN — IPRATROPIUM BROMIDE AND ALBUTEROL SULFATE SCH ML: .5; 3 SOLUTION RESPIRATORY (INHALATION) at 05:49

## 2019-03-25 RX ADMIN — SODIUM CHLORIDE SCH MLS/HR: 9 INJECTION, SOLUTION INTRAVENOUS at 23:50

## 2019-03-25 RX ADMIN — CHLORHEXIDINE GLUCONATE SCH ML: 1.2 RINSE ORAL at 09:00

## 2019-03-25 RX ADMIN — SODIUM CHLORIDE SCH MLS/HR: 900 INJECTION, SOLUTION INTRAVENOUS at 05:05

## 2019-03-25 RX ADMIN — NOREPINEPHRINE BITARTRATE SCH: 1 INJECTION, SOLUTION, CONCENTRATE INTRAVENOUS at 19:51

## 2019-03-25 RX ADMIN — SODIUM CHLORIDE SCH MLS/HR: 9 INJECTION, SOLUTION INTRAVENOUS at 05:04

## 2019-03-25 RX ADMIN — CLEVIPIDINE SCH: 0.5 EMULSION INTRAVENOUS at 12:34

## 2019-03-25 RX ADMIN — SODIUM CHLORIDE SCH MLS/HR: 900 INJECTION, SOLUTION INTRAVENOUS at 08:59

## 2019-03-25 RX ADMIN — CEFAZOLIN SCH MLS/HR: 330 INJECTION, POWDER, FOR SOLUTION INTRAMUSCULAR; INTRAVENOUS at 19:54

## 2019-03-25 RX ADMIN — PROPOFOL SCH MLS/HR: 10 INJECTION, EMULSION INTRAVENOUS at 23:53

## 2019-03-25 RX ADMIN — SODIUM CHLORIDE SCH MLS/HR: 900 INJECTION, SOLUTION INTRAVENOUS at 12:13

## 2019-03-25 RX ADMIN — SODIUM CHLORIDE SCH MLS/HR: 900 INJECTION, SOLUTION INTRAVENOUS at 16:32

## 2019-03-25 RX ADMIN — PROPOFOL SCH MLS/HR: 10 INJECTION, EMULSION INTRAVENOUS at 09:04

## 2019-03-25 RX ADMIN — METHYLPREDNISOLONE SODIUM SUCCINATE SCH MG: 125 INJECTION, POWDER, FOR SOLUTION INTRAMUSCULAR; INTRAVENOUS at 12:13

## 2019-03-25 RX ADMIN — METHYLPREDNISOLONE SODIUM SUCCINATE SCH MG: 125 INJECTION, POWDER, FOR SOLUTION INTRAMUSCULAR; INTRAVENOUS at 23:50

## 2019-03-25 RX ADMIN — METHYLPREDNISOLONE SODIUM SUCCINATE SCH MG: 125 INJECTION, POWDER, FOR SOLUTION INTRAMUSCULAR; INTRAVENOUS at 05:05

## 2019-03-25 RX ADMIN — CEFAZOLIN SCH MLS/HR: 330 INJECTION, POWDER, FOR SOLUTION INTRAMUSCULAR; INTRAVENOUS at 16:32

## 2019-03-25 RX ADMIN — IPRATROPIUM BROMIDE AND ALBUTEROL SULFATE SCH ML: .5; 3 SOLUTION RESPIRATORY (INHALATION) at 12:07

## 2019-03-25 RX ADMIN — LEVOTHYROXINE SODIUM ANHYDROUS SCH MCG: 100 INJECTION, POWDER, LYOPHILIZED, FOR SOLUTION INTRAVENOUS at 09:00

## 2019-03-25 RX ADMIN — SODIUM CHLORIDE SCH MLS/HR: 9 INJECTION, SOLUTION INTRAVENOUS at 12:13

## 2019-03-25 RX ADMIN — MORPHINE SULFATE SCH: 30 TABLET, FILM COATED, EXTENDED RELEASE ORAL at 19:51

## 2019-03-25 RX ADMIN — SODIUM CHLORIDE SCH MLS/HR: 900 INJECTION, SOLUTION INTRAVENOUS at 23:00

## 2019-03-25 RX ADMIN — PROPOFOL SCH MLS/HR: 10 INJECTION, EMULSION INTRAVENOUS at 16:32

## 2019-03-25 RX ADMIN — ENOXAPARIN SODIUM SCH MG: 40 INJECTION SUBCUTANEOUS at 08:59

## 2019-03-25 RX ADMIN — IPRATROPIUM BROMIDE AND ALBUTEROL SULFATE SCH ML: .5; 3 SOLUTION RESPIRATORY (INHALATION) at 19:08

## 2019-03-25 RX ADMIN — INSULIN ASPART SCH UNIT: 100 INJECTION, SOLUTION INTRAVENOUS; SUBCUTANEOUS at 18:19

## 2019-03-25 RX ADMIN — PROPOFOL SCH MLS/HR: 10 INJECTION, EMULSION INTRAVENOUS at 05:08

## 2019-03-25 RX ADMIN — METHYLPREDNISOLONE SODIUM SUCCINATE SCH MG: 125 INJECTION, POWDER, FOR SOLUTION INTRAMUSCULAR; INTRAVENOUS at 18:19

## 2019-03-25 NOTE — P.PN
Subjective


Progress Note Date: 03/25/19


Principal diagnosis: 





COPD exacerbation, acute respiratory failure, IIIA NSCLC





Pt remains intubated, stable on monitor, 2 family members at bedside.  





Objective





- Vital Signs


Vital signs: 


                                   Vital Signs











Temp  97.8 F   03/25/19 08:00


 


Pulse  57 L  03/25/19 10:00


 


Resp  14   03/25/19 10:00


 


BP  104/62   03/25/19 07:00


 


Pulse Ox  98   03/25/19 10:00








                                 Intake & Output











 03/24/19 03/25/19 03/25/19





 18:59 06:59 18:59


 


Intake Total 4307.693 0516.205 647.489


 


Output Total 565 365 85


 


Balance 025.200 7769.205 562.489


 


Weight 54.1 kg 57.6 kg 


 


Intake:   


 


   1300 400


 


    Acyclovir Sodium 500 mg   100





    In Sodium Chloride 0.9%   





    100 ml @ 100 mls/hr IVPB   





    Q8HR [facility]ARY Rx#:[id number]   


 


    Normal Saline  1200 300


 


    Potassium Chloride 20 meq 200  





    In Water For Injection 1   





    100ml.bag @ 50 mls/hr   





    IVPB Q2H [facility]ARY Rx#:   





    [id number]   


 


    Sodium Chloride 0.9% 1, 400 100 





    000 ml @ 100 mls/hr IV .   





    Q10H [facility]A Rx#:[id number]   


 


  Intake, IV Titration 626.765 7937.205 142.489





  Amount   


 


    Acyclovir Sodium 500 mg  100 





    In Sodium Chloride 0.9%   





    100 ml @ 100 mls/hr IVPB   





    Q8HR MARY Rx#:[id number]   


 


    Magnesium Sulfate-D5w Pmx 400  





    1 gm In Dextrose/Water 1   





    100ml.bag @ 100 mls/hr   





    IVPB Q1H [facility]ARY Rx#:   





    [id number]   


 


    Propofol 1,000 mg In 185.478 162.301 63.838





    Empty Bag 1 bag @ Titrate   





    IV .Q0M [facility] Rx#:   





    [id number]   


 


    Sodium Chloride 0.9% 1, 100  





    000 ml @ 100 mls/hr IV .   





    Q10H [facility] Rx#:[id number]   


 


    Vancomycin 1,000 mg In  625 





    Sodium Chloride 0.9% 250   





    ml @ 125 mls/hr IVPB Q8H   





    MARY Rx#:[id number]   


 


    fentaNYL (PF) 1,000 mcg 193.347 173.904 78.651





    In Sodium Chloride 0.9%   





    80 ml @ 1 MCG/KG/HR 5.443   





    mls/hr IV .[id number] [facility]   





    Rx#:[id number]   


 


  Tube Feeding 10 335 75


 


  Other  90 30


 


Output:   


 


  Urine 565 365 85


 


Other:   


 


  Voiding Method Indwelling Catheter Indwelling Catheter Indwelling Catheter








                       ABP, PAP, CO, CI - Last Documented











Arterial Blood Pressure        104/47

















- Constitutional


General appearance: Present: no acute distress, obese





- EENT


EENT Comment(s): 





PERRL





- Respiratory


Respiratory: bilateral: rhonchi





- Cardiovascular


Details: 





mild anasarca


Heart sounds: normal: S1, S2


Abnormal Heart Sounds: Absent: systolic murmur, diastolic murmur, rub, S3 

Gallop, S4 Gallop, click, other





- Peripheral pulses


  ** dorsalis pedis


Peripheral Pulses: bilateral: Normal





- Gastrointestinal


General gastrointestinal: Present: normal bowel sounds, soft





- Integumentary


Integumentary Comment(s): 





small hematoma on the right frontal just inferior to hairline, multiple bruises 

on forearms





- Neurologic


Neurologic Comment(s): 





Unable to assess





- Psychiatric


Psychiatric Comment(s): 





Unable to assess





- Labs


CBC & Chem 7: 


                                 03/25/19 04:20





                                 03/25/19 04:20


Labs: 


                  Abnormal Lab Results - Last 24 Hours (Table)











  03/24/19 03/24/19 03/24/19 Range/Units





  04:15 04:15 12:03 


 


RBC     (3.80-5.40)  m/uL


 


Hgb     (11.4-16.0)  gm/dL


 


Hct     (34.0-46.0)  %


 


Plt Count     (150-450)  k/uL


 


Neutrophils #     (1.3-7.7)  k/uL


 


Lymphocytes #     (1.0-4.8)  k/uL


 


ABG pCO2     (35-45)  mmHg


 


ABG pO2     ()  mmHg


 


ABG HCO3     (21-25)  mmol/L


 


ABG Total CO2     (19-24)  mmol/L


 


ABG O2 Saturation     (94-97)  %


 


Sodium     (137-145)  mmol/L


 


Chloride     ()  mmol/L


 


Creatinine     (0.52-1.04)  mg/dL


 


Glucose     (74-99)  mg/dL


 


POC Glucose (mg/dL)    155 H  (75-99)  mg/dL


 


Phosphorus  2.1 L    (2.5-4.5)  mg/dL


 


Total Protein     (6.3-8.2)  g/dL


 


Albumin     (3.5-5.0)  g/dL


 


Hep C IgG Ab   Reactive H   (Non-Reactive)  














  03/24/19 03/24/19 03/25/19 Range/Units





  17:00 23:26 04:20 


 


RBC    3.28 L  (3.80-5.40)  m/uL


 


Hgb    10.2 L  (11.4-16.0)  gm/dL


 


Hct    32.7 L  (34.0-46.0)  %


 


Plt Count    146 L  (150-450)  k/uL


 


Neutrophils #    9.8 H  (1.3-7.7)  k/uL


 


Lymphocytes #    0.1 L  (1.0-4.8)  k/uL


 


ABG pCO2     (35-45)  mmHg


 


ABG pO2     ()  mmHg


 


ABG HCO3     (21-25)  mmol/L


 


ABG Total CO2     (19-24)  mmol/L


 


ABG O2 Saturation     (94-97)  %


 


Sodium     (137-145)  mmol/L


 


Chloride     ()  mmol/L


 


Creatinine     (0.52-1.04)  mg/dL


 


Glucose     (74-99)  mg/dL


 


POC Glucose (mg/dL)  133 H  162 H   (75-99)  mg/dL


 


Phosphorus     (2.5-4.5)  mg/dL


 


Total Protein     (6.3-8.2)  g/dL


 


Albumin     (3.5-5.0)  g/dL


 


Hep C IgG Ab     (Non-Reactive)  














  03/25/19 03/25/19 03/25/19 Range/Units





  04:20 04:33 05:52 


 


RBC     (3.80-5.40)  m/uL


 


Hgb     (11.4-16.0)  gm/dL


 


Hct     (34.0-46.0)  %


 


Plt Count     (150-450)  k/uL


 


Neutrophils #     (1.3-7.7)  k/uL


 


Lymphocytes #     (1.0-4.8)  k/uL


 


ABG pCO2   49 H   (35-45)  mmHg


 


ABG pO2   145 H   ()  mmHg


 


ABG HCO3   29 H   (21-25)  mmol/L


 


ABG Total CO2   31 H   (19-24)  mmol/L


 


ABG O2 Saturation   98.2 H   (94-97)  %


 


Sodium  136 L    (137-145)  mmol/L


 


Chloride  110 H    ()  mmol/L


 


Creatinine  0.49 L    (0.52-1.04)  mg/dL


 


Glucose  176 H    (74-99)  mg/dL


 


POC Glucose (mg/dL)    187 H  (75-99)  mg/dL


 


Phosphorus     (2.5-4.5)  mg/dL


 


Total Protein  3.9 L    (6.3-8.2)  g/dL


 


Albumin  2.1 L    (3.5-5.0)  g/dL


 


Hep C IgG Ab     (Non-Reactive)  








                      Microbiology - Last 24 Hours (Table)











 03/23/19 15:20 CSF Gram Stain - Preliminary





 Cerebral Spinal Fluid CSF Culture - Preliminary


 


 03/24/19 20:02 Gram Stain - Preliminary





 Sputum Sputum Culture - Preliminary


 


 03/22/19 17:39 Blood Culture - Preliminary





 Blood    No Growth after 48 hours


 


 03/23/19 10:11 Urine Culture - Final





 Urine,Catheterized 














Assessment and Plan


(1) Non-small cell lung cancer


Narrative/Plan: 


Pt has Hx of stage IIIA NSCLC, most recent treatment with immunotherapy 2 mo 

ago.  


CSF pending cytology


Pending MRI brain after recovered from mechanical ventilation


Current Visit: No   Status: Chronic   Priority: Medium   Code(s): C34.90 - 

MALIGNANT NEOPLASM OF UNSP PART OF UNSP BRONCHUS OR LUNG   SNOMED Code(s): 

591602633


   


Plan: 





Defer ICU mgmt to Critical Care team.


Did explain to family at bedside that work up continues for evaluation of cause 

of respiratory failure and pt rapid decline, reassured them that pt is receiving

aggressive care.  


Pt has been off of imfinzi since Feb, unclear at this time if pt has disease 

progression in the brain-CT was negative-pending stability to order MRI, CSF 

cytology is still pending


Will continue to follow with you and update from a Hem/Onc standpoint

## 2019-03-25 NOTE — P.PN
Subjective


Progress Note Date: 03/25/19


Principal diagnosis: 





Acute mental status change, stage III non-small cell lung cancer





This is a very pleasant 63-year-old female patient who follows with Dr. Amaya 

as her primary care physician.  She has a history of CVA/TIA, fibromyalgia, 

gastroesophageal reflux disease, hyperlipidemia, hypothyroidism, chronic back 

pain, depression.  She also has a history of chronic tobacco dependence, oxygen 

dependent chronic obstructive pulmonary disease with an FEV1 value of 56% of pre

dicted, and non-small cell lung cancer stage III adenocarcinoma.  She follows 

with Dr. Pastor in our office. This was diagnosed by an FNA of a left upper lobe 

lesion by IR in May 2017.  She had received concurrent chemoradiation with 

subsequent additional chemotherapy and then placed on maintenance mmunotherapy 

of Imfinzi back in June 2018.  Her last PET scan in September 2018 revealed 

continued left upper lobe mass with irregular margins extending to the pleural 

surface, spiculated appearance but was stable compared to previous and April 2018.  The SUV value was only 1.7.  Subsequent computed tomography scan of the 

chest in January 2019 revealed a stable and slightly smaller spiculated mass of 

the left upper lobe measuring 2.5 x 2.4 cm versus 3.0 x 2.6 previous.  No 

evidence of recurrence or metastasis.  The patient was in the hospital back in 

early March 2019 and she was having ongoing issues with nausea vomiting profound

weakness and fatigue.  She decided to stop taking the immunotherapy. She had 

been treated for his COPD exacerbation 02/13/2019 in our office by Dr. Pastor 

where she received steroids and Bactrim.  She did improve for the first week or 

so. She presented to the emergency room yesterday with complaints of increasing 

shortness of breath cough and congestion.  She did have some lower extremity 

edema.  She was still quite weak and fatigued.  ome dyspnea on exertion.  The 

patient was discharged home on 03/11/2019 as the patient shortness of breath 

improved and the patient recovered from her acute COPD exacerbation and she was 

discharged home on her usual routine medications which included also morphine 

sulfate 30 mg by mouth twice a day Percocet 10/03/2025 every 6 hours and a when 

necessary basis.  In terms of her lung medication she was maintained on a combi

nation of Symbicort and Incruse.  She is also on Synthroid 25 g by mouth daily.

 She was given Lasix to be taken on an as needed basis.The patient came in 

yesterday to the burst department 3 day history of altered mental status.  

According to the family the patient was acting weird.  She was acting unusual 

and she was confused.  She was also very weak and she was having episodes of 

fall.  I do see a bruise over her forehead.  The family tells that she did not 

have any active had trauma.  No seizure activity.  No neck stiffness.  No 

documented fever.  She was thrashing significantly yesterday.  A CAT scan of the

head was done that showed no acute process.  CAT scan of abdomen and pelvis was 

done that showed urinary bladder distention along with extensive colonic stool. 

The patient's ammonia level was at 28.  Urine and blood cultures were ordered.  

The influenza screen was negative.  The wife that was at 19.7 the patient was 

given a dose of 1 g of Rocephin and she was admitted to the oncology unit.  

Overnight the patient continued to be the same.  Earlier this morning I was 

informed about this patient by the nurse practitioners and I was able to see it 

on the floor and I asked her to be transferred to the intensive care unit.  She 

did not have any neck stiffness.  No fever.  She was at times morning.  Other 

times she would open up her eyes and follows some simple commands.  I was told 

that this is an improvement in her condition compared to yesterday.  She was 

able to follow some simple commands however she has not been consistent in 

following orders.  His speech is minimal and the patient may safely worse.  She 

denies being in pain.  Urine toxin was ordered.  She was given a dose of Narcan 

here in the ICU without much improvement.  Blood gases showed a pH of 7.3 with a

pCO2 of 47 and pO2 of 136 and there is no evidence of any CO2 narcosis.  Her 

white cell count is down to 14.8.  The rest of the blood work is all within 

normal limits with a exception of some mild elevation of the LFTs with an AST of

65, ALT of 79 and alkaline phosphatase of 219.  Troponins of 0.04.  Serum 

albumin is at 3.8.  The coagulation profile is within normal.  The chest x-ray 

is not showing any acute process.








Today's evaluation of 03/24/2019 I'm seeing this patient for a follow-up.  E

vents from yesterday were noted.  The patient came in to the ICU.  She was 

extremely agitated and restless, having tachypnea tachycardia and blood pressure

was up.  She received several doses of Haldol without any improvement.  She 

continued to be altered and tomorrow mentation.  At that point, the patient was 

given propofol and subsequently she was intubated and placed on a mechanical 

ventilator.  Her sedation requirements stayed quite high throughout the night.  

The patient was receiving maximum dose of propofol which is currently running at

50 g per KG per minute.  Fentanyl had to be also added for sedation and 

fentanyl is at high as 160 g KG per hour.  She is much well rested for now.  

She is very symptoms with the mechanical ventilator.  She is set at a tidal 

volume of 400 and FiO2 of 40% and PEEP of 5 and a rate of 14.  Blood gases from 

today showed a pH of 7. raphae with a pCO2 of 44 and pO2 of 195.  This was on 

FiO2 of 50% and subsequently she was weaned down to 40%.  His x-ray from today 

shows this small new left-sided pleural effusion.





Patient was reevaluated today on 3/25/2019, patient remains on mechanical 

ventilation, she is on assist control rate of 14 tidal volume of 400 FiO2 of 40%

and PEEP of 5.  Remains on propofol drip, she is also on fentanyl drip, apparent

ly patient was extremely agitated yesterday, and she required a significant 

amount of sedation.  Her propofol is at 50 mcg/kg/m, and her fentanyl is 160 

mcg/h.  Seems to be resting while on mechanical ventilation.  Sedated, and 

difficult to arouse.  Hence I recommended cutting down the fentanyl drip to half

of the dose she is presently on, and we need to assess her mental status and 

decide whether the patient could even be weaned at all.  She underwent lumbar 

puncture by Dr. Love, she remains on vancomycin and Rocephin, empirically, 

and she is also on acyclovir for HSV CNS infection.  PCR for HSV is pending.  

Repeat CT of the brain has been unremarkable.  No evidence of any stroke.  Labs 

were all reviewed today, ABG showed a pO2 of 145 pCO2 of 49 pH of 7.38.  

Electrolytes and renal profile are normal.  CBC is relatively unremarkable, 

hemoglobin is 10.2.  Chest x-ray from yesterday showed new small left pleural 

effusion minimal atelectasis








Objective





- Vital Signs


Vital signs: 


                                   Vital Signs











Temp  97.8 F   03/25/19 08:00


 


Pulse  66   03/25/19 12:11


 


Resp  14   03/25/19 10:00


 


BP  104/62   03/25/19 07:00


 


Pulse Ox  98   03/25/19 10:00








                                 Intake & Output











 03/24/19 03/25/19 03/25/19





 18:59 06:59 18:59


 


Intake Total 0037.051 6116.205 666.405


 


Output Total 565 365 85


 


Balance 583.289 5197.205 581.405


 


Weight 54.1 kg 57.6 kg 


 


Intake:   


 


   1300 400


 


    Acyclovir Sodium 500 mg   100





    In Sodium Chloride 0.9%   





    100 ml @ 100 mls/hr IVPB   





    Q8HR MARY Rx#:[id number]   


 


    Normal Saline  1200 300


 


    Potassium Chloride 20 meq 200  





    In Water For Injection 1   





    100ml.bag @ 50 mls/hr   





    IVPB Q2H [facility]ARY Rx#:   





    [id number]   


 


    Sodium Chloride 0.9% 1, 400 100 





    000 ml @ 100 mls/hr IV .   





    Q10H STA Rx#:[id number]   


 


  Intake, IV Titration 015.855 1556.205 161.405





  Amount   


 


    Acyclovir Sodium 500 mg  100 





    In Sodium Chloride 0.9%   





    100 ml @ 100 mls/hr IVPB   





    Q8HR MARY Rx#:[id number]   


 


    Magnesium Sulfate-D5w Pmx 400  





    1 gm In Dextrose/Water 1   





    100ml.bag @ 100 mls/hr   





    IVPB Q1H [facility] Rx#:   





    [id number]   


 


    Propofol 1,000 mg In 185.478 162.301 63.838





    Empty Bag 1 bag @ Titrate   





    IV .Q0M [facility] Rx#:   





    [id number]   


 


    Sodium Chloride 0.9% 1, 100  





    000 ml @ 100 mls/hr IV .   





    Q10H [facility] Rx#:[id number]   


 


    Vancomycin 1,000 mg In  625 





    Sodium Chloride 0.9% 250   





    ml @ 125 mls/hr IVPB Q8H   





    MARY Rx#:[id number]   


 


    fentaNYL (PF) 1,000 mcg 193.347 173.904 97.567





    In Sodium Chloride 0.9%   





    80 ml @ 1 MCG/KG/HR 5.443   





    mls/hr IV .[id number] [facility]   





    Rx#:[id number]   


 


  Tube Feeding 10 335 75


 


  Other  90 30


 


Output:   


 


  Urine 565 365 85


 


Other:   


 


  Voiding Method Indwelling Catheter Indwelling Catheter Indwelling Catheter








                       ABP, PAP, CO, CI - Last Documented











Arterial Blood Pressure        104/47

















- Exam





Physical Exam: Revealed a 63-year-old female, comfortable, on mechanical 

ventilation, sedated, on propofol and on phenytoin.  Orogastric tube and 

orotracheal tubes are noted to be intact


Head: Atraumatic, normocephalic.


HEENT:[Neck is supple.] [No neck masses.] [No thyromegaly.] [No JVD.]  PERRLA, 

EOMI, no icterus.  Endotracheal tube and oral gastric tube are intact


Chest: [Diminished breath sounds at the bases, no crackles, no rhonchi no 

wheezes.  Symmetrical chest expansion is noted no chest wall tenderness..]


Cardiac Exam: [Normal S1 and S2, no S3 gallop, no murmur.]


Abdomen: [Soft, nontender,  no megaly, no rebound, no guarding, normal bowel 

sounds.]


Extremities: [No clubbing, no edema, no cyanosis.]  Good pulses bilaterally.


Neurological Exam: Cannot be assessed, fully sedated and on propofol as well as 

fentanyl.


Psychiatric: Could not be assessed.


Lymphatics: No lymphadenopathy.


Skin: No rashes.





- Labs


CBC & Chem 7: 


                                 03/25/19 04:20





                                 03/25/19 04:20


Labs: 


                  Abnormal Lab Results - Last 24 Hours (Table)











  03/24/19 03/24/19 03/24/19 Range/Units





  04:15 04:15 17:00 


 


RBC     (3.80-5.40)  m/uL


 


Hgb     (11.4-16.0)  gm/dL


 


Hct     (34.0-46.0)  %


 


Plt Count     (150-450)  k/uL


 


Neutrophils #     (1.3-7.7)  k/uL


 


Lymphocytes #     (1.0-4.8)  k/uL


 


ABG pCO2     (35-45)  mmHg


 


ABG pO2     ()  mmHg


 


ABG HCO3     (21-25)  mmol/L


 


ABG Total CO2     (19-24)  mmol/L


 


ABG O2 Saturation     (94-97)  %


 


Sodium     (137-145)  mmol/L


 


Chloride     ()  mmol/L


 


Creatinine     (0.52-1.04)  mg/dL


 


Glucose     (74-99)  mg/dL


 


POC Glucose (mg/dL)    133 H  (75-99)  mg/dL


 


Phosphorus  2.1 L    (2.5-4.5)  mg/dL


 


Total Protein     (6.3-8.2)  g/dL


 


Albumin     (3.5-5.0)  g/dL


 


Hep C IgG Ab   Reactive H   (Non-Reactive)  














  03/24/19 03/25/19 03/25/19 Range/Units





  23:26 04:20 04:20 


 


RBC   3.28 L   (3.80-5.40)  m/uL


 


Hgb   10.2 L   (11.4-16.0)  gm/dL


 


Hct   32.7 L   (34.0-46.0)  %


 


Plt Count   146 L   (150-450)  k/uL


 


Neutrophils #   9.8 H   (1.3-7.7)  k/uL


 


Lymphocytes #   0.1 L   (1.0-4.8)  k/uL


 


ABG pCO2     (35-45)  mmHg


 


ABG pO2     ()  mmHg


 


ABG HCO3     (21-25)  mmol/L


 


ABG Total CO2     (19-24)  mmol/L


 


ABG O2 Saturation     (94-97)  %


 


Sodium    136 L  (137-145)  mmol/L


 


Chloride    110 H  ()  mmol/L


 


Creatinine    0.49 L  (0.52-1.04)  mg/dL


 


Glucose    176 H  (74-99)  mg/dL


 


POC Glucose (mg/dL)  162 H    (75-99)  mg/dL


 


Phosphorus     (2.5-4.5)  mg/dL


 


Total Protein    3.9 L  (6.3-8.2)  g/dL


 


Albumin    2.1 L  (3.5-5.0)  g/dL


 


Hep C IgG Ab     (Non-Reactive)  














  03/25/19 03/25/19 Range/Units





  04:33 05:52 


 


RBC    (3.80-5.40)  m/uL


 


Hgb    (11.4-16.0)  gm/dL


 


Hct    (34.0-46.0)  %


 


Plt Count    (150-450)  k/uL


 


Neutrophils #    (1.3-7.7)  k/uL


 


Lymphocytes #    (1.0-4.8)  k/uL


 


ABG pCO2  49 H   (35-45)  mmHg


 


ABG pO2  145 H   ()  mmHg


 


ABG HCO3  29 H   (21-25)  mmol/L


 


ABG Total CO2  31 H   (19-24)  mmol/L


 


ABG O2 Saturation  98.2 H   (94-97)  %


 


Sodium    (137-145)  mmol/L


 


Chloride    ()  mmol/L


 


Creatinine    (0.52-1.04)  mg/dL


 


Glucose    (74-99)  mg/dL


 


POC Glucose (mg/dL)   187 H  (75-99)  mg/dL


 


Phosphorus    (2.5-4.5)  mg/dL


 


Total Protein    (6.3-8.2)  g/dL


 


Albumin    (3.5-5.0)  g/dL


 


Hep C IgG Ab    (Non-Reactive)  








                      Microbiology - Last 24 Hours (Table)











 03/23/19 15:20 CSF Gram Stain - Preliminary





 Cerebral Spinal Fluid CSF Culture - Preliminary


 


 03/24/19 20:02 Gram Stain - Preliminary





 Sputum Sputum Culture - Preliminary


 


 03/22/19 17:39 Blood Culture - Preliminary





 Blood    No Growth after 48 hours


 


 03/23/19 10:11 Urine Culture - Final





 Urine,Catheterized 














Assessment and Plan


Assessment: 





Impression:





1 acute mental status change, acute encephalopathy exact etiology is not clear, 

viral encephalitis is in the differential.  Patient is status post lumbar 

puncture, and she is on acyclovir for the time being.  No signs of CVA noted on 

the CT of the brain.





2 extreme agitation requiring significant amount of sedation leading to 

intubation and mechanical ventilation.  Again the possibility of viral 

encephalitis is being highly considered.  The report on the spinal fluid was 

noted.  Patient remains on acyclovir.





3 stage III non-small cell lung cancer status post chemo and radiation therapy 

and she is receiving immunotherapy on outpatient basis.





4 history of fibromyalgia





5 chronic back pain





6 severe COPD





7 previous CVA/TIA





8 history of depression





Recommendation: Continue present ventilatory support, continue sedation but will

cut down the dose of fentanyl, no plans to wean the patient or extubated the 

patient today.  Continue empiric antibiotics coverage, continue acyclovir, 

continue Rocephin and vancomycin, ventilator settings were reviewed and adjusted

accordingly.  Continue GI and DVT prophylaxis, nutritional support, 

bronchodilators and systemic steroids, no plans to wean or extubated the patient

today.  Patient is relatively critically ill.  And not ready for weaning at this

point.  Critical care time is 40 minutes


Time with Patient: Greater than 30

## 2019-03-25 NOTE — P.PN
Subjective


Progress Note Date: 03/25/19


This is a 63-year-old female patient of Dr. bustillo.  Patient presented to the 

hospital with complaints of altered mental status changes.  She per patient's 

sister patient started having altered mental status possibly 2 days ago.   

Patient has a known past medical history of stage III lung cancer.  Per patient 

patient has not received any recent treatment for cancer.  Past medical history 

includes COPD, CVA, fibromyalgia, GERD, hyperlipidemia, osteoarthritis and 

pneumonia.  Chest x-ray completed showing no new process.   Head CT completed 

showing no acute process.  CT of abdomen and pelvis dated showing urinary 

bladder distention excessive colonic stool.  Liver enzymes slightly elevated AST

65, ALT 79 and alkaline phosphatase 219.  Ammonia level  28.  Urine and blood 

cultures have been ordered.  Patient's pH on this admission 7.44 pCO2 57 HCO3 

38.   WBC 19.5.  Influenza ordered.  Patient started on Rocephin.  At this time 

patient remains confused and are not able to follow commands.  Discussed case 

with Dr. Trejo per critical care.  [facility] patient be transferred to the 

intensive care unit for closer monitoring.  Discussed with patient's sister at 

bedside CODE STATUS.  At this time requesting patient be full code.  Patient's 

potassium also low at 2.8.  Replacement has been ordered.  Infectious disease 

will be consulted.  Oncology service is consulted.








On 03/24/2019 patient is currently in the intensive care unit.  Patient was 

intubated yesterday per critical care.  At this time patient is sedated.  

Discussed case with critical care Dr. Trejo, plan to attempt to wake patient u

p tomorrow.  Lumbar puncture was performed yesterday repeat head CT also 

performed.  At this time vitals do remain stable.  Patient patient currently on 

acylovir, vancomycin and Rocephin.  All cultures pending 





On 03/25/2019 patient remains in the intensive care unit.  Patient remains on 

mechanical ventilation and sedation at this time.  Attempt to wean sedation 

today per critical care team.  At this time critical care, infectious disease 

and oncology services are following.  Patient remains on antibiotics and 

antivirals.  Cultures currently pending








Objective





- Vital Signs


Vital signs: 


                                   Vital Signs











Temp  97.8 F   03/25/19 08:00


 


Pulse  53 L  03/25/19 09:00


 


Resp  14   03/25/19 09:00


 


BP  104/62   03/25/19 07:00


 


Pulse Ox  100   03/25/19 09:00








                                 Intake & Output











 03/24/19 03/25/19 03/25/19





 18:59 06:59 18:59


 


Intake Total 1058.943 9683.205 443.838


 


Output Total 565 365 60


 


Balance 294.414 0552.205 383.838


 


Weight 54.1 kg 57.6 kg 


 


Intake:   


 


   1300 300


 


    Acyclovir Sodium 500 mg   100





    In Sodium Chloride 0.9%   





    100 ml @ 100 mls/hr IVPB   





    Q8HR MARY Rx#:[id number]   


 


    Normal Saline  1200 200


 


    Potassium Chloride 20 meq 200  





    In Water For Injection 1   





    100ml.bag @ 50 mls/hr   





    IVPB Q2H MARY Rx#:   





    [id number]   


 


    Sodium Chloride 0.9% 1, 400 100 





    000 ml @ 100 mls/hr IV .   





    Q10H STA Rx#:[id number]   


 


  Intake, IV Titration 821.638 7063.205 63.838





  Amount   


 


    Acyclovir Sodium 500 mg  100 





    In Sodium Chloride 0.9%   





    100 ml @ 100 mls/hr IVPB   





    Q8HR MARY Rx#:[id number]   


 


    Magnesium Sulfate-D5w Pmx 400  





    1 gm In Dextrose/Water 1   





    100ml.bag @ 100 mls/hr   





    IVPB Q1H MARY Rx#:   





    [id number]   


 


    Propofol 1,000 mg In 185.478 162.301 63.838





    Empty Bag 1 bag @ Titrate   





    IV .Q0M MARY Rx#:   





    [id number]   


 


    Sodium Chloride 0.9% 1, 100  





    000 ml @ 100 mls/hr IV .   





    Q10H MARY Rx#:[id number]   


 


    Vancomycin 1,000 mg In  625 





    Sodium Chloride 0.9% 250   





    ml @ 125 mls/hr IVPB Q8H   





    MARY Rx#:[id number]   


 


    fentaNYL (PF) 1,000 mcg 193.347 173.904 





    In Sodium Chloride 0.9%   





    80 ml @ 1 MCG/KG/HR 5.443   





    mls/hr IV .[id number] [facility]   





    Rx#:[id number]   


 


  Tube Feeding 10 335 50


 


  Other  90 30


 


Output:   


 


  Urine 565 365 60


 


Other:   


 


  Voiding Method Indwelling Catheter Indwelling Catheter 








                       ABP, PAP, CO, CI - Last Documented











Arterial Blood Pressure        119/52

















- Exam





Head normocephalic


Neck supple


Lungs mechanically vented


Heart regular rate and rhythm S1-S2, no rub or gallop


Abdomen is soft nontender nondistended positive bowel sounds no 

hepatosplenomegaly


Extremities no edema


Neuro sedated at this time





- Labs


CBC & Chem 7: 


                                 03/25/19 04:20





                                 03/25/19 04:20


Labs: 


                  Abnormal Lab Results - Last 24 Hours (Table)











  03/24/19 03/24/19 03/24/19 Range/Units





  04:15 12:03 17:00 


 


RBC     (3.80-5.40)  m/uL


 


Hgb     (11.4-16.0)  gm/dL


 


Hct     (34.0-46.0)  %


 


Plt Count     (150-450)  k/uL


 


Neutrophils #     (1.3-7.7)  k/uL


 


Lymphocytes #     (1.0-4.8)  k/uL


 


ABG pCO2     (35-45)  mmHg


 


ABG pO2     ()  mmHg


 


ABG HCO3     (21-25)  mmol/L


 


ABG Total CO2     (19-24)  mmol/L


 


ABG O2 Saturation     (94-97)  %


 


Sodium     (137-145)  mmol/L


 


Chloride     ()  mmol/L


 


Creatinine     (0.52-1.04)  mg/dL


 


Glucose     (74-99)  mg/dL


 


POC Glucose (mg/dL)   155 H  133 H  (75-99)  mg/dL


 


Phosphorus  2.1 L    (2.5-4.5)  mg/dL


 


Total Protein     (6.3-8.2)  g/dL


 


Albumin     (3.5-5.0)  g/dL














  03/24/19 03/25/19 03/25/19 Range/Units





  23:26 04:20 04:20 


 


RBC   3.28 L   (3.80-5.40)  m/uL


 


Hgb   10.2 L   (11.4-16.0)  gm/dL


 


Hct   32.7 L   (34.0-46.0)  %


 


Plt Count   146 L   (150-450)  k/uL


 


Neutrophils #   9.8 H   (1.3-7.7)  k/uL


 


Lymphocytes #   0.1 L   (1.0-4.8)  k/uL


 


ABG pCO2     (35-45)  mmHg


 


ABG pO2     ()  mmHg


 


ABG HCO3     (21-25)  mmol/L


 


ABG Total CO2     (19-24)  mmol/L


 


ABG O2 Saturation     (94-97)  %


 


Sodium    136 L  (137-145)  mmol/L


 


Chloride    110 H  ()  mmol/L


 


Creatinine    0.49 L  (0.52-1.04)  mg/dL


 


Glucose    176 H  (74-99)  mg/dL


 


POC Glucose (mg/dL)  162 H    (75-99)  mg/dL


 


Phosphorus     (2.5-4.5)  mg/dL


 


Total Protein    3.9 L  (6.3-8.2)  g/dL


 


Albumin    2.1 L  (3.5-5.0)  g/dL














  03/25/19 03/25/19 Range/Units





  04:33 05:52 


 


RBC    (3.80-5.40)  m/uL


 


Hgb    (11.4-16.0)  gm/dL


 


Hct    (34.0-46.0)  %


 


Plt Count    (150-450)  k/uL


 


Neutrophils #    (1.3-7.7)  k/uL


 


Lymphocytes #    (1.0-4.8)  k/uL


 


ABG pCO2  49 H   (35-45)  mmHg


 


ABG pO2  145 H   ()  mmHg


 


ABG HCO3  29 H   (21-25)  mmol/L


 


ABG Total CO2  31 H   (19-24)  mmol/L


 


ABG O2 Saturation  98.2 H   (94-97)  %


 


Sodium    (137-145)  mmol/L


 


Chloride    ()  mmol/L


 


Creatinine    (0.52-1.04)  mg/dL


 


Glucose    (74-99)  mg/dL


 


POC Glucose (mg/dL)   187 H  (75-99)  mg/dL


 


Phosphorus    (2.5-4.5)  mg/dL


 


Total Protein    (6.3-8.2)  g/dL


 


Albumin    (3.5-5.0)  g/dL








                      Microbiology - Last 24 Hours (Table)











 03/23/19 15:20 CSF Gram Stain - Preliminary





 Cerebral Spinal Fluid CSF Culture - Preliminary


 


 03/24/19 20:02 Gram Stain - Preliminary





 Sputum Sputum Culture - Preliminary


 


 03/22/19 17:39 Blood Culture - Preliminary





 Blood    No Growth after 48 hours


 


 03/23/19 10:11 Urine Culture - Final





 Urine,Catheterized 














Assessment and Plan


Assessment: 





1.  Altered mental status changes.  Ammonia level 28.  Head CT completed showing

no acute process.  Abdomen and pelvis CT completed showing urinary bladder 

distention excessive colonic stool.  Urine and blood cultures ordered.  

Influenza ordered.  Lumbar puncture was performed per critical care.  Acyclovir 

has been added per infectious disease.  Repeat head CT also completed showing no

acute intracranial abnormality.  At this time patient remains on mechanical 

ventilation sedated.  Discussed case with critical care team will attempt to 

wean patient from mechanical ventilation and sedation tomorrow 03/25/2019





2.  Hypercapnia and hypoxic respiratory failure with known history of COPD.  

Initial pH on venous blood gas 7.44 pCO2 57 and HCO3 38.  pulmonary services 

following





3.  Leukocytosis.  Initial white blood cell 19.5.  Chest x-ray completed showing

no new process.  Patient started on Rocephin, vancomycin and Acyclovir.  

Infectious disease consulted.  Urine and blood cultures ordered.





4.  Hypokalemia.  Potassium 2.8.  Will replace per protocol





5.  Non-small cell lung cancer stage III adenocarcinoma.  Per patient's family 

patient has not received treatment for multiple months.  Per oncology service is

patient received PD1 immunotherapy proximally 2 months ago.  Possible MRI when 

patient is stable to rule out malignancy per oncology services





6.  History of COPD.  Patient currently on Solu-Medrol and DuoNeb breathing 

treatments





7.  History of CVA





8.  History of osteoarthritis





9.  History of hyperlipidemia





10.  History of fibromyalgia





11.  History of depression





12.  Ex-smoker.  Patient's both proximally half pack per day for 30 years





13.  Elevated liver enzymes.  AST trending down to 37 ALT 55 and alkaline 

phosphatase 148





DVT prophylaxis Lovenox.  GI prophylaxis Protonix





At This time patient remains in the intensive care unit.


Patient is mechanically ventilated on station.


Critical care and  infectious disease is following








I performed an examination of the patient and discussed their management with 

the Nurse Practitioner.  I have reviewed the Nurse Practitioner's notes and 

agree with the documented findings and plan of care

## 2019-03-25 NOTE — US
EXAMINATION TYPE: US abdomen limited

 

DATE OF EXAM: 3/25/2019

 

COMPARISON: CT

 

CLINICAL HISTORY: elevated LFT . ICU patient with intubation

 

EXAM MEASUREMENTS:

 

Liver Length:  18.1 cm   

Gallbladder Wall:  0.25 cm   

CBD:  0.4 cm

Right Kidney:  10.1 x 5.0 x 3.9 cm

 

 

 

Pancreas:  hyperechoic and tail obscured by overlying bowel gas

Liver:  no masses seen  

Gallbladder:  sludge within; pericholecystic fluid is seen

**Evidence for sonographic Beltrán's sign:  NA for ICU patient

CBD:  wnl 

Right Kidney:  No hydronephrosis or masses seen 

 

 

 

IMPRESSION: 

1. Sludge within the gallbladder. Some pericholecystic fluid is adjacent. Correlate for cholecystitis
. Stasis could be considered.

## 2019-03-25 NOTE — P.GSCN
History of Present Illness


Consult date: 19


Reason for Consult: 





Cholecystitis





Requesting physician: Veronica Ramírez


History of present illness: 





CHIEF COMPLAINT: Cholecystitis





HISTORY OF PRESENT ILLNESS: 63-year-old  female with a history of lung 

cancer who is admitted to the hospital secondary to mental status changes.  

Abdominal ultrasound was performed this morning secondary to elevated liver 

enzymes upon admission.  Ultrasound revealing sludge within the gallbladder and 

some pericholecystic fluid. General surgery was consulted for further 

evaluation. The patient is currently intubated and sedated. Mother and aunt are 

at the bedside who were able to answer some questions. Mother reports patient 

was not acting like herself prior to coming to the hospital. She reports that 

the patient is a Religion and does not curse and the patient was cursing 

frequently and she knew something was not right so they brought her to the 

hospital. Her mother denies nausea or vomiting prior to hospitalization. Mother 

denied constipation or diarrhea to her knowledge. She did report the patient had

a 'stomach-ache" the day she came to the hospital. Liver enzymes were mildly 

elevated upon admission, but have since normalized. 





PAST MEDICAL HISTORY: 


See list.





PAST SURGICAL HISTORY: 


See list.





SOCIAL HISTORY: No illicit drug use.  





REVIEW OF SYSTEMS: 


Unable to thoroughly obtain secondary to mechanical ventilation and sedation





PHYSICAL EXAM: 


VITAL SIGNS: Reviewed.


GENERAL: Well-developed in no acute distress. Sedated on ventilator.


HEENT: ET tube intact. No sclera icterus. Extraocular movements grossly intact. 

Moist buccal mucosa. Head is atraumatic, normocephalic. 


ABDOMEN:  Soft.  Nondistended. Nontender.


NEUROLOGIC: Sedated on ventilator.





IMAGIN. Abdominal ultrasound: Sludge within the gallbladder. Pericholecystic fluid.  

Gallbladder wall 0.25 cm.  Common bile duct 0.4 cm.


2. CT abdomen and pelvis: No significant abnormality of gallbladder visualized





ASSESSMENT: 


1.  Chronic cholecystitis





PLAN: 


No immediate surgical intervention at this time. Patient may be re-evaluated on 

an outpatient basis once she is more stable and recovers from current acute 

illness.





Nurse practitioner note has been reviewed by physician. Signing provider agrees 

with the documented findings, assessment, and plan of care. 








Past Medical History


Past Medical History: Cancer, COPD, CVA/TIA, Fibromyalgia, GERD/Reflux, 

Hyperlipidemia, Osteoarthritis (OA), Pneumonia


Additional Past Medical History / Comment(s): Non-small cell lung cancer stage 3

adenocarcinoma, COPD, fibromyalgia, acid reflux, hyperlipidemia, osteoarthritis,

adrenal mass that has remained stable over some time, TIA history of, hiatal 

hernia, chronic constipation, chronic back pain, degenerative arthritis


History of Any Multi-Drug Resistant Organisms: None Reported


Additional Past Surgical History / Comment(s): SINUS surgery


Past Anesthesia/Blood Transfusion Reactions: No Reported Reaction


Past Psychological History: Depression


Smoking Status: Former smoker


Past Alcohol Use History: None Reported


Past Drug Use History: None Reported





- Past Family History


  ** Mother


Family Medical History: Cancer





  ** Father


Family Medical History: Cancer





Medications and Allergies


                                Home Medications











 Medication  Instructions  Recorded  Confirmed  Type


 


Albuterol Inhaler [Ventolin Hfa 2 puff INHALATION RT-QID PRN 14 

History





Inhaler]    


 


Cevimeline [Evoxac] 30 mg PO BID 14 History


 


Escitalopram [Lexapro] 20 mg PO DAILY 14 History


 


Esomeprazole Magnesium [NexIUM] 40 mg PO BID 14 History


 


Morphine Sulfate [Ms Contin] 30 mg PO BID #60 tablet.er 09/02/15 03/22/19 Rx


 


oxyCODONE-APAP 10-325MG [Percocet 1 tab PO Q6H PRN 17 History





 mg]    


 


Albuterol Nebulized [Ventolin 2.5 mg INHALATION RT-QID PRN 17 

History





Nebulized]    


 


Budesonide-Formot 160-4.5 Mcg 2 puff INHALATION RT-BID 17 History





[Symbicort 160-4.5 Mcg Inhaler]    


 


Ondansetron Odt [Zofran ODT] 8 mg PO Q6H PRN 17 History


 


Prochlorperazine [Compazine] 10 mg PO Q6H PRN 17 History


 


ALPRAZolam [Xanax] 0.25 mg PO AS DIRECTED PRN 18 History


 


Cholecalciferol [Vitamin D3] 1,000 unit PO DAILY 19 History


 


Cyanocobalamin (Vitamin B-12) 1,000 mcg PO BID 19 History





[Vitamin B-12]    


 


Folic Acid 0.4 mg PO DAILY 19 History


 


Furosemide [Lasix] 40 mg PO TID 19 History


 


Levothyroxine Sodium [Synthroid] 25 mcg PO DAILY 19 History


 


Umeclidinium Bromide [Incruse 1 puff INHALATION RT-DAILY 19 

History





Ellipta]    


 


guaiFENesin [Mucinex] 600 mg PO DAILY PRN 19 History


 


LORazepam [Ativan] 0.5 mg PO Q6H PRN 19 History


 


Pyridoxine HCl (Vitamin B6) 100 mg PO DAILY 19 History





[Vitamin B-6]    


 


Tolterodine Tartrate [Detrol LA] 4 mg PO DAILY 19 History








                                    Allergies











Allergy/AdvReac Type Severity Reaction Status Date / Time


 


No Known Allergies Allergy   Verified 19 17:39














Surgical - Exam


                                   Vital Signs











Temp Pulse Resp BP Pulse Ox


 


 99 F   99   28 H  166/116   90 L


 


 19 17:33  19 17:33  19 17:33  19 17:33  19 17:33














Results





- Labs





                                 19 04:20





                                 19 04:20


                  Abnormal Lab Results - Last 24 Hours (Table)











  19 Range/Units





  04:15 04:15 04:15 


 


RBC     (3.80-5.40)  m/uL


 


Hgb     (11.4-16.0)  gm/dL


 


Hct     (34.0-46.0)  %


 


Plt Count     (150-450)  k/uL


 


Neutrophils #     (1.3-7.7)  k/uL


 


Lymphocytes #     (1.0-4.8)  k/uL


 


ABG pCO2     (35-45)  mmHg


 


ABG pO2     ()  mmHg


 


ABG HCO3     (21-25)  mmol/L


 


ABG Total CO2     (19-24)  mmol/L


 


ABG O2 Saturation     (94-97)  %


 


Sodium     (137-145)  mmol/L


 


Chloride     ()  mmol/L


 


Creatinine     (0.52-1.04)  mg/dL


 


Glucose     (74-99)  mg/dL


 


POC Glucose (mg/dL)     (75-99)  mg/dL


 


Hemoglobin A1c   6.2 H   (4.0-6.0)  %


 


Phosphorus  2.1 L    (2.5-4.5)  mg/dL


 


Total Protein     (6.3-8.2)  g/dL


 


Albumin     (3.5-5.0)  g/dL


 


Hep C IgG Ab    Reactive H  (Non-Reactive)  














  19 Range/Units





  17:00 23:26 04:20 


 


RBC    3.28 L  (3.80-5.40)  m/uL


 


Hgb    10.2 L  (11.4-16.0)  gm/dL


 


Hct    32.7 L  (34.0-46.0)  %


 


Plt Count    146 L  (150-450)  k/uL


 


Neutrophils #    9.8 H  (1.3-7.7)  k/uL


 


Lymphocytes #    0.1 L  (1.0-4.8)  k/uL


 


ABG pCO2     (35-45)  mmHg


 


ABG pO2     ()  mmHg


 


ABG HCO3     (21-25)  mmol/L


 


ABG Total CO2     (19-24)  mmol/L


 


ABG O2 Saturation     (94-97)  %


 


Sodium     (137-145)  mmol/L


 


Chloride     ()  mmol/L


 


Creatinine     (0.52-1.04)  mg/dL


 


Glucose     (74-99)  mg/dL


 


POC Glucose (mg/dL)  133 H  162 H   (75-99)  mg/dL


 


Hemoglobin A1c     (4.0-6.0)  %


 


Phosphorus     (2.5-4.5)  mg/dL


 


Total Protein     (6.3-8.2)  g/dL


 


Albumin     (3.5-5.0)  g/dL


 


Hep C IgG Ab     (Non-Reactive)  














  19 Range/Units





  04:20 04:33 05:52 


 


RBC     (3.80-5.40)  m/uL


 


Hgb     (11.4-16.0)  gm/dL


 


Hct     (34.0-46.0)  %


 


Plt Count     (150-450)  k/uL


 


Neutrophils #     (1.3-7.7)  k/uL


 


Lymphocytes #     (1.0-4.8)  k/uL


 


ABG pCO2   49 H   (35-45)  mmHg


 


ABG pO2   145 H   ()  mmHg


 


ABG HCO3   29 H   (21-25)  mmol/L


 


ABG Total CO2   31 H   (19-24)  mmol/L


 


ABG O2 Saturation   98.2 H   (94-97)  %


 


Sodium  136 L    (137-145)  mmol/L


 


Chloride  110 H    ()  mmol/L


 


Creatinine  0.49 L    (0.52-1.04)  mg/dL


 


Glucose  176 H    (74-99)  mg/dL


 


POC Glucose (mg/dL)    187 H  (75-99)  mg/dL


 


Hemoglobin A1c     (4.0-6.0)  %


 


Phosphorus     (2.5-4.5)  mg/dL


 


Total Protein  3.9 L    (6.3-8.2)  g/dL


 


Albumin  2.1 L    (3.5-5.0)  g/dL


 


Hep C IgG Ab     (Non-Reactive)  














  19 Range/Units





  12:26 


 


RBC   (3.80-5.40)  m/uL


 


Hgb   (11.4-16.0)  gm/dL


 


Hct   (34.0-46.0)  %


 


Plt Count   (150-450)  k/uL


 


Neutrophils #   (1.3-7.7)  k/uL


 


Lymphocytes #   (1.0-4.8)  k/uL


 


ABG pCO2   (35-45)  mmHg


 


ABG pO2   ()  mmHg


 


ABG HCO3   (21-25)  mmol/L


 


ABG Total CO2   (19-24)  mmol/L


 


ABG O2 Saturation   (94-97)  %


 


Sodium   (137-145)  mmol/L


 


Chloride   ()  mmol/L


 


Creatinine   (0.52-1.04)  mg/dL


 


Glucose   (74-99)  mg/dL


 


POC Glucose (mg/dL)  206 H  (75-99)  mg/dL


 


Hemoglobin A1c   (4.0-6.0)  %


 


Phosphorus   (2.5-4.5)  mg/dL


 


Total Protein   (6.3-8.2)  g/dL


 


Albumin   (3.5-5.0)  g/dL


 


Hep C IgG Ab   (Non-Reactive)  








                      Microbiology - Last 24 Hours (Table)











 19 20:02 Gram Stain - Preliminary





 Sputum Sputum Culture - Preliminary


 


 19 15:20 CSF Gram Stain - Preliminary





 Cerebral Spinal Fluid CSF Culture - Preliminary


 


 19 17:39 Blood Culture - Preliminary





 Blood    No Growth after 48 hours


 


 19 10:11 Urine Culture - Final





 Urine,Catheterized 








                                 Diabetes panel











  19 Range/Units





  04:15 16:40 04:20 


 


Sodium    136 L  (137-145)  mmol/L


 


Potassium   3.5  4.0  (3.5-5.1)  mmol/L


 


Chloride    110 H  ()  mmol/L


 


Carbon Dioxide    27  (22-30)  mmol/L


 


BUN    15  (7-17)  mg/dL


 


Creatinine    0.49 L  (0.52-1.04)  mg/dL


 


Glucose    176 H  (74-99)  mg/dL


 


Hemoglobin A1c  6.2 H    (4.0-6.0)  %


 


Calcium    8.4  (8.4-10.2)  mg/dL


 


AST    24  (14-36)  U/L


 


ALT    43  (9-52)  U/L


 


Alkaline Phosphatase    126  ()  U/L


 


Total Protein    3.9 L  (6.3-8.2)  g/dL


 


Albumin    2.1 L  (3.5-5.0)  g/dL








                                  Calcium panel











  19 Range/Units





  04:15 04:20 


 


Calcium   8.4  (8.4-10.2)  mg/dL


 


Phosphorus  2.1 L   (2.5-4.5)  mg/dL


 


Albumin   2.1 L  (3.5-5.0)  g/dL








                                 Pituitary panel











  19 Range/Units





  16:40 04:20 


 


Sodium   136 L  (137-145)  mmol/L


 


Potassium  3.5  4.0  (3.5-5.1)  mmol/L


 


Chloride   110 H  ()  mmol/L


 


Carbon Dioxide   27  (22-30)  mmol/L


 


BUN   15  (7-17)  mg/dL


 


Creatinine   0.49 L  (0.52-1.04)  mg/dL


 


Glucose   176 H  (74-99)  mg/dL


 


Calcium   8.4  (8.4-10.2)  mg/dL








                                  Adrenal panel











  19 Range/Units





  16:40 04:20 


 


Sodium   136 L  (137-145)  mmol/L


 


Potassium  3.5  4.0  (3.5-5.1)  mmol/L


 


Chloride   110 H  ()  mmol/L


 


Carbon Dioxide   27  (22-30)  mmol/L


 


BUN   15  (7-17)  mg/dL


 


Creatinine   0.49 L  (0.52-1.04)  mg/dL


 


Glucose   176 H  (74-99)  mg/dL


 


Calcium   8.4  (8.4-10.2)  mg/dL


 


Total Bilirubin   0.3  (0.2-1.3)  mg/dL


 


AST   24  (14-36)  U/L


 


ALT   43  (9-52)  U/L


 


Alkaline Phosphatase   126  ()  U/L


 


Total Protein   3.9 L  (6.3-8.2)  g/dL


 


Albumin   2.1 L  (3.5-5.0)  g/dL

## 2019-03-26 VITALS — DIASTOLIC BLOOD PRESSURE: 62 MMHG | SYSTOLIC BLOOD PRESSURE: 104 MMHG

## 2019-03-26 LAB
ALBUMIN SERPL-MCNC: 2.2 G/DL (ref 3.5–5)
ALP SERPL-CCNC: 115 U/L (ref 38–126)
ALT SERPL-CCNC: 39 U/L (ref 9–52)
ANION GAP SERPL CALC-SCNC: -2 MMOL/L
AST SERPL-CCNC: 21 U/L (ref 14–36)
BASOPHILS # BLD AUTO: 0 K/UL (ref 0–0.2)
BASOPHILS NFR BLD AUTO: 0 %
BUN SERPL-SCNC: 17 MG/DL (ref 7–17)
CALCIUM SPEC-MCNC: 8.3 MG/DL (ref 8.4–10.2)
CHLORIDE SERPL-SCNC: 113 MMOL/L (ref 98–107)
CO2 BLDA-SCNC: 30 MMOL/L (ref 19–24)
CO2 SERPL-SCNC: 28 MMOL/L (ref 22–30)
EOSINOPHIL # BLD AUTO: 0.1 K/UL (ref 0–0.7)
EOSINOPHIL NFR BLD AUTO: 1 %
ERYTHROCYTE [DISTWIDTH] IN BLOOD BY AUTOMATED COUNT: 3.24 M/UL (ref 3.8–5.4)
ERYTHROCYTE [DISTWIDTH] IN BLOOD: 16 % (ref 11.5–15.5)
GLUCOSE BLD-MCNC: 128 MG/DL (ref 75–99)
GLUCOSE BLD-MCNC: 162 MG/DL (ref 75–99)
GLUCOSE BLD-MCNC: 169 MG/DL (ref 75–99)
GLUCOSE BLD-MCNC: 182 MG/DL (ref 75–99)
GLUCOSE BLD-MCNC: 183 MG/DL (ref 75–99)
GLUCOSE SERPL-MCNC: 174 MG/DL (ref 74–99)
HCO3 BLDA-SCNC: 29 MMOL/L (ref 21–25)
HCT VFR BLD AUTO: 32.3 % (ref 34–46)
HGB BLD-MCNC: 10.4 GM/DL (ref 11.4–16)
LYMPHOCYTES # SPEC AUTO: 0.2 K/UL (ref 1–4.8)
LYMPHOCYTES NFR SPEC AUTO: 2 %
MCH RBC QN AUTO: 32 PG (ref 25–35)
MCHC RBC AUTO-ENTMCNC: 32.1 G/DL (ref 31–37)
MCV RBC AUTO: 99.7 FL (ref 80–100)
MONOCYTES # BLD AUTO: 0.4 K/UL (ref 0–1)
MONOCYTES NFR BLD AUTO: 4 %
NEUTROPHILS # BLD AUTO: 9.5 K/UL (ref 1.3–7.7)
NEUTROPHILS NFR BLD AUTO: 94 %
PCO2 BLDA: 51 MMHG (ref 35–45)
PH BLDA: 7.36 [PH] (ref 7.35–7.45)
PLATELET # BLD AUTO: 141 K/UL (ref 150–450)
PO2 BLDA: 71 MMHG (ref 83–108)
POTASSIUM SERPL-SCNC: 4.7 MMOL/L (ref 3.5–5.1)
PROT SERPL-MCNC: 4 G/DL (ref 6.3–8.2)
SODIUM SERPL-SCNC: 139 MMOL/L (ref 137–145)
VDRL CSF-TITR: (no result) TITER
WBC # BLD AUTO: 10.2 K/UL (ref 3.8–10.6)

## 2019-03-26 RX ADMIN — MORPHINE SULFATE SCH: 30 TABLET, FILM COATED, EXTENDED RELEASE ORAL at 08:19

## 2019-03-26 RX ADMIN — METHYLPREDNISOLONE SODIUM SUCCINATE SCH MG: 125 INJECTION, POWDER, FOR SOLUTION INTRAMUSCULAR; INTRAVENOUS at 18:12

## 2019-03-26 RX ADMIN — CHLORHEXIDINE GLUCONATE SCH ML: 1.2 RINSE ORAL at 08:21

## 2019-03-26 RX ADMIN — METHYLPREDNISOLONE SODIUM SUCCINATE SCH MG: 125 INJECTION, POWDER, FOR SOLUTION INTRAMUSCULAR; INTRAVENOUS at 23:23

## 2019-03-26 RX ADMIN — LEVOTHYROXINE SODIUM ANHYDROUS SCH MCG: 100 INJECTION, POWDER, LYOPHILIZED, FOR SOLUTION INTRAVENOUS at 08:22

## 2019-03-26 RX ADMIN — SODIUM CHLORIDE SCH MLS/HR: 900 INJECTION, SOLUTION INTRAVENOUS at 11:19

## 2019-03-26 RX ADMIN — CLEVIPIDINE SCH MLS/HR: 0.5 EMULSION INTRAVENOUS at 11:19

## 2019-03-26 RX ADMIN — IPRATROPIUM BROMIDE AND ALBUTEROL SULFATE SCH ML: .5; 3 SOLUTION RESPIRATORY (INHALATION) at 23:18

## 2019-03-26 RX ADMIN — PROPOFOL SCH MLS/HR: 10 INJECTION, EMULSION INTRAVENOUS at 08:20

## 2019-03-26 RX ADMIN — INSULIN ASPART SCH UNIT: 100 INJECTION, SOLUTION INTRAVENOUS; SUBCUTANEOUS at 06:14

## 2019-03-26 RX ADMIN — INSULIN ASPART SCH: 100 INJECTION, SOLUTION INTRAVENOUS; SUBCUTANEOUS at 00:28

## 2019-03-26 RX ADMIN — ENOXAPARIN SODIUM SCH MG: 40 INJECTION SUBCUTANEOUS at 08:21

## 2019-03-26 RX ADMIN — INSULIN ASPART SCH UNIT: 100 INJECTION, SOLUTION INTRAVENOUS; SUBCUTANEOUS at 18:13

## 2019-03-26 RX ADMIN — IPRATROPIUM BROMIDE AND ALBUTEROL SULFATE PRN ML: .5; 3 SOLUTION RESPIRATORY (INHALATION) at 05:17

## 2019-03-26 RX ADMIN — IPRATROPIUM BROMIDE AND ALBUTEROL SULFATE SCH ML: .5; 3 SOLUTION RESPIRATORY (INHALATION) at 07:19

## 2019-03-26 RX ADMIN — CHLORHEXIDINE GLUCONATE SCH ML: 1.2 RINSE ORAL at 20:54

## 2019-03-26 RX ADMIN — METHYLPREDNISOLONE SODIUM SUCCINATE SCH MG: 125 INJECTION, POWDER, FOR SOLUTION INTRAMUSCULAR; INTRAVENOUS at 12:02

## 2019-03-26 RX ADMIN — PANTOPRAZOLE SODIUM SCH MG: 40 INJECTION, POWDER, FOR SOLUTION INTRAVENOUS at 08:22

## 2019-03-26 RX ADMIN — MIDAZOLAM SCH MLS/HR: 5 INJECTION INTRAMUSCULAR; INTRAVENOUS at 11:19

## 2019-03-26 RX ADMIN — INSULIN ASPART SCH UNIT: 100 INJECTION, SOLUTION INTRAVENOUS; SUBCUTANEOUS at 23:29

## 2019-03-26 RX ADMIN — IPRATROPIUM BROMIDE AND ALBUTEROL SULFATE SCH ML: .5; 3 SOLUTION RESPIRATORY (INHALATION) at 15:21

## 2019-03-26 RX ADMIN — CEFAZOLIN SCH MLS/HR: 330 INJECTION, POWDER, FOR SOLUTION INTRAMUSCULAR; INTRAVENOUS at 17:27

## 2019-03-26 RX ADMIN — CEFAZOLIN SCH MLS/HR: 330 INJECTION, POWDER, FOR SOLUTION INTRAMUSCULAR; INTRAVENOUS at 23:23

## 2019-03-26 RX ADMIN — SODIUM CHLORIDE SCH MLS/HR: 9 INJECTION, SOLUTION INTRAVENOUS at 12:01

## 2019-03-26 RX ADMIN — PROPOFOL SCH MLS/HR: 10 INJECTION, EMULSION INTRAVENOUS at 04:00

## 2019-03-26 RX ADMIN — MIDAZOLAM SCH MLS/HR: 5 INJECTION INTRAMUSCULAR; INTRAVENOUS at 16:57

## 2019-03-26 RX ADMIN — METHYLPREDNISOLONE SODIUM SUCCINATE SCH MG: 125 INJECTION, POWDER, FOR SOLUTION INTRAMUSCULAR; INTRAVENOUS at 06:13

## 2019-03-26 RX ADMIN — MIDAZOLAM SCH MLS/HR: 5 INJECTION INTRAMUSCULAR; INTRAVENOUS at 22:19

## 2019-03-26 RX ADMIN — SODIUM CHLORIDE SCH MLS/HR: 900 INJECTION, SOLUTION INTRAVENOUS at 16:56

## 2019-03-26 RX ADMIN — INSULIN ASPART SCH UNIT: 100 INJECTION, SOLUTION INTRAVENOUS; SUBCUTANEOUS at 12:29

## 2019-03-26 RX ADMIN — IPRATROPIUM BROMIDE AND ALBUTEROL SULFATE SCH ML: .5; 3 SOLUTION RESPIRATORY (INHALATION) at 11:10

## 2019-03-26 RX ADMIN — CLEVIPIDINE SCH MLS/HR: 0.5 EMULSION INTRAVENOUS at 20:55

## 2019-03-26 RX ADMIN — MORPHINE SULFATE SCH: 30 TABLET, FILM COATED, EXTENDED RELEASE ORAL at 20:41

## 2019-03-26 RX ADMIN — NOREPINEPHRINE BITARTRATE SCH: 1 INJECTION, SOLUTION, CONCENTRATE INTRAVENOUS at 20:41

## 2019-03-26 RX ADMIN — IPRATROPIUM BROMIDE AND ALBUTEROL SULFATE SCH ML: .5; 3 SOLUTION RESPIRATORY (INHALATION) at 19:19

## 2019-03-26 NOTE — PN
PROGRESS NOTE



DATE OF SERVICE:

03/25/2019



REASON FOR FOLLOWUP:

Infection.



INTERVAL HISTORY:

The patient is currently afebrile.  The patient is hemodynamically stable, not on any

pressor support.  FiO2 is currently stable, down to 35%.  She is tolerating her tube

feeds. No diarrhea per the nursing staff. She is currently sedated on the vent, unable

to provide any history.



PHYSICAL EXAMINATION:

Blood pressure 108/49, pulse of 52, temperature 98. She is 99% on 35% FiO2.

General description is an elderly female lying in bed in no distress.

RESPIRATORY SYSTEM: Unlabored breathing with decreased breath sounds at the base. No

wheeze.

HEART: S1, S2.  Regular rate and rhythm.

ABDOMEN: Soft. No tenderness.

EXTREMITIES: No edema of the feet.



LABS:

Hemoglobin 10.2, white count of 10.4, BUN of 15, creatinine 0.49.



DIAGNOSTIC IMPRESSION AND PLAN:

Patient with leukocytosis which is likely multifactorial in this patient with a

question of chronic cholecystitis. Seen by Surgery, who is recommending no acute

intervention.  The patient _____ negative. Chest x-ray with some atelectasis and

consolidation. Sputum culture is currently pending.  We will keep the patient on

Rocephin and vancomycin while waiting for the sputum culture to finalize.  However,

discontinue the acyclovir, monitoring clinical course closely.  Continue with

supportive care.





MMODL / IJN: [id number] / Job#: [id number]

## 2019-03-26 NOTE — PN
PROGRESS NOTE



DATE OF SERVICE:

03/26/2019



REASON FOR FOLLOWUP:

Leukocytosis/infection.



INTERVAL HISTORY:

The patient is currently afebrile.  The patient is hemodynamically stable, not on any

pressor support.  The patient's FiO2 is currently stable at 35%.  She has been

tolerating her tube feeds. No diarrhea has been noticed by the nursing staff. She

looked to be slightly agitated and sedation has been adjusted.



PHYSICAL EXAMINATION:

Blood pressure is 151/68 with a pulse of 80, temperature 98. She is 96% on 35% FiO2.

General description is an elderly female lying in bed in no distress.

RESPIRATORY SYSTEM: Unlabored breathing with decreased breath sounds at the base. No

wheeze.

HEART: S1, S2.  Regular rate and rhythm.

ABDOMEN: Soft. No tenderness.



LABS:

Hemoglobin 10.4, white count 10.2 with a BUN of 17, creatinine 0.44.  Sputum with

Candida albicans.  Blood culture has been negative.  CSF culture negative.  Urine is

negative.



DIAGNOSTIC IMPRESSION AND PLAN:

Patient with leukocytosis which is likely multifactorial in this patient with possible

component of chronic cholecystitis _____ patient did have a chest x-ray completed. It

shows trace left pleural effusion. Sputum with Candida albicans, likely colonizer, as

it did not grow any resistant pathogen.  Recommend to keep the patient on the Rocephin;

however, discontinue the vancomycin to decrease the risk of nephrotoxicity and monitor

clinical course closely.  Continue with supportive care.





MMODL / SARAHN: [id number] / Job#: [id number]

## 2019-03-26 NOTE — P.PN
Subjective


Progress Note Date: 03/26/19


This is a 63-year-old female patient of Dr. bustillo.  Patient presented to the 

hospital with complaints of altered mental status changes.  She per patient's 

sister patient started having altered mental status possibly 2 days ago.   

Patient has a known past medical history of stage III lung cancer.  Per patient 

patient has not received any recent treatment for cancer.  Past medical history 

includes COPD, CVA, fibromyalgia, GERD, hyperlipidemia, osteoarthritis and 

pneumonia.  Chest x-ray completed showing no new process.   Head CT completed 

showing no acute process.  CT of abdomen and pelvis dated showing urinary 

bladder distention excessive colonic stool.  Liver enzymes slightly elevated AST

65, ALT 79 and alkaline phosphatase 219.  Ammonia level  28.  Urine and blood 

cultures have been ordered.  Patient's pH on this admission 7.44 pCO2 57 HCO3 

38.   WBC 19.5.  Influenza ordered.  Patient started on Rocephin.  At this time 

patient remains confused and are not able to follow commands.  Discussed case 

with Dr. Trejo per critical care.  [facility] patient be transferred to the 

intensive care unit for closer monitoring.  Discussed with patient's sister at 

bedside CODE STATUS.  At this time requesting patient be full code.  Patient's 

potassium also low at 2.8.  Replacement has been ordered.  Infectious disease 

will be consulted.  Oncology service is consulted.








On 03/24/2019 patient is currently in the intensive care unit.  Patient was 

intubated yesterday per critical care.  At this time patient is sedated.  

Discussed case with critical care Dr. Trejo, plan to attempt to wake patient u

p tomorrow.  Lumbar puncture was performed yesterday repeat head CT also 

performed.  At this time vitals do remain stable.  Patient patient currently on 

acylovir, vancomycin and Rocephin.  All cultures pending 





On 03/25/2019 patient remains in the intensive care unit.  Patient remains on 

mechanical ventilation and sedation at this time.  Attempt to wean sedation 

today per critical care team.  At this time critical care, infectious disease 

and oncology services are following.  Patient remains on antibiotics and 

antivirals.  Cultures currently pending








On 03/26/2018 patient remains in the intensive care unit on mechanical 

ventilation.  Patient is increasingly agitated and unable to weaned from 

sedation at this time.  Per Dr. Mendenhall antivirals have been DC'd.  Patient 

remains on Vanco and Rocephin for IV antibiotics.





Objective





- Vital Signs


Vital signs: 


                                   Vital Signs











Temp  98.1 F   03/26/19 08:00


 


Pulse  76   03/26/19 10:00


 


Resp  17   03/26/19 10:00


 


BP  104/62   03/26/19 10:00


 


Pulse Ox  96   03/26/19 10:00








                                 Intake & Output











 03/25/19 03/26/19 03/26/19





 18:59 06:59 18:59


 


Intake Total 2176.405 1969.860 597.898


 


Output Total 310 380 235


 


Balance 2774.918 5032.860 362.898


 


Weight  63 kg 


 


Intake:   


 


  IV 1600 1200 400


 


    Acyclovir Sodium 500 mg 200  





    In Sodium Chloride 0.9%   





    100 ml @ 100 mls/hr IVPB   





    Q8HR MARY Rx#:[id number]   


 


    Normal Saline 1100 1200 400


 


    Vancomycin 1,000 mg In 250  





    Sodium Chloride 0.9% 250   





    ml @ 125 mls/hr IVPB Q8H   





    [facility] Rx#:[id number]   


 


    cefTRIAXone 1 gm In 50  





    Sodium Chloride 0.9% 50   





    ml @ 100 mls/hr IVPB   





    Q24HR MARY Rx#:[id number]   


 


  Intake, IV Titration 261.405 379.860 147.898





  Amount   


 


    Propofol 1,000 mg In 163.838 166.814 70.33





    Empty Bag 1 bag @ Titrate   





    IV .Q0M MARY Rx#:   





    [id number]   


 


    Vancomycin 1,000 mg In  125 





    Sodium Chloride 0.9% 250   





    ml @ 125 mls/hr IVPB Q12H   





    [facility] Rx#:[id number]   


 


    fentaNYL (PF) 1,000 mcg 97.567 88.046 77.568





    In Sodium Chloride 0.9%   





    80 ml @ 1 MCG/KG/HR 5.443   





    mls/hr IV .[id number] [facility]   





    Rx#:[id number]   


 


  Tube Feeding 225 300 50


 


  Other 90 90 


 


Output:   


 


  Urine 310 380 235


 


Other:   


 


  Voiding Method Indwelling Catheter Indwelling Catheter 








                       ABP, PAP, CO, CI - Last Documented











Arterial Blood Pressure        144/63

















- Exam





Head normocephalic


Neck supple


Lungs mechanically vented


Heart regular rate and rhythm S1-S2, no rub or gallop


Abdomen is soft nontender nondistended positive bowel sounds no 

hepatosplenomegaly


Extremities no edema


Neuro sedated at this time





- Labs


CBC & Chem 7: 


                                 03/26/19 04:45





                                 03/26/19 04:45


Labs: 


                  Abnormal Lab Results - Last 24 Hours (Table)











  03/24/19 03/24/19 03/25/19 Range/Units





  04:15 04:15 12:26 


 


RBC     (3.80-5.40)  m/uL


 


Hgb     (11.4-16.0)  gm/dL


 


Hct     (34.0-46.0)  %


 


RDW     (11.5-15.5)  %


 


Plt Count     (150-450)  k/uL


 


Neutrophils #     (1.3-7.7)  k/uL


 


Lymphocytes #     (1.0-4.8)  k/uL


 


ABG pCO2     (35-45)  mmHg


 


ABG pO2     ()  mmHg


 


ABG HCO3     (21-25)  mmol/L


 


ABG Total CO2     (19-24)  mmol/L


 


Chloride     ()  mmol/L


 


Creatinine     (0.52-1.04)  mg/dL


 


Glucose     (74-99)  mg/dL


 


POC Glucose (mg/dL)    206 H  (75-99)  mg/dL


 


Hemoglobin A1c  6.2 H    (4.0-6.0)  %


 


Calcium     (8.4-10.2)  mg/dL


 


Ammonia     (<30)  umol/L


 


Total Protein     (6.3-8.2)  g/dL


 


Albumin     (3.5-5.0)  g/dL


 


Hep C IgG Ab   Reactive H   (Non-Reactive)  














  03/25/19 03/25/19 03/26/19 Range/Units





  17:44 23:54 04:45 


 


RBC    3.24 L  (3.80-5.40)  m/uL


 


Hgb    10.4 L  (11.4-16.0)  gm/dL


 


Hct    32.3 L  (34.0-46.0)  %


 


RDW    16.0 H  (11.5-15.5)  %


 


Plt Count    141 L  (150-450)  k/uL


 


Neutrophils #    9.5 H  (1.3-7.7)  k/uL


 


Lymphocytes #    0.2 L  (1.0-4.8)  k/uL


 


ABG pCO2     (35-45)  mmHg


 


ABG pO2     ()  mmHg


 


ABG HCO3     (21-25)  mmol/L


 


ABG Total CO2     (19-24)  mmol/L


 


Chloride     ()  mmol/L


 


Creatinine     (0.52-1.04)  mg/dL


 


Glucose     (74-99)  mg/dL


 


POC Glucose (mg/dL)  145 H  128 H   (75-99)  mg/dL


 


Hemoglobin A1c     (4.0-6.0)  %


 


Calcium     (8.4-10.2)  mg/dL


 


Ammonia     (<30)  umol/L


 


Total Protein     (6.3-8.2)  g/dL


 


Albumin     (3.5-5.0)  g/dL


 


Hep C IgG Ab     (Non-Reactive)  














  03/26/19 03/26/19 03/26/19 Range/Units





  04:45 04:45 06:09 


 


RBC     (3.80-5.40)  m/uL


 


Hgb     (11.4-16.0)  gm/dL


 


Hct     (34.0-46.0)  %


 


RDW     (11.5-15.5)  %


 


Plt Count     (150-450)  k/uL


 


Neutrophils #     (1.3-7.7)  k/uL


 


Lymphocytes #     (1.0-4.8)  k/uL


 


ABG pCO2     (35-45)  mmHg


 


ABG pO2     ()  mmHg


 


ABG HCO3     (21-25)  mmol/L


 


ABG Total CO2     (19-24)  mmol/L


 


Chloride  113 H    ()  mmol/L


 


Creatinine  0.44 L    (0.52-1.04)  mg/dL


 


Glucose  174 H    (74-99)  mg/dL


 


POC Glucose (mg/dL)    183 H  (75-99)  mg/dL


 


Hemoglobin A1c     (4.0-6.0)  %


 


Calcium  8.3 L    (8.4-10.2)  mg/dL


 


Ammonia   43 H   (<30)  umol/L


 


Total Protein  4.0 L    (6.3-8.2)  g/dL


 


Albumin  2.2 L    (3.5-5.0)  g/dL


 


Hep C IgG Ab     (Non-Reactive)  














  03/26/19 Range/Units





  08:07 


 


RBC   (3.80-5.40)  m/uL


 


Hgb   (11.4-16.0)  gm/dL


 


Hct   (34.0-46.0)  %


 


RDW   (11.5-15.5)  %


 


Plt Count   (150-450)  k/uL


 


Neutrophils #   (1.3-7.7)  k/uL


 


Lymphocytes #   (1.0-4.8)  k/uL


 


ABG pCO2  51 H  (35-45)  mmHg


 


ABG pO2  71 L  ()  mmHg


 


ABG HCO3  29 H  (21-25)  mmol/L


 


ABG Total CO2  30 H  (19-24)  mmol/L


 


Chloride   ()  mmol/L


 


Creatinine   (0.52-1.04)  mg/dL


 


Glucose   (74-99)  mg/dL


 


POC Glucose (mg/dL)   (75-99)  mg/dL


 


Hemoglobin A1c   (4.0-6.0)  %


 


Calcium   (8.4-10.2)  mg/dL


 


Ammonia   (<30)  umol/L


 


Total Protein   (6.3-8.2)  g/dL


 


Albumin   (3.5-5.0)  g/dL


 


Hep C IgG Ab   (Non-Reactive)  








                      Microbiology - Last 24 Hours (Table)











 03/23/19 15:20 CSF Gram Stain - Preliminary





 Cerebral Spinal Fluid CSF Culture - Preliminary


 


 03/22/19 17:39 Blood Culture - Preliminary





 Blood    No Growth after 72 hours


 


 03/24/19 20:02 Gram Stain - Preliminary





 Sputum Sputum Culture - Preliminary














Assessment and Plan


Assessment: 





1.  Altered mental status changes with increased agitation.  Ammonia level 28.  

Head CT completed showing no acute process.  Abdomen and pelvis CT completed 

showing urinary bladder distention excessive colonic stool.  Urine and blood 

cultures ordered.  Influenza ordered.  Lumbar puncture was performed per 

critical care.  Acyclovir has been added per infectious disease.  Repeat head CT

also completed showing no acute intracranial abnormality.  At this time patient 

remains on mechanical ventilation andsedated. 





2.  Hypercapnia and hypoxic respiratory failure with known history of COPD.  Ini

tial pH on venous blood gas 7.44 pCO2 57 and HCO3 38.  pulmonary services 

following





3.  Leukocytosis.  Initial white blood cell 19.5.  Chest x-ray completed showing

no new process.  Patient started on Rocephin and vancomycin.  Infectious disease

consulted.  Urine and blood cultures currently showing no growth





4.  Hypokalemia.  Potassium 2.8.  Will replace per protocol.  resolved. 





5.  Non-small cell lung cancer stage III adenocarcinoma.  Per patient's family 

patient has not received treatment for multiple months.  Per oncology service is

patient received PD1 immunotherapy proximally 2 months ago.  Possible MRI when 

patient is stable to rule out malignancy per oncology services





6.  History of COPD.  Patient currently on Solu-Medrol and DuoNeb breathing 

treatments





7.  History of CVA





8.  History of osteoarthritis





9.  History of hyperlipidemia





10.  History of fibromyalgia





11.  History of depression





12.  Ex-smoker.  Patient's both proximally half pack per day for 30 years





13.  Elevated liver enzymes.  AST trending down to 37 ALT 55 and alkaline 

phosphatase 148





14.  Chronic cholecystitis.  Abdominal ultrasound completed showing sludge 

within the gallbladder.  Pericholecystic fluid.  Gallbladder wall is 0.25 cm, 

bile duct 0.4 cm.  Surgical services were consulted.  At this time no immediate 

surgical intervention





DVT prophylaxis Lovenox.  GI prophylaxis Protonix





At This time patient remains in the intensive care unit.


Patient is mechanically ventilated on station.


Critical care and  infectious disease is following








I performed an examination of the patient and discussed their management with 

the Nurse Practitioner.  I have reviewed the Nurse Practitioner's notes and 

agree with the documented findings and plan of care

## 2019-03-26 NOTE — XR
EXAMINATION TYPE: XR chest 1V portable

 

DATE OF EXAM: 3/26/2019

 

COMPARISON: 3/24/2019

 

HISTORY: Ventilatory dependent respiratory failure.

 

TECHNIQUE: Single frontal view of the chest is obtained.

 

FINDINGS:  Trace left pleural effusion blunts the costophrenic angle. Endotracheal tube and enteric t
ube appear satisfactorily placed and similar to the prior. Remainder the lungs are well aerated. No p
neumothorax is appreciated. Subclavian approach left-sided central venous catheter terminates in the 
distal superior vena cava. No acute osseous abnormality.

 

IMPRESSION:  Satisfactory placement of the lines and tubes and continued evidence of a trace left ple
ural effusion.

## 2019-03-26 NOTE — P.PN
Subjective


Progress Note Date: 03/26/19


Principal diagnosis: 





Acute mental status change, stage III non-small cell lung cancer





This is a very pleasant 63-year-old female patient who follows with Dr. Amaya 

as her primary care physician.  She has a history of CVA/TIA, fibromyalgia, 

gastroesophageal reflux disease, hyperlipidemia, hypothyroidism, chronic back 

pain, depression.  She also has a history of chronic tobacco dependence, oxygen 

dependent chronic obstructive pulmonary disease with an FEV1 value of 56% of pre

dicted, and non-small cell lung cancer stage III adenocarcinoma.  She follows 

with Dr. Pastor in our office. This was diagnosed by an FNA of a left upper lobe 

lesion by IR in May 2017.  She had received concurrent chemoradiation with 

subsequent additional chemotherapy and then placed on maintenance mmunotherapy 

of Imfinzi back in June 2018.  Her last PET scan in September 2018 revealed 

continued left upper lobe mass with irregular margins extending to the pleural 

surface, spiculated appearance but was stable compared to previous and April 2018.  The SUV value was only 1.7.  Subsequent computed tomography scan of the 

chest in January 2019 revealed a stable and slightly smaller spiculated mass of 

the left upper lobe measuring 2.5 x 2.4 cm versus 3.0 x 2.6 previous.  No 

evidence of recurrence or metastasis.  The patient was in the hospital back in 

early March 2019 and she was having ongoing issues with nausea vomiting profound

weakness and fatigue.  She decided to stop taking the immunotherapy. She had 

been treated for his COPD exacerbation 02/13/2019 in our office by Dr. Pastor 

where she received steroids and Bactrim.  She did improve for the first week or 

so. She presented to the emergency room yesterday with complaints of increasing 

shortness of breath cough and congestion.  She did have some lower extremity 

edema.  She was still quite weak and fatigued.  ome dyspnea on exertion.  The 

patient was discharged home on 03/11/2019 as the patient shortness of breath 

improved and the patient recovered from her acute COPD exacerbation and she was 

discharged home on her usual routine medications which included also morphine 

sulfate 30 mg by mouth twice a day Percocet 10/03/2025 every 6 hours and a when 

necessary basis.  In terms of her lung medication she was maintained on a combi

nation of Symbicort and Incruse.  She is also on Synthroid 25 g by mouth daily.

 She was given Lasix to be taken on an as needed basis.The patient came in 

yesterday to the burst department 3 day history of altered mental status.  

According to the family the patient was acting weird.  She was acting unusual 

and she was confused.  She was also very weak and she was having episodes of 

fall.  I do see a bruise over her forehead.  The family tells that she did not 

have any active had trauma.  No seizure activity.  No neck stiffness.  No 

documented fever.  She was thrashing significantly yesterday.  A CAT scan of the

head was done that showed no acute process.  CAT scan of abdomen and pelvis was 

done that showed urinary bladder distention along with extensive colonic stool. 

The patient's ammonia level was at 28.  Urine and blood cultures were ordered.  

The influenza screen was negative.  The wife that was at 19.7 the patient was 

given a dose of 1 g of Rocephin and she was admitted to the oncology unit.  

Overnight the patient continued to be the same.  Earlier this morning I was 

informed about this patient by the nurse practitioners and I was able to see it 

on the floor and I asked her to be transferred to the intensive care unit.  She 

did not have any neck stiffness.  No fever.  She was at times morning.  Other 

times she would open up her eyes and follows some simple commands.  I was told 

that this is an improvement in her condition compared to yesterday.  She was 

able to follow some simple commands however she has not been consistent in 

following orders.  His speech is minimal and the patient may safely worse.  She 

denies being in pain.  Urine toxin was ordered.  She was given a dose of Narcan 

here in the ICU without much improvement.  Blood gases showed a pH of 7.3 with a

pCO2 of 47 and pO2 of 136 and there is no evidence of any CO2 narcosis.  Her 

white cell count is down to 14.8.  The rest of the blood work is all within 

normal limits with a exception of some mild elevation of the LFTs with an AST of

65, ALT of 79 and alkaline phosphatase of 219.  Troponins of 0.04.  Serum 

albumin is at 3.8.  The coagulation profile is within normal.  The chest x-ray 

is not showing any acute process.








Today's evaluation of 03/24/2019 I'm seeing this patient for a follow-up.  E

vents from yesterday were noted.  The patient came in to the ICU.  She was 

extremely agitated and restless, having tachypnea tachycardia and blood pressure

was up.  She received several doses of Haldol without any improvement.  She 

continued to be altered and tomorrow mentation.  At that point, the patient was 

given propofol and subsequently she was intubated and placed on a mechanical 

ventilator.  Her sedation requirements stayed quite high throughout the night.  

The patient was receiving maximum dose of propofol which is currently running at

50 g per KG per minute.  Fentanyl had to be also added for sedation and 

fentanyl is at high as 160 g KG per hour.  She is much well rested for now.  

She is very symptoms with the mechanical ventilator.  She is set at a tidal 

volume of 400 and FiO2 of 40% and PEEP of 5 and a rate of 14.  Blood gases from 

today showed a pH of 7. raphae with a pCO2 of 44 and pO2 of 195.  This was on 

FiO2 of 50% and subsequently she was weaned down to 40%.  His x-ray from today 

shows this small new left-sided pleural effusion.





Patient was reevaluated today on 3/25/2019, patient remains on mechanical 

ventilation, she is on assist control rate of 14 tidal volume of 400 FiO2 of 40%

and PEEP of 5.  Remains on propofol drip, she is also on fentanyl drip, apparent

ly patient was extremely agitated yesterday, and she required a significant 

amount of sedation.  Her propofol is at 50 mcg/kg/m, and her fentanyl is 160 

mcg/h.  Seems to be resting while on mechanical ventilation.  Sedated, and 

difficult to arouse.  Hence I recommended cutting down the fentanyl drip to half

of the dose she is presently on, and we need to assess her mental status and 

decide whether the patient could even be weaned at all.  She underwent lumbar 

puncture by Dr. Love, she remains on vancomycin and Rocephin, empirically, 

and she is also on acyclovir for HSV CNS infection.  PCR for HSV is pending.  

Repeat CT of the brain has been unremarkable.  No evidence of any stroke.  Labs 

were all reviewed today, ABG showed a pO2 of 145 pCO2 of 49 pH of 7.38.  

Electrolytes and renal profile are normal.  CBC is relatively unremarkable, 

hemoglobin is 10.2.  Chest x-ray from yesterday showed new small left pleural 

effusion minimal atelectasis





Reevaluated today on 3/26/2019, remains on the same ventilator settings, remains

on mechanical ventilation, still requiring relatively high dose of fentanyl, and

she is on 50 mcg/kg/m of propofol.  Patient had a slight decrease in her sedatio

n, and she became extremely agitated, but was not arousable, remained obtunded 

and could not actually assess fully her mental status.  Hence I have decided to 

switch propofol to Versed, and I will try to titrate the fentanyl down to a 

lesser dose if possible.  Patient continues to have diffuse rhonchi and wheezes 

and she remains on bronchodilators and steroids.  She was seen by infectious 

disease, and her PCR for HSV was negative, acyclovir was discontinued.  ABG 

today showed a pO2 of 71 pCO2 of 51 pH of 7.36.  Electrolytes are relatively 

normal renal profile is normal.  CBC is relatively unremarkable.  Hemoglobin is 

10.4.  Chest x-ray continues to show trace of left pleural effusion and blunting

of the costophrenic angle, otherwise it is basically unremarkable.  Lines and 

tubes are noted to be in the proper position.  Again I could not assess mental 

status today because of extreme agitation once the dose of the fentanyl or 

propofol is cut down a bit.








Objective





- Vital Signs


Vital signs: 


                                   Vital Signs











Temp  98.1 F   03/26/19 08:00


 


Pulse  67   03/26/19 11:24


 


Resp  14   03/26/19 11:00


 


BP  104/62   03/26/19 11:00


 


Pulse Ox  98   03/26/19 11:00








                                 Intake & Output











 03/25/19 03/26/19 03/26/19





 18:59 06:59 18:59


 


Intake Total 2176.405 1969.860 821.532


 


Output Total 310 380 285


 


Balance 7658.197 0812.860 536.532


 


Weight  63 kg 63 kg


 


Intake:   


 


  IV 1600 1200 500


 


    Acyclovir Sodium 500 mg 200  





    In Sodium Chloride 0.9%   





    100 ml @ 100 mls/hr IVPB   





    Q8HR MARY Rx#:[id number]   


 


    Normal Saline 1100 1200 500


 


    Vancomycin 1,000 mg In 250  





    Sodium Chloride 0.9% 250   





    ml @ 125 mls/hr IVPB Q8H   





    MARY Rx#:[id number]   


 


    cefTRIAXone 1 gm In 50  





    Sodium Chloride 0.9% 50   





    ml @ 100 mls/hr IVPB   





    Q24HR MARY Rx#:[id number]   


 


  Intake, IV Titration 261.405 379.860 246.532





  Amount   


 


    Clevidipine Butyrate 25   2.933





    mg In Empty Bag 1 bag @ 1   





    MG/HR 2 mls/hr IV .Q24H   





    MARY Rx#:[id number]   


 


    Propofol 1,000 mg In 163.838 166.814 143.599





    Empty Bag 1 bag @ Titrate   





    IV .Q0M MARY Rx#:   





    [id number]   


 


    Vancomycin 1,000 mg In  125 





    Sodium Chloride 0.9% 250   





    ml @ 125 mls/hr IVPB Q12H   





    MARY Rx#:[id number]   


 


    fentaNYL (PF) 1,000 mcg 97.567 88.046 100.000





    In Sodium Chloride 0.9%   





    80 ml @ 1 MCG/KG/HR 5.443   





    mls/hr IV .[id number] MARY   





    Rx#:[id number]   


 


  Tube Feeding 225 300 75


 


  Other 90 90 


 


Output:   


 


  Urine 310 380 285


 


Other:   


 


  Voiding Method Indwelling Catheter Indwelling Catheter 








                       ABP, PAP, CO, CI - Last Documented











Arterial Blood Pressure        137/63

















- Exam





Physical Exam: Revealed a 63-year-old female, comfortable, on mechanical 

ventilation, sedated, on propofol and on phenytoin.  Orogastric tube and 

orotracheal tubes are noted to be intact


Head: Atraumatic, normocephalic.


HEENT:[Neck is supple.] [No neck masses.] [No thyromegaly.] [No JVD.]  PERRLA, 

EOMI, no icterus.  Endotracheal tube and oral gastric tube are intact


Chest: [Diminished breath sounds at the bases, no crackles, no rhonchi no 

wheezes.  Symmetrical chest expansion is noted no chest wall tenderness..]


Cardiac Exam: [Normal S1 and S2, no S3 gallop, no murmur.]


Abdomen: [Soft, nontender,  no megaly, no rebound, no guarding, normal bowel 

sounds.]


Extremities: [No clubbing, no edema, no cyanosis.]  Good pulses bilaterally.


Neurological Exam: Cannot be assessed, fully sedated and on propofol as well as 

fentanyl.


Psychiatric: Could not be assessed.  Remains on propofol and on fentanyl pain


Lymphatics: No lymphadenopathy.


Skin: No rashes.





- Labs


CBC & Chem 7: 


                                 03/26/19 04:45





                                 03/26/19 04:45


Labs: 


                  Abnormal Lab Results - Last 24 Hours (Table)











  03/25/19 03/25/19 03/25/19 Range/Units





  12:26 17:44 23:54 


 


RBC     (3.80-5.40)  m/uL


 


Hgb     (11.4-16.0)  gm/dL


 


Hct     (34.0-46.0)  %


 


RDW     (11.5-15.5)  %


 


Plt Count     (150-450)  k/uL


 


Neutrophils #     (1.3-7.7)  k/uL


 


Lymphocytes #     (1.0-4.8)  k/uL


 


ABG pCO2     (35-45)  mmHg


 


ABG pO2     ()  mmHg


 


ABG HCO3     (21-25)  mmol/L


 


ABG Total CO2     (19-24)  mmol/L


 


Chloride     ()  mmol/L


 


Creatinine     (0.52-1.04)  mg/dL


 


Glucose     (74-99)  mg/dL


 


POC Glucose (mg/dL)  206 H  145 H  128 H  (75-99)  mg/dL


 


Calcium     (8.4-10.2)  mg/dL


 


Ammonia     (<30)  umol/L


 


Total Protein     (6.3-8.2)  g/dL


 


Albumin     (3.5-5.0)  g/dL














  03/26/19 03/26/19 03/26/19 Range/Units





  04:45 04:45 04:45 


 


RBC  3.24 L    (3.80-5.40)  m/uL


 


Hgb  10.4 L    (11.4-16.0)  gm/dL


 


Hct  32.3 L    (34.0-46.0)  %


 


RDW  16.0 H    (11.5-15.5)  %


 


Plt Count  141 L    (150-450)  k/uL


 


Neutrophils #  9.5 H    (1.3-7.7)  k/uL


 


Lymphocytes #  0.2 L    (1.0-4.8)  k/uL


 


ABG pCO2     (35-45)  mmHg


 


ABG pO2     ()  mmHg


 


ABG HCO3     (21-25)  mmol/L


 


ABG Total CO2     (19-24)  mmol/L


 


Chloride   113 H   ()  mmol/L


 


Creatinine   0.44 L   (0.52-1.04)  mg/dL


 


Glucose   174 H   (74-99)  mg/dL


 


POC Glucose (mg/dL)     (75-99)  mg/dL


 


Calcium   8.3 L   (8.4-10.2)  mg/dL


 


Ammonia    43 H  (<30)  umol/L


 


Total Protein   4.0 L   (6.3-8.2)  g/dL


 


Albumin   2.2 L   (3.5-5.0)  g/dL














  03/26/19 03/26/19 03/26/19 Range/Units





  06:09 08:07 11:59 


 


RBC     (3.80-5.40)  m/uL


 


Hgb     (11.4-16.0)  gm/dL


 


Hct     (34.0-46.0)  %


 


RDW     (11.5-15.5)  %


 


Plt Count     (150-450)  k/uL


 


Neutrophils #     (1.3-7.7)  k/uL


 


Lymphocytes #     (1.0-4.8)  k/uL


 


ABG pCO2   51 H   (35-45)  mmHg


 


ABG pO2   71 L   ()  mmHg


 


ABG HCO3   29 H   (21-25)  mmol/L


 


ABG Total CO2   30 H   (19-24)  mmol/L


 


Chloride     ()  mmol/L


 


Creatinine     (0.52-1.04)  mg/dL


 


Glucose     (74-99)  mg/dL


 


POC Glucose (mg/dL)  183 H   182 H  (75-99)  mg/dL


 


Calcium     (8.4-10.2)  mg/dL


 


Ammonia     (<30)  umol/L


 


Total Protein     (6.3-8.2)  g/dL


 


Albumin     (3.5-5.0)  g/dL








                      Microbiology - Last 24 Hours (Table)











 03/24/19 20:02 Gram Stain - Final





 Sputum Sputum Culture - Final





    Candida albicans


 


 03/23/19 15:20 CSF Gram Stain - Preliminary





 Cerebral Spinal Fluid CSF Culture - Preliminary


 


 03/22/19 17:39 Blood Culture - Preliminary





 Blood    No Growth after 72 hours














Assessment and Plan


Assessment: 





Impression:





1 acute mental status change, acute encephalopathy exact etiology is not clear, 

viral encephalitis was ruled out based on negative PCR on the spinal fluid.  

Hence acyclovir was discontinued





2 extreme agitation requiring significant amount of sedation leading to int

ubation and mechanical ventilation.  Hence I will recommend switching propofol 

to Versed, and will continue to work on cutting the dose of fentanyl slowly to a

lower dose if possible.





3 stage III non-small cell lung cancer status post chemo and radiation therapy 

and she is receiving immunotherapy on outpatient basis.





4 history of fibromyalgia





5 chronic back pain





6 severe COPD





7 previous CVA/TIA





8 history of depression








Recommendation: Continue present ventilatory support, continue sedation but will

switch propofol to Versed, titrate the dose of fentanyl down hopefully to less 

than 50 g per hour.  Continue antibiotics, bronchodilators, steroids, continue 

all supportive care measures, nutritional support/enteral feeding, continue GI 

and DVT prophylaxis.  Continue Rocephin and vancomycin, patient is not ready for

any plans to wean at this point, she may actually eventually require t

racheostomy.  We'll discuss this with her family.  Overall long-term prognosis 

is again poor and guarded.  Not to mention the patient has advanced stage of 

non-small cell lung cancer.  And she probably has severe underlying COPD.  We'll

continue to follow in the ICU.  Critical care time is 35 minutes 


Time with Patient: Greater than 30

## 2019-03-26 NOTE — P.PN
Subjective


Progress Note Date: 19





CHIEF COMPLAINT: Cholecystitis





HISTORY OF PRESENT ILLNESS: Patient examined in the ICU. She remains intubated 

and sedated. Patient anxious and agitated this morning. Sedation unable to be 

weaned. Tolerating tube feeds at goal. WBC 10.2. Hemoglobin 10.4. AST 21. ALT 

39.





PHYSICAL EXAM: 


VITAL SIGNS: Reviewed.


GENERAL: Well-developed in no acute distress. Sedated on ventilator.


HEENT: ET tube intact. No sclera icterus. Extraocular movements grossly intact. 

Moist buccal mucosa. Head is atraumatic, normocephalic. 


ABDOMEN:  Soft.  Nondistended. Nontender.


NEUROLOGIC: Sedated on ventilator.





IMAGIN. Abdominal ultrasound: Sludge within the gallbladder. Pericholecystic fluid.  

Gallbladder wall 0.25 cm.  Common bile duct 0.4 cm.


2. CT abdomen and pelvis: No significant abnormality of gallbladder visualized





ASSESSMENT: 


1.  Possible chronic cholecystitis





PLAN: 


No immediate surgical intervention at this time. Patient may be re-evaluated on 

an outpatient basis once she is more stable and recovers from current acute 

illness. We will follow as needed. Please call with questions or concerns. 





Nurse practitioner note has been reviewed by physician. Signing provider agrees 

with the documented findings, assessment, and plan of care. 








Objective





- Vital Signs


Vital signs: 


                                   Vital Signs











Temp  98.1 F   19 08:00


 


Pulse  67   19 11:24


 


Resp  14   19 11:00


 


BP  104/62   19 11:00


 


Pulse Ox  98   19 11:00








                                 Intake & Output











 19





 18:59 06:59 18:59


 


Intake Total 2176.405 1969.860 802.975


 


Output Total 310 380 285


 


Balance 4178.833 9732.860 517.975


 


Weight  63 kg 63 kg


 


Intake:   


 


  IV 1600 1200 500


 


    Acyclovir Sodium 500 mg 200  





    In Sodium Chloride 0.9%   





    100 ml @ 100 mls/hr IVPB   





    Q8HR MARY Rx#:[id number]   


 


    Normal Saline 1100 1200 500


 


    Vancomycin 1,000 mg In 250  





    Sodium Chloride 0.9% 250   





    ml @ 125 mls/hr IVPB Q8H   





    MARY Rx#:[id number]   


 


    cefTRIAXone 1 gm In 50  





    Sodium Chloride 0.9% 50   





    ml @ 100 mls/hr IVPB   





    Q24HR MARY Rx#:[id number]   


 


  Intake, IV Titration 261.405 379.860 227.975





  Amount   


 


    Clevidipine Butyrate 25   0





    mg In Empty Bag 1 bag @ 1   





    MG/HR 2 mls/hr IV .Q24H   





    MARY Rx#:[id number]   


 


    Propofol 1,000 mg In 163.838 166.814 127.975





    Empty Bag 1 bag @ Titrate   





    IV .Q0M MARY Rx#:   





    [id number]   


 


    Vancomycin 1,000 mg In  125 





    Sodium Chloride 0.9% 250   





    ml @ 125 mls/hr IVPB Q12H   





    MARY Rx#:[id number]   


 


    fentaNYL (PF) 1,000 mcg 97.567 88.046 100.000





    In Sodium Chloride 0.9%   





    80 ml @ 1 MCG/KG/HR 5.443   





    mls/hr IV .[id number] MARY   





    Rx#:[id number]   


 


  Tube Feeding 225 300 75


 


  Other 90 90 


 


Output:   


 


  Urine 310 380 285


 


Other:   


 


  Voiding Method Indwelling Catheter Indwelling Catheter 








                       ABP, PAP, CO, CI - Last Documented











Arterial Blood Pressure        137/63

















- Labs


CBC & Chem 7: 


                                 19 04:45





                                 19 04:45


Labs: 


                  Abnormal Lab Results - Last 24 Hours (Table)











  19 Range/Units





  04:15 12:26 17:44 


 


RBC     (3.80-5.40)  m/uL


 


Hgb     (11.4-16.0)  gm/dL


 


Hct     (34.0-46.0)  %


 


RDW     (11.5-15.5)  %


 


Plt Count     (150-450)  k/uL


 


Neutrophils #     (1.3-7.7)  k/uL


 


Lymphocytes #     (1.0-4.8)  k/uL


 


ABG pCO2     (35-45)  mmHg


 


ABG pO2     ()  mmHg


 


ABG HCO3     (21-25)  mmol/L


 


ABG Total CO2     (19-24)  mmol/L


 


Chloride     ()  mmol/L


 


Creatinine     (0.52-1.04)  mg/dL


 


Glucose     (74-99)  mg/dL


 


POC Glucose (mg/dL)   206 H  145 H  (75-99)  mg/dL


 


Hemoglobin A1c  6.2 H    (4.0-6.0)  %


 


Calcium     (8.4-10.2)  mg/dL


 


Ammonia     (<30)  umol/L


 


Total Protein     (6.3-8.2)  g/dL


 


Albumin     (3.5-5.0)  g/dL














  19 Range/Units





  23:54 04:45 04:45 


 


RBC   3.24 L   (3.80-5.40)  m/uL


 


Hgb   10.4 L   (11.4-16.0)  gm/dL


 


Hct   32.3 L   (34.0-46.0)  %


 


RDW   16.0 H   (11.5-15.5)  %


 


Plt Count   141 L   (150-450)  k/uL


 


Neutrophils #   9.5 H   (1.3-7.7)  k/uL


 


Lymphocytes #   0.2 L   (1.0-4.8)  k/uL


 


ABG pCO2     (35-45)  mmHg


 


ABG pO2     ()  mmHg


 


ABG HCO3     (21-25)  mmol/L


 


ABG Total CO2     (19-24)  mmol/L


 


Chloride    113 H  ()  mmol/L


 


Creatinine    0.44 L  (0.52-1.04)  mg/dL


 


Glucose    174 H  (74-99)  mg/dL


 


POC Glucose (mg/dL)  128 H    (75-99)  mg/dL


 


Hemoglobin A1c     (4.0-6.0)  %


 


Calcium    8.3 L  (8.4-10.2)  mg/dL


 


Ammonia     (<30)  umol/L


 


Total Protein    4.0 L  (6.3-8.2)  g/dL


 


Albumin    2.2 L  (3.5-5.0)  g/dL














  19 Range/Units





  04:45 06:09 08:07 


 


RBC     (3.80-5.40)  m/uL


 


Hgb     (11.4-16.0)  gm/dL


 


Hct     (34.0-46.0)  %


 


RDW     (11.5-15.5)  %


 


Plt Count     (150-450)  k/uL


 


Neutrophils #     (1.3-7.7)  k/uL


 


Lymphocytes #     (1.0-4.8)  k/uL


 


ABG pCO2    51 H  (35-45)  mmHg


 


ABG pO2    71 L  ()  mmHg


 


ABG HCO3    29 H  (21-25)  mmol/L


 


ABG Total CO2    30 H  (19-24)  mmol/L


 


Chloride     ()  mmol/L


 


Creatinine     (0.52-1.04)  mg/dL


 


Glucose     (74-99)  mg/dL


 


POC Glucose (mg/dL)   183 H   (75-99)  mg/dL


 


Hemoglobin A1c     (4.0-6.0)  %


 


Calcium     (8.4-10.2)  mg/dL


 


Ammonia  43 H    (<30)  umol/L


 


Total Protein     (6.3-8.2)  g/dL


 


Albumin     (3.5-5.0)  g/dL








                      Microbiology - Last 24 Hours (Table)











 19 20:02 Gram Stain - Final





 Sputum Sputum Culture - Final





    Candida albicans


 


 19 15:20 CSF Gram Stain - Preliminary





 Cerebral Spinal Fluid CSF Culture - Preliminary


 


 19 17:39 Blood Culture - Preliminary





 Blood    No Growth after 72 hours

## 2019-03-26 NOTE — EEG
ELECTROENCEPHALOGRAM REPORT



ELECTROENCEPHALOGRAM (EEG) REPORT:



TECHNIQUE:

A routine 18-channel EEG was performed with video using the 10-20 international

electrode placement system.



HISTORY:

Patient brought to the emergency room for altered mental status for 2 days.  She has

stage III lung cancer.  Patient is currently intubated and sedated on Diprivan. Other

medical history includes COPD, gastroesophageal reflux and other conditions.



CURRENT MEDICATIONS:

1. Propofol.

2. Compazine.

3. Protonix.

4. Vancomycin.

5. Magnesium.

6. Levothyroxine.



STUDY DURATION:

25 minutes.



FINDINGS:

Overall, this recording was of low amplitude and interpretation was performed at 5-

microvolt sensitivity.



BACKGROUND:  A sustained posterior dominant rhythm was not seen.



ACTIVATION:

Hyperventilation:  Not performed.

Photic stimulation:  No driving seen.

Sleep:  Drowsy.



ABNORMALITIES:

1. This EEG demonstrated a modified burst suppression pattern. Diffuse synchronous and

    asynchronous 3-4 Hz slow wave activity was seen,  by relative periods of

    electrical suppression.

2. Rare poorly formed triphasic waves were seen.



IMPRESSION:

Abnormal EEG.  This EEG demonstrated a modified burst suppression pattern.  The bursts

consisted of diffuse synchronous and asynchronous delta range slowing of low amplitude.

These findings are not epileptiform in nature.  In combination, these findings indicate

severe diffuse cerebral dysfunction as may be seen in a toxometabolic encephalopathy.

Triphasic waves can be seen in the setting of metabolic encephalopathy.  These findings

were called to the patient's nurse at 4:30 p.m. on 3/25/2019.





MMDYANL / CLAUDIA: [id number] / Job#: [id number]

## 2019-03-27 VITALS — TEMPERATURE: 99.3 F

## 2019-03-27 VITALS — RESPIRATION RATE: 20 BRPM | HEART RATE: 96 BPM

## 2019-03-27 LAB
ALBUMIN SERPL-MCNC: 2.4 G/DL (ref 3.5–5)
ALP SERPL-CCNC: 122 U/L (ref 38–126)
ALT SERPL-CCNC: 37 U/L (ref 9–52)
ANION GAP SERPL CALC-SCNC: 1 MMOL/L
AST SERPL-CCNC: 27 U/L (ref 14–36)
BASOPHILS # BLD AUTO: 0 K/UL (ref 0–0.2)
BASOPHILS NFR BLD AUTO: 0 %
BUN SERPL-SCNC: 14 MG/DL (ref 7–17)
CALCIUM SPEC-MCNC: 8.1 MG/DL (ref 8.4–10.2)
CHLORIDE SERPL-SCNC: 106 MMOL/L (ref 98–107)
CO2 BLDA-SCNC: 36 MMOL/L (ref 19–24)
CO2 SERPL-SCNC: 32 MMOL/L (ref 22–30)
EOSINOPHIL # BLD AUTO: 0 K/UL (ref 0–0.7)
EOSINOPHIL NFR BLD AUTO: 0 %
ERYTHROCYTE [DISTWIDTH] IN BLOOD BY AUTOMATED COUNT: 3.51 M/UL (ref 3.8–5.4)
ERYTHROCYTE [DISTWIDTH] IN BLOOD: 15.5 % (ref 11.5–15.5)
GLUCOSE BLD-MCNC: 145 MG/DL (ref 75–99)
GLUCOSE BLD-MCNC: 153 MG/DL (ref 75–99)
GLUCOSE BLD-MCNC: 189 MG/DL (ref 75–99)
GLUCOSE SERPL-MCNC: 148 MG/DL (ref 74–99)
HCO3 BLDA-SCNC: 35 MMOL/L (ref 21–25)
HCT VFR BLD AUTO: 34 % (ref 34–46)
HCV RNA SPEC NAA+PROBE-ACNC: <12 IU/ML (ref ?–12)
HCV RNA SPEC NAA+PROBE-LOG#: <1.08 {LOG_COPIES}/ML (ref ?–1.08)
HGB BLD-MCNC: 10.8 GM/DL (ref 11.4–16)
LYMPHOCYTES # SPEC AUTO: 0.2 K/UL (ref 1–4.8)
LYMPHOCYTES NFR SPEC AUTO: 1 %
MAGNESIUM SPEC-SCNC: 2.1 MG/DL (ref 1.6–2.3)
MCH RBC QN AUTO: 30.7 PG (ref 25–35)
MCHC RBC AUTO-ENTMCNC: 31.7 G/DL (ref 31–37)
MCV RBC AUTO: 97 FL (ref 80–100)
MONOCYTES # BLD AUTO: 0.3 K/UL (ref 0–1)
MONOCYTES NFR BLD AUTO: 3 %
NEUTROPHILS # BLD AUTO: 9.6 K/UL (ref 1.3–7.7)
NEUTROPHILS NFR BLD AUTO: 95 %
PCO2 BLDA: 46 MMHG (ref 35–45)
PH BLDA: 7.49 [PH] (ref 7.35–7.45)
PLATELET # BLD AUTO: 151 K/UL (ref 150–450)
PO2 BLDA: 88 MMHG (ref 83–108)
POTASSIUM SERPL-SCNC: 3 MMOL/L (ref 3.5–5.1)
PROT SERPL-MCNC: 4.5 G/DL (ref 6.3–8.2)
SODIUM SERPL-SCNC: 139 MMOL/L (ref 137–145)
WBC # BLD AUTO: 10.1 K/UL (ref 3.8–10.6)

## 2019-03-27 RX ADMIN — HYDROMORPHONE HYDROCHLORIDE PRN MG: 1 INJECTION, SOLUTION INTRAMUSCULAR; INTRAVENOUS; SUBCUTANEOUS at 10:26

## 2019-03-27 RX ADMIN — POTASSIUM BICARBONATE SCH MEQ: 782 TABLET, EFFERVESCENT ORAL at 06:52

## 2019-03-27 RX ADMIN — PANTOPRAZOLE SODIUM SCH MG: 40 INJECTION, POWDER, FOR SOLUTION INTRAVENOUS at 08:02

## 2019-03-27 RX ADMIN — POTASSIUM BICARBONATE SCH MEQ: 782 TABLET, EFFERVESCENT ORAL at 10:23

## 2019-03-27 RX ADMIN — MORPHINE SULFATE SCH: 30 TABLET, FILM COATED, EXTENDED RELEASE ORAL at 08:02

## 2019-03-27 RX ADMIN — HYDROMORPHONE HYDROCHLORIDE PRN MG: 1 INJECTION, SOLUTION INTRAMUSCULAR; INTRAVENOUS; SUBCUTANEOUS at 15:22

## 2019-03-27 RX ADMIN — POTASSIUM BICARBONATE SCH MEQ: 782 TABLET, EFFERVESCENT ORAL at 11:00

## 2019-03-27 RX ADMIN — POTASSIUM BICARBONATE SCH MEQ: 782 TABLET, EFFERVESCENT ORAL at 05:56

## 2019-03-27 RX ADMIN — MIDAZOLAM SCH MLS/HR: 5 INJECTION INTRAMUSCULAR; INTRAVENOUS at 11:00

## 2019-03-27 RX ADMIN — MIDAZOLAM SCH MLS/HR: 5 INJECTION INTRAMUSCULAR; INTRAVENOUS at 07:51

## 2019-03-27 RX ADMIN — CLEVIPIDINE SCH MLS/HR: 0.5 EMULSION INTRAVENOUS at 11:23

## 2019-03-27 RX ADMIN — CLEVIPIDINE SCH MLS/HR: 0.5 EMULSION INTRAVENOUS at 15:38

## 2019-03-27 RX ADMIN — IPRATROPIUM BROMIDE AND ALBUTEROL SULFATE SCH ML: .5; 3 SOLUTION RESPIRATORY (INHALATION) at 07:22

## 2019-03-27 RX ADMIN — IPRATROPIUM BROMIDE AND ALBUTEROL SULFATE SCH ML: .5; 3 SOLUTION RESPIRATORY (INHALATION) at 11:16

## 2019-03-27 RX ADMIN — METHYLPREDNISOLONE SODIUM SUCCINATE SCH MG: 125 INJECTION, POWDER, FOR SOLUTION INTRAMUSCULAR; INTRAVENOUS at 11:52

## 2019-03-27 RX ADMIN — CEFAZOLIN SCH MLS/HR: 330 INJECTION, POWDER, FOR SOLUTION INTRAMUSCULAR; INTRAVENOUS at 07:55

## 2019-03-27 RX ADMIN — INSULIN ASPART SCH UNIT: 100 INJECTION, SOLUTION INTRAVENOUS; SUBCUTANEOUS at 05:56

## 2019-03-27 RX ADMIN — POTASSIUM BICARBONATE SCH MEQ: 782 TABLET, EFFERVESCENT ORAL at 12:08

## 2019-03-27 RX ADMIN — ENOXAPARIN SODIUM SCH MG: 40 INJECTION SUBCUTANEOUS at 08:02

## 2019-03-27 RX ADMIN — CHLORHEXIDINE GLUCONATE SCH ML: 1.2 RINSE ORAL at 08:02

## 2019-03-27 RX ADMIN — INSULIN ASPART SCH UNIT: 100 INJECTION, SOLUTION INTRAVENOUS; SUBCUTANEOUS at 18:24

## 2019-03-27 RX ADMIN — CEFAZOLIN SCH MLS/HR: 330 INJECTION, POWDER, FOR SOLUTION INTRAMUSCULAR; INTRAVENOUS at 19:45

## 2019-03-27 RX ADMIN — HALOPERIDOL LACTATE PRN MG: 5 INJECTION, SOLUTION INTRAMUSCULAR at 19:50

## 2019-03-27 RX ADMIN — MIDAZOLAM SCH MLS/HR: 5 INJECTION INTRAMUSCULAR; INTRAVENOUS at 18:25

## 2019-03-27 RX ADMIN — METHYLPREDNISOLONE SODIUM SUCCINATE SCH MG: 125 INJECTION, POWDER, FOR SOLUTION INTRAMUSCULAR; INTRAVENOUS at 05:58

## 2019-03-27 RX ADMIN — LEVOTHYROXINE SODIUM ANHYDROUS SCH MCG: 100 INJECTION, POWDER, LYOPHILIZED, FOR SOLUTION INTRAVENOUS at 08:02

## 2019-03-27 RX ADMIN — HYDROMORPHONE HYDROCHLORIDE PRN MG: 1 INJECTION, SOLUTION INTRAMUSCULAR; INTRAVENOUS; SUBCUTANEOUS at 04:45

## 2019-03-27 RX ADMIN — INSULIN ASPART SCH UNIT: 100 INJECTION, SOLUTION INTRAVENOUS; SUBCUTANEOUS at 11:52

## 2019-03-27 RX ADMIN — METHYLPREDNISOLONE SODIUM SUCCINATE SCH MG: 125 INJECTION, POWDER, FOR SOLUTION INTRAMUSCULAR; INTRAVENOUS at 18:24

## 2019-03-27 RX ADMIN — IPRATROPIUM BROMIDE AND ALBUTEROL SULFATE SCH ML: .5; 3 SOLUTION RESPIRATORY (INHALATION) at 15:36

## 2019-03-27 RX ADMIN — SODIUM CHLORIDE SCH MLS/HR: 900 INJECTION, SOLUTION INTRAVENOUS at 03:33

## 2019-03-27 RX ADMIN — HYDROMORPHONE HYDROCHLORIDE PRN MG: 1 INJECTION, SOLUTION INTRAMUSCULAR; INTRAVENOUS; SUBCUTANEOUS at 19:49

## 2019-03-27 RX ADMIN — IPRATROPIUM BROMIDE AND ALBUTEROL SULFATE SCH ML: .5; 3 SOLUTION RESPIRATORY (INHALATION) at 03:23

## 2019-03-27 RX ADMIN — CLEVIPIDINE SCH MLS/HR: 0.5 EMULSION INTRAVENOUS at 04:46

## 2019-03-27 RX ADMIN — CLEVIPIDINE SCH MLS/HR: 0.5 EMULSION INTRAVENOUS at 19:44

## 2019-03-27 RX ADMIN — HYDROMORPHONE HYDROCHLORIDE PRN MG: 1 INJECTION, SOLUTION INTRAMUSCULAR; INTRAVENOUS; SUBCUTANEOUS at 00:38

## 2019-03-27 NOTE — P.PN
Subjective


Progress Note Date: 03/27/19


Principal diagnosis: 





COPD exacerbation, acute respiratory failure, IIIA NSCLC





Pt remains intubated, stable on monitor, 2 family members at bedside.  





Objective





- Vital Signs


Vital signs: 


                                   Vital Signs











Temp  99.3 F   03/27/19 12:00


 


Pulse  93   03/27/19 13:00


 


Resp  22   03/27/19 13:00


 


BP  104/62   03/26/19 22:00


 


Pulse Ox  92 L  03/27/19 13:00








                                 Intake & Output











 03/26/19 03/27/19 03/27/19





 18:59 06:59 18:59


 


Intake Total 2266.792 9304.881 1763.414


 


Output Total 850 2355 900


 


Balance 1416.792 -363.313 242.414


 


Weight 63 kg 65.8 kg 


 


Intake:   


 


  IV 1300 1300 700


 


    Normal Saline 1200 1300 600


 


    cefTRIAXone 1 gm In 100  100





    Sodium Chloride 0.9% 50   





    ml @ 100 mls/hr IVPB   





    Q24HR MARY Rx#:[id number]   


 


  Intake, IV Titration 631.792 251.687 107.414





  Amount   


 


    Clevidipine Butyrate 25 4.666 64.834 35.167





    mg In Empty Bag 1 bag @ 1   





    MG/HR 2 mls/hr IV .Q24H   





    MARY Rx#:[id number]   


 


    Midazolam HCl 50 mg In 30.95 92.05 59.317





    Sodium Chloride 0.9% 40   





    ml @ 5 MG/HR 5 mls/hr IV   





    .Q10H MARY Rx#:[id number]   


 


    Propofol 1,000 mg In 154.915  





    Empty Bag 1 bag @ Titrate   





    IV .Q0M MARY Rx#:   





    [id number]   


 


    Vancomycin 1,000 mg In 250  





    Sodium Chloride 0.9% 250   





    ml @ 125 mls/hr IVPB Q12H   





    MARY Rx#:[id number]   


 


    fentaNYL (PF) 1,000 mcg 191.261 94.803 12.93





    In Sodium Chloride 0.9%   





    80 ml @ 1 MCG/KG/HR 5.443   





    mls/hr IV .[id number] AMRY   





    Rx#:[id number]   


 


  Tube Feeding 275 350 125


 


  Other 60 90 210


 


Output:   


 


  Urine 850 2355 900


 


Other:   


 


  Voiding Method Indwelling Catheter Indwelling Catheter Indwelling Catheter








                       ABP, PAP, CO, CI - Last Documented











Arterial Blood Pressure        133/67

















- Constitutional


General appearance: Present: no acute distress





- EENT


EENT Comment(s): 





2mm pupils, equal





- Respiratory


Respiratory: right: wheezing, left: CTA





- Cardiovascular


Heart sounds: normal: S1, S2





- Gastrointestinal


General gastrointestinal: Present: normal bowel sounds, soft





- Integumentary


Integumentary Comment(s): 





right forehead bruise at hairline





- Neurologic


Neurologic: Absent: CNII-XII intact, focal deficits





- Psychiatric


Psychiatric: Absent: A&O x's 3, appropriate affect, intact judgment & insight





- Labs


CBC & Chem 7: 


                                 03/27/19 04:21





                                 03/27/19 09:00


Labs: 


                  Abnormal Lab Results - Last 24 Hours (Table)











  03/26/19 03/26/19 03/27/19 Range/Units





  18:08 23:26 04:21 


 


RBC    3.51 L  (3.80-5.40)  m/uL


 


Hgb    10.8 L  (11.4-16.0)  gm/dL


 


Neutrophils #    9.6 H  (1.3-7.7)  k/uL


 


Lymphocytes #    0.2 L  (1.0-4.8)  k/uL


 


ABG pH     (7.35-7.45)  


 


ABG pCO2     (35-45)  mmHg


 


ABG HCO3     (21-25)  mmol/L


 


ABG Total CO2     (19-24)  mmol/L


 


ABG O2 Saturation     (94-97)  %


 


Potassium     (3.5-5.1)  mmol/L


 


Carbon Dioxide     (22-30)  mmol/L


 


Creatinine     (0.52-1.04)  mg/dL


 


Glucose     (74-99)  mg/dL


 


POC Glucose (mg/dL)  169 H  162 H   (75-99)  mg/dL


 


Calcium     (8.4-10.2)  mg/dL


 


Ammonia     (<30)  umol/L


 


Total Protein     (6.3-8.2)  g/dL


 


Albumin     (3.5-5.0)  g/dL














  03/27/19 03/27/19 03/27/19 Range/Units





  04:21 04:21 05:49 


 


RBC     (3.80-5.40)  m/uL


 


Hgb     (11.4-16.0)  gm/dL


 


Neutrophils #     (1.3-7.7)  k/uL


 


Lymphocytes #     (1.0-4.8)  k/uL


 


ABG pH     (7.35-7.45)  


 


ABG pCO2     (35-45)  mmHg


 


ABG HCO3     (21-25)  mmol/L


 


ABG Total CO2     (19-24)  mmol/L


 


ABG O2 Saturation     (94-97)  %


 


Potassium  3.0 L    (3.5-5.1)  mmol/L


 


Carbon Dioxide  32 H    (22-30)  mmol/L


 


Creatinine  0.34 L    (0.52-1.04)  mg/dL


 


Glucose  148 H    (74-99)  mg/dL


 


POC Glucose (mg/dL)    145 H  (75-99)  mg/dL


 


Calcium  8.1 L    (8.4-10.2)  mg/dL


 


Ammonia   34 H   (<30)  umol/L


 


Total Protein  4.5 L    (6.3-8.2)  g/dL


 


Albumin  2.4 L    (3.5-5.0)  g/dL














  03/27/19 03/27/19 03/27/19 Range/Units





  08:24 09:00 11:48 


 


RBC     (3.80-5.40)  m/uL


 


Hgb     (11.4-16.0)  gm/dL


 


Neutrophils #     (1.3-7.7)  k/uL


 


Lymphocytes #     (1.0-4.8)  k/uL


 


ABG pH  7.49 H    (7.35-7.45)  


 


ABG pCO2  46 H    (35-45)  mmHg


 


ABG HCO3  35 H    (21-25)  mmol/L


 


ABG Total CO2  36 H    (19-24)  mmol/L


 


ABG O2 Saturation  98.0 H    (94-97)  %


 


Potassium   3.4 L   (3.5-5.1)  mmol/L


 


Carbon Dioxide     (22-30)  mmol/L


 


Creatinine     (0.52-1.04)  mg/dL


 


Glucose     (74-99)  mg/dL


 


POC Glucose (mg/dL)    153 H  (75-99)  mg/dL


 


Calcium     (8.4-10.2)  mg/dL


 


Ammonia     (<30)  umol/L


 


Total Protein     (6.3-8.2)  g/dL


 


Albumin     (3.5-5.0)  g/dL








                      Microbiology - Last 24 Hours (Table)











 03/23/19 15:20 CSF Gram Stain - Preliminary





 Cerebral Spinal Fluid CSF Culture - Preliminary


 


 03/22/19 17:39 Blood Culture - Preliminary





 Blood    No Growth after 96 hours


 


 03/24/19 20:02 Gram Stain - Final





 Sputum Sputum Culture - Final





    Candida albicans














Assessment and Plan


(1) Non-small cell lung cancer


Narrative/Plan: 


CSF cytology reported no malignant cells or cells suggestive of inflammation.  

Pt current condition not felt to be r/t malignancy or treatment of malignancy 

with immunotherapy-last dose in Feb.  


Results were discussed with family at bedside.  


Current Visit: No   Status: Chronic   Priority: Medium   Code(s): C34.90 - 

MALIGNANT NEOPLASM OF UNSP PART OF UNSP BRONCHUS OR LUNG   SNOMED Code(s): 

328738721


   


Plan: 





Defer ICU mgmt to Critical Care team.

## 2019-03-27 NOTE — XR
EXAMINATION TYPE: XR chest 1V portable

 

DATE OF EXAM: 3/27/2019

 

COMPARISON: 3/26/2019

 

HISTORY: Ventilatory dependent respiratory failure

 

TECHNIQUE: Single frontal view of the chest is obtained.

 

FINDINGS:  There is a stable trace left pleural effusion with new right basilar opacity and persisten
t retrocardiac opacity. Remainder the lungs are clear. Cardiomediastinal silhouette is partially obsc
ured but appears overall stable. Left-sided subclavian approach central venous catheter, enteric tube
 and endotracheal tube appear satisfactorily placed and similar to the prior. No sizable pneumothorax
.

 

IMPRESSION:  Stable trace left pleural effusion and retrocardiac airspace disease with new right basi
lar airspace disease that may represent atelectasis or developing pneumonia.

## 2019-03-27 NOTE — P.PN
Subjective


Progress Note Date: 03/27/19


This is a 63-year-old female patient of Dr. bustillo.  Patient presented to the 

hospital with complaints of altered mental status changes.  She per patient's 

sister patient started having altered mental status possibly 2 days ago.   

Patient has a known past medical history of stage III lung cancer.  Per patient 

patient has not received any recent treatment for cancer.  Past medical history 

includes COPD, CVA, fibromyalgia, GERD, hyperlipidemia, osteoarthritis and 

pneumonia.  Chest x-ray completed showing no new process.   Head CT completed 

showing no acute process.  CT of abdomen and pelvis dated showing urinary 

bladder distention excessive colonic stool.  Liver enzymes slightly elevated AST

65, ALT 79 and alkaline phosphatase 219.  Ammonia level  28.  Urine and blood 

cultures have been ordered.  Patient's pH on this admission 7.44 pCO2 57 HCO3 

38.   WBC 19.5.  Influenza ordered.  Patient started on Rocephin.  At this time 

patient remains confused and are not able to follow commands.  Discussed case 

with Dr. Trejo per critical care.  [facility] patient be transferred to the 

intensive care unit for closer monitoring.  Discussed with patient's sister at 

bedside CODE STATUS.  At this time requesting patient be full code.  Patient's 

potassium also low at 2.8.  Replacement has been ordered.  Infectious disease 

will be consulted.  Oncology service is consulted.








On 03/24/2019 patient is currently in the intensive care unit.  Patient was 

intubated yesterday per critical care.  At this time patient is sedated.  

Discussed case with critical care Dr. Trejo, plan to attempt to wake patient u

p tomorrow.  Lumbar puncture was performed yesterday repeat head CT also 

performed.  At this time vitals do remain stable.  Patient patient currently on 

acylovir, vancomycin and Rocephin.  All cultures pending 





On 03/25/2019 patient remains in the intensive care unit.  Patient remains on 

mechanical ventilation and sedation at this time.  Attempt to wean sedation 

today per critical care team.  At this time critical care, infectious disease 

and oncology services are following.  Patient remains on antibiotics and 

antivirals.  Cultures currently pending








On 03/26/2018 patient remains in the intensive care unit on mechanical 

ventilation.  Patient is increasingly agitated and unable to weaned from 

sedation at this time.  Per Dr. Mendenhall antivirals have been DC'd.  Patient 

remains on Vanco and Rocephin for IV antibiotics.





On 03/27/2018 patient remains on mechanical ventilation intensive care unit.  

Attempting to wean off sedation today per nursing staff.  Patient remains 

currently on Versed.  tube feeding have been started.  Critical care, oncology 

and infectious disease following.





Objective





- Vital Signs


Vital signs: 


                                   Vital Signs











Temp  99.3 F   03/27/19 12:00


 


Pulse  101 H  03/27/19 12:00


 


Resp  22   03/27/19 12:00


 


BP  104/62   03/26/19 22:00


 


Pulse Ox  91 L  03/27/19 12:00








                                 Intake & Output











 03/26/19 03/27/19 03/27/19





 18:59 06:59 18:59


 


Intake Total 2266.792 1452.139 6688.247


 


Output Total 850 2355 800


 


Balance 1416.792 -363.313 212.247


 


Weight 63 kg 65.8 kg 


 


Intake:   


 


  IV 1300 1300 600


 


    Normal Saline 1200 1300 500


 


    cefTRIAXone 1 gm In 100  100





    Sodium Chloride 0.9% 50   





    ml @ 100 mls/hr IVPB   





    Q24HR [facility]ARY Rx#:[id number]   


 


  Intake, IV Titration 631.792 251.687 102.247





  Amount   


 


    Clevidipine Butyrate 25 4.666 64.834 35.167





    mg In Empty Bag 1 bag @ 1   





    MG/HR 2 mls/hr IV .Q24H   





    MARY Rx#:[id number]   


 


    Midazolam HCl 50 mg In 30.95 92.05 54.150





    Sodium Chloride 0.9% 40   





    ml @ 5 MG/HR 5 mls/hr IV   





    .Q10H MARY Rx#:[id number]   


 


    Propofol 1,000 mg In 154.915  





    Empty Bag 1 bag @ Titrate   





    IV .Q0M MARY Rx#:   





    [id number]   


 


    Vancomycin 1,000 mg In 250  





    Sodium Chloride 0.9% 250   





    ml @ 125 mls/hr IVPB Q12H   





    MARY Rx#:[id number]   


 


    fentaNYL (PF) 1,000 mcg 191.261 94.803 12.93





    In Sodium Chloride 0.9%   





    80 ml @ 1 MCG/KG/HR 5.443   





    mls/hr IV .[id number] MARY   





    Rx#:[id number]   


 


  Tube Feeding 275 350 100


 


  Other 60 90 210


 


Output:   


 


  Urine 850 2355 800


 


Other:   


 


  Voiding Method Indwelling Catheter Indwelling Catheter Indwelling Catheter








                       ABP, PAP, CO, CI - Last Documented











Arterial Blood Pressure        140/72

















- Exam





Head normocephalic


Neck supple


Lungs mechanically vented


Heart regular rate and rhythm S1-S2, no rub or gallop


Abdomen is soft nontender nondistended positive bowel sounds no 

hepatosplenomegaly


Extremities no edema


Neuro sedated at this time





- Labs


CBC & Chem 7: 


                                 03/27/19 04:21





                                 03/27/19 09:00


Labs: 


                  Abnormal Lab Results - Last 24 Hours (Table)











  03/26/19 03/26/19 03/27/19 Range/Units





  18:08 23:26 04:21 


 


RBC    3.51 L  (3.80-5.40)  m/uL


 


Hgb    10.8 L  (11.4-16.0)  gm/dL


 


Neutrophils #    9.6 H  (1.3-7.7)  k/uL


 


Lymphocytes #    0.2 L  (1.0-4.8)  k/uL


 


ABG pH     (7.35-7.45)  


 


ABG pCO2     (35-45)  mmHg


 


ABG HCO3     (21-25)  mmol/L


 


ABG Total CO2     (19-24)  mmol/L


 


ABG O2 Saturation     (94-97)  %


 


Potassium     (3.5-5.1)  mmol/L


 


Carbon Dioxide     (22-30)  mmol/L


 


Creatinine     (0.52-1.04)  mg/dL


 


Glucose     (74-99)  mg/dL


 


POC Glucose (mg/dL)  169 H  162 H   (75-99)  mg/dL


 


Calcium     (8.4-10.2)  mg/dL


 


Ammonia     (<30)  umol/L


 


Total Protein     (6.3-8.2)  g/dL


 


Albumin     (3.5-5.0)  g/dL














  03/27/19 03/27/19 03/27/19 Range/Units





  04:21 04:21 05:49 


 


RBC     (3.80-5.40)  m/uL


 


Hgb     (11.4-16.0)  gm/dL


 


Neutrophils #     (1.3-7.7)  k/uL


 


Lymphocytes #     (1.0-4.8)  k/uL


 


ABG pH     (7.35-7.45)  


 


ABG pCO2     (35-45)  mmHg


 


ABG HCO3     (21-25)  mmol/L


 


ABG Total CO2     (19-24)  mmol/L


 


ABG O2 Saturation     (94-97)  %


 


Potassium  3.0 L    (3.5-5.1)  mmol/L


 


Carbon Dioxide  32 H    (22-30)  mmol/L


 


Creatinine  0.34 L    (0.52-1.04)  mg/dL


 


Glucose  148 H    (74-99)  mg/dL


 


POC Glucose (mg/dL)    145 H  (75-99)  mg/dL


 


Calcium  8.1 L    (8.4-10.2)  mg/dL


 


Ammonia   34 H   (<30)  umol/L


 


Total Protein  4.5 L    (6.3-8.2)  g/dL


 


Albumin  2.4 L    (3.5-5.0)  g/dL














  03/27/19 03/27/19 03/27/19 Range/Units





  08:24 09:00 11:48 


 


RBC     (3.80-5.40)  m/uL


 


Hgb     (11.4-16.0)  gm/dL


 


Neutrophils #     (1.3-7.7)  k/uL


 


Lymphocytes #     (1.0-4.8)  k/uL


 


ABG pH  7.49 H    (7.35-7.45)  


 


ABG pCO2  46 H    (35-45)  mmHg


 


ABG HCO3  35 H    (21-25)  mmol/L


 


ABG Total CO2  36 H    (19-24)  mmol/L


 


ABG O2 Saturation  98.0 H    (94-97)  %


 


Potassium   3.4 L   (3.5-5.1)  mmol/L


 


Carbon Dioxide     (22-30)  mmol/L


 


Creatinine     (0.52-1.04)  mg/dL


 


Glucose     (74-99)  mg/dL


 


POC Glucose (mg/dL)    153 H  (75-99)  mg/dL


 


Calcium     (8.4-10.2)  mg/dL


 


Ammonia     (<30)  umol/L


 


Total Protein     (6.3-8.2)  g/dL


 


Albumin     (3.5-5.0)  g/dL








                      Microbiology - Last 24 Hours (Table)











 03/23/19 15:20 CSF Gram Stain - Preliminary





 Cerebral Spinal Fluid CSF Culture - Preliminary


 


 03/22/19 17:39 Blood Culture - Preliminary





 Blood    No Growth after 96 hours


 


 03/24/19 20:02 Gram Stain - Final





 Sputum Sputum Culture - Final





    Candida albicans














Assessment and Plan


Assessment: 





1.  Altered mental status changes with increased agitation.  Ammonia level 28.  

Head CT completed showing no acute process.  Abdomen and pelvis CT completed 

showing urinary bladder distention excessive colonic stool.  Urine and blood 

cultures ordered.  Influenza ordered.  Lumbar puncture was performed per 

critical care.  Acyclovir has been added per infectious disease.  Repeat head CT

also completed showing no acute intracranial abnormality.  At this time patient 

remains on mechanical ventilation andsedated.  On 03/27/2019 patient will be 

attempted to wean off sedation per critical care





2.  Acute Hypercapnia and hypoxic respiratory failure with known history of 

COPD.  Initial pH on venous blood gas 7.44 pCO2 57 and HCO3 38.  pulmonary 

services following





3.  Leukocytosis.  Initial white blood cell 19.5.  Chest x-ray completed showing

no new process.  Patient started on Rocephin and vancomycin.  Infectious disease

consulted.  Urine and blood cultures currently showing no growth





4.  Hypokalemia.  Potassium 2.8.  Will replace per protocol.  resolved. 





5.  Non-small cell lung cancer stage III adenocarcinoma.  Per patient's family 

patient has not received treatment for multiple months.  Per oncology service is

patient received PD1 immunotherapy proximally 2 months ago.  Possible MRI when 

patient is stable to rule out malignancy per oncology services





6.  History of COPD.  Patient currently on Solu-Medrol and DuoNeb breathing 

treatments





7.  History of CVA





8.  History of osteoarthritis





9.  History of hyperlipidemia





10.  History of fibromyalgia





11.  History of depression





12.  Ex-smoker.  Patient's both proximally half pack per day for 30 years





13.  Elevated liver enzymes.  AST trending down to 37 ALT 55 and alkaline 

phosphatase 148





14.  Chronic cholecystitis.  Abdominal ultrasound completed showing sludge 

within the gallbladder.  Pericholecystic fluid.  Gallbladder wall is 0.25 cm, 

bile duct 0.4 cm.  Surgical services were consulted.  At this time no immediate 

surgical intervention





DVT prophylaxis Lovenox.  GI prophylaxis Protonix





At This time patient remains in the intensive care unit.


Patient is mechanically ventilated on station.


Critical care and  infectious disease is following








I performed an examination of the patient and discussed their management with 

the Nurse Practitioner.  I have reviewed the Nurse Practitioner's notes and agr

ee with the documented findings and plan of care

## 2019-03-27 NOTE — P.DS
Providers


Date of admission: 


03/22/19 21:14





Expected date of discharge: 03/27/19


Attending physician: 


Miguel Garza





Consults: 





                                        





03/22/19 21:12


Consult Physician Routine 


   Consulting Provider: Kenna Love


   Consult Reason/Comments: COPD


   Do you want consulting provider notified?: Yes





03/23/19 08:20


Consult Physician Routine 


   Consulting Provider: Cosmo Bartlett


   Consult Reason/Comments: lung ca


   Do you want consulting provider notified?: Yes





03/23/19 09:29


Consult Physician Routine 


   Consulting Provider: Darya Mendenhall


   Consult Reason/Comments: elevated white count alt mental status


   Do you want consulting provider notified?: Yes





03/25/19 10:03


Consult Physician Routine 


   Consulting Provider: Parth Lopez


   Consult Reason/Comments: cholecystitis


   Do you want consulting provider notified?: Yes











Primary care physician: 


Rebecca Bustillo





Hospital Course: 





Discharge diagnosis


1.  Altered mental status changes with increased agitation.  Ammonia level 28.  

Head CT completed showing no acute process.  Abdomen and pelvis CT completed 

showing urinary bladder distention excessive colonic stool.  Urine and blood 

cultures ordered.  Influenza ordered.  Lumbar puncture was performed per 

critical care.  Acyclovir has been added per infectious disease.  Repeat head CT

also completed showing no acute intracranial abnormality.  At this time patient 

remains on mechanical ventilation andsedated.  Sedation was held and patient 

agitated and unable to weaned.  Discussed case with critical care Dr. Moe, 

commending at this time transferred to [facility] for neurology 

evaluation





2.  Acute Hypercapnia and hypoxic respiratory failure with known history of 

COPD.  Initial pH on venous blood gas 7.44 pCO2 57 and HCO3 38.  pulmonary 

services following





3.  Leukocytosis.  Initial white blood cell 19.5.  Chest x-ray completed showing

no new process.  Patient started on Rocephin and vancomycin.  Infectious disease

consulted.  Urine and blood cultures currently showing no growth





4.  Hypokalemia.  Potassium 2.8.  Will replace per protocol.  resolved. 





5.  Non-small cell lung cancer stage III adenocarcinoma.  Per patient's family 

patient has not received treatment for multiple months.  Per oncology service is

patient received PD1 immunotherapy proximally 2 months ago.  Possible MRI when 

patient is stable to rule out malignancy per oncology services





6.  History of COPD.  Patient currently on Solu-Medrol and DuoNeb breathing 

treatments





7.  History of CVA





8.  History of osteoarthritis





9.  History of hyperlipidemia





10.  History of fibromyalgia





11.  History of depression





12.  Ex-smoker.  Patient's both proximally half pack per day for 30 years





13.  Elevated liver enzymes.  AST trending down to 37 ALT 55 and alkaline 

phosphatase 148





14.  Chronic cholecystitis.  Abdominal ultrasound completed showing sludge 

within the gallbladder.  Pericholecystic fluid.  Gallbladder wall is 0.25 cm, 

bile duct 0.4 cm.  Surgical services were consulted.  At this time no immediate 

surgical intervention





DVT prophylaxis Lovenox.  GI prophylaxis Protonix





Patient will be transferred to [facility] for neurology evaluation.  Discussed 

case with patient's family and patient's partner agreeable at this time





Hospital course


This is a 63-year-old female patient of Dr. bustillo.  Patient presented to the 

hospital with complaints of altered mental status changes.  She per patient's 

sister patient started having altered mental status possibly 2 days ago.   

Patient has a known past medical history of stage III lung cancer.  Per patient 

patient has not received any recent treatment for cancer.  Past medical history 

includes COPD, CVA, fibromyalgia, GERD, hyperlipidemia, osteoarthritis and 

pneumonia.  Chest x-ray completed showing no new process.   Head CT completed 

showing no acute process.  CT of abdomen and pelvis dated showing urinary 

bladder distention excessive colonic stool.  Liver enzymes slightly elevated AST

65, ALT 79 and alkaline phosphatase 219.  Ammonia level  28.  Urine and blood 

cultures have been ordered.  Patient's pH on this admission 7.44 pCO2 57 HCO3 

38.   WBC 19.5.  Influenza ordered.  Patient started on Rocephin.  At this time 

patient remains confused and are not able to follow commands.  Discussed case 

with Dr. Trejo per critical care.  [facility] patient be transferred to the 

intensive care unit for closer monitoring.  Discussed with patient's sister at 

bedside CODE STATUS.  At this time requesting patient be full code.  Patient's 

potassium also low at 2.8.  Replacement has been ordered.  Infectious disease 

will be consulted.  Oncology service is consulted.








On 03/24/2019 patient is currently in the intensive care unit.  Patient was 

intubated yesterday per critical care.  At this time patient is sedated.  

Discussed case with critical care Dr. Trejo, plan to attempt to wake patient 

up tomorrow.  Lumbar puncture was performed yesterday repeat head CT also 

performed.  At this time vitals do remain stable.  Patient patient currently on 

acylovir, vancomycin and Rocephin.  All cultures pending 





On 03/25/2019 patient remains in the intensive care unit.  Patient remains on 

mechanical ventilation and sedation at this time.  Attempt to wean sedation 

today per critical care team.  At this time critical care, infectious disease 

and oncology services are following.  Patient remains on antibiotics and 

antivirals.  Cultures currently pending








On 03/26/2018 patient remains in the intensive care unit on mechanical 

ventilation.  Patient is increasingly agitated and unable to weaned from 

sedation at this time.  Per Dr. Mendenhall antivirals have been DC'd.  Patient 

remains on Vanco and Rocephin for IV antibiotics.





On 03/27/2018 patient remains on mechanical ventilation intensive care unit.  

Attempting to wean off sedation today per nursing staff.  Patient remains 

currently on Versed.  tube feeding have been started.  Critical care, oncology 

and infectious disease following.








Discussed case with critical care team at this time recommending transferred to 

[facility] for neurology evaluation.  Patient was attempted to be off 

sedation and was agitated and unable to wean.  Discussed with family agreeable 

at this time to transfer.





I performed an examination of the patient and discussed their management with 

the Nurse Practitioner.  I have reviewed the Nurse Practitioner's notes and 

agree with the documented findings and plan of care


Patient Condition at Discharge: Stable





Plan - Discharge Summary


Discharge Rx Participant: No


New Discharge Prescriptions: 


No Action


   Cevimeline [Evoxac] 30 mg PO BID


   Albuterol Inhaler [Ventolin Hfa Inhaler] 2 puff INHALATION RT-QID PRN


     PRN Reason: Shortness Of Breath


   Esomeprazole Magnesium [NexIUM] 40 mg PO BID


   Escitalopram [Lexapro] 20 mg PO DAILY


   Morphine Sulfate [Ms Contin] 30 mg PO BID #60 tablet.er


   oxyCODONE-APAP 10-325MG [Percocet  mg] 1 tab PO Q6H PRN


     PRN Reason: Pain


   Prochlorperazine [Compazine] 10 mg PO Q6H PRN


     PRN Reason: Nausea


   Budesonide-Formot 160-4.5 Mcg [Symbicort 160-4.5 Mcg Inhaler] 2 puff 

INHALATION RT-BID


   Albuterol Nebulized [Ventolin Nebulized] 2.5 mg INHALATION RT-QID PRN


     PRN Reason: Shortness Of Breath


   Ondansetron Odt [Zofran ODT] 8 mg PO Q6H PRN


     PRN Reason: Nausea


   ALPRAZolam [Xanax] 0.25 mg PO AS DIRECTED PRN


     PRN Reason: Anxiety


   Folic Acid 0.4 mg PO DAILY


   Cholecalciferol [Vitamin D3] 1,000 unit PO DAILY


   Cyanocobalamin (Vitamin B-12) [Vitamin B-12] 1,000 mcg PO BID


   Furosemide [Lasix] 40 mg PO TID


   Umeclidinium Bromide [Incruse Ellipta] 1 puff INHALATION RT-DAILY


   Levothyroxine Sodium [Synthroid] 25 mcg PO DAILY


   guaiFENesin [Mucinex] 600 mg PO DAILY PRN


     PRN Reason: Cough


   LORazepam [Ativan] 0.5 mg PO Q6H PRN


     PRN Reason: Anxiety


   Tolterodine Tartrate [Detrol LA] 4 mg PO DAILY


   Pyridoxine HCl (Vitamin B6) [Vitamin B-6] 100 mg PO DAILY


Discharge Medication List





Albuterol Inhaler [Ventolin Hfa Inhaler] 2 puff INHALATION RT-QID PRN 05/27/14 

[History]


Cevimeline [Evoxac] 30 mg PO BID 05/27/14 [History]


Escitalopram [Lexapro] 20 mg PO DAILY 05/27/14 [History]


Esomeprazole Magnesium [NexIUM] 40 mg PO BID 05/27/14 [History]


Morphine Sulfate [Ms Contin] 30 mg PO BID #60 tablet.er 09/02/15 [Rx]


oxyCODONE-APAP 10-325MG [Percocet  mg] 1 tab PO Q6H PRN 02/27/17 [History]


Albuterol Nebulized [Ventolin Nebulized] 2.5 mg INHALATION RT-QID PRN 08/17/17 

[History]


Budesonide-Formot 160-4.5 Mcg [Symbicort 160-4.5 Mcg Inhaler] 2 puff INHALATION 

RT-BID 08/17/17 [History]


Ondansetron Odt [Zofran ODT] 8 mg PO Q6H PRN 08/17/17 [History]


Prochlorperazine [Compazine] 10 mg PO Q6H PRN 08/17/17 [History]


ALPRAZolam [Xanax] 0.25 mg PO AS DIRECTED PRN 07/14/18 [History]


Cholecalciferol [Vitamin D3] 1,000 unit PO DAILY 03/07/19 [History]


Cyanocobalamin (Vitamin B-12) [Vitamin B-12] 1,000 mcg PO BID 03/07/19 [History]


Folic Acid 0.4 mg PO DAILY 03/07/19 [History]


Furosemide [Lasix] 40 mg PO TID 03/07/19 [History]


Levothyroxine Sodium [Synthroid] 25 mcg PO DAILY 03/07/19 [History]


Umeclidinium Bromide [Incruse Ellipta] 1 puff INHALATION RT-DAILY 03/07/19 

[History]


guaiFENesin [Mucinex] 600 mg PO DAILY PRN 03/07/19 [History]


LORazepam [Ativan] 0.5 mg PO Q6H PRN 03/22/19 [History]


Pyridoxine HCl (Vitamin B6) [Vitamin B-6] 100 mg PO DAILY 03/22/19 [History]


Tolterodine Tartrate [Detrol LA] 4 mg PO DAILY 03/22/19 [History]








Follow up Appointment(s)/Referral(s): 


Rebecca Bustillo MD [Primary Care Provider] - 1-2 days

## 2019-03-27 NOTE — P.PN
Subjective


Progress Note Date: 03/27/19


Principal diagnosis: 





Acute mental status change, stage III non-small cell lung cancer





This is a very pleasant 63-year-old female patient who follows with Dr. Amaya 

as her primary care physician.  She has a history of CVA/TIA, fibromyalgia, 

gastroesophageal reflux disease, hyperlipidemia, hypothyroidism, chronic back 

pain, depression.  She also has a history of chronic tobacco dependence, oxygen 

dependent chronic obstructive pulmonary disease with an FEV1 value of 56% of pre

dicted, and non-small cell lung cancer stage III adenocarcinoma.  She follows 

with Dr. Pastor in our office. This was diagnosed by an FNA of a left upper lobe 

lesion by IR in May 2017.  She had received concurrent chemoradiation with 

subsequent additional chemotherapy and then placed on maintenance mmunotherapy 

of Imfinzi back in June 2018.  Her last PET scan in September 2018 revealed 

continued left upper lobe mass with irregular margins extending to the pleural 

surface, spiculated appearance but was stable compared to previous and April 2018.  The SUV value was only 1.7.  Subsequent computed tomography scan of the 

chest in January 2019 revealed a stable and slightly smaller spiculated mass of 

the left upper lobe measuring 2.5 x 2.4 cm versus 3.0 x 2.6 previous.  No 

evidence of recurrence or metastasis.  The patient was in the hospital back in 

early March 2019 and she was having ongoing issues with nausea vomiting profound

weakness and fatigue.  She decided to stop taking the immunotherapy. She had 

been treated for his COPD exacerbation 02/13/2019 in our office by Dr. Pastor 

where she received steroids and Bactrim.  She did improve for the first week or 

so. She presented to the emergency room yesterday with complaints of increasing 

shortness of breath cough and congestion.  She did have some lower extremity 

edema.  She was still quite weak and fatigued.  ome dyspnea on exertion.  The 

patient was discharged home on 03/11/2019 as the patient shortness of breath 

improved and the patient recovered from her acute COPD exacerbation and she was 

discharged home on her usual routine medications which included also morphine 

sulfate 30 mg by mouth twice a day Percocet 10/03/2025 every 6 hours and a when 

necessary basis.  In terms of her lung medication she was maintained on a combi

nation of Symbicort and Incruse.  She is also on Synthroid 25 g by mouth daily.

 She was given Lasix to be taken on an as needed basis.The patient came in 

yesterday to the burst department 3 day history of altered mental status.  

According to the family the patient was acting weird.  She was acting unusual 

and she was confused.  She was also very weak and she was having episodes of 

fall.  I do see a bruise over her forehead.  The family tells that she did not 

have any active had trauma.  No seizure activity.  No neck stiffness.  No 

documented fever.  She was thrashing significantly yesterday.  A CAT scan of the

head was done that showed no acute process.  CAT scan of abdomen and pelvis was 

done that showed urinary bladder distention along with extensive colonic stool. 

The patient's ammonia level was at 28.  Urine and blood cultures were ordered.  

The influenza screen was negative.  The wife that was at 19.7 the patient was 

given a dose of 1 g of Rocephin and she was admitted to the oncology unit.  

Overnight the patient continued to be the same.  Earlier this morning I was 

informed about this patient by the nurse practitioners and I was able to see it 

on the floor and I asked her to be transferred to the intensive care unit.  She 

did not have any neck stiffness.  No fever.  She was at times morning.  Other 

times she would open up her eyes and follows some simple commands.  I was told 

that this is an improvement in her condition compared to yesterday.  She was 

able to follow some simple commands however she has not been consistent in 

following orders.  His speech is minimal and the patient may safely worse.  She 

denies being in pain.  Urine toxin was ordered.  She was given a dose of Narcan 

here in the ICU without much improvement.  Blood gases showed a pH of 7.3 with a

pCO2 of 47 and pO2 of 136 and there is no evidence of any CO2 narcosis.  Her 

white cell count is down to 14.8.  The rest of the blood work is all within 

normal limits with a exception of some mild elevation of the LFTs with an AST of

65, ALT of 79 and alkaline phosphatase of 219.  Troponins of 0.04.  Serum 

albumin is at 3.8.  The coagulation profile is within normal.  The chest x-ray 

is not showing any acute process.








Today's evaluation of 03/24/2019 I'm seeing this patient for a follow-up.  E

vents from yesterday were noted.  The patient came in to the ICU.  She was 

extremely agitated and restless, having tachypnea tachycardia and blood pressure

was up.  She received several doses of Haldol without any improvement.  She 

continued to be altered and tomorrow mentation.  At that point, the patient was 

given propofol and subsequently she was intubated and placed on a mechanical 

ventilator.  Her sedation requirements stayed quite high throughout the night.  

The patient was receiving maximum dose of propofol which is currently running at

50 g per KG per minute.  Fentanyl had to be also added for sedation and 

fentanyl is at high as 160 g KG per hour.  She is much well rested for now.  

She is very symptoms with the mechanical ventilator.  She is set at a tidal 

volume of 400 and FiO2 of 40% and PEEP of 5 and a rate of 14.  Blood gases from 

today showed a pH of 7. raphae with a pCO2 of 44 and pO2 of 195.  This was on 

FiO2 of 50% and subsequently she was weaned down to 40%.  His x-ray from today 

shows this small new left-sided pleural effusion.





Patient was reevaluated today on 3/25/2019, patient remains on mechanical 

ventilation, she is on assist control rate of 14 tidal volume of 400 FiO2 of 40%

and PEEP of 5.  Remains on propofol drip, she is also on fentanyl drip, apparent

ly patient was extremely agitated yesterday, and she required a significant 

amount of sedation.  Her propofol is at 50 mcg/kg/m, and her fentanyl is 160 

mcg/h.  Seems to be resting while on mechanical ventilation.  Sedated, and 

difficult to arouse.  Hence I recommended cutting down the fentanyl drip to half

of the dose she is presently on, and we need to assess her mental status and 

decide whether the patient could even be weaned at all.  She underwent lumbar 

puncture by Dr. Love, she remains on vancomycin and Rocephin, empirically, 

and she is also on acyclovir for HSV CNS infection.  PCR for HSV is pending.  

Repeat CT of the brain has been unremarkable.  No evidence of any stroke.  Labs 

were all reviewed today, ABG showed a pO2 of 145 pCO2 of 49 pH of 7.38.  

Electrolytes and renal profile are normal.  CBC is relatively unremarkable, 

hemoglobin is 10.2.  Chest x-ray from yesterday showed new small left pleural 

effusion minimal atelectasis





Reevaluated today on 3/26/2019, remains on the same ventilator settings, remains

on mechanical ventilation, still requiring relatively high dose of fentanyl, and

she is on 50 mcg/kg/m of propofol.  Patient had a slight decrease in her sedatio

n, and she became extremely agitated, but was not arousable, remained obtunded 

and could not actually assess fully her mental status.  Hence I have decided to 

switch propofol to Versed, and I will try to titrate the fentanyl down to a 

lesser dose if possible.  Patient continues to have diffuse rhonchi and wheezes 

and she remains on bronchodilators and steroids.  She was seen by infectious 

disease, and her PCR for HSV was negative, acyclovir was discontinued.  ABG 

today showed a pO2 of 71 pCO2 of 51 pH of 7.36.  Electrolytes are relatively 

normal renal profile is normal.  CBC is relatively unremarkable.  Hemoglobin is 

10.4.  Chest x-ray continues to show trace of left pleural effusion and blunting

of the costophrenic angle, otherwise it is basically unremarkable.  Lines and 

tubes are noted to be in the proper position.  Again I could not assess mental 

status today because of extreme agitation once the dose of the fentanyl or 

propofol is cut down a bit.





Patient was reevaluated today on 3/27/2019, remains on mechanical ventilation, 

same ventilator settings, unchanged.  ABG showed a pO2 of 88 pCO2 of 46 pH of 

7.49.  Rest of the labs were unremarkable except for low potassium being cor

rected as per protocol.  Patient is off propofol, she is off fentanyl, and now 

she is on 10 mg per hour of Versed.  I have instructed the nurses to cut down 

the Versed, and I would like to awaken the patient and assess her mental status 

if possible.  Patient does have rhonchi and wheezes on physical examination, her

chest x-ray showed stable minimal left pleural effusion, and retro-cardiac 

disease, possible new right lower lobe atelectasis or pneumonia.  Patient 

remains on empiric antibiotics.








Objective





- Vital Signs


Vital signs: 


                                   Vital Signs











Temp  98.5 F   03/27/19 08:00


 


Pulse  105 H  03/27/19 10:00


 


Resp  19   03/27/19 10:00


 


BP  104/62   03/26/19 22:00


 


Pulse Ox  98   03/27/19 10:00








                                 Intake & Output











 03/26/19 03/27/19 03/27/19





 18:59 06:59 18:59


 


Intake Total 2266.792 1991.687 550.647


 


Output Total 850 2355 475


 


Balance 1416.792 -363.313 75.647


 


Weight 63 kg 65.8 kg 


 


Intake:   


 


  IV 1300 1300 400


 


    Normal Saline 1200 1300 300


 


    cefTRIAXone 1 gm In 100  100





    Sodium Chloride 0.9% 50   





    ml @ 100 mls/hr IVPB   





    Q24HR MARY Rx#:[id number]   


 


  Intake, IV Titration 631.792 251.687 70.647





  Amount   


 


    Clevidipine Butyrate 25 4.666 64.834 24.367





    mg In Empty Bag 1 bag @ 1   





    MG/HR 2 mls/hr IV .Q24H   





    MARY Rx#:[id number]   


 


    Midazolam HCl 50 mg In 30.95 92.05 33.350





    Sodium Chloride 0.9% 40   





    ml @ 5 MG/HR 5 mls/hr IV   





    .Q10H MARY Rx#:[id number]   


 


    Propofol 1,000 mg In 154.915  





    Empty Bag 1 bag @ Titrate   





    IV .Q0M MARY Rx#:   





    [id number]   


 


    Vancomycin 1,000 mg In 250  





    Sodium Chloride 0.9% 250   





    ml @ 125 mls/hr IVPB Q12H   





    [facility] Rx#:[id number]   


 


    fentaNYL (PF) 1,000 mcg 191.261 94.803 12.93





    In Sodium Chloride 0.9%   





    80 ml @ 1 MCG/KG/HR 5.443   





    mls/hr IV .Q68R47D MARY   





    Rx#:[id number]   


 


  Tube Feeding 275 350 50


 


  Other 60 90 30


 


Output:   


 


  Urine 850 2355 475


 


Other:   


 


  Voiding Method Indwelling Catheter Indwelling Catheter Indwelling Catheter








                       ABP, PAP, CO, CI - Last Documented











Arterial Blood Pressure        175/81

















- Exam





Physical Exam: Revealed a 63-year-old female, on mechanical ventilation, 

presently on Versed, off propofol and fentanyl.  Still fairly sedated.


Head: Atraumatic, normocephalic.


HEENT:[Neck is supple.] [No neck masses.] [No thyromegaly.] [No JVD.]  PERRLA, 

EOMI, no icterus.  Endotracheal tube and oral gastric tube are intact


Chest: [Diminished breath sounds at the bases, rhonchi and wheezes noted 

bilaterally.  Symmetrical chest expansion.


Cardiac Exam: [Normal S1 and S2, no S3 gallop, no murmur.]


Abdomen: [Soft, nontender,  no megaly, no rebound, no guarding, normal bowel 

sounds.]


Extremities: [No clubbing, no edema, no cyanosis.]  Good pulses bilaterally.


Neurological Exam: Cannot be assessed, fully sedated and on propofol as well as 

fentanyl.


Psychiatric: Could not be assessed.  Remains on propofol and on fentanyl pain


Lymphatics: No lymphadenopathy.


Skin: No rashes.





- Labs


CBC & Chem 7: 


                                 03/27/19 04:21





                                 03/27/19 09:00


Labs: 


                  Abnormal Lab Results - Last 24 Hours (Table)











  03/26/19 03/26/19 03/26/19 Range/Units





  11:59 18:08 23:26 


 


RBC     (3.80-5.40)  m/uL


 


Hgb     (11.4-16.0)  gm/dL


 


Neutrophils #     (1.3-7.7)  k/uL


 


Lymphocytes #     (1.0-4.8)  k/uL


 


ABG pH     (7.35-7.45)  


 


ABG pCO2     (35-45)  mmHg


 


ABG HCO3     (21-25)  mmol/L


 


ABG Total CO2     (19-24)  mmol/L


 


ABG O2 Saturation     (94-97)  %


 


Potassium     (3.5-5.1)  mmol/L


 


Carbon Dioxide     (22-30)  mmol/L


 


Creatinine     (0.52-1.04)  mg/dL


 


Glucose     (74-99)  mg/dL


 


POC Glucose (mg/dL)  182 H  169 H  162 H  (75-99)  mg/dL


 


Calcium     (8.4-10.2)  mg/dL


 


Ammonia     (<30)  umol/L


 


Total Protein     (6.3-8.2)  g/dL


 


Albumin     (3.5-5.0)  g/dL














  03/27/19 03/27/19 03/27/19 Range/Units





  04:21 04:21 04:21 


 


RBC  3.51 L    (3.80-5.40)  m/uL


 


Hgb  10.8 L    (11.4-16.0)  gm/dL


 


Neutrophils #  9.6 H    (1.3-7.7)  k/uL


 


Lymphocytes #  0.2 L    (1.0-4.8)  k/uL


 


ABG pH     (7.35-7.45)  


 


ABG pCO2     (35-45)  mmHg


 


ABG HCO3     (21-25)  mmol/L


 


ABG Total CO2     (19-24)  mmol/L


 


ABG O2 Saturation     (94-97)  %


 


Potassium   3.0 L   (3.5-5.1)  mmol/L


 


Carbon Dioxide   32 H   (22-30)  mmol/L


 


Creatinine   0.34 L   (0.52-1.04)  mg/dL


 


Glucose   148 H   (74-99)  mg/dL


 


POC Glucose (mg/dL)     (75-99)  mg/dL


 


Calcium   8.1 L   (8.4-10.2)  mg/dL


 


Ammonia    34 H  (<30)  umol/L


 


Total Protein   4.5 L   (6.3-8.2)  g/dL


 


Albumin   2.4 L   (3.5-5.0)  g/dL














  03/27/19 03/27/19 03/27/19 Range/Units





  05:49 08:24 09:00 


 


RBC     (3.80-5.40)  m/uL


 


Hgb     (11.4-16.0)  gm/dL


 


Neutrophils #     (1.3-7.7)  k/uL


 


Lymphocytes #     (1.0-4.8)  k/uL


 


ABG pH   7.49 H   (7.35-7.45)  


 


ABG pCO2   46 H   (35-45)  mmHg


 


ABG HCO3   35 H   (21-25)  mmol/L


 


ABG Total CO2   36 H   (19-24)  mmol/L


 


ABG O2 Saturation   98.0 H   (94-97)  %


 


Potassium    3.4 L  (3.5-5.1)  mmol/L


 


Carbon Dioxide     (22-30)  mmol/L


 


Creatinine     (0.52-1.04)  mg/dL


 


Glucose     (74-99)  mg/dL


 


POC Glucose (mg/dL)  145 H    (75-99)  mg/dL


 


Calcium     (8.4-10.2)  mg/dL


 


Ammonia     (<30)  umol/L


 


Total Protein     (6.3-8.2)  g/dL


 


Albumin     (3.5-5.0)  g/dL








                      Microbiology - Last 24 Hours (Table)











 03/23/19 15:20 CSF Gram Stain - Preliminary





 Cerebral Spinal Fluid CSF Culture - Preliminary


 


 03/22/19 17:39 Blood Culture - Preliminary





 Blood    No Growth after 96 hours


 


 03/24/19 20:02 Gram Stain - Final





 Sputum Sputum Culture - Final





    Candida albicans














Assessment and Plan


Assessment: 





Impression:





1 acute mental status change, acute encephalopathy exact etiology is not clear, 

viral encephalitis was ruled out based on negative PCR on the spinal fluid.  

Hence acyclovir was discontinued





2 extreme agitation requiring significant amount of sedation leading to 

intubation and mechanical ventilation.  Presently on Versed, off propofol and 

fentanyl.





3 stage III non-small cell lung cancer status post chemo and radiation therapy 

and she is receiving immunotherapy on outpatient basis.





4 history of fibromyalgia





5 chronic back pain





6 severe COPD





7 previous CVA/TIA





8 history of depression








Recommendation: Continue ventilatory support, nutritional support via enteral 

feeding, titrate to Versed down and assess mental status if possible today, keep

the patient of fentanyl for now, continue bronchodilators, steroids, antibiot

ics, continue GI and DVT prophylaxis, continue antibiotics, will hopefully be 

able to addressed mental status once the patient is down on a lower dose of 

Versed, she is definitely not ready to be weaned, but I would like to have 

sedation interruption for assessment of mental status.  I discussed her 

condition with family members at bedside, updated them on her condition, patient

again may eventually require tracheostomy and PEG tube placement, but this is 

yet to be addressed early next week if the patient does not show much 

improvement.  Patient remains critically ill.  And critical care time is 35 

minutes

















Time with Patient: Greater than 30

## 2019-03-27 NOTE — PN
PROGRESS NOTE



DATE OF SERVICE:

03/27/2019.



REASON FOR FOLLOWUP:

Leukocytosis.



HISTORY OF PRESENT ILLNESS:

The patient was seen on rounds early this afternoon.  The patient has been afebrile.

The patient was in the process of getting weaned off the vent.  However, agitation had

been noted once the sedation was cut back. No nausea. She has been tolerating some tube

feeds. No drainage reported by the nursing staff. She is currently _____ any pressor

support.



PHYSICAL EXAMINATION:

Blood pressure 127/62 with a pulse of 96, temperature 98. She is 90% on 40% FiO2.

General description is an elderly female lying in bed in no distress.

RESPIRATORY SYSTEM: Unlabored breathing with decreased breath sounds at the base.

HEART: S1, S2.  Regular rate and rhythm.

ABDOMEN: Soft. No tenderness.



LABS:

Hemoglobin is 10.8, white count 10.1.  BUN of 14, creatinine 0.34.  Cultures have been

negative, including the blood, sputum, urine and CSF.



DIAGNOSTIC IMPRESSION AND PLAN:

Patient with leukocytosis which is likely multifactorial in this patient who did have

acute respiratory failure.  The patient did have extensive workup. So far cultures

remain negative.  Patient is on Rocephin; to continue until the patient is evaluated by

the _____ service at [facility].  Family was present at bedside.  Questions

were answered.





MMODL / IJN: [id number] / Job#: [id number]

## 2019-05-26 ENCOUNTER — HOSPITAL ENCOUNTER (INPATIENT)
Dept: HOSPITAL 47 - EC | Age: 64
LOS: 11 days | Discharge: NURSING, REHAB OR LONG-TERM CARE | DRG: 870 | End: 2019-06-06
Payer: MEDICARE

## 2019-05-26 VITALS — BODY MASS INDEX: 27.6 KG/M2

## 2019-05-26 DIAGNOSIS — I10: ICD-10-CM

## 2019-05-26 DIAGNOSIS — M54.9: ICD-10-CM

## 2019-05-26 DIAGNOSIS — Z93.0: ICD-10-CM

## 2019-05-26 DIAGNOSIS — R65.21: ICD-10-CM

## 2019-05-26 DIAGNOSIS — J44.1: ICD-10-CM

## 2019-05-26 DIAGNOSIS — Z87.01: ICD-10-CM

## 2019-05-26 DIAGNOSIS — E27.9: ICD-10-CM

## 2019-05-26 DIAGNOSIS — Z79.51: ICD-10-CM

## 2019-05-26 DIAGNOSIS — Z79.899: ICD-10-CM

## 2019-05-26 DIAGNOSIS — M19.90: ICD-10-CM

## 2019-05-26 DIAGNOSIS — Z87.891: ICD-10-CM

## 2019-05-26 DIAGNOSIS — J96.21: ICD-10-CM

## 2019-05-26 DIAGNOSIS — Z80.9: ICD-10-CM

## 2019-05-26 DIAGNOSIS — K21.9: ICD-10-CM

## 2019-05-26 DIAGNOSIS — J98.11: ICD-10-CM

## 2019-05-26 DIAGNOSIS — Z93.1: ICD-10-CM

## 2019-05-26 DIAGNOSIS — Z99.81: ICD-10-CM

## 2019-05-26 DIAGNOSIS — Z98.890: ICD-10-CM

## 2019-05-26 DIAGNOSIS — E78.5: ICD-10-CM

## 2019-05-26 DIAGNOSIS — E03.9: ICD-10-CM

## 2019-05-26 DIAGNOSIS — G89.29: ICD-10-CM

## 2019-05-26 DIAGNOSIS — Z86.73: ICD-10-CM

## 2019-05-26 DIAGNOSIS — C34.12: ICD-10-CM

## 2019-05-26 DIAGNOSIS — J13: ICD-10-CM

## 2019-05-26 DIAGNOSIS — K59.09: ICD-10-CM

## 2019-05-26 DIAGNOSIS — J44.0: ICD-10-CM

## 2019-05-26 DIAGNOSIS — Z79.891: ICD-10-CM

## 2019-05-26 DIAGNOSIS — Z79.890: ICD-10-CM
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
## 2019-05-26 DIAGNOSIS — E87.70: ICD-10-CM

## 2019-05-26 DIAGNOSIS — M79.7: ICD-10-CM

## 2019-05-26 DIAGNOSIS — Z92.3: ICD-10-CM

## 2019-05-26 DIAGNOSIS — G57.93: ICD-10-CM

## 2019-05-26 DIAGNOSIS — Z92.21: ICD-10-CM

## 2019-05-26 DIAGNOSIS — F32.9: ICD-10-CM

## 2019-05-26 DIAGNOSIS — K44.9: ICD-10-CM

## 2019-05-26 DIAGNOSIS — A40.3: Primary | ICD-10-CM

## 2019-05-26 DIAGNOSIS — J90: ICD-10-CM

## 2019-05-26 DIAGNOSIS — B37.0: ICD-10-CM

## 2019-05-26 LAB
ALBUMIN SERPL-MCNC: 4 G/DL (ref 3.5–5)
ALP SERPL-CCNC: 100 U/L (ref 38–126)
ALT SERPL-CCNC: 21 U/L (ref 9–52)
ANION GAP SERPL CALC-SCNC: 5 MMOL/L
APTT BLD: 22.2 SEC (ref 22–30)
AST SERPL-CCNC: 40 U/L (ref 14–36)
BUN SERPL-SCNC: 50 MG/DL (ref 7–17)
CALCIUM SPEC-MCNC: 9.1 MG/DL (ref 8.4–10.2)
CELLS COUNTED: 200
CHLORIDE SERPL-SCNC: 94 MMOL/L (ref 98–107)
CO2 SERPL-SCNC: 31 MMOL/L (ref 22–30)
ERYTHROCYTE [DISTWIDTH] IN BLOOD BY AUTOMATED COUNT: 4.07 M/UL (ref 3.8–5.4)
ERYTHROCYTE [DISTWIDTH] IN BLOOD: 17.1 % (ref 11.5–15.5)
GLUCOSE SERPL-MCNC: 169 MG/DL (ref 74–99)
HCT VFR BLD AUTO: 39.1 % (ref 34–46)
HGB BLD-MCNC: 12.3 GM/DL (ref 11.4–16)
INR PPP: 0.9 (ref ?–1.2)
LYMPHOCYTES # BLD MANUAL: 0.22 K/UL (ref 1–4.8)
MCH RBC QN AUTO: 30.2 PG (ref 25–35)
MCHC RBC AUTO-ENTMCNC: 31.4 G/DL (ref 31–37)
MCV RBC AUTO: 96.1 FL (ref 80–100)
MONOCYTES # BLD MANUAL: 0.55 K/UL (ref 0–1)
NEUTROPHILS NFR BLD MANUAL: 74 %
NEUTS SEG # BLD MANUAL: 10.2 K/UL (ref 1.3–7.7)
PLATELET # BLD AUTO: 317 K/UL (ref 150–450)
POTASSIUM SERPL-SCNC: 5.4 MMOL/L (ref 3.5–5.1)
PROT SERPL-MCNC: 7.3 G/DL (ref 6.3–8.2)
PT BLD: 9.6 SEC (ref 9–12)
SODIUM SERPL-SCNC: 130 MMOL/L (ref 137–145)
WBC # BLD AUTO: 10.9 K/UL (ref 3.8–10.6)

## 2019-05-26 PROCEDURE — 85025 COMPLETE CBC W/AUTO DIFF WBC: CPT

## 2019-05-26 PROCEDURE — 80053 COMPREHEN METABOLIC PANEL: CPT

## 2019-05-26 PROCEDURE — 96375 TX/PRO/DX INJ NEW DRUG ADDON: CPT

## 2019-05-26 PROCEDURE — 87077 CULTURE AEROBIC IDENTIFY: CPT

## 2019-05-26 PROCEDURE — 85610 PROTHROMBIN TIME: CPT

## 2019-05-26 PROCEDURE — 71045 X-RAY EXAM CHEST 1 VIEW: CPT

## 2019-05-26 PROCEDURE — 84100 ASSAY OF PHOSPHORUS: CPT

## 2019-05-26 PROCEDURE — 93005 ELECTROCARDIOGRAM TRACING: CPT

## 2019-05-26 PROCEDURE — 82805 BLOOD GASES W/O2 SATURATION: CPT

## 2019-05-26 PROCEDURE — 87205 SMEAR GRAM STAIN: CPT

## 2019-05-26 PROCEDURE — 84484 ASSAY OF TROPONIN QUANT: CPT

## 2019-05-26 PROCEDURE — 36556 INSERT NON-TUNNEL CV CATH: CPT

## 2019-05-26 PROCEDURE — 83036 HEMOGLOBIN GLYCOSYLATED A1C: CPT

## 2019-05-26 PROCEDURE — 87070 CULTURE OTHR SPECIMN AEROBIC: CPT

## 2019-05-26 PROCEDURE — 94002 VENT MGMT INPAT INIT DAY: CPT

## 2019-05-26 PROCEDURE — 87086 URINE CULTURE/COLONY COUNT: CPT

## 2019-05-26 PROCEDURE — 80048 BASIC METABOLIC PNL TOTAL CA: CPT

## 2019-05-26 PROCEDURE — 96365 THER/PROPH/DIAG IV INF INIT: CPT

## 2019-05-26 PROCEDURE — 81003 URINALYSIS AUTO W/O SCOPE: CPT

## 2019-05-26 PROCEDURE — 94760 N-INVAS EAR/PLS OXIMETRY 1: CPT

## 2019-05-26 PROCEDURE — 96367 TX/PROPH/DG ADDL SEQ IV INF: CPT

## 2019-05-26 PROCEDURE — 80202 ASSAY OF VANCOMYCIN: CPT

## 2019-05-26 PROCEDURE — 85730 THROMBOPLASTIN TIME PARTIAL: CPT

## 2019-05-26 PROCEDURE — 96361 HYDRATE IV INFUSION ADD-ON: CPT

## 2019-05-26 PROCEDURE — 36600 WITHDRAWAL OF ARTERIAL BLOOD: CPT

## 2019-05-26 PROCEDURE — 36415 COLL VENOUS BLD VENIPUNCTURE: CPT

## 2019-05-26 PROCEDURE — 94640 AIRWAY INHALATION TREATMENT: CPT

## 2019-05-26 PROCEDURE — 87040 BLOOD CULTURE FOR BACTERIA: CPT

## 2019-05-26 PROCEDURE — 87186 SC STD MICRODIL/AGAR DIL: CPT

## 2019-05-26 PROCEDURE — 84132 ASSAY OF SERUM POTASSIUM: CPT

## 2019-05-26 PROCEDURE — 96366 THER/PROPH/DIAG IV INF ADDON: CPT

## 2019-05-26 PROCEDURE — 85027 COMPLETE CBC AUTOMATED: CPT

## 2019-05-26 PROCEDURE — 99291 CRITICAL CARE FIRST HOUR: CPT

## 2019-05-26 PROCEDURE — 96376 TX/PRO/DX INJ SAME DRUG ADON: CPT

## 2019-05-26 PROCEDURE — 94003 VENT MGMT INPAT SUBQ DAY: CPT

## 2019-05-26 PROCEDURE — 83735 ASSAY OF MAGNESIUM: CPT

## 2019-05-26 PROCEDURE — 76604 US EXAM CHEST: CPT

## 2019-05-26 PROCEDURE — 83605 ASSAY OF LACTIC ACID: CPT

## 2019-05-26 RX ADMIN — NICARDIPINE HYDROCHLORIDE SCH MLS/HR: 2.5 INJECTION INTRAVENOUS at 22:14

## 2019-05-26 NOTE — XR
EXAM:

  XR Chest, 1 View

 

CLINICAL HISTORY:

  ITS.REASON XR Reason: Fever

 

TECHNIQUE:

  Frontal view of the chest.

 

COMPARISON:

  No relevant prior studies available.

 

FINDINGS:

  Lungs:  Right lower lobe consolidation.

  Pleural space:  Bilateral pleural effusions.  No pneumothorax.

  Heart:  No pneumomediastinum.

  Mediastinum:  Unremarkable.

  Bones/joints:  No definite fracture.

 

IMPRESSION:     

1.  Right lower lobe consolidation.

2.  Bilateral pleural effusions.

## 2019-05-26 NOTE — ED
SOB HPI





- General


Chief Complaint: Shortness of Breath


Stated Complaint: GARRY


Time Seen by Provider: 05/26/19 21:54


Source: patient, EMS


Mode of arrival: EMS





- History of Present Illness


Initial Comments: 


Hemalatha Piper is a 63-year-old female with a past medical history of a lung cancer

and recent long admission to the hospital with prolonged intubation and 

subsequently trach intake.  Patient has been at [facility] for one week she's been 

off her trach she's been doing well though she has continued to have a mildly 

productive cough.  Throughout the day today she had progressively worsening 

shortness of breath difficulty breathing and decision was made to transfer her 

to our facility for further evaluation.  Upon arrival patient reports no 

complaints she states that she's just seconds scares the caregivers at [facility] 

so they made her come here.  She does admit to having a cough.  Her significant 

other's at bedside state that she pulled on her trach earlier today and there w

as some bleeding but it stopped quickly.  He also reports that she's had a 

persistent cough.  They do report that she has previously been made DO NOT 

RESUSCITATE however felt that she was recovering well and again made herself 

full code.








- Related Data


                                Home Medications











 Medication  Instructions  Recorded  Confirmed


 


Furosemide [Lasix] 40 mg PO DAILY@0900 03/07/19 05/26/19


 


Levothyroxine Sodium [Synthroid] 25 mcg PO DAILY@1100 03/07/19 05/26/19


 


LORazepam [Ativan] 0.5 mg PO Q6H PRN 03/22/19 05/26/19


 


Acetaminophen [Tylenol Arthritis] 650 mg PO Q6H PRN 05/26/19 05/26/19


 


Amiodarone [Cordarone] 200 mg PO DAILY@0900 05/26/19 05/26/19


 


Budesonide [Pulmicort] 1 mg INHALATION RT-BID@0900,2100 05/26/19 05/26/19


 


Chlorhexidine Gluconate [Periogard] 15 ml PO BID@0900,2100 05/26/19 05/26/19


 


Lactulose 20 gm PO Q8H PRN 05/26/19 05/26/19


 


Levalbuterol Nebulized [Xopenex 1.25 mg INHALATION RT-Q6H 05/26/19 05/26/19





Nebulized]   


 


Levothyroxine Sodium [Synthroid] 50 mcg PO DAILY@0600 05/26/19 05/26/19


 


Lisinopril [Zestril] 10 mg PO DAILY@0900 05/26/19 05/26/19


 


Ondansetron [Zofran ODT] 4 mg PO Q6H PRN 05/26/19 05/26/19


 


Pantoprazole Sodium [Protonix] 40 mg PO DAILY@0600 05/26/19 05/26/19


 


Polyethylene Glycol 3350 [Miralax] 17 gm PO DAILY PRN 05/26/19 05/26/19


 


QUEtiapine FUMARATE 50 mg PO BID@0900,2100 05/26/19 05/26/19


 


Sennosides/Docusate Sodium 2 tab PO BID@0900,2100 05/26/19 05/26/19





[Senna-S Laxative Tablet]   


 


Simethicone 80 mg PO DAILY PRN 05/26/19 05/26/19


 


hydrALAZINE HCL 25 mg PO TID@0600,1400,2200 05/26/19 05/26/19


 


oxyCODONE ER [OxyCONTIN] 10 mg PO BID@0900,2100 05/26/19 05/26/19


 


oxyCODONE HCL [OxyCONTIN] 10 mg PO Q4H PRN 05/26/19 05/26/19











                                    Allergies











Allergy/AdvReac Type Severity Reaction Status Date / Time


 


No Known Allergies Allergy   Verified 05/26/19 22:43














Review of Systems


ROS Statement: 


Those systems with pertinent positive or pertinent negative responses have been 

documented in the HPI.





ROS Other: All systems not noted in ROS Statement are negative.





Past Medical History


Past Medical History: Cancer, COPD, CVA/TIA, Fibromyalgia, GERD/Reflux, 

Hyperlipidemia, Osteoarthritis (OA), Pneumonia


Additional Past Medical History / Comment(s): Non-small cell lung cancer stage 3

 adenocarcinoma, COPD, fibromyalgia, acid reflux, hyperlipidemia, osteo

arthritis, adrenal mass that has remained stable over some time, TIA history of,

 hiatal hernia, chronic constipation, chronic back pain, degenerative arthritis


History of Any Multi-Drug Resistant Organisms: None Reported


Additional Past Surgical History / Comment(s): SINUS surgery


Past Anesthesia/Blood Transfusion Reactions: No Reported Reaction


Past Psychological History: Depression


Smoking Status: Former smoker


Past Alcohol Use History: None Reported


Past Drug Use History: None Reported





- Past Family History


  ** Mother


Family Medical History: Cancer





  ** Father


Family Medical History: Cancer





General Exam





- General Exam Comments


Initial Comments: 


Physical Exam


GENERAL:


Chronically ill-appearing, debilitated, trach, pain, ashen skin





HENT:


Normocephalic, Atraumatic. 


Tracheostomy in place





EYES:


PERRL, EOMI





PULMONARY:


Tachypnea, wheezing


Crackles at bilateral bases





CARDIOVASCULAR:


There is a regular rate and rhythm without any murmurs gallops or rubs.  





ABDOMEN:


PEG tube in place 





SKIN:


Multiple bruises on bilateral upper and lower extremities 





: 


Normal external genitalia 





NEUROLOGIC:


Patient is alert and oriented x3.  Moving all extremities spontaneously





MUSCULOSKELETAL:


Normal extremities with adequate strength and full range of motion.  No lower e

xtremity swelling or edema.  No calf tenderness.  





PSYCHIATRIC:


Normal psychiatric evaluation








Course


                                   Vital Signs











  05/26/19 05/26/19 05/26/19





  21:55 22:30 23:00


 


Temperature 98.2 F  


 


Pulse Rate 92 90 84


 


Respiratory 19 30 H 28 H





Rate   


 


Blood Pressure 96/47 86/50 84/54


 


O2 Sat by Pulse 89 L 87 L 94 L





Oximetry   














  05/26/19 05/26/19 05/26/19





  23:30 23:40 23:44


 


Temperature   


 


Pulse Rate 93 87 81


 


Respiratory 31 H 29 H 26 H





Rate   


 


Blood Pressure 79/68 86/49 86/49


 


O2 Sat by Pulse 91 L 92 L 95





Oximetry   














  05/27/19 05/27/19 05/27/19





  00:00 00:15 00:30


 


Temperature   


 


Pulse Rate 91 91 90


 


Respiratory 21 24 38 H





Rate   


 


Blood Pressure 95/35 96/45 96/45


 


O2 Sat by Pulse 79 L 92 L 85 L





Oximetry   














  05/27/19 05/27/19 05/27/19





  00:50 01:00 01:10


 


Temperature   


 


Pulse Rate 92 93 90


 


Respiratory 31 H 32 H 32 H





Rate   


 


Blood Pressure 77/44 77/44 80/32


 


O2 Sat by Pulse 88 L 86 L 86 L





Oximetry   














  05/27/19 05/27/19 05/27/19





  01:30 01:40 02:00


 


Temperature   


 


Pulse Rate 88 86 89


 


Respiratory 24 14 17





Rate   


 


Blood Pressure 77/37 86/75 65/39


 


O2 Sat by Pulse 85 L 86 L 100





Oximetry   














  05/27/19 05/27/19 05/27/19





  02:03 02:15 02:30


 


Temperature   


 


Pulse Rate 89 84 81


 


Respiratory 14 14 19





Rate   


 


Blood Pressure 79/40 90/58 90/58


 


O2 Sat by Pulse 100 96 99





Oximetry   














  05/27/19 05/27/19





  02:45 03:00


 


Temperature  


 


Pulse Rate 80 78


 


Respiratory 18 17





Rate  


 


Blood Pressure 98/54 81/52


 


O2 Sat by Pulse 94 L 99





Oximetry  














Procedures





- Central Line Placement


  ** Right Femoral


Consent Obtained: verbal consent


Patient Placed on Monitor/Pulse Ox: Yes


MD Prep: mask, gown, gloves


Central Line Prep: Chlorhexidine scrub


Local Anesthesia Used: Lidocaine 1%


Amount of Anesthesia Used (mls): 3


Ultrasound Used for Placement: Yes


Central Line Lumen Inserted: triple


Bloods Obtained for Lab: No


Central Line Position: good blood return, all ports aspirated, flushed, capped, 

sutured in place with nylon


Dressing Applied: Tegaderm


Patient Tolerated Procedure: well, no complications


Complications: none





- Sepsis


  ** Sepsis Focused Exam #1


Sepsis Focused Exam Date: 05/27/19


Sepsis Focused Exam Time: 02:02


Sepsis Focused Exam Complete: Yes


Vital Signs & RN Notes Reviewed: Yes


Capillary Refill: > 2 Seconds: Fingers, Toes


Peripheral Pulses: Weak: Radial (R), Radial (L)


Skin Color: Ashen


Respiratory Exam: respiratory distress


Cardiovascular Exam: regular rate, normal rhythm





Medical Decision Making





- Medical Decision Making


The patient was seen and evaluated immediately upon arrival to the emergency 

department patient was noted to be tachypneic tachycardic


Supplemental oxygen and breathing treatments were ordered


Sepsis workup was initiated


Chest x-ray confirms a right lower lobe pneumonia at 10:55 PM.  Sepsis 

identified, broad-spectrum antibiotics ordered


patient has been inpatient will be treated for hospital acquired pneumonia with 

the Vanccomycin and Zosyn





Patient's history of heart failure gentle fluids were ordered however patient 

remained hypotensive and decision was made to place a right femoral central 

line.  Due to patient having a trach with trach collar in place she was not a 

good candidate for an IJ due to her pulmonary history did not feel should be a 

good candidate for a subclavian line and therefore femoral line was placed.  One

 was placed with about difficulty.  There was some bruising likely related to 

patient being anticoagulated.  Levophed was initiated.  The decided the patient 

needed to be sedated and placed back on the vent due to her increasing work of 

breathing.  Patient was agreeable to this.  Final and Versed pushes were ordered

 for sedation, trach was exchanged for a cuffed trach and patient was placed 

back on ventilator.





Patient's primary care Dr. Amaya admits to Dr. Garza.  Patient care was 

discussed with Dr. Garza who agrees with plan to admit  to the ICU for 

septic shock secondary to healthcare associated pneumonia.  Patient care was 

discussed with Dr. Albert and agrees with plan for admission.





- Lab Data


Result diagrams: 


                                 05/26/19 22:17





                                 05/26/19 22:17


                                   Lab Results











  05/26/19 05/26/19 05/26/19 Range/Units





  22:17 22:17 22:17 


 


WBC  10.9 H    (3.8-10.6)  k/uL


 


RBC  4.07    (3.80-5.40)  m/uL


 


Hgb  12.3    (11.4-16.0)  gm/dL


 


Hct  39.1    (34.0-46.0)  %


 


MCV  96.1    (80.0-100.0)  fL


 


MCH  30.2    (25.0-35.0)  pg


 


MCHC  31.4    (31.0-37.0)  g/dL


 


RDW  17.1 H    (11.5-15.5)  %


 


Plt Count  317  D    (150-450)  k/uL


 


Neutrophils % (Manual)  74    %


 


Band Neutrophils %  20    %


 


Lymphocytes % (Manual)  2    %


 


Monocytes % (Manual)  5    %


 


Neutrophils # (Manual)  10.20 H    (1.3-7.7)  k/uL


 


Lymphocytes # (Manual)  0.22 L    (1.0-4.8)  k/uL


 


Monocytes # (Manual)  0.55    (0-1.0)  k/uL


 


Nucleated RBCs  0    (0-0)  /100 WBC


 


Manual Slide Review  Performed    


 


Anisocytosis  Slight    


 


Macrocytosis  Slight    


 


PT     (9.0-12.0)  sec


 


INR     (<1.2)  


 


APTT     (22.0-30.0)  sec


 


Sample Site     


 


ABG pH     (7.35-7.45)  


 


ABG pCO2     (35-45)  mmHg


 


ABG pO2     ()  mmHg


 


ABG HCO3     (21-25)  mmol/L


 


ABG Total CO2     (19-24)  mmol/L


 


ABG O2 Saturation     (94-97)  %


 


ABG Base Excess     mmol/L


 


Hussein Test     


 


FiO2     %


 


Sodium   130 L   (137-145)  mmol/L


 


Potassium   5.4 H   (3.5-5.1)  mmol/L


 


Chloride   94 L   ()  mmol/L


 


Carbon Dioxide   31 H   (22-30)  mmol/L


 


Anion Gap   5   mmol/L


 


BUN   50 H   (7-17)  mg/dL


 


Creatinine   0.85   (0.52-1.04)  mg/dL


 


Est GFR (CKD-EPI)AfAm   84   (>60 ml/min/1.73 sqM)  


 


Est GFR (CKD-EPI)NonAf   72   (>60 ml/min/1.73 sqM)  


 


Glucose   169 H   (74-99)  mg/dL


 


Lactic Ac Sepsis Rflx     


 


Plasma Lactic Acid Ebenezer    2.1 H*  (0.7-2.0)  mmol/L


 


Calcium   9.1   (8.4-10.2)  mg/dL


 


Total Bilirubin   0.8   (0.2-1.3)  mg/dL


 


AST   40 H   (14-36)  U/L


 


ALT   21   (9-52)  U/L


 


Alkaline Phosphatase   100   ()  U/L


 


Troponin I     (0.000-0.034)  ng/mL


 


Total Protein   7.3   (6.3-8.2)  g/dL


 


Albumin   4.0   (3.5-5.0)  g/dL


 


Urine Color     


 


Urine Appearance     (Clear)  


 


Urine pH     (5.0-8.0)  


 


Ur Specific Gravity     (1.001-1.035)  


 


Urine Protein     (Negative)  


 


Urine Glucose (UA)     (Negative)  


 


Urine Ketones     (Negative)  


 


Urine Blood     (Negative)  


 


Urine Nitrite     (Negative)  


 


Urine Bilirubin     (Negative)  


 


Urine Urobilinogen     (<2.0)  mg/dL


 


Ur Leukocyte Esterase     (Negative)  














  05/26/19 05/26/19 05/26/19 Range/Units





  22:17 22:17 22:59 


 


WBC     (3.8-10.6)  k/uL


 


RBC     (3.80-5.40)  m/uL


 


Hgb     (11.4-16.0)  gm/dL


 


Hct     (34.0-46.0)  %


 


MCV     (80.0-100.0)  fL


 


MCH     (25.0-35.0)  pg


 


MCHC     (31.0-37.0)  g/dL


 


RDW     (11.5-15.5)  %


 


Plt Count     (150-450)  k/uL


 


Neutrophils % (Manual)     %


 


Band Neutrophils %     %


 


Lymphocytes % (Manual)     %


 


Monocytes % (Manual)     %


 


Neutrophils # (Manual)     (1.3-7.7)  k/uL


 


Lymphocytes # (Manual)     (1.0-4.8)  k/uL


 


Monocytes # (Manual)     (0-1.0)  k/uL


 


Nucleated RBCs     (0-0)  /100 WBC


 


Manual Slide Review     


 


Anisocytosis     


 


Macrocytosis     


 


PT  9.6    (9.0-12.0)  sec


 


INR  0.9    (<1.2)  


 


APTT  22.2    (22.0-30.0)  sec


 


Sample Site     


 


ABG pH     (7.35-7.45)  


 


ABG pCO2     (35-45)  mmHg


 


ABG pO2     ()  mmHg


 


ABG HCO3     (21-25)  mmol/L


 


ABG Total CO2     (19-24)  mmol/L


 


ABG O2 Saturation     (94-97)  %


 


ABG Base Excess     mmol/L


 


Hussein Test     


 


FiO2     %


 


Sodium     (137-145)  mmol/L


 


Potassium     (3.5-5.1)  mmol/L


 


Chloride     ()  mmol/L


 


Carbon Dioxide     (22-30)  mmol/L


 


Anion Gap     mmol/L


 


BUN     (7-17)  mg/dL


 


Creatinine     (0.52-1.04)  mg/dL


 


Est GFR (CKD-EPI)AfAm     (>60 ml/min/1.73 sqM)  


 


Est GFR (CKD-EPI)NonAf     (>60 ml/min/1.73 sqM)  


 


Glucose     (74-99)  mg/dL


 


Lactic Ac Sepsis Rflx    Y  


 


Plasma Lactic Acid Ebenezer     (0.7-2.0)  mmol/L


 


Calcium     (8.4-10.2)  mg/dL


 


Total Bilirubin     (0.2-1.3)  mg/dL


 


AST     (14-36)  U/L


 


ALT     (9-52)  U/L


 


Alkaline Phosphatase     ()  U/L


 


Troponin I   <0.012   (0.000-0.034)  ng/mL


 


Total Protein     (6.3-8.2)  g/dL


 


Albumin     (3.5-5.0)  g/dL


 


Urine Color     


 


Urine Appearance     (Clear)  


 


Urine pH     (5.0-8.0)  


 


Ur Specific Gravity     (1.001-1.035)  


 


Urine Protein     (Negative)  


 


Urine Glucose (UA)     (Negative)  


 


Urine Ketones     (Negative)  


 


Urine Blood     (Negative)  


 


Urine Nitrite     (Negative)  


 


Urine Bilirubin     (Negative)  


 


Urine Urobilinogen     (<2.0)  mg/dL


 


Ur Leukocyte Esterase     (Negative)  














  05/27/19 05/27/19 Range/Units





  02:22 03:05 


 


WBC    (3.8-10.6)  k/uL


 


RBC    (3.80-5.40)  m/uL


 


Hgb    (11.4-16.0)  gm/dL


 


Hct    (34.0-46.0)  %


 


MCV    (80.0-100.0)  fL


 


MCH    (25.0-35.0)  pg


 


MCHC    (31.0-37.0)  g/dL


 


RDW    (11.5-15.5)  %


 


Plt Count    (150-450)  k/uL


 


Neutrophils % (Manual)    %


 


Band Neutrophils %    %


 


Lymphocytes % (Manual)    %


 


Monocytes % (Manual)    %


 


Neutrophils # (Manual)    (1.3-7.7)  k/uL


 


Lymphocytes # (Manual)    (1.0-4.8)  k/uL


 


Monocytes # (Manual)    (0-1.0)  k/uL


 


Nucleated RBCs    (0-0)  /100 WBC


 


Manual Slide Review    


 


Anisocytosis    


 


Macrocytosis    


 


PT    (9.0-12.0)  sec


 


INR    (<1.2)  


 


APTT    (22.0-30.0)  sec


 


Sample Site  r rad   


 


ABG pH  7.30 L   (7.35-7.45)  


 


ABG pCO2  57 H   (35-45)  mmHg


 


ABG pO2  172 H   ()  mmHg


 


ABG HCO3  28 H   (21-25)  mmol/L


 


ABG Total CO2  30 H   (19-24)  mmol/L


 


ABG O2 Saturation  99.6 H   (94-97)  %


 


ABG Base Excess  1.5   mmol/L


 


Hussein Test  Yes   


 


FiO2  100   %


 


Sodium    (137-145)  mmol/L


 


Potassium    (3.5-5.1)  mmol/L


 


Chloride    ()  mmol/L


 


Carbon Dioxide    (22-30)  mmol/L


 


Anion Gap    mmol/L


 


BUN    (7-17)  mg/dL


 


Creatinine    (0.52-1.04)  mg/dL


 


Est GFR (CKD-EPI)AfAm    (>60 ml/min/1.73 sqM)  


 


Est GFR (CKD-EPI)NonAf    (>60 ml/min/1.73 sqM)  


 


Glucose    (74-99)  mg/dL


 


Lactic Ac Sepsis Rflx    


 


Plasma Lactic Acid Ebenezer    (0.7-2.0)  mmol/L


 


Calcium    (8.4-10.2)  mg/dL


 


Total Bilirubin    (0.2-1.3)  mg/dL


 


AST    (14-36)  U/L


 


ALT    (9-52)  U/L


 


Alkaline Phosphatase    ()  U/L


 


Troponin I    (0.000-0.034)  ng/mL


 


Total Protein    (6.3-8.2)  g/dL


 


Albumin    (3.5-5.0)  g/dL


 


Urine Color   Light Yellow  


 


Urine Appearance   Clear  (Clear)  


 


Urine pH   5.5  (5.0-8.0)  


 


Ur Specific Gravity   1.011  (1.001-1.035)  


 


Urine Protein   Negative  (Negative)  


 


Urine Glucose (UA)   Negative  (Negative)  


 


Urine Ketones   Negative  (Negative)  


 


Urine Blood   Negative  (Negative)  


 


Urine Nitrite   Negative  (Negative)  


 


Urine Bilirubin   Negative  (Negative)  


 


Urine Urobilinogen   <2.0  (<2.0)  mg/dL


 


Ur Leukocyte Esterase   Negative  (Negative)  














- EKG Data


-: EKG Interpreted by Me


EKG shows normal: sinus rhythm


EKG Comments: 


TEG was obtained at 2156, rate is 91 rhythm is sinus there is normal axis are 

normal intervals, OH 1:30, QRS 72, ,  there are no acute ST 

elevations or depressions there is no evidence of acute ischemia or infarction. 

 EKG does have significant respiratory artifact.








Critical Care Time


Critical Care Time: Yes


Total Critical Care Time: 45


Critical Care Time: 


Critical Care Time 


Critical care time was exclusive of separately billable procedures and treating 

other patients and teaching time. 


Critical care was necessary to treat or prevent imminent or life-threatening 

deterioration. 


Given the critical condition in which the patient arrived, the patient was 

immediately assessed by myself and the nurse, and cardiac monitoring initiated 

due to the potential for rapid decompensation of the patient's clinical 

condition. During the course of the patients stay, I spent a considerable amount

 of time at the bedside performing serial re-evaluations of the patient's 

hemodynamic and clinical status because of the recognized potential threat to 

life or limb in this condition. I then had a chance to review not only all of 

the available current laboratory and radiographic studies obtained today, but I 

also reviewed old records available to me at the time. Additionally, any 

ancillary information available including paramedic records were reviewed. 

Sequential vital signs were obtained. 








Disposition


Clinical Impression: 


 Non-small cell lung cancer, Acute respiratory failure, Pleural effusion, At 

risk for readmission to hospital, HCAP (healthcare-associated pneumonia), Septic

 shock





Disposition: ADMITTED AS IP TO THIS HOSP


Condition: Serious


Referrals: 


Ten Dejesus MD [Primary Care Provider] - 1-2 days

## 2019-05-27 LAB
ANION GAP SERPL CALC-SCNC: 5 MMOL/L
BUN SERPL-SCNC: 48 MG/DL (ref 7–17)
CALCIUM SPEC-MCNC: 8.5 MG/DL (ref 8.4–10.2)
CELLS COUNTED: 200
CHLORIDE SERPL-SCNC: 101 MMOL/L (ref 98–107)
CO2 BLDA-SCNC: 28 MMOL/L (ref 19–24)
CO2 BLDA-SCNC: 30 MMOL/L (ref 19–24)
CO2 SERPL-SCNC: 28 MMOL/L (ref 22–30)
ERYTHROCYTE [DISTWIDTH] IN BLOOD BY AUTOMATED COUNT: 3.78 M/UL (ref 3.8–5.4)
ERYTHROCYTE [DISTWIDTH] IN BLOOD: 17.3 % (ref 11.5–15.5)
GLUCOSE BLD-MCNC: 152 MG/DL (ref 75–99)
GLUCOSE BLD-MCNC: 153 MG/DL (ref 75–99)
GLUCOSE BLD-MCNC: 61 MG/DL (ref 75–99)
GLUCOSE BLD-MCNC: 69 MG/DL (ref 75–99)
GLUCOSE BLD-MCNC: 97 MG/DL (ref 75–99)
GLUCOSE SERPL-MCNC: 150 MG/DL (ref 74–99)
HCO3 BLDA-SCNC: 26 MMOL/L (ref 21–25)
HCO3 BLDA-SCNC: 28 MMOL/L (ref 21–25)
HCT VFR BLD AUTO: 36.9 % (ref 34–46)
HGB BLD-MCNC: 11.2 GM/DL (ref 11.4–16)
LYMPHOCYTES # BLD MANUAL: 0.74 K/UL (ref 1–4.8)
MAGNESIUM SPEC-SCNC: 1.8 MG/DL (ref 1.6–2.3)
MCH RBC QN AUTO: 29.7 PG (ref 25–35)
MCHC RBC AUTO-ENTMCNC: 30.4 G/DL (ref 31–37)
MCV RBC AUTO: 97.6 FL (ref 80–100)
METAMYELOCYTES # BLD: 0.92 K/UL
MONOCYTES # BLD MANUAL: 1.1 K/UL (ref 0–1)
MYELOCYTES # BLD MANUAL: 0.18 K/UL
NEUTROPHILS NFR BLD MANUAL: 61 %
NEUTS SEG # BLD MANUAL: 15.8 K/UL (ref 1.3–7.7)
PCO2 BLDA: 53 MMHG (ref 35–45)
PCO2 BLDA: 57 MMHG (ref 35–45)
PH BLDA: 7.3 [PH] (ref 7.35–7.45)
PH BLDA: 7.3 [PH] (ref 7.35–7.45)
PH UR: 5.5 [PH] (ref 5–8)
PLATELET # BLD AUTO: 349 K/UL (ref 150–450)
PO2 BLDA: 172 MMHG (ref 83–108)
PO2 BLDA: 74 MMHG (ref 83–108)
POTASSIUM SERPL-SCNC: 4.9 MMOL/L (ref 3.5–5.1)
SODIUM SERPL-SCNC: 134 MMOL/L (ref 137–145)
SP GR UR: 1.01 (ref 1–1.03)
UROBILINOGEN UR QL STRIP: <2 MG/DL (ref ?–2)
WBC # BLD AUTO: 18.4 K/UL (ref 3.8–10.6)

## 2019-05-27 PROCEDURE — 06HM33Z INSERTION OF INFUSION DEVICE INTO RIGHT FEMORAL VEIN, PERCUTANEOUS APPROACH: ICD-10-PCS

## 2019-05-27 PROCEDURE — 5A1955Z RESPIRATORY VENTILATION, GREATER THAN 96 CONSECUTIVE HOURS: ICD-10-PCS

## 2019-05-27 RX ADMIN — CEFAZOLIN SCH MLS/HR: 330 INJECTION, POWDER, FOR SOLUTION INTRAMUSCULAR; INTRAVENOUS at 05:44

## 2019-05-27 RX ADMIN — NOREPINEPHRINE BITARTRATE SCH MLS/HR: 1 INJECTION, SOLUTION, CONCENTRATE INTRAVENOUS at 08:13

## 2019-05-27 RX ADMIN — NOREPINEPHRINE BITARTRATE SCH MLS/HR: 1 INJECTION, SOLUTION, CONCENTRATE INTRAVENOUS at 09:40

## 2019-05-27 RX ADMIN — MIDAZOLAM HYDROCHLORIDE PRN MG: 1 INJECTION, SOLUTION INTRAMUSCULAR; INTRAVENOUS at 01:36

## 2019-05-27 RX ADMIN — INSULIN ASPART SCH: 100 INJECTION, SOLUTION INTRAVENOUS; SUBCUTANEOUS at 20:40

## 2019-05-27 RX ADMIN — DOCUSATE SODIUM AND SENNOSIDES SCH EACH: 50; 8.6 TABLET ORAL at 21:30

## 2019-05-27 RX ADMIN — CEFAZOLIN SCH MLS/HR: 330 INJECTION, POWDER, FOR SOLUTION INTRAMUSCULAR; INTRAVENOUS at 21:31

## 2019-05-27 RX ADMIN — ISODIUM CHLORIDE SCH: 0.03 SOLUTION RESPIRATORY (INHALATION) at 16:11

## 2019-05-27 RX ADMIN — ISODIUM CHLORIDE SCH MG: 0.03 SOLUTION RESPIRATORY (INHALATION) at 19:50

## 2019-05-27 RX ADMIN — NOREPINEPHRINE BITARTRATE SCH MLS/HR: 1 INJECTION, SOLUTION, CONCENTRATE INTRAVENOUS at 13:02

## 2019-05-27 RX ADMIN — INSULIN ASPART SCH: 100 INJECTION, SOLUTION INTRAVENOUS; SUBCUTANEOUS at 21:30

## 2019-05-27 RX ADMIN — PANTOPRAZOLE SODIUM SCH MG: 40 INJECTION, POWDER, FOR SOLUTION INTRAVENOUS at 08:14

## 2019-05-27 RX ADMIN — FENTANYL CITRATE PRN MCG: 50 INJECTION, SOLUTION INTRAMUSCULAR; INTRAVENOUS at 04:39

## 2019-05-27 RX ADMIN — CHLORHEXIDINE GLUCONATE SCH ML: 1.2 RINSE ORAL at 21:31

## 2019-05-27 RX ADMIN — NOREPINEPHRINE BITARTRATE SCH MLS/HR: 1 INJECTION, SOLUTION, CONCENTRATE INTRAVENOUS at 01:32

## 2019-05-27 RX ADMIN — HEPARIN SODIUM SCH UNIT: 5000 INJECTION, SOLUTION INTRAVENOUS; SUBCUTANEOUS at 08:13

## 2019-05-27 RX ADMIN — FENTANYL CITRATE PRN MCG: 50 INJECTION, SOLUTION INTRAMUSCULAR; INTRAVENOUS at 01:38

## 2019-05-27 RX ADMIN — OXYCODONE HYDROCHLORIDE SCH MG: 10 TABLET, FILM COATED, EXTENDED RELEASE ORAL at 21:29

## 2019-05-27 RX ADMIN — MIDAZOLAM HYDROCHLORIDE PRN MG: 1 INJECTION, SOLUTION INTRAMUSCULAR; INTRAVENOUS at 06:11

## 2019-05-27 RX ADMIN — CEFAZOLIN SCH MLS/HR: 330 INJECTION, POWDER, FOR SOLUTION INTRAMUSCULAR; INTRAVENOUS at 13:06

## 2019-05-27 RX ADMIN — BUDESONIDE SCH MG: 1 SUSPENSION RESPIRATORY (INHALATION) at 19:52

## 2019-05-27 RX ADMIN — CHLORHEXIDINE GLUCONATE SCH ML: 1.2 RINSE ORAL at 13:01

## 2019-05-27 RX ADMIN — NOREPINEPHRINE BITARTRATE SCH MLS/HR: 1 INJECTION, SOLUTION, CONCENTRATE INTRAVENOUS at 05:31

## 2019-05-27 RX ADMIN — HEPARIN SODIUM SCH UNIT: 5000 INJECTION, SOLUTION INTRAVENOUS; SUBCUTANEOUS at 17:25

## 2019-05-27 RX ADMIN — INSULIN ASPART SCH UNIT: 100 INJECTION, SOLUTION INTRAVENOUS; SUBCUTANEOUS at 13:01

## 2019-05-27 NOTE — XR
EXAMINATION TYPE: XR chest 1V

 

DATE OF EXAM: 5/27/2019

 

COMPARISON: 5/26/2019

 

INDICATION: Pneumonia

 

TECHNIQUE: Single frontal view of the chest is obtained.

 

FINDINGS:  

The heart size is normal.  

The pulmonary vasculature is normal.  

Right lower lobe infiltrate is present. Correlate for pneumonia. Tracheostomy tube is in the midline.
 There may be some diminished left pleural effusion  

 

 

IMPRESSION:  

1. Right lower lobe infiltrate. Correlate for pneumonia.

2. Improving left pleural effusion

## 2019-05-27 NOTE — P.HPIM
History of Present Illness


H&P Date: 05/27/19





This is  a 63-year-old female patient who presented to the hospital with 

worsening shortness of breath.  Patient has a past medical history of recent 

hospital admission in which she was admitted to [facility] hearing transferred

to [facility] which required prolonged intubation and tracheostomy.  Patient was

recently discharged to [facility] D/C to [facility] week ago.  Patient has had 

increased sputum production and cough.  Patient has past medical history of non-

small cell lung cancer stage III, CVA, COPD, GERD, hyperlipidemia, 

osteoarthritis,chronic back pain and depression.  Chest x-ray completed in ER 

showing right lower lobe consolidation with bilateral pleural effusions.  

Patient's WBC elevated at 10.9.  Patient also have a lactic acid of 2.1.  UA 

negative.  Patient's blood pressure low.  Patient started on Levophed and 

transferred to the ICU.  Dr. Trejo has been consulted for critical care.  

Patient started on vancomycin and Zosyn for antibiotic.  Blood urine and sputum 

cultures ordered.  At this time patient is on mechanical ventilation resting 

comfortably in bed.  Patient is following commands.





Review of Systems





Please refer to HPI otherwise unremarkable





Past Medical History


Past Medical History: Cancer, COPD, CVA/TIA, Fibromyalgia, GERD/Reflux, 

Hyperlipidemia, Osteoarthritis (OA), Pneumonia


Additional Past Medical History / Comment(s): Non-small cell lung cancer stage 3

adenocarcinoma, COPD, fibromyalgia, acid reflux, hyperlipidemia, osteoarthritis,

adrenal mass that has remained stable over some time, TIA history of, hiatal 

hernia, chronic constipation, chronic back pain, degenerative arthritis


History of Any Multi-Drug Resistant Organisms: None Reported


Additional Past Surgical History / Comment(s): SINUS surgery


Past Anesthesia/Blood Transfusion Reactions: No Reported Reaction


Past Psychological History: Depression


Smoking Status: Former smoker


Past Alcohol Use History: None Reported


Past Drug Use History: None Reported





- Past Family History


  ** Mother


Family Medical History: Cancer





  ** Father


Family Medical History: Cancer





Medications and Allergies


                                Home Medications











 Medication  Instructions  Recorded  Confirmed  Type


 


Furosemide [Lasix] 40 mg PO DAILY@0900 03/07/19 05/26/19 History


 


Levothyroxine Sodium [Synthroid] 25 mcg PO DAILY@1100 03/07/19 05/26/19 History


 


LORazepam [Ativan] 0.5 mg PO Q6H PRN 03/22/19 05/26/19 History


 


Acetaminophen [Tylenol Arthritis] 650 mg PO Q6H PRN 05/26/19 05/26/19 History


 


Amiodarone [Cordarone] 200 mg PO DAILY@0900 05/26/19 05/26/19 History


 


Budesonide [Pulmicort] 1 mg INHALATION RT-BID@0900,2100 05/26/19 05/26/19 

History


 


Chlorhexidine Gluconate [Periogard] 15 ml PO BID@0900,2100 05/26/19 05/26/19 

History


 


Lactulose 20 gm PO Q8H PRN 05/26/19 05/26/19 History


 


Levalbuterol Nebulized [Xopenex 1.25 mg INHALATION RT-Q6H 05/26/19 05/26/19 

History





Nebulized]    


 


Levothyroxine Sodium [Synthroid] 50 mcg PO DAILY@0600 05/26/19 05/26/19 History


 


Lisinopril [Zestril] 10 mg PO DAILY@0900 05/26/19 05/26/19 History


 


Ondansetron [Zofran ODT] 4 mg PO Q6H PRN 05/26/19 05/26/19 History


 


Pantoprazole Sodium [Protonix] 40 mg PO DAILY@0600 05/26/19 05/26/19 History


 


Polyethylene Glycol 3350 [Miralax] 17 gm PO DAILY PRN 05/26/19 05/26/19 History


 


QUEtiapine FUMARATE 50 mg PO BID@0900,2100 05/26/19 05/26/19 History


 


Sennosides/Docusate Sodium 2 tab PO BID@0900,2100 05/26/19 05/26/19 History





[Senna-S Laxative Tablet]    


 


Simethicone 80 mg PO DAILY PRN 05/26/19 05/26/19 History


 


hydrALAZINE HCL 25 mg PO TID@0600,1400,2200 05/26/19 05/26/19 History


 


oxyCODONE ER [OxyCONTIN] 10 mg PO BID@0900,2100 05/26/19 05/26/19 History


 


oxyCODONE HCL [OxyCONTIN] 10 mg PO Q4H PRN 05/26/19 05/26/19 History








                                    Allergies











Allergy/AdvReac Type Severity Reaction Status Date / Time


 


No Known Allergies Allergy   Verified 05/26/19 22:43














Physical Exam


Vitals: 


                                   Vital Signs











  Temp Pulse Resp BP Pulse Ox


 


 05/27/19 09:15   73  19  115/47  97


 


 05/27/19 09:00   68  17  85/35  97


 


 05/27/19 08:45   74  18  110/49  95


 


 05/27/19 08:30   67  18  91/45  98


 


 05/27/19 08:15   71  18  78/42  98


 


 05/27/19 08:00  98.5 F  75  23  107/54  97


 


 05/27/19 07:45   73  20  102/49  98


 


 05/27/19 07:30   72  18  102/50  98


 


 05/27/19 07:15   73  18  98/48  98


 


 05/27/19 07:00   77  18  92/49  98


 


 05/27/19 06:45   80  18  90/44  98


 


 05/27/19 06:30   85  18  105/58  98


 


 05/27/19 06:24   85  18   99


 


 05/27/19 06:11   81  18  107/54  100


 


 05/27/19 05:51   81  18  104/52  100


 


 05/27/19 05:30   80  19  107/51  100


 


 05/27/19 05:15   79  18  106/53  100


 


 05/27/19 05:00   80  18  104/55  100


 


 05/27/19 04:45   80  18  105/57  100


 


 05/27/19 04:30   80  19  109/53  100


 


 05/27/19 04:15   76  18  95/47  100


 


 05/27/19 04:00   79  18  90/49  100


 


 05/27/19 03:45   80  18  90/47  100


 


 05/27/19 03:30   79  10 L  80/38  94 L


 


 05/27/19 03:00   78  17  81/52  99


 


 05/27/19 02:45   80  18  98/54  94 L


 


 05/27/19 02:30   81  19  90/58  99


 


 05/27/19 02:15   84  14  90/58  96


 


 05/27/19 02:03   89  14  79/40  100


 


 05/27/19 02:00   89  17  65/39  100


 


 05/27/19 01:40   86  14  86/75  86 L


 


 05/27/19 01:30   88  24  77/37  85 L


 


 05/27/19 01:10   90  32 H  80/32  86 L


 


 05/27/19 01:00   93  32 H  77/44  86 L


 


 05/27/19 00:50   92  31 H  77/44  88 L


 


 05/27/19 00:30   90  38 H  96/45  85 L


 


 05/27/19 00:15   91  24  96/45  92 L


 


 05/27/19 00:00   91  21  95/35  79 L


 


 05/26/19 23:44   81  26 H  86/49  95


 


 05/26/19 23:40   87  29 H  86/49  92 L


 


 05/26/19 23:30   93  31 H  79/68  91 L


 


 05/26/19 23:00   84  28 H  84/54  94 L


 


 05/26/19 22:30   90  30 H  86/50  87 L


 


 05/26/19 21:55  98.2 F  92  19  96/47  89 L








                                Intake and Output











 05/26/19 05/27/19 05/27/19





 22:59 06:59 14:59


 


Intake Total  398.789 2305


 


Output Total  800 285


 


Balance  -546.000 844


 


Intake:   


 


  IV   375


 


    Sodium Chloride 0.9% 1,   375





    000 ml @ 125 mls/hr IV .   





    Q8H MARY Rx#:[id number]   


 


  Intake, IV Titration  254.000 754





  Amount   


 


    Norepinephrine 4 mg In  254.000 254





    Sodium Chloride 0.9% 250   





    ml @ 0.05 MCG/KG/MIN 15.   





    554 mls/hr IV .[id number]   





    MARY Rx#:[id number]   


 


    Sodium Chloride 0.9% 500   500





    ml 500 ml @ 1000 mls/hr   





    IV Q35M MARY Rx#:[id number]   


 


Output:   


 


  Urine  800 285


 


Other:   


 


  Voiding Method  Indwelling Catheter 


 


  Weight 81.647 kg  














Head normocephalic


Neck supple. Tracheostomy in place


Lungs diminished with bilateral rhonchi.  


Heart regular rate and rhythm S1-S2, no rub or gallop


Abdomen is soft nontender nondistended positive bowel sounds no 

hepatosplenomegaly


Extremities no edema


Neuro follows commands.  On mechanical ventilation





Results


CBC & Chem 7: 


                                 05/27/19 07:31





                                 05/27/19 07:31


Labs: 


                  Abnormal Lab Results - Last 24 Hours (Table)











  05/26/19 05/26/19 05/26/19 Range/Units





  22:17 22:17 22:17 


 


WBC  10.9 H    (3.8-10.6)  k/uL


 


RBC     (3.80-5.40)  m/uL


 


Hgb     (11.4-16.0)  gm/dL


 


MCHC     (31.0-37.0)  g/dL


 


RDW  17.1 H    (11.5-15.5)  %


 


Neutrophils # (Manual)  10.20 H    (1.3-7.7)  k/uL


 


Lymphocytes # (Manual)  0.22 L    (1.0-4.8)  k/uL


 


ABG pH     (7.35-7.45)  


 


ABG pCO2     (35-45)  mmHg


 


ABG pO2     ()  mmHg


 


ABG HCO3     (21-25)  mmol/L


 


ABG Total CO2     (19-24)  mmol/L


 


ABG O2 Saturation     (94-97)  %


 


Sodium   130 L   (137-145)  mmol/L


 


Potassium   5.4 H   (3.5-5.1)  mmol/L


 


Chloride   94 L   ()  mmol/L


 


Carbon Dioxide   31 H   (22-30)  mmol/L


 


BUN   50 H   (7-17)  mg/dL


 


Glucose   169 H   (74-99)  mg/dL


 


POC Glucose (mg/dL)     (75-99)  mg/dL


 


Plasma Lactic Acid Ebenezer    2.1 H*  (0.7-2.0)  mmol/L


 


AST   40 H   (14-36)  U/L














  05/27/19 05/27/19 05/27/19 Range/Units





  02:22 02:45 06:40 


 


WBC     (3.8-10.6)  k/uL


 


RBC     (3.80-5.40)  m/uL


 


Hgb     (11.4-16.0)  gm/dL


 


MCHC     (31.0-37.0)  g/dL


 


RDW     (11.5-15.5)  %


 


Neutrophils # (Manual)     (1.3-7.7)  k/uL


 


Lymphocytes # (Manual)     (1.0-4.8)  k/uL


 


ABG pH  7.30 L    (7.35-7.45)  


 


ABG pCO2  57 H    (35-45)  mmHg


 


ABG pO2  172 H    ()  mmHg


 


ABG HCO3  28 H    (21-25)  mmol/L


 


ABG Total CO2  30 H    (19-24)  mmol/L


 


ABG O2 Saturation  99.6 H    (94-97)  %


 


Sodium     (137-145)  mmol/L


 


Potassium     (3.5-5.1)  mmol/L


 


Chloride     ()  mmol/L


 


Carbon Dioxide     (22-30)  mmol/L


 


BUN     (7-17)  mg/dL


 


Glucose     (74-99)  mg/dL


 


POC Glucose (mg/dL)    152 H  (75-99)  mg/dL


 


Plasma Lactic Acid Ebenezer   3.2 H*   (0.7-2.0)  mmol/L


 


AST     (14-36)  U/L














  05/27/19 05/27/19 Range/Units





  07:31 07:31 


 


WBC  18.4 H   (3.8-10.6)  k/uL


 


RBC  3.78 L   (3.80-5.40)  m/uL


 


Hgb  11.2 L   (11.4-16.0)  gm/dL


 


MCHC  30.4 L   (31.0-37.0)  g/dL


 


RDW  17.3 H   (11.5-15.5)  %


 


Neutrophils # (Manual)    (1.3-7.7)  k/uL


 


Lymphocytes # (Manual)    (1.0-4.8)  k/uL


 


ABG pH    (7.35-7.45)  


 


ABG pCO2    (35-45)  mmHg


 


ABG pO2    ()  mmHg


 


ABG HCO3    (21-25)  mmol/L


 


ABG Total CO2    (19-24)  mmol/L


 


ABG O2 Saturation    (94-97)  %


 


Sodium   134 L  (137-145)  mmol/L


 


Potassium    (3.5-5.1)  mmol/L


 


Chloride    ()  mmol/L


 


Carbon Dioxide    (22-30)  mmol/L


 


BUN   48 H  (7-17)  mg/dL


 


Glucose   150 H  (74-99)  mg/dL


 


POC Glucose (mg/dL)    (75-99)  mg/dL


 


Plasma Lactic Acid Ebenezer    (0.7-2.0)  mmol/L


 


AST    (14-36)  U/L














Assessment and Plan


Assessment: 





1.  Sepsis present on admission related to pneumonia.  Chest x-ray completed 

showing right lower lobe consolidation.  Bilateral pleural effusions.  Patient 

admitted to the intensive care unit.  Initial lactic acid 2.1.  Repeat lactic 

acid 5.  Patient started on vancomycin and Zosyn for IV antibiotics.  Blood 

urine and sputum cultures have been ordered.  White blood cell 18.4





2.  Hypotension related to above.  Patient maintained on Levophed





3.  History of non-small cell lung stage III adenocarcinoma





4.  History of recent prolonged hospital admission in which she was transferred 

to [facility].  Patient was on mechanical ventilation for prolonged 

time requiring tracheostomy





5.  Acute on chronic respiratory failure.  Does have tracheostomy in place 

currently on mechanical ventilation





6.  Bilateral pleural effusions.  Pulmonary services have been consulted





7.  History of COPD





8.  History of CVA





9.  History of osteoarthritis





10.  History of hyperlipidemia





11.  History of fibromyalgia





12.  History of depression





13.  Ex-smoker.  Patient is smoking history of half-pack per day for 30 years





DVT prophylaxis heparin.  GI prophylaxis Protonix


Time with Patient: Greater than 30 (Greater than 60% of the total time spent in 

counseling and coordination of care. I performed an examination of the patient 

and discussed their management with the Nurse Practitioner.  I have reviewed the

 Nurse Practitioner's notes and agree with the documented findings and plan of 

care)

## 2019-05-27 NOTE — P.CNPUL
History of Present Illness


Consult date: 05/27/19


Chief complaint: Respiratory failure, pneumonia


History of present illness: 








This is a very pleasant 63-year-old female patient who follows with Dr. Amaya 

as her primary care physician.  She has a history of CVA/TIA, fibromyalgia, 

gastroesophageal reflux disease, hyperlipidemia, hypothyroidism, chronic back 

pain, depression.  She also has a history of chronic tobacco dependence, oxygen 

dependent chronic obstructive pulmonary disease with an FEV1 value of 56% of 

predicted, and non-small cell lung cancer stage III adenocarcinoma.  She follows

with Dr. Pastor in our office. This was diagnosed by an FNA of a left upper lobe 

lesion by IR in May 2017.  She had received concurrent chemoradiation with 

subsequent additional chemotherapy and then placed on maintenance mmunotherapy 

of Imfinzi back in June 2018.  Her last PET scan in September 2018 revealed 

continued left upper lobe mass with irregular margins extending to the pleural 

surface, spiculated appearance but was stable compared to previous and April 2018.  The SUV value was only 1.7.  Subsequent computed tomography scan of the 

chest in January 2019 revealed a stable and slightly smaller spiculated mass of 

the left upper lobe measuring 2.5 x 2.4 cm versus 3.0 x 2.6 previous.  No 

evidence of recurrence or metastasis.  The patient was in the hospital back in 

early March 2019 and she was having ongoing issues with nausea vomiting profound

weakness and fatigue.  She decided to stop taking the immunotherapy. She had 

been treated for his COPD exacerbation 02/13/2019 in our office by Dr. Pastor 

where she received steroids and Bactrim.  She did improve for the first week or 

so. She presented to the emergency room yesterday with complaints of increasing 

shortness of breath cough and congestion.  She did have some lower extremity 

edema.  She was still quite weak and fatigued.  ome dyspnea on exertion.  The 

patient was discharged home on 03/11/2019 as the patient shortness of breath 

improved and the patient recovered from her acute COPD exacerbation and she was 

discharged home on her usual routine medications which included also morphine 

sulfate 30 mg by mouth twice a day Percocet 10/03/2025 every 6 hours and a when 

necessary basis.  In terms of her lung medication she was maintained on a 

combination of Symbicort and Incruse.  She is also on Synthroid 25 g by mouth d

aily.  She was given Lasix to be taken on an as needed basis.





The patient subsequently came back to our hospital on 03/23/2019 because of 

altered mental status.   According to the family the patient was acting weird.  

She was acting unusual and she was confused.  She was also very weak and she was

having episodes of fall.  I do see a bruise over her forehead.  The family told 

that she did not have any active had trauma.  No seizure activity.  No neck 

stiffness.  No documented fever.  She was thrashing significantly yesterday.  A 

CAT scan of the head was done that showed no acute process.  CAT scan of abdomen

and pelvis was done that showed urinary bladder distention along with extensive 

colonic stool.  The patient's ammonia level was at 28.  Urine and blood cultures

were ordered.  The influenza screen was negative.   She did not have any neck 

stiffness.  No fever.  She was at times morning.  Other times she would open up 

her eyes and follows some simple commands.  I was told that this is an 

improvement in her condition compared to yesterday.  She was able to follow some

simple commands however she has not been consistent in following orders.  His 

speech is minimal and the patient may safely worse.  She denies being in pain.  

Urine toxin was ordered.  She was given a dose of Narcan here in the ICU without

much improvement.  Based on all this, I intubated the patient at that time and 

moved to the intensive care unit.  Lumbar puncture was done and was negative and

assess the causes for this patient altered mental status was not established.  

The patient was chest at [facility].  She underwent prolonged 

hospitalization during which she required intubation mechanical ventilation 

tracheostomy tube insertion PEG tube insertion and following that the patient 

[place] [facility] [place].  

It seems that the patient regained her mentation and she was communicating well.

 I'm not sure of the details of her presentation at [facility]





The patient came in yesterday to the burst department complaining of worsening 

shortness of breath.  Note that the patient has a tracheostomy tube in place.  

She was quite weak and hypotensive and lethargic.  In the burst department the 

patient was found to have bilateral lower lobe consolidation pleural effusions. 

White cell count was at 10.9.  Lactic acid level was at 2.1.  Blood pressure was

quite low.  The patient was given IV fluids and the patient was started on p

ressors.  She was also started on a combination of Zosyn and vancomycin as dosed

antibiotics for pneumonia.  Blood cultures of been sent.  Sputum cultures of 

been sent.  The patient subsequently was attached to mechanical ventilator.  

Currently, the patient is mechanically ventilated.  The patient is an assist-

control mode of ventilation.  The patient is an FiO2 of 60% with a tidal volume 

of 350 the respiratory rate of 18 and 5 of PEEP.  The patient's blood gases 

showed a pH of 7.3 with a pCO2 of 53 and pO2 of 74.  The patient received IV 

fluids and currently she is on levo fed running at 0.2 g per KG per minute.  

Her white cell count is 1 is at 18.4.  Despite all this, she is arousable.  He 

is following simple commands.  She is not requiring any sedation.  She is 

currently in normal sinus rhythm.  She had a triple lumen catheter in her right 

femoral vein.  Denies having any chest pain.  Her lactic acid level dropped from

2.1 down to 1.5.





Review of Systems








EYES: Denies change in vision.


EARS, NOSE, MOUTH, THROAT: Denies headaches, denies sore throat.


CARDIOVASCULAR: Denies chest pain, palpitations or syncopal episodes.


RESPIRATORY: Positive for shortness of breath, cough, congestion without 

hemoptysis.  The patient is currently on a mechanical ventilator.  No signs of 

any significant respiratory distress.


GASTROINTESTINAL: Denies change in appetite, denies abdominal pain


GENITOURINARY: Denies hematuria, denies infections.


MUSKULOSKELETAL: Positive for muscle fatigue and weakness, generalized malaise


INTEGUMENTARY: Denies rash, denies eczema.


NEUROLOGICAL: It seems that the patient has recovered her mentation and the 

patient is currently following commands while her being a mechanical ventilator.

 Note that she is quite septic on pressors and a fall neurologic assessment 

cannot be done.  She is moving all 4 extremities upon demand.  No focal 

neurological deficit at this point in time.  She had been having generalized 

weakness.


PSYCHIATRIC: Positive for depression.


HEMATOLOGIC/LYMPHATIC: Denies anemia, denies enlarged lymph nodes.


Oncologic the patient has non-small cell lung cancer which is being treated.





Past Medical History


Past Medical History: Cancer, COPD, CVA/TIA, Fibromyalgia, GERD/Reflux, 

Hyperlipidemia, Osteoarthritis (OA), Pneumonia


Additional Past Medical History / Comment(s): Non-small cell lung cancer stage 3

adenocarcinoma, COPD, fibromyalgia, acid reflux, hyperlipidemia, osteoarthritis,

adrenal mass that has remained stable over some time, TIA history of, hiatal 

hernia, chronic constipation, chronic back pain, degenerative arthritis


History of Any Multi-Drug Resistant Organisms: None Reported


Additional Past Surgical History / Comment(s): SINUS surgery, insertion of a 

tracheostomy tube, insertion of a PEG tube.


Past Anesthesia/Blood Transfusion Reactions: No Reported Reaction


Smoking Status: Former smoker





- Past Family History


  ** Mother


Family Medical History: Cancer





  ** Father


Family Medical History: Cancer





Medications and Allergies


                                Home Medications











 Medication  Instructions  Recorded  Confirmed  Type


 


Furosemide [Lasix] 40 mg PO DAILY@0900 03/07/19 05/26/19 History


 


Levothyroxine Sodium [Synthroid] 25 mcg PO DAILY@1100 03/07/19 05/26/19 History


 


LORazepam [Ativan] 0.5 mg PO Q6H PRN 03/22/19 05/26/19 History


 


Acetaminophen [Tylenol Arthritis] 650 mg PO Q6H PRN 05/26/19 05/26/19 History


 


Amiodarone [Cordarone] 200 mg PO DAILY@0900 05/26/19 05/26/19 History


 


Budesonide [Pulmicort] 1 mg INHALATION RT-BID@0900,2100 05/26/19 05/26/19 

History


 


Chlorhexidine Gluconate [Periogard] 15 ml PO BID@0900,2100 05/26/19 05/26/19 

History


 


Lactulose 20 gm PO Q8H PRN 05/26/19 05/26/19 History


 


Levalbuterol Nebulized [Xopenex 1.25 mg INHALATION RT-Q6H 05/26/19 05/26/19 

History





Nebulized]    


 


Levothyroxine Sodium [Synthroid] 50 mcg PO DAILY@0600 05/26/19 05/26/19 History


 


Lisinopril [Zestril] 10 mg PO DAILY@0900 05/26/19 05/26/19 History


 


Ondansetron [Zofran ODT] 4 mg PO Q6H PRN 05/26/19 05/26/19 History


 


Pantoprazole Sodium [Protonix] 40 mg PO DAILY@0600 05/26/19 05/26/19 History


 


Polyethylene Glycol 3350 [Miralax] 17 gm PO DAILY PRN 05/26/19 05/26/19 History


 


QUEtiapine FUMARATE 50 mg PO BID@0900,2100 05/26/19 05/26/19 History


 


Sennosides/Docusate Sodium 2 tab PO BID@0900,2100 05/26/19 05/26/19 History





[Senna-S Laxative Tablet]    


 


Simethicone 80 mg PO DAILY PRN 05/26/19 05/26/19 History


 


hydrALAZINE HCL 25 mg PO TID@0600,1400,2200 05/26/19 05/26/19 History


 


oxyCODONE ER [OxyCONTIN] 10 mg PO BID@0900,2100 05/26/19 05/26/19 History


 


oxyCODONE HCL [oxyCODONE HCL (IR)] 10 mg PO Q4H PRN 05/27/19 05/27/19 History








                                    Allergies











Allergy/AdvReac Type Severity Reaction Status Date / Time


 


No Known Allergies Allergy   Verified 05/26/19 22:43














Physical Exam


Vitals: 


                                   Vital Signs











  Temp Pulse Pulse Resp BP Pulse Ox


 


 05/27/19 11:11  99.7 F H   74  16   95


 


 05/27/19 11:00   74   8 L  109/63  89 L


 


 05/27/19 10:15   76   8 L  122/62  95


 


 05/27/19 10:00   72   20  105/45  96


 


 05/27/19 09:45   75   19  87/46  96


 


 05/27/19 09:30   78   18  115/51  95


 


 05/27/19 09:15  98.5 F  73   19  115/47  97


 


 05/27/19 09:00   68   17  85/35  97


 


 05/27/19 08:45   74   18  110/49  95


 


 05/27/19 08:30   67   18  91/45  98


 


 05/27/19 08:15   71   18  78/42  98


 


 05/27/19 08:00  98.5 F  75   19  107/54  97


 


 05/27/19 07:45   73   20  102/49  98


 


 05/27/19 07:30   72   18  102/50  98


 


 05/27/19 07:15   73   18  98/48  98


 


 05/27/19 07:00   77   18  92/49  98


 


 05/27/19 06:45   80   18  90/44  98


 


 05/27/19 06:30   85   18  105/58  98


 


 05/27/19 06:24   85   18   99


 


 05/27/19 06:11   81   18  107/54  100


 


 05/27/19 05:51   81   18  104/52  100


 


 05/27/19 05:30   80   19  107/51  100


 


 05/27/19 05:15   79   18  106/53  100


 


 05/27/19 05:00   80   18  104/55  100


 


 05/27/19 04:45   80   18  105/57  100


 


 05/27/19 04:30   80   19  109/53  100


 


 05/27/19 04:15   76   18  95/47  100


 


 05/27/19 04:00   79   18  90/49  100


 


 05/27/19 03:45   80   18  90/47  100


 


 05/27/19 03:30   79   10 L  80/38  94 L


 


 05/27/19 03:00   78   17  81/52  99


 


 05/27/19 02:45   80   18  98/54  94 L


 


 05/27/19 02:30   81   19  90/58  99


 


 05/27/19 02:15   84   14  90/58  96


 


 05/27/19 02:03   89   14  79/40  100


 


 05/27/19 02:00   89   17  65/39  100


 


 05/27/19 01:40   86   14  86/75  86 L


 


 05/27/19 01:30   88   24  77/37  85 L


 


 05/27/19 01:10   90   32 H  80/32  86 L


 


 05/27/19 01:00   93   32 H  77/44  86 L


 


 05/27/19 00:50   92   31 H  77/44  88 L


 


 05/27/19 00:30   90   38 H  96/45  85 L


 


 05/27/19 00:15   91   24  96/45  92 L


 


 05/27/19 00:00   91   21  95/35  79 L


 


 05/26/19 23:44   81   26 H  86/49  95


 


 05/26/19 23:40   87   29 H  86/49  92 L


 


 05/26/19 23:30   93   31 H  79/68  91 L


 


 05/26/19 23:00   84   28 H  84/54  94 L


 


 05/26/19 22:30   90   30 H  86/50  87 L


 


 05/26/19 21:55  98.2 F  92   19  96/47  89 L








                                Intake and Output











 05/26/19 05/27/19 05/27/19





 22:59 06:59 14:59


 


Intake Total  422.127 7360.87


 


Output Total  800 365


 


Balance  -987.843 8263.87


 


Intake:   


 


  IV   500


 


    Sodium Chloride 0.9% 1,   500





    000 ml @ 125 mls/hr IV .   





    Q8H [facility] Rx#:[id number]   


 


  Intake, IV Titration  254.000 911.87





  Amount   


 


    Norepinephrine 4 mg In  254.000 411.87





    Sodium Chloride 0.9% 250   





    ml @ 0.05 MCG/KG/MIN 15.   





    554 mls/hr IV .[id number]   





    [facility] Rx#:[id number]   


 


    Sodium Chloride 0.9% 500   500





    ml 500 ml @ 1000 mls/hr   





    IV Q35M [facility] Rx#:[id number]   


 


Output:   


 


  Urine  800 365


 


Other:   


 


  Voiding Method  Indwelling Catheter Indwelling Catheter


 


  Weight 81.647 kg  55.7 kg

















Gen. appearance the patient is quite restless in bed.   However, she does not 

seem to be in acute respiratory distress.  She is attached to mechanical 

ventilator.


Head exam was generally normal. There was no scleral icterus or corneal arcus. 

Mucous membranes were moist.


Neck was supple and without jugular venous distension, thyromegaly, or carotid 

bruits. Carotids were easily palpable bilaterally. There was no adenopathy.  The

patient has a tracheostomy tube in place.  The exit site is dry clean and 

intact.


Lungs were clear to auscultation and percussion, and with normal diaphragmatic 

excursion. No wheezes or rales were noted.  There is some few scattered 

expiratory wheezes throughout the lung fields bilaterally.


Heart sounds are tachycardic, normal S1-S2,Cardiac exam revealed the PMI to be 

normally situated and sized. The rhythm was regular and no extrasystoles were 

noted during several minutes of auscultation. The first and second heart sounds 

were normal and physiologic splitting of the second heart sound was noted. There

were no murmurs, rubs, clicks, or gallops.


Abdominal exam revealed normal bowel sounds. The abdomen was soft, non-tender, 

and without masses, organomegaly, or appreciable enlargement of the abdominal 

aorta.


Examination of the extremities revealed easily palpable radial, femoral and 

pedal pulses. There was no cyanosis, clubbing or edema.


Examination of the skin revealed no evidence of significant rashes, suspicious 

appearing nevi or other concerning lesions.


Neurologically, the patient is able to sense painful stimulation she is 

withdrawing her extremities all 4 without any limitation from painful 

stimulation.  No Babinski.  No clonus.  No facial asymmetry.  Pupils around 5 mm

in size and they're reactive to light.  No nystagmus.  She is following commands

at this point in time.





Results





- Laboratory Findings


CBC and BMP: 


                                 05/27/19 07:31





                                 05/27/19 07:31


ABG











ABG pH  7.30  (7.35-7.45)  L  05/27/19  10:08    


 


ABG pCO2  53 mmHg (35-45)  H  05/27/19  10:08    


 


ABG pO2  74 mmHg ()  L  05/27/19  10:08    


 


ABG O2 Saturation  95.0 % (94-97)   05/27/19  10:08    





PT/INR, D-dimer











PT  9.6 sec (9.0-12.0)   05/26/19  22:17    


 


INR  0.9  (<1.2)   05/26/19  22:17    








Abnormal lab findings: 


                                  Abnormal Labs











  05/26/19 05/26/19 05/26/19





  22:17 22:17 22:17


 


WBC  10.9 H  


 


RBC   


 


Hgb   


 


MCHC   


 


RDW  17.1 H  


 


Neutrophils # (Manual)  10.20 H  


 


Lymphocytes # (Manual)  0.22 L  


 


Monocytes # (Manual)   


 


Metamyelocytes # (Man)   


 


Myelocytes # (Manual)   


 


ABG pH   


 


ABG pCO2   


 


ABG pO2   


 


ABG HCO3   


 


ABG Total CO2   


 


ABG O2 Saturation   


 


Sodium   130 L 


 


Potassium   5.4 H 


 


Chloride   94 L 


 


Carbon Dioxide   31 H 


 


BUN   50 H 


 


Glucose   169 H 


 


POC Glucose (mg/dL)   


 


Plasma Lactic Acid Ebenezer    2.1 H*


 


AST   40 H 














  05/27/19 05/27/19 05/27/19





  02:22 02:45 06:40


 


WBC   


 


RBC   


 


Hgb   


 


MCHC   


 


RDW   


 


Neutrophils # (Manual)   


 


Lymphocytes # (Manual)   


 


Monocytes # (Manual)   


 


Metamyelocytes # (Man)   


 


Myelocytes # (Manual)   


 


ABG pH  7.30 L  


 


ABG pCO2  57 H  


 


ABG pO2  172 H  


 


ABG HCO3  28 H  


 


ABG Total CO2  30 H  


 


ABG O2 Saturation  99.6 H  


 


Sodium   


 


Potassium   


 


Chloride   


 


Carbon Dioxide   


 


BUN   


 


Glucose   


 


POC Glucose (mg/dL)    152 H


 


Plasma Lactic Acid Ebenezer   3.2 H* 


 


AST   














  05/27/19 05/27/19 05/27/19





  07:31 07:31 10:08


 


WBC  18.4 H  


 


RBC  3.78 L  


 


Hgb  11.2 L  


 


MCHC  30.4 L  


 


RDW  17.3 H  


 


Neutrophils # (Manual)  15.80 H  


 


Lymphocytes # (Manual)  0.74 L  


 


Monocytes # (Manual)  1.10 H  


 


Metamyelocytes # (Man)  0.92 H  


 


Myelocytes # (Manual)  0.18 H  


 


ABG pH    7.30 L


 


ABG pCO2    53 H


 


ABG pO2    74 L


 


ABG HCO3    26 H


 


ABG Total CO2    28 H


 


ABG O2 Saturation   


 


Sodium   134 L 


 


Potassium   


 


Chloride   


 


Carbon Dioxide   


 


BUN   48 H 


 


Glucose   150 H 


 


POC Glucose (mg/dL)   


 


Plasma Lactic Acid Ebenezer   


 


AST   














  05/27/19





  11:59


 


WBC 


 


RBC 


 


Hgb 


 


MCHC 


 


RDW 


 


Neutrophils # (Manual) 


 


Lymphocytes # (Manual) 


 


Monocytes # (Manual) 


 


Metamyelocytes # (Man) 


 


Myelocytes # (Manual) 


 


ABG pH 


 


ABG pCO2 


 


ABG pO2 


 


ABG HCO3 


 


ABG Total CO2 


 


ABG O2 Saturation 


 


Sodium 


 


Potassium 


 


Chloride 


 


Carbon Dioxide 


 


BUN 


 


Glucose 


 


POC Glucose (mg/dL)  153 H


 


Plasma Lactic Acid Ebenezer 


 


AST 














- Diagnostic Findings


Chest x-ray: image reviewed





Assessment and Plan


Plan: 











1 bilateral pneumonia with secondary sepsis.  Consider hospital-acquired 

pathogens knowing that this patient had a prolonged hospitalization at [facility] and [place] [facility] [place]  The jeremi

ent has bilateral lower lobe consolidation of right more than left and currently

she is septic, hypotensive and placed back on a mechanical ventilator





2 septic shock secondary to above currently on pressors.  The patient is also 

being resuscitated IV fluids





3 prolonged mechanical ventilator due to altered mentation back in March 2019, 

with subsequent recovered and the patient currently has a tracheostomy tube in 

place in addition to PEG tube feeding for enteral feeding and nutritional 

support





4 stage IV non-small cell lung cancer post-chemoradiation therapy and the 

patient was receiving immunotherapy prior to these complications





5 acute leukocytosis secondary to above





6 acute   lactic acidosis secondary to above him a improving





7 COPD





8 hypertension, history of





9 hyperlipidemia





10 fibromyalgia/rthritis/chronic back pain





11 depression





Plan





Obtain sputum Gram stain and culture.  Obtain blood culture.  Continue Zosyn and

vancomycin.  Continue vent support for now.  Blood gases was noted.  Chest x-ray

was noted.  Continue IV fluids and the patient is currently receiving normal 

state rate of 125 mL an hour.  Wean off pressors.  The patient has a triple-

lumen catheter and an arterial line catheter will be also inserted.  May need to

restart enteral feeding for nutritional support with the next 24 hours.  Heparin

subcu for DVT prophylaxis.  IV Protonix.  We'll continue to follow.  Will 

request records from [facility] regarding her previous hospitalization.

 Condition is critical.  This evaluation was done and more than 30 minutes 

excluding time to do any procedures.

## 2019-05-27 NOTE — P.CONS
History of Present Illness





- Reason for Consult


Consult date: 05/27/19


Pneumonia


Requesting physician: Miguel Garza





- Chief Complaint


Shortness of breath and cough x 1 day





- History of Present Illness





Patient is a 63-year-old  female who recently did have a prolonged stay

at [facility] after the patient was transferred from this facility 

because of mental status changes while there the patient did have respiratory 

failure requiring mechanical ventilation subsequently ended up getting a trach 

and PEG after which the patient was transferred to [facility] and

just recently discharged to local nursing home about 3 days ago, the patient has

been brought back to this facility with increasing shortness of breath 

apparently started after the patient walked around the hallway with an empty 

oxygen tank and took a long time for replacement of that supplemental oxygen for

this sister who provided most of the history is still having decreasing 

shortness of breath she did have a cough with some occasional sputum production 

no significant chest pain no nausea no vomiting the patient had did have a PEG 

tube currently not be used as patient unable treat herself without any 

difficulty or any choking on the food with the symptoms the patient was brought 

to the [facility] ER with the patient was evaluated by the physician 

patient reports to be hypertensive did have a low-grade fever initially when it 

was not that activity subsequent up to 18,000 chest x-ray with evidence of right

lower lobe pneumonia patient did receive a dose of Zosyn in the ER and 

vancomycin vancomycin was continued and infectious disease was consulted for 

further recommendation regarding antibiotic therapy





Review of Systems





Positive points has been mentioned in HPI rest of the systems negative





Past Medical History


Past Medical History: Cancer, COPD, CVA/TIA, Fibromyalgia, GERD/Reflux, 

Hyperlipidemia, Osteoarthritis (OA), Pneumonia


Additional Past Medical History / Comment(s): Non-small cell lung cancer stage 3

adenocarcinoma, COPD, fibromyalgia, acid reflux, hyperlipidemia, osteoarthritis,

adrenal mass that has remained stable over some time, TIA history of, hiatal 

hernia, chronic constipation, chronic back pain, degenerative arthritis


History of Any Multi-Drug Resistant Organisms: None Reported


Additional Past Surgical History / Comment(s): SINUS surgery, insertion of a 

tracheostomy tube, insertion of a PEG tube.


Past Anesthesia/Blood Transfusion Reactions: No Reported Reaction


Smoking Status: Former smoker





- Past Family History


  ** Mother


Family Medical History: Cancer





  ** Father


Family Medical History: Cancer





Medications and Allergies


                                Home Medications











 Medication  Instructions  Recorded  Confirmed  Type


 


Furosemide [Lasix] 40 mg PO DAILY@0900 03/07/19 05/26/19 History


 


Levothyroxine Sodium [Synthroid] 25 mcg PO DAILY@1100 03/07/19 05/26/19 History


 


LORazepam [Ativan] 0.5 mg PO Q6H PRN 03/22/19 05/26/19 History


 


Acetaminophen [Tylenol Arthritis] 650 mg PO Q6H PRN 05/26/19 05/26/19 History


 


Amiodarone [Cordarone] 200 mg PO DAILY@0900 05/26/19 05/26/19 History


 


Budesonide [Pulmicort] 1 mg INHALATION RT-BID@0900,2100 05/26/19 05/26/19 

History


 


Chlorhexidine Gluconate [Periogard] 15 ml PO BID@0900,2100 05/26/19 05/26/19 

History


 


Lactulose 20 gm PO Q8H PRN 05/26/19 05/26/19 History


 


Levalbuterol Nebulized [Xopenex 1.25 mg INHALATION RT-Q6H 05/26/19 05/26/19 

History





Nebulized]    


 


Levothyroxine Sodium [Synthroid] 50 mcg PO DAILY@0600 05/26/19 05/26/19 History


 


Lisinopril [Zestril] 10 mg PO DAILY@0900 05/26/19 05/26/19 History


 


Ondansetron [Zofran ODT] 4 mg PO Q6H PRN 05/26/19 05/26/19 History


 


Pantoprazole Sodium [Protonix] 40 mg PO DAILY@0600 05/26/19 05/26/19 History


 


Polyethylene Glycol 3350 [Miralax] 17 gm PO DAILY PRN 05/26/19 05/26/19 History


 


QUEtiapine FUMARATE 50 mg PO BID@0900,2100 05/26/19 05/26/19 History


 


Sennosides/Docusate Sodium 2 tab PO BID@0900,2100 05/26/19 05/26/19 History





[Senna-S Laxative Tablet]    


 


Simethicone 80 mg PO DAILY PRN 05/26/19 05/26/19 History


 


hydrALAZINE HCL 25 mg PO TID@0600,1400,2200 05/26/19 05/26/19 History


 


oxyCODONE ER [OxyCONTIN] 10 mg PO BID@0900,2100 05/26/19 05/26/19 History


 


oxyCODONE HCL [oxyCODONE HCL (IR)] 10 mg PO Q4H PRN 05/27/19 05/27/19 History








                                    Allergies











Allergy/AdvReac Type Severity Reaction Status Date / Time


 


No Known Allergies Allergy   Verified 05/26/19 22:43














Physical Exam


Vitals: 


                                   Vital Signs











  Temp Pulse Pulse Resp BP Pulse Ox


 


 05/27/19 14:00   74   19  116/62  94 L


 


 05/27/19 13:00   68   18  108/57  97


 


 05/27/19 12:00  98.5 F  73   22  100/68  95


 


 05/27/19 11:11  99.7 F H   74  16   95


 


 05/27/19 11:00   74   8 L  109/63  89 L


 


 05/27/19 10:15   76   19  122/62  95


 


 05/27/19 10:00   72   20  105/45  96


 


 05/27/19 09:45   75   19  87/46  96


 


 05/27/19 09:30   78   18  115/51  95


 


 05/27/19 09:15  98.5 F  73   19  115/47  97


 


 05/27/19 09:00   68   17  85/35  97


 


 05/27/19 08:45   74   18  110/49  95


 


 05/27/19 08:30   67   18  91/45  98


 


 05/27/19 08:15   71   18  78/42  98


 


 05/27/19 08:00  98.5 F  75   23  107/54  97


 


 05/27/19 07:45   73   20  102/49  98


 


 05/27/19 07:30   72   18  102/50  98


 


 05/27/19 07:15   73   18  98/48  98


 


 05/27/19 07:00   77   18  92/49  98


 


 05/27/19 06:45   80   18  90/44  98


 


 05/27/19 06:30   85   18  105/58  98


 


 05/27/19 06:24   85   18   99


 


 05/27/19 06:11   81   18  107/54  100


 


 05/27/19 05:51   81   18  104/52  100


 


 05/27/19 05:30   80   19  107/51  100


 


 05/27/19 05:15   79   18  106/53  100


 


 05/27/19 05:00   80   18  104/55  100


 


 05/27/19 04:45   80   18  105/57  100


 


 05/27/19 04:30   80   19  109/53  100


 


 05/27/19 04:15   76   18  95/47  100


 


 05/27/19 04:00   79   18  90/49  100


 


 05/27/19 03:45   80   18  90/47  100


 


 05/27/19 03:30   79   10 L  80/38  94 L


 


 05/27/19 03:00   78   17  81/52  99


 


 05/27/19 02:45   80   18  98/54  94 L


 


 05/27/19 02:30   81   19  90/58  99


 


 05/27/19 02:15   84   14  90/58  96


 


 05/27/19 02:03   89   14  79/40  100


 


 05/27/19 02:00   89   17  65/39  100


 


 05/27/19 01:40   86   14  86/75  86 L


 


 05/27/19 01:30   88   24  77/37  85 L


 


 05/27/19 01:10   90   32 H  80/32  86 L


 


 05/27/19 01:00   93   32 H  77/44  86 L


 


 05/27/19 00:50   92   31 H  77/44  88 L


 


 05/27/19 00:30   90   38 H  96/45  85 L


 


 05/27/19 00:15   91   24  96/45  92 L


 


 05/27/19 00:00   91   21  95/35  79 L


 


 05/26/19 23:44   81   26 H  86/49  95


 


 05/26/19 23:40   87   29 H  86/49  92 L


 


 05/26/19 23:30   93   31 H  79/68  91 L


 


 05/26/19 23:00   84   28 H  84/54  94 L


 


 05/26/19 22:30   90   30 H  86/50  87 L


 


 05/26/19 21:55  98.2 F  92   19  96/47  89 L








                                Intake and Output











 05/26/19 05/27/19 05/27/19





 22:59 06:59 14:59


 


Intake Total  611.632 6597.53


 


Output Total  800 860


 


Balance  -015.073 2091.53


 


Intake:   


 


  IV   1000


 


    Sodium Chloride 0.9% 1,   1000





    000 ml @ 125 mls/hr IV .   





    Q8H [facility] Rx#:[id number]   


 


  Intake, IV Titration  463.176 2911.53





  Amount   


 


    Norepinephrine 4 mg In  254.000 726.53





    Sodium Chloride 0.9% 250   





    ml @ 0.05 MCG/KG/MIN 15.   





    554 mls/hr IV .[id number]   





    [facility] Rx#:[id number]   


 


    Sodium Chloride 0.9% 500   500





    ml 500 ml @ 1000 mls/hr   





    IV Q35M [facility] Rx#:[id number]   


 


Output:   


 


  Urine  800 860


 


Other:   


 


  Voiding Method  Indwelling Catheter Indwelling Catheter


 


  Weight 81.647 kg  55.7 kg














GENERAL DESCRIPTION: An elderly female lying in bed, no distress. No tachypnea 

or accessory muscle of respiration use.


HEENT: Shows Pallor , no scleral icterus. Oral mucous membrane is dry. No 

pharyngeal erythema or thrush


NECK: Patient is intubated to the trach


LUNGS: Unlabored breathing.  Decreased breath sound at the base. No wheeze or 

crackle.


HEART: S1, S2, regular rate and rhythm. No loud murmur


ABDOMEN: Soft, no tenderness , guarding or rigidity, no organomegaly


EXTREMITIES: No edema of feet.


SKIN: No rash, no masses palpable.


NEUROLOGICAL: The patient is awake, alert, oriented x3, mood and affect normal





Results


CBC & Chem 7: 


                                 05/27/19 07:31





                                 05/27/19 07:31


Labs: 


                  Abnormal Lab Results - Last 24 Hours (Table)











  05/26/19 05/26/19 05/26/19 Range/Units





  22:17 22:17 22:17 


 


WBC  10.9 H    (3.8-10.6)  k/uL


 


RBC     (3.80-5.40)  m/uL


 


Hgb     (11.4-16.0)  gm/dL


 


MCHC     (31.0-37.0)  g/dL


 


RDW  17.1 H    (11.5-15.5)  %


 


Neutrophils # (Manual)  10.20 H    (1.3-7.7)  k/uL


 


Lymphocytes # (Manual)  0.22 L    (1.0-4.8)  k/uL


 


Monocytes # (Manual)     (0-1.0)  k/uL


 


Metamyelocytes # (Man)     (0)  k/uL


 


Myelocytes # (Manual)     (0)  k/uL


 


ABG pH     (7.35-7.45)  


 


ABG pCO2     (35-45)  mmHg


 


ABG pO2     ()  mmHg


 


ABG HCO3     (21-25)  mmol/L


 


ABG Total CO2     (19-24)  mmol/L


 


ABG O2 Saturation     (94-97)  %


 


Sodium   130 L   (137-145)  mmol/L


 


Potassium   5.4 H   (3.5-5.1)  mmol/L


 


Chloride   94 L   ()  mmol/L


 


Carbon Dioxide   31 H   (22-30)  mmol/L


 


BUN   50 H   (7-17)  mg/dL


 


Glucose   169 H   (74-99)  mg/dL


 


POC Glucose (mg/dL)     (75-99)  mg/dL


 


Plasma Lactic Acid Ebenezer    2.1 H*  (0.7-2.0)  mmol/L


 


AST   40 H   (14-36)  U/L














  05/27/19 05/27/19 05/27/19 Range/Units





  02:22 02:45 06:40 


 


WBC     (3.8-10.6)  k/uL


 


RBC     (3.80-5.40)  m/uL


 


Hgb     (11.4-16.0)  gm/dL


 


MCHC     (31.0-37.0)  g/dL


 


RDW     (11.5-15.5)  %


 


Neutrophils # (Manual)     (1.3-7.7)  k/uL


 


Lymphocytes # (Manual)     (1.0-4.8)  k/uL


 


Monocytes # (Manual)     (0-1.0)  k/uL


 


Metamyelocytes # (Man)     (0)  k/uL


 


Myelocytes # (Manual)     (0)  k/uL


 


ABG pH  7.30 L    (7.35-7.45)  


 


ABG pCO2  57 H    (35-45)  mmHg


 


ABG pO2  172 H    ()  mmHg


 


ABG HCO3  28 H    (21-25)  mmol/L


 


ABG Total CO2  30 H    (19-24)  mmol/L


 


ABG O2 Saturation  99.6 H    (94-97)  %


 


Sodium     (137-145)  mmol/L


 


Potassium     (3.5-5.1)  mmol/L


 


Chloride     ()  mmol/L


 


Carbon Dioxide     (22-30)  mmol/L


 


BUN     (7-17)  mg/dL


 


Glucose     (74-99)  mg/dL


 


POC Glucose (mg/dL)    152 H  (75-99)  mg/dL


 


Plasma Lactic Acid Ebenezer   3.2 H*   (0.7-2.0)  mmol/L


 


AST     (14-36)  U/L














  05/27/19 05/27/19 05/27/19 Range/Units





  07:31 07:31 10:08 


 


WBC  18.4 H    (3.8-10.6)  k/uL


 


RBC  3.78 L    (3.80-5.40)  m/uL


 


Hgb  11.2 L    (11.4-16.0)  gm/dL


 


MCHC  30.4 L    (31.0-37.0)  g/dL


 


RDW  17.3 H    (11.5-15.5)  %


 


Neutrophils # (Manual)  15.80 H    (1.3-7.7)  k/uL


 


Lymphocytes # (Manual)  0.74 L    (1.0-4.8)  k/uL


 


Monocytes # (Manual)  1.10 H    (0-1.0)  k/uL


 


Metamyelocytes # (Man)  0.92 H    (0)  k/uL


 


Myelocytes # (Manual)  0.18 H    (0)  k/uL


 


ABG pH    7.30 L  (7.35-7.45)  


 


ABG pCO2    53 H  (35-45)  mmHg


 


ABG pO2    74 L  ()  mmHg


 


ABG HCO3    26 H  (21-25)  mmol/L


 


ABG Total CO2    28 H  (19-24)  mmol/L


 


ABG O2 Saturation     (94-97)  %


 


Sodium   134 L   (137-145)  mmol/L


 


Potassium     (3.5-5.1)  mmol/L


 


Chloride     ()  mmol/L


 


Carbon Dioxide     (22-30)  mmol/L


 


BUN   48 H   (7-17)  mg/dL


 


Glucose   150 H   (74-99)  mg/dL


 


POC Glucose (mg/dL)     (75-99)  mg/dL


 


Plasma Lactic Acid Ebenezer     (0.7-2.0)  mmol/L


 


AST     (14-36)  U/L














  05/27/19 Range/Units





  11:59 


 


WBC   (3.8-10.6)  k/uL


 


RBC   (3.80-5.40)  m/uL


 


Hgb   (11.4-16.0)  gm/dL


 


MCHC   (31.0-37.0)  g/dL


 


RDW   (11.5-15.5)  %


 


Neutrophils # (Manual)   (1.3-7.7)  k/uL


 


Lymphocytes # (Manual)   (1.0-4.8)  k/uL


 


Monocytes # (Manual)   (0-1.0)  k/uL


 


Metamyelocytes # (Man)   (0)  k/uL


 


Myelocytes # (Manual)   (0)  k/uL


 


ABG pH   (7.35-7.45)  


 


ABG pCO2   (35-45)  mmHg


 


ABG pO2   ()  mmHg


 


ABG HCO3   (21-25)  mmol/L


 


ABG Total CO2   (19-24)  mmol/L


 


ABG O2 Saturation   (94-97)  %


 


Sodium   (137-145)  mmol/L


 


Potassium   (3.5-5.1)  mmol/L


 


Chloride   ()  mmol/L


 


Carbon Dioxide   (22-30)  mmol/L


 


BUN   (7-17)  mg/dL


 


Glucose   (74-99)  mg/dL


 


POC Glucose (mg/dL)  153 H  (75-99)  mg/dL


 


Plasma Lactic Acid Ebenezer   (0.7-2.0)  mmol/L


 


AST   (14-36)  U/L








                      Microbiology - Last 24 Hours (Table)











 05/26/19 23:30 Gram Stain - Preliminary





 Sputum Sputum Culture - Preliminary


 


 05/27/19 03:05 Urine Culture - Preliminary





 Urine,Catheterized 














Assessment and Plan


Assessment: 





1-patient admitted to hospital with increasing shortness of breath patient also 

have a cough did have a low-grade fever hypotension and elevated white count 

with evidence of right lobe pneumonia concern for possible resistant gram-

positive or gram-negative pathogen in this patient with recent prolonged 

hospital stay at [facility] followed by select specialty


Plan: 





1-blood and sputum culture has been obtained those will be followed


2-vancomycin pharmacy to dose target trough of 15 to continue while watching her

kidney function and Vanco trough closely


3-add cefepime 2 g every 12hr to cover  for the gram-negative component


We will follow on clinical condition and cultures to further adjust medication 

if needed


Thank you for this consultation will follow this patient along with you


Time with Patient: Greater than 30

## 2019-05-28 LAB
ALBUMIN SERPL-MCNC: 2.4 G/DL (ref 3.5–5)
ALP SERPL-CCNC: 82 U/L (ref 38–126)
ALT SERPL-CCNC: 18 U/L (ref 9–52)
ANION GAP SERPL CALC-SCNC: 1 MMOL/L
AST SERPL-CCNC: 16 U/L (ref 14–36)
BASOPHILS # BLD AUTO: 0 K/UL (ref 0–0.2)
BASOPHILS NFR BLD AUTO: 0 %
BUN SERPL-SCNC: 19 MG/DL (ref 7–17)
CALCIUM SPEC-MCNC: 8.6 MG/DL (ref 8.4–10.2)
CHLORIDE SERPL-SCNC: 109 MMOL/L (ref 98–107)
CO2 BLDA-SCNC: 27 MMOL/L (ref 19–24)
CO2 SERPL-SCNC: 26 MMOL/L (ref 22–30)
EOSINOPHIL # BLD AUTO: 0.1 K/UL (ref 0–0.7)
EOSINOPHIL NFR BLD AUTO: 1 %
ERYTHROCYTE [DISTWIDTH] IN BLOOD BY AUTOMATED COUNT: 3.04 M/UL (ref 3.8–5.4)
ERYTHROCYTE [DISTWIDTH] IN BLOOD: 17.2 % (ref 11.5–15.5)
GLUCOSE BLD-MCNC: 104 MG/DL (ref 75–99)
GLUCOSE BLD-MCNC: 127 MG/DL (ref 75–99)
GLUCOSE BLD-MCNC: 130 MG/DL (ref 75–99)
GLUCOSE BLD-MCNC: 132 MG/DL (ref 75–99)
GLUCOSE BLD-MCNC: 140 MG/DL (ref 75–99)
GLUCOSE BLD-MCNC: 149 MG/DL (ref 75–99)
GLUCOSE BLD-MCNC: 92 MG/DL (ref 75–99)
GLUCOSE BLD-MCNC: 94 MG/DL (ref 75–99)
GLUCOSE SERPL-MCNC: 126 MG/DL (ref 74–99)
HCO3 BLDA-SCNC: 26 MMOL/L (ref 21–25)
HCT VFR BLD AUTO: 30 % (ref 34–46)
HGB BLD-MCNC: 9.2 GM/DL (ref 11.4–16)
LYMPHOCYTES # SPEC AUTO: 0.4 K/UL (ref 1–4.8)
LYMPHOCYTES NFR SPEC AUTO: 2 %
MAGNESIUM SPEC-SCNC: 1.7 MG/DL (ref 1.6–2.3)
MCH RBC QN AUTO: 30.4 PG (ref 25–35)
MCHC RBC AUTO-ENTMCNC: 30.8 G/DL (ref 31–37)
MCV RBC AUTO: 98.7 FL (ref 80–100)
MONOCYTES # BLD AUTO: 0.4 K/UL (ref 0–1)
MONOCYTES NFR BLD AUTO: 2 %
NEUTROPHILS # BLD AUTO: 15.7 K/UL (ref 1.3–7.7)
NEUTROPHILS NFR BLD AUTO: 94 %
PCO2 BLDA: 43 MMHG (ref 35–45)
PH BLDA: 7.39 [PH] (ref 7.35–7.45)
PLATELET # BLD AUTO: 267 K/UL (ref 150–450)
PO2 BLDA: 97 MMHG (ref 83–108)
POTASSIUM SERPL-SCNC: 3.7 MMOL/L (ref 3.5–5.1)
PROT SERPL-MCNC: 4.9 G/DL (ref 6.3–8.2)
SODIUM SERPL-SCNC: 136 MMOL/L (ref 137–145)
WBC # BLD AUTO: 16.7 K/UL (ref 3.8–10.6)

## 2019-05-28 RX ADMIN — HEPARIN SODIUM SCH UNIT: 5000 INJECTION, SOLUTION INTRAVENOUS; SUBCUTANEOUS at 00:41

## 2019-05-28 RX ADMIN — DEXTROSE MONOHYDRATE AND SODIUM CHLORIDE SCH MLS/HR: 5; .9 INJECTION, SOLUTION INTRAVENOUS at 20:13

## 2019-05-28 RX ADMIN — SODIUM CHLORIDE SCH MLS/HR: 9 INJECTION, SOLUTION INTRAVENOUS at 17:05

## 2019-05-28 RX ADMIN — MAGNESIUM SULFATE IN DEXTROSE SCH MLS/HR: 10 INJECTION, SOLUTION INTRAVENOUS at 09:35

## 2019-05-28 RX ADMIN — HEPARIN SODIUM SCH UNIT: 5000 INJECTION, SOLUTION INTRAVENOUS; SUBCUTANEOUS at 17:05

## 2019-05-28 RX ADMIN — NOREPINEPHRINE BITARTRATE SCH MLS/HR: 1 INJECTION, SOLUTION, CONCENTRATE INTRAVENOUS at 07:49

## 2019-05-28 RX ADMIN — CEFEPIME HYDROCHLORIDE SCH MLS/HR: 2 INJECTION, POWDER, FOR SOLUTION INTRAVENOUS at 00:32

## 2019-05-28 RX ADMIN — PANTOPRAZOLE SODIUM SCH MG: 40 INJECTION, POWDER, FOR SOLUTION INTRAVENOUS at 09:38

## 2019-05-28 RX ADMIN — LEVOTHYROXINE SODIUM SCH MCG: 50 TABLET ORAL at 06:26

## 2019-05-28 RX ADMIN — SODIUM CHLORIDE SCH MLS/HR: 9 INJECTION, SOLUTION INTRAVENOUS at 23:04

## 2019-05-28 RX ADMIN — DEXTROSE MONOHYDRATE AND SODIUM CHLORIDE SCH MLS/HR: 5; .9 INJECTION, SOLUTION INTRAVENOUS at 09:59

## 2019-05-28 RX ADMIN — INSULIN ASPART SCH: 100 INJECTION, SOLUTION INTRAVENOUS; SUBCUTANEOUS at 09:35

## 2019-05-28 RX ADMIN — DOCUSATE SODIUM AND SENNOSIDES SCH EACH: 50; 8.6 TABLET ORAL at 20:04

## 2019-05-28 RX ADMIN — INSULIN ASPART SCH UNIT: 100 INJECTION, SOLUTION INTRAVENOUS; SUBCUTANEOUS at 13:32

## 2019-05-28 RX ADMIN — ISODIUM CHLORIDE SCH MG: 0.03 SOLUTION RESPIRATORY (INHALATION) at 13:25

## 2019-05-28 RX ADMIN — DOCUSATE SODIUM AND SENNOSIDES SCH EACH: 50; 8.6 TABLET ORAL at 09:38

## 2019-05-28 RX ADMIN — CHLORHEXIDINE GLUCONATE SCH ML: 1.2 RINSE ORAL at 20:05

## 2019-05-28 RX ADMIN — MAGNESIUM SULFATE IN DEXTROSE SCH MLS/HR: 10 INJECTION, SOLUTION INTRAVENOUS at 11:40

## 2019-05-28 RX ADMIN — HEPARIN SODIUM SCH UNIT: 5000 INJECTION, SOLUTION INTRAVENOUS; SUBCUTANEOUS at 09:36

## 2019-05-28 RX ADMIN — PREGABALIN SCH MG: 50 CAPSULE ORAL at 23:01

## 2019-05-28 RX ADMIN — ISODIUM CHLORIDE SCH MG: 0.03 SOLUTION RESPIRATORY (INHALATION) at 07:48

## 2019-05-28 RX ADMIN — ISODIUM CHLORIDE SCH MG: 0.03 SOLUTION RESPIRATORY (INHALATION) at 20:08

## 2019-05-28 RX ADMIN — BUDESONIDE SCH MG: 1 SUSPENSION RESPIRATORY (INHALATION) at 20:08

## 2019-05-28 RX ADMIN — ISODIUM CHLORIDE SCH MG: 0.03 SOLUTION RESPIRATORY (INHALATION) at 01:47

## 2019-05-28 RX ADMIN — OXYCODONE HYDROCHLORIDE SCH: 10 TABLET, FILM COATED, EXTENDED RELEASE ORAL at 20:13

## 2019-05-28 RX ADMIN — OXYCODONE HYDROCHLORIDE SCH MG: 10 TABLET, FILM COATED, EXTENDED RELEASE ORAL at 09:37

## 2019-05-28 RX ADMIN — DEXTROSE MONOHYDRATE AND SODIUM CHLORIDE SCH MLS/HR: 5; .9 INJECTION, SOLUTION INTRAVENOUS at 06:29

## 2019-05-28 RX ADMIN — AMIODARONE HYDROCHLORIDE SCH MG: 200 TABLET ORAL at 09:36

## 2019-05-28 RX ADMIN — CEFEPIME HYDROCHLORIDE SCH MLS/HR: 2 INJECTION, POWDER, FOR SOLUTION INTRAVENOUS at 09:38

## 2019-05-28 RX ADMIN — HEPARIN SODIUM SCH UNIT: 5000 INJECTION, SOLUTION INTRAVENOUS; SUBCUTANEOUS at 23:04

## 2019-05-28 RX ADMIN — INSULIN ASPART SCH: 100 INJECTION, SOLUTION INTRAVENOUS; SUBCUTANEOUS at 23:27

## 2019-05-28 RX ADMIN — BUDESONIDE SCH MG: 1 SUSPENSION RESPIRATORY (INHALATION) at 07:48

## 2019-05-28 RX ADMIN — CHLORHEXIDINE GLUCONATE SCH ML: 1.2 RINSE ORAL at 09:37

## 2019-05-28 RX ADMIN — CEFAZOLIN SCH: 330 INJECTION, POWDER, FOR SOLUTION INTRAMUSCULAR; INTRAVENOUS at 06:57

## 2019-05-28 RX ADMIN — NOREPINEPHRINE BITARTRATE SCH MLS/HR: 1 INJECTION, SOLUTION, CONCENTRATE INTRAVENOUS at 00:32

## 2019-05-28 RX ADMIN — INSULIN ASPART SCH: 100 INJECTION, SOLUTION INTRAVENOUS; SUBCUTANEOUS at 19:51

## 2019-05-28 RX ADMIN — CEFEPIME HYDROCHLORIDE SCH MLS/HR: 2 INJECTION, POWDER, FOR SOLUTION INTRAVENOUS at 21:44

## 2019-05-28 NOTE — P.PN
Subjective


Progress Note Date: 05/28/19


Principal diagnosis: 





Acute sepsis, septic shock, and bilateral pneumonia with sepsis.





This is a very pleasant 63-year-old female patient who follows with Dr. Amaya 

as her primary care physician.  She has a history of CVA/TIA, fibromyalgia, 

gastroesophageal reflux disease, hyperlipidemia, hypothyroidism, chronic back 

pain, depression.  She also has a history of chronic tobacco dependence, oxygen 

dependent chronic obstructive pulmonary disease with an FEV1 value of 56% of pre

dicted, and non-small cell lung cancer stage III adenocarcinoma.  She follows 

with Dr. Pastor in our office. This was diagnosed by an FNA of a left upper lobe 

lesion by IR in May 2017.  She had received concurrent chemoradiation with 

subsequent additional chemotherapy and then placed on maintenance mmunotherapy 

of Imfinzi back in June 2018.  Her last PET scan in September 2018 revealed 

continued left upper lobe mass with irregular margins extending to the pleural 

surface, spiculated appearance but was stable compared to previous and April 2018.  The SUV value was only 1.7.  Subsequent computed tomography scan of the 

chest in January 2019 revealed a stable and slightly smaller spiculated mass of 

the left upper lobe measuring 2.5 x 2.4 cm versus 3.0 x 2.6 previous.  No 

evidence of recurrence or metastasis.  The patient was in the hospital back in 

early March 2019 and she was having ongoing issues with nausea vomiting profound

weakness and fatigue.  She decided to stop taking the immunotherapy. She had 

been treated for his COPD exacerbation 02/13/2019 in our office by Dr. Pastor 

where she received steroids and Bactrim.  She did improve for the first week or 

so. She presented to the emergency room yesterday with complaints of increasing 

shortness of breath cough and congestion.  She did have some lower extremity 

edema.  She was still quite weak and fatigued.  ome dyspnea on exertion.  The 

patient was discharged home on 03/11/2019 as the patient shortness of breath 

improved and the patient recovered from her acute COPD exacerbation and she was 

discharged home on her usual routine medications which included also morphine 

sulfate 30 mg by mouth twice a day Percocet 10/03/2025 every 6 hours and a when 

necessary basis.  In terms of her lung medication she was maintained on a combi

nation of Symbicort and Incruse.  She is also on Synthroid 25 g by mouth daily.

 She was given Lasix to be taken on an as needed basis.





The patient subsequently came back to our hospital on 03/23/2019 because of 

altered mental status.   According to the family the patient was acting weird.  

She was acting unusual and she was confused.  She was also very weak and she was

having episodes of fall.  I do see a bruise over her forehead.  The family told 

that she did not have any active had trauma.  No seizure activity.  No neck 

stiffness.  No documented fever.  She was thrashing significantly yesterday.  A 

CAT scan of the head was done that showed no acute process.  CAT scan of abdomen

and pelvis was done that showed urinary bladder distention along with extensive 

colonic stool.  The patient's ammonia level was at 28.  Urine and blood cultures

were ordered.  The influenza screen was negative.   She did not have any neck 

stiffness.  No fever.  She was at times morning.  Other times she would open up 

her eyes and follows some simple commands.  I was told that this is an 

improvement in her condition compared to yesterday.  She was able to follow some

simple commands however she has not been consistent in following orders.  His 

speech is minimal and the patient may safely worse.  She denies being in pain.  

Urine toxin was ordered.  She was given a dose of Narcan here in the ICU without

much improvement.  Based on all this, I intubated the patient at that time and 

moved to the intensive care unit.  Lumbar puncture was done and was negative and

assess the causes for this patient altered mental status was not established.  

The patient was chest at [facility].  She underwent prolonged 

hospitalization during which she required intubation mechanical ventilation 

tracheostomy tube insertion PEG tube insertion and following that the patient 

[place] [facility] [place].  

It seems that the patient regained her mentation and she was communicating well.

 I'm not sure of the details of her presentation at [facility]





The patient came in yesterday to the burst department complaining of worsening 

shortness of breath.  Note that the patient has a tracheostomy tube in place.  

She was quite weak and hypotensive and lethargic.  In the burst department the 

patient was found to have bilateral lower lobe consolidation pleural effusions. 

White cell count was at 10.9.  Lactic acid level was at 2.1.  Blood pressure was

quite low.  The patient was given IV fluids and the patient was started on 

pressors.  She was also started on a combination of Zosyn and vancomycin as 

dosed antibiotics for pneumonia.  Blood cultures of been sent.  Sputum cultures 

of been sent.  The patient subsequently was attached to mechanical ventilator.  

Currently, the patient is mechanically ventilated.  The patient is an assist-

control mode of ventilation.  The patient is an FiO2 of 60% with a tidal volume 

of 350 the respiratory rate of 18 and 5 of PEEP.  The patient's blood gases 

showed a pH of 7.3 with a pCO2 of 53 and pO2 of 74.  The patient received IV 

fluids and currently she is on levo fed running at 0.2 g per KG per minute.  He

r white cell count is 1 is at 18.4.  Despite all this, she is arousable.  He is 

following simple commands.  She is not requiring any sedation.  She is currently

in normal sinus rhythm.  She had a triple lumen catheter in her right femoral 

vein.  Denies having any chest pain.  Her lactic acid level dropped from 2.1 

down to 1.5.





Reevaluated today on 5/28/2019, patient remains on mechanical ventilation, she 

is presently on assist control rate of 18 tidal volume of 350 FiO2 of 50% and 

PEEP of 5.  Remains on Levophed at 5.5 mcg/m, she is also on cefepime and 

vancomycin.  Chest x-ray clearly shows evidence of pneumonia, and some chronic 

parenchymal changes with small bilateral pleural effusions and atelectasis.  

Patient had a WBC count of 16.7 hemoglobin of 9.2 ABG showed a pO2 of 97 pCO2 of

43 pH of 7.39.  Electrolytes and renal profile are normal.











Objective





- Vital Signs


Vital signs: 


                                   Vital Signs











Temp  98.6 F   05/28/19 09:00


 


Pulse  71   05/28/19 10:00


 


Resp  12   05/28/19 10:00


 


BP  92/57   05/28/19 10:00


 


Pulse Ox  94 L  05/28/19 10:00








                                 Intake & Output











 05/27/19 05/28/19 05/28/19





 18:59 06:59 18:59


 


Intake Total 2919.87 1879 1238.458


 


Output Total 1325 1025 200


 


Balance 1594.87 854 1038.458


 


Weight 55.7 kg  


 


Intake:   


 


  IV 1500 1625 


 


    Sodium Chloride 0.9% 1, 1500 1625 





    000 ml @ 125 mls/hr IV .   





    Q8H [facility] Rx#:[id number]   


 


  Intake, IV Titration 1419.87 254 1178.458





  Amount   


 


    Cefepime 2 gm In Sodium   100





    Chloride 0.9% 100 ml @   





    200 mls/hr IVPB Q12H [facility]   





    Rx#:[id number]   


 


    Dextrose 5%-0.9% NaCl 1,   375





    000 ml @ 125 mls/hr IV .   





    Q8H [facility] Rx#:[id number]   


 


    Magnesium Sulfate-D5w Pmx   100





    1 gm In Dextrose/Water 1   





    100ml.bag @ 100 mls/hr   





    IVPB Q1H [facility] Rx#:   





    [id number]   


 


    Norepinephrine 32 mg In   3.458





    Sodium Chloride 0.9% 218   





    ml @ 0.05 MCG/KG/MIN 1.   





    305 mls/hr IV .Q24H [facility]   





    Rx#:[id number]   


 


    Norepinephrine 4 mg In 919.87 254 





    Sodium Chloride 0.9% 250   





    ml @ 0.05 MCG/KG/MIN 15.   





    554 mls/hr IV .[id number]   





    [facility] Rx#:[id number]   


 


    Potassium Phosphate 10   100





    mmol In Sodium Chloride 0   





    .9% 100 ml @ 50 mls/hr IV   





    ONCE ONE Rx#:[id number]   


 


    Sodium Chloride 0.9% 500 500  





    ml 500 ml @ 1000 mls/hr   





    IV Q35M [facility] Rx#:[id number]   


 


    Vancomycin 1,500 mg In   500





    Sodium Chloride 0.9% 250   





    ml @ 125 mls/hr IVPB Q24H   





    [facility] Rx#:[id number]   


 


  Other   60


 


Output:   


 


  Urine 1325 1025 200


 


Other:   


 


  Voiding Method Indwelling Catheter Indwelling Catheter Indwelling Catheter














- Exam





Physical Exam: Revealed a 63-year-old female, pleasant, on mechanical 

ventilation, however she is awake and follows all simple instructions


Head: Atraumatic, normocephalic,


HEENT:[Neck is supple.] [No neck masses.] [No thyromegaly.] [No JVD.]  PERRLA, 

EOMI, no icterus, tracheostomy is intact.


Chest: [Crackles and rhonchi bilaterally especially at the right base..]


Cardiac Exam: [Normal S1 and S2, no S3 gallop, no murmur.]


Abdomen: [Soft, nontender,  no megaly, no rebound, no guarding, normal bowel 

sounds.]  PEG tube is intact.


Extremities: [No clubbing, no edema, no cyanosis.]


Neurological Exam: [No focal neurologic deficit.


Skin: No rashes.


Psychiatric: Normal mood, affect and mental status examination.


Lymphatics: No lymphadenopathy.


]





- Labs


CBC & Chem 7: 


                                 05/28/19 05:49





                                 05/28/19 05:49


Labs: 


                  Abnormal Lab Results - Last 24 Hours (Table)











  05/27/19 05/27/19 05/27/19 Range/Units





  11:59 16:55 20:44 


 


WBC     (3.8-10.6)  k/uL


 


RBC     (3.80-5.40)  m/uL


 


Hgb     (11.4-16.0)  gm/dL


 


Hct     (34.0-46.0)  %


 


MCHC     (31.0-37.0)  g/dL


 


RDW     (11.5-15.5)  %


 


Neutrophils #     (1.3-7.7)  k/uL


 


Lymphocytes #     (1.0-4.8)  k/uL


 


ABG HCO3     (21-25)  mmol/L


 


ABG Total CO2     (19-24)  mmol/L


 


ABG O2 Saturation     (94-97)  %


 


Sodium     (137-145)  mmol/L


 


Chloride     ()  mmol/L


 


BUN     (7-17)  mg/dL


 


Creatinine     (0.52-1.04)  mg/dL


 


Glucose     (74-99)  mg/dL


 


POC Glucose (mg/dL)  153 H  61 L  69 L  (75-99)  mg/dL


 


Phosphorus     (2.5-4.5)  mg/dL


 


Total Protein     (6.3-8.2)  g/dL


 


Albumin     (3.5-5.0)  g/dL














  05/28/19 05/28/19 05/28/19 Range/Units





  04:48 05:03 05:49 


 


WBC    16.7 H  (3.8-10.6)  k/uL


 


RBC    3.04 L  (3.80-5.40)  m/uL


 


Hgb    9.2 L D  (11.4-16.0)  gm/dL


 


Hct    30.0 L  (34.0-46.0)  %


 


MCHC    30.8 L  (31.0-37.0)  g/dL


 


RDW    17.2 H  (11.5-15.5)  %


 


Neutrophils #    15.7 H  (1.3-7.7)  k/uL


 


Lymphocytes #    0.4 L  (1.0-4.8)  k/uL


 


ABG HCO3   26 H   (21-25)  mmol/L


 


ABG Total CO2   27 H   (19-24)  mmol/L


 


ABG O2 Saturation   98.2 H   (94-97)  %


 


Sodium     (137-145)  mmol/L


 


Chloride     ()  mmol/L


 


BUN     (7-17)  mg/dL


 


Creatinine     (0.52-1.04)  mg/dL


 


Glucose     (74-99)  mg/dL


 


POC Glucose (mg/dL)  127 H    (75-99)  mg/dL


 


Phosphorus     (2.5-4.5)  mg/dL


 


Total Protein     (6.3-8.2)  g/dL


 


Albumin     (3.5-5.0)  g/dL














  05/28/19 05/28/19 05/28/19 Range/Units





  05:49 06:57 08:44 


 


WBC     (3.8-10.6)  k/uL


 


RBC     (3.80-5.40)  m/uL


 


Hgb     (11.4-16.0)  gm/dL


 


Hct     (34.0-46.0)  %


 


MCHC     (31.0-37.0)  g/dL


 


RDW     (11.5-15.5)  %


 


Neutrophils #     (1.3-7.7)  k/uL


 


Lymphocytes #     (1.0-4.8)  k/uL


 


ABG HCO3     (21-25)  mmol/L


 


ABG Total CO2     (19-24)  mmol/L


 


ABG O2 Saturation     (94-97)  %


 


Sodium  136 L    (137-145)  mmol/L


 


Chloride  109 H    ()  mmol/L


 


BUN  19 H    (7-17)  mg/dL


 


Creatinine  0.37 L    (0.52-1.04)  mg/dL


 


Glucose  126 H    (74-99)  mg/dL


 


POC Glucose (mg/dL)   132 H  130 H  (75-99)  mg/dL


 


Phosphorus  2.1 L    (2.5-4.5)  mg/dL


 


Total Protein  4.9 L    (6.3-8.2)  g/dL


 


Albumin  2.4 L    (3.5-5.0)  g/dL








                      Microbiology - Last 24 Hours (Table)











 05/27/19 03:05 Urine Culture - Final





 Urine,Catheterized 


 


 05/26/19 22:52 Blood Culture - Preliminary





 Blood    No Growth after 24 hours


 


 05/27/19 09:30 Gram Stain - Preliminary





 Sputum Sputum Culture - Preliminary


 


 05/26/19 23:30 Gram Stain - Preliminary





 Sputum Sputum Culture - Preliminary














Assessment and Plan


Assessment: 





Impression:





1 acute on chronic hypoxic respiratory failure secondary to bilateral pneumonia 

possibly hospital acquired pneumonia since the patient had a recent prolonged 

course at [facility] and [place] [facility] [place]





2 acute sepsis and septic shock secondary to pneumonia patient required fluid 

boluses and pressors.





3 stage IV non-small cell lung cancer post-chemoradiation therapy and was on 

immunotherapy.





4 severe COPD with acute exacerbation





5 hypertension





6 chronic back pain and fibromyalgia and arthritis





7 history of depression





8 history of recent prolonged course of mechanical ventilation in March of 2019 

requiring tracheostomy and PEG tube placement





Recommendation: Continue present supportive care measures, continue antibiotics 

including vancomycin and Zosyn, will give the patient a trial of weaning on 

trach collar if possible today, continue fluids, pressors, titrate the pressor 

and maintain a mean arterial pressure of 65 monitor urine output, continue 

nutritional support, patient will be placed on feeding via PEG tube.  Continue 

GI and DVT prophylaxis.  She is on IV Protonix.  Condition remains critical, 

we'll continue to follow.  Critical care time is 34 minutes.


Time with Patient: Greater than 30

## 2019-05-28 NOTE — XR
EXAMINATION TYPE: XR chest 1V portable

 

DATE OF EXAM: 5/28/2019

 

CLINICAL HISTORY: Difficulty breathing progress study.  

 

TECHNIQUE: Single AP portable upright view of the chest is obtained.

 

COMPARISON: Chest x-ray from one day earlier and older studies.

 

FINDINGS:  Tracheostomy tube is redemonstrated. Persistent bibasilar opacities. Cardiac silhouette si
ze is stable and within normal limits with atherosclerotic aorta. Background chronic parenchymal arrieta
ges. Multilevel spurring in the spine is present.

 

IMPRESSION:  Overall stable findings,  chronic parenchymal changes with persistent small bilateral pl
eural effusions and associated bibasilar atelectasis and/or infiltrate all redemonstrated.

## 2019-05-28 NOTE — PN
PROGRESS NOTE



DATE OF SERVICE:

05/28/2019



REASON FOR FOLLOWUP:

Pneumonia.



INTERVAL HISTORY:

The patient is currently afebrile.  The patient has been breathing comfortably.  Her

blood pressure is still borderline, though. However, the patient is currently on the

trach mask and has been extubated. Breathing has improved.  No nausea, vomiting or any

diarrhea reported.



PHYSICAL EXAMINATION:

Blood pressure is 87/43, pulse of 75, temperature 98.5. She is 92% on 50% FiO2.

General description is an elderly female up in the chair in no distress.

RESPIRATORY SYSTEM: Unlabored breathing. Clear to auscultation anteriorly.

HEART: S1, S2.  Regular rate and rhythm.

ABDOMEN: Soft. No tenderness.



LABS:

White count slightly decreased to 16.7, BUN of 19, creatinine 0.37.  Blood and sputum

culture currently pending.



DIAGNOSTIC IMPRESSION AND PLAN:

Patient admitted to hospital with worsening respiratory symptoms, low-grade fever and

white count with evidence of right lower lobe pneumonia, possibly gram-negative. The

patient is currently on cefepime and vancomycin; to continue while waiting for the

cultures to finalize. Continue with supportive care.





MMODL / IJN: [id number] / Job#: [id number]

## 2019-05-28 NOTE — P.PN
Subjective


Progress Note Date: 05/28/19





This is  a 63-year-old female patient who presented to the hospital with 

worsening shortness of breath.  Patient has a past medical history of recent 

hospital admission in which she was admitted to [facility] hearing transferred

to [facility] which required prolonged intubation and tracheostomy.  Patient was

recently discharged to recent D/C to [facility] week ago.  Patient has had 

increased sputum production and cough.  Patient has past medical history of non-

small cell lung cancer stage III, CVA, COPD, GERD, hyperlipidemia, 

osteoarthritis,chronic back pain and depression.  Chest x-ray completed in ER 

showing right lower lobe consolidation with bilateral pleural effusions.  

Patient's WBC elevated at 10.9.  Patient also have a lactic acid of 2.1.  UA 

negative.  Patient's blood pressure low.  Patient started on Levophed and 

transferred to the ICU.  Dr. Trejo has been consulted for critical care.  

Patient started on vancomycin and Zosyn for antibiotic.  Blood urine and sputum 

cultures ordered.  At this time patient is on mechanical ventilation resting 

comfortably in bed.  Patient is following commands.








On 05/28/2019 patient maintained on mechanical ventilation but awake and alert. 

Patient remains in intensive care unit on Levophed for pressure support.  

Patient reports that she feels better.  Patient currently on vancomycin and 

Maxipime for antibiotics.  Infectious disease and pulmonary services are 

following.  White Blood cell 16.7.  Blood,urine and sputum cultures pending





Objective





- Vital Signs


Vital signs: 


                                   Vital Signs











Temp  98.9 F   05/28/19 06:00


 


Pulse  76   05/28/19 08:15


 


Resp  20   05/28/19 08:00


 


BP  100/54   05/28/19 08:00


 


Pulse Ox  99   05/28/19 08:00








                                 Intake & Output











 05/27/19 05/28/19 05/28/19





 18:59 06:59 18:59


 


Intake Total 2919.87 1879 


 


Output Total 1325 1025 


 


Balance 1594.87 854 


 


Weight 55.7 kg  


 


Intake:   


 


  IV 1500 1625 


 


    Sodium Chloride 0.9% 1, 1500 1625 





    000 ml @ 125 mls/hr IV .   





    Q8H [facility] Rx#:[id number]   


 


  Intake, IV Titration 1419.87 254 





  Amount   


 


    Norepinephrine 4 mg In 919.87 254 





    Sodium Chloride 0.9% 250   





    ml @ 0.05 MCG/KG/MIN 15.   





    554 mls/hr IV .[id number]   





    [facility] Rx#:[id number]   


 


    Sodium Chloride 0.9% 500 500  





    ml 500 ml @ 1000 mls/hr   





    IV Q35M [facility] Rx#:[id number]   


 


Output:   


 


  Urine 1325 1025 


 


Other:   


 


  Voiding Method Indwelling Catheter Indwelling Catheter 














- Exam





Head normocephalic


Neck supple. Tracheostomy in place


Lungs diminished with bilateral rhonchi.  


Heart regular rate and rhythm S1-S2, no rub or gallop


Abdomen is soft nontender nondistended positive bowel sounds no 

hepatosplenomegaly.  PEG tube in place


Extremities no edema


Neuro follows commands.  On mechanical ventilation





- Labs


CBC & Chem 7: 


                                 05/28/19 05:49





                                 05/28/19 05:49


Labs: 


                  Abnormal Lab Results - Last 24 Hours (Table)











  05/27/19 05/27/19 05/27/19 Range/Units





  07:31 11:59 16:55 


 


WBC     (3.8-10.6)  k/uL


 


RBC     (3.80-5.40)  m/uL


 


Hgb     (11.4-16.0)  gm/dL


 


Hct     (34.0-46.0)  %


 


MCHC     (31.0-37.0)  g/dL


 


RDW     (11.5-15.5)  %


 


Neutrophils #     (1.3-7.7)  k/uL


 


Neutrophils # (Manual)  15.80 H    (1.3-7.7)  k/uL


 


Lymphocytes #     (1.0-4.8)  k/uL


 


Lymphocytes # (Manual)  0.74 L    (1.0-4.8)  k/uL


 


Monocytes # (Manual)  1.10 H    (0-1.0)  k/uL


 


Metamyelocytes # (Man)  0.92 H    (0)  k/uL


 


Myelocytes # (Manual)  0.18 H    (0)  k/uL


 


ABG HCO3     (21-25)  mmol/L


 


ABG Total CO2     (19-24)  mmol/L


 


ABG O2 Saturation     (94-97)  %


 


Sodium     (137-145)  mmol/L


 


Chloride     ()  mmol/L


 


BUN     (7-17)  mg/dL


 


Creatinine     (0.52-1.04)  mg/dL


 


Glucose     (74-99)  mg/dL


 


POC Glucose (mg/dL)   153 H  61 L  (75-99)  mg/dL


 


Phosphorus     (2.5-4.5)  mg/dL


 


Total Protein     (6.3-8.2)  g/dL


 


Albumin     (3.5-5.0)  g/dL














  05/27/19 05/28/19 05/28/19 Range/Units





  20:44 04:48 05:03 


 


WBC     (3.8-10.6)  k/uL


 


RBC     (3.80-5.40)  m/uL


 


Hgb     (11.4-16.0)  gm/dL


 


Hct     (34.0-46.0)  %


 


MCHC     (31.0-37.0)  g/dL


 


RDW     (11.5-15.5)  %


 


Neutrophils #     (1.3-7.7)  k/uL


 


Neutrophils # (Manual)     (1.3-7.7)  k/uL


 


Lymphocytes #     (1.0-4.8)  k/uL


 


Lymphocytes # (Manual)     (1.0-4.8)  k/uL


 


Monocytes # (Manual)     (0-1.0)  k/uL


 


Metamyelocytes # (Man)     (0)  k/uL


 


Myelocytes # (Manual)     (0)  k/uL


 


ABG HCO3    26 H  (21-25)  mmol/L


 


ABG Total CO2    27 H  (19-24)  mmol/L


 


ABG O2 Saturation    98.2 H  (94-97)  %


 


Sodium     (137-145)  mmol/L


 


Chloride     ()  mmol/L


 


BUN     (7-17)  mg/dL


 


Creatinine     (0.52-1.04)  mg/dL


 


Glucose     (74-99)  mg/dL


 


POC Glucose (mg/dL)  69 L  127 H   (75-99)  mg/dL


 


Phosphorus     (2.5-4.5)  mg/dL


 


Total Protein     (6.3-8.2)  g/dL


 


Albumin     (3.5-5.0)  g/dL














  05/28/19 05/28/19 05/28/19 Range/Units





  05:49 05:49 06:57 


 


WBC  16.7 H    (3.8-10.6)  k/uL


 


RBC  3.04 L    (3.80-5.40)  m/uL


 


Hgb  9.2 L D    (11.4-16.0)  gm/dL


 


Hct  30.0 L    (34.0-46.0)  %


 


MCHC  30.8 L    (31.0-37.0)  g/dL


 


RDW  17.2 H    (11.5-15.5)  %


 


Neutrophils #  15.7 H    (1.3-7.7)  k/uL


 


Neutrophils # (Manual)     (1.3-7.7)  k/uL


 


Lymphocytes #  0.4 L    (1.0-4.8)  k/uL


 


Lymphocytes # (Manual)     (1.0-4.8)  k/uL


 


Monocytes # (Manual)     (0-1.0)  k/uL


 


Metamyelocytes # (Man)     (0)  k/uL


 


Myelocytes # (Manual)     (0)  k/uL


 


ABG HCO3     (21-25)  mmol/L


 


ABG Total CO2     (19-24)  mmol/L


 


ABG O2 Saturation     (94-97)  %


 


Sodium   136 L   (137-145)  mmol/L


 


Chloride   109 H   ()  mmol/L


 


BUN   19 H   (7-17)  mg/dL


 


Creatinine   0.37 L   (0.52-1.04)  mg/dL


 


Glucose   126 H   (74-99)  mg/dL


 


POC Glucose (mg/dL)    132 H  (75-99)  mg/dL


 


Phosphorus   2.1 L   (2.5-4.5)  mg/dL


 


Total Protein   4.9 L   (6.3-8.2)  g/dL


 


Albumin   2.4 L   (3.5-5.0)  g/dL














  05/28/19 Range/Units





  08:44 


 


WBC   (3.8-10.6)  k/uL


 


RBC   (3.80-5.40)  m/uL


 


Hgb   (11.4-16.0)  gm/dL


 


Hct   (34.0-46.0)  %


 


MCHC   (31.0-37.0)  g/dL


 


RDW   (11.5-15.5)  %


 


Neutrophils #   (1.3-7.7)  k/uL


 


Neutrophils # (Manual)   (1.3-7.7)  k/uL


 


Lymphocytes #   (1.0-4.8)  k/uL


 


Lymphocytes # (Manual)   (1.0-4.8)  k/uL


 


Monocytes # (Manual)   (0-1.0)  k/uL


 


Metamyelocytes # (Man)   (0)  k/uL


 


Myelocytes # (Manual)   (0)  k/uL


 


ABG HCO3   (21-25)  mmol/L


 


ABG Total CO2   (19-24)  mmol/L


 


ABG O2 Saturation   (94-97)  %


 


Sodium   (137-145)  mmol/L


 


Chloride   ()  mmol/L


 


BUN   (7-17)  mg/dL


 


Creatinine   (0.52-1.04)  mg/dL


 


Glucose   (74-99)  mg/dL


 


POC Glucose (mg/dL)  130 H  (75-99)  mg/dL


 


Phosphorus   (2.5-4.5)  mg/dL


 


Total Protein   (6.3-8.2)  g/dL


 


Albumin   (3.5-5.0)  g/dL








                      Microbiology - Last 24 Hours (Table)











 05/27/19 03:05 Urine Culture - Final





 Urine,Catheterized 


 


 05/26/19 22:52 Blood Culture - Preliminary





 Blood    No Growth after 24 hours


 


 05/27/19 09:30 Gram Stain - Preliminary





 Sputum Sputum Culture - Preliminary


 


 05/26/19 23:30 Gram Stain - Preliminary





 Sputum Sputum Culture - Preliminary














Assessment and Plan


Assessment: 





1.  Sepsis present on admission related to pneumonia.  Chest x-ray completed 

showing right lower lobe consolidation.  Bilateral pleural effusions.  Patient 

admitted to the intensive care unit.  Initial lactic acid 2.1.  Repeat lactic 

acid 5.  Patient started on vancomycin and Zosyn for IV antibiotics.  Blood 

urine and sputum cultures have been ordered.  White blood cell 16.4.  Infectious

disease following.  Patient on cefepime and Vanco





2.  Hypotension related to above.  Patient maintained on Levophed





3.  History of non-small cell lung stage III adenocarcinoma





4.  History of recent prolonged hospital admission in which she was transferred 

to [facility].  Patient was on mechanical ventilation for prolonged 

time requiring tracheostomy





5.  Acute on chronic respiratory failure.  Does have tracheostomy in place 

currently on mechanical ventilation





6.  Bilateral pleural effusions.  Pulmonary services have been consulted





7.  History of COPD





8.  History of CVA





9.  History of osteoarthritis





10.  History of hyperlipidemia





11.  History of fibromyalgia





12.  History of depression





13.  Ex-smoker.  Patient is smoking history of half-pack per day for 30 years





DVT prophylaxis heparin.  GI prophylaxis Protonix





I performed an examination of the patient and discussed their management with 

the Nurse Practitioner.  I have reviewed the Nurse Practitioner's notes and 

agree with the documented findings and plan of care

## 2019-05-29 LAB
ANION GAP SERPL CALC-SCNC: 4 MMOL/L
BASOPHILS # BLD AUTO: 0 K/UL (ref 0–0.2)
BASOPHILS NFR BLD AUTO: 0 %
BUN SERPL-SCNC: 11 MG/DL (ref 7–17)
CALCIUM SPEC-MCNC: 8.5 MG/DL (ref 8.4–10.2)
CHLORIDE SERPL-SCNC: 109 MMOL/L (ref 98–107)
CO2 BLDA-SCNC: 28 MMOL/L (ref 19–24)
CO2 SERPL-SCNC: 26 MMOL/L (ref 22–30)
EOSINOPHIL # BLD AUTO: 0.1 K/UL (ref 0–0.7)
EOSINOPHIL NFR BLD AUTO: 1 %
ERYTHROCYTE [DISTWIDTH] IN BLOOD BY AUTOMATED COUNT: 3.09 M/UL (ref 3.8–5.4)
ERYTHROCYTE [DISTWIDTH] IN BLOOD: 17.2 % (ref 11.5–15.5)
GLUCOSE BLD-MCNC: 118 MG/DL (ref 75–99)
GLUCOSE BLD-MCNC: 138 MG/DL (ref 75–99)
GLUCOSE BLD-MCNC: 148 MG/DL (ref 75–99)
GLUCOSE BLD-MCNC: 167 MG/DL (ref 75–99)
GLUCOSE SERPL-MCNC: 151 MG/DL (ref 74–99)
HBA1C MFR BLD: 5.2 % (ref 4–6)
HCO3 BLDA-SCNC: 26 MMOL/L (ref 21–25)
HCT VFR BLD AUTO: 31.2 % (ref 34–46)
HGB BLD-MCNC: 9 GM/DL (ref 11.4–16)
LYMPHOCYTES # SPEC AUTO: 0.4 K/UL (ref 1–4.8)
LYMPHOCYTES NFR SPEC AUTO: 3 %
MAGNESIUM SPEC-SCNC: 1.9 MG/DL (ref 1.6–2.3)
MCH RBC QN AUTO: 29.2 PG (ref 25–35)
MCHC RBC AUTO-ENTMCNC: 28.9 G/DL (ref 31–37)
MCV RBC AUTO: 101.1 FL (ref 80–100)
MONOCYTES # BLD AUTO: 0.3 K/UL (ref 0–1)
MONOCYTES NFR BLD AUTO: 2 %
NEUTROPHILS # BLD AUTO: 12.8 K/UL (ref 1.3–7.7)
NEUTROPHILS NFR BLD AUTO: 94 %
PCO2 BLDA: 55 MMHG (ref 35–45)
PH BLDA: 7.28 [PH] (ref 7.35–7.45)
PLATELET # BLD AUTO: 281 K/UL (ref 150–450)
PO2 BLDA: 132 MMHG (ref 83–108)
POTASSIUM SERPL-SCNC: 3.7 MMOL/L (ref 3.5–5.1)
SODIUM SERPL-SCNC: 139 MMOL/L (ref 137–145)
WBC # BLD AUTO: 13.6 K/UL (ref 3.8–10.6)

## 2019-05-29 RX ADMIN — OXYCODONE HYDROCHLORIDE SCH: 10 TABLET, FILM COATED, EXTENDED RELEASE ORAL at 09:03

## 2019-05-29 RX ADMIN — PANTOPRAZOLE SODIUM SCH MG: 40 INJECTION, POWDER, FOR SOLUTION INTRAVENOUS at 09:41

## 2019-05-29 RX ADMIN — ONDANSETRON PRN MG: 2 INJECTION INTRAMUSCULAR; INTRAVENOUS at 20:46

## 2019-05-29 RX ADMIN — ISODIUM CHLORIDE SCH MG: 0.03 SOLUTION RESPIRATORY (INHALATION) at 08:21

## 2019-05-29 RX ADMIN — INSULIN ASPART SCH UNIT: 100 INJECTION, SOLUTION INTRAVENOUS; SUBCUTANEOUS at 06:22

## 2019-05-29 RX ADMIN — LEVOTHYROXINE SODIUM SCH MCG: 50 TABLET ORAL at 06:22

## 2019-05-29 RX ADMIN — DOCUSATE SODIUM AND SENNOSIDES SCH EACH: 50; 8.6 TABLET ORAL at 20:14

## 2019-05-29 RX ADMIN — HEPARIN SODIUM SCH UNIT: 5000 INJECTION, SOLUTION INTRAVENOUS; SUBCUTANEOUS at 09:40

## 2019-05-29 RX ADMIN — SODIUM CHLORIDE SCH MLS/HR: 9 INJECTION, SOLUTION INTRAVENOUS at 09:40

## 2019-05-29 RX ADMIN — INSULIN ASPART SCH UNIT: 100 INJECTION, SOLUTION INTRAVENOUS; SUBCUTANEOUS at 17:51

## 2019-05-29 RX ADMIN — DEXTROSE MONOHYDRATE AND SODIUM CHLORIDE SCH MLS/HR: 5; .9 INJECTION, SOLUTION INTRAVENOUS at 11:51

## 2019-05-29 RX ADMIN — HEPARIN SODIUM SCH UNIT: 5000 INJECTION, SOLUTION INTRAVENOUS; SUBCUTANEOUS at 23:54

## 2019-05-29 RX ADMIN — CHLORHEXIDINE GLUCONATE SCH ML: 1.2 RINSE ORAL at 20:14

## 2019-05-29 RX ADMIN — DEXTROSE MONOHYDRATE AND SODIUM CHLORIDE SCH MLS/HR: 5; .9 INJECTION, SOLUTION INTRAVENOUS at 00:28

## 2019-05-29 RX ADMIN — ISODIUM CHLORIDE SCH MG: 0.03 SOLUTION RESPIRATORY (INHALATION) at 19:43

## 2019-05-29 RX ADMIN — OXYCODONE HYDROCHLORIDE SCH: 10 TABLET, FILM COATED, EXTENDED RELEASE ORAL at 21:00

## 2019-05-29 RX ADMIN — CEFEPIME HYDROCHLORIDE SCH MLS/HR: 2 INJECTION, POWDER, FOR SOLUTION INTRAVENOUS at 09:41

## 2019-05-29 RX ADMIN — BUDESONIDE SCH MG: 1 SUSPENSION RESPIRATORY (INHALATION) at 08:21

## 2019-05-29 RX ADMIN — INSULIN ASPART SCH UNIT: 100 INJECTION, SOLUTION INTRAVENOUS; SUBCUTANEOUS at 00:59

## 2019-05-29 RX ADMIN — DOCUSATE SODIUM AND SENNOSIDES SCH EACH: 50; 8.6 TABLET ORAL at 09:41

## 2019-05-29 RX ADMIN — INSULIN ASPART SCH: 100 INJECTION, SOLUTION INTRAVENOUS; SUBCUTANEOUS at 14:39

## 2019-05-29 RX ADMIN — CHLORHEXIDINE GLUCONATE SCH ML: 1.2 RINSE ORAL at 09:40

## 2019-05-29 RX ADMIN — ISODIUM CHLORIDE SCH MG: 0.03 SOLUTION RESPIRATORY (INHALATION) at 01:21

## 2019-05-29 RX ADMIN — CEFEPIME HYDROCHLORIDE SCH MLS/HR: 2 INJECTION, POWDER, FOR SOLUTION INTRAVENOUS at 22:06

## 2019-05-29 RX ADMIN — DEXTROSE MONOHYDRATE AND SODIUM CHLORIDE SCH MLS/HR: 5; .9 INJECTION, SOLUTION INTRAVENOUS at 20:11

## 2019-05-29 RX ADMIN — NOREPINEPHRINE BITARTRATE SCH: 1 INJECTION, SOLUTION, CONCENTRATE INTRAVENOUS at 19:14

## 2019-05-29 RX ADMIN — ISODIUM CHLORIDE SCH MG: 0.03 SOLUTION RESPIRATORY (INHALATION) at 12:30

## 2019-05-29 RX ADMIN — BUDESONIDE SCH MG: 1 SUSPENSION RESPIRATORY (INHALATION) at 19:43

## 2019-05-29 RX ADMIN — ONDANSETRON PRN MG: 2 INJECTION INTRAMUSCULAR; INTRAVENOUS at 12:32

## 2019-05-29 RX ADMIN — HEPARIN SODIUM SCH UNIT: 5000 INJECTION, SOLUTION INTRAVENOUS; SUBCUTANEOUS at 17:15

## 2019-05-29 RX ADMIN — PREGABALIN SCH MG: 50 CAPSULE ORAL at 09:41

## 2019-05-29 RX ADMIN — PREGABALIN SCH MG: 50 CAPSULE ORAL at 20:14

## 2019-05-29 RX ADMIN — AMIODARONE HYDROCHLORIDE SCH MG: 200 TABLET ORAL at 09:40

## 2019-05-29 RX ADMIN — INSULIN ASPART SCH UNIT: 100 INJECTION, SOLUTION INTRAVENOUS; SUBCUTANEOUS at 23:54

## 2019-05-29 RX ADMIN — SODIUM CHLORIDE SCH MLS/HR: 9 INJECTION, SOLUTION INTRAVENOUS at 20:11

## 2019-05-29 NOTE — P.PN
Subjective


Progress Note Date: 05/29/19


Principal diagnosis: 





Acute sepsis, septic shock, and bilateral pneumonia with sepsis.





This is a very pleasant 63-year-old female patient who follows with Dr. Amaya 

as her primary care physician.  She has a history of CVA/TIA, fibromyalgia, 

gastroesophageal reflux disease, hyperlipidemia, hypothyroidism, chronic back 

pain, depression.  She also has a history of chronic tobacco dependence, oxygen 

dependent chronic obstructive pulmonary disease with an FEV1 value of 56% of pre

dicted, and non-small cell lung cancer stage III adenocarcinoma.  She follows 

with Dr. Pastor in our office. This was diagnosed by an FNA of a left upper lobe 

lesion by IR in May 2017.  She had received concurrent chemoradiation with 

subsequent additional chemotherapy and then placed on maintenance mmunotherapy 

of Imfinzi back in June 2018.  Her last PET scan in September 2018 revealed 

continued left upper lobe mass with irregular margins extending to the pleural 

surface, spiculated appearance but was stable compared to previous and April 2018.  The SUV value was only 1.7.  Subsequent computed tomography scan of the 

chest in January 2019 revealed a stable and slightly smaller spiculated mass of 

the left upper lobe measuring 2.5 x 2.4 cm versus 3.0 x 2.6 previous.  No 

evidence of recurrence or metastasis.  The patient was in the hospital back in 

early March 2019 and she was having ongoing issues with nausea vomiting profound

weakness and fatigue.  She decided to stop taking the immunotherapy. She had 

been treated for his COPD exacerbation 02/13/2019 in our office by Dr. Pastor 

where she received steroids and Bactrim.  She did improve for the first week or 

so. She presented to the emergency room yesterday with complaints of increasing 

shortness of breath cough and congestion.  She did have some lower extremity 

edema.  She was still quite weak and fatigued.  ome dyspnea on exertion.  The 

patient was discharged home on 03/11/2019 as the patient shortness of breath 

improved and the patient recovered from her acute COPD exacerbation and she was 

discharged home on her usual routine medications which included also morphine 

sulfate 30 mg by mouth twice a day Percocet 10/03/2025 every 6 hours and a when 

necessary basis.  In terms of her lung medication she was maintained on a combi

nation of Symbicort and Incruse.  She is also on Synthroid 25 g by mouth daily.

 She was given Lasix to be taken on an as needed basis.





The patient subsequently came back to our hospital on 03/23/2019 because of 

altered mental status.   According to the family the patient was acting weird.  

She was acting unusual and she was confused.  She was also very weak and she was

having episodes of fall.  I do see a bruise over her forehead.  The family told 

that she did not have any active had trauma.  No seizure activity.  No neck 

stiffness.  No documented fever.  She was thrashing significantly yesterday.  A 

CAT scan of the head was done that showed no acute process.  CAT scan of abdomen

and pelvis was done that showed urinary bladder distention along with extensive 

colonic stool.  The patient's ammonia level was at 28.  Urine and blood cultures

were ordered.  The influenza screen was negative.   She did not have any neck 

stiffness.  No fever.  She was at times morning.  Other times she would open up 

her eyes and follows some simple commands.  I was told that this is an 

improvement in her condition compared to yesterday.  She was able to follow some

simple commands however she has not been consistent in following orders.  His 

speech is minimal and the patient may safely worse.  She denies being in pain.  

Urine toxin was ordered.  She was given a dose of Narcan here in the ICU without

much improvement.  Based on all this, I intubated the patient at that time and 

moved to the intensive care unit.  Lumbar puncture was done and was negative and

assess the causes for this patient altered mental status was not established.  

The patient was chest at [facility].  She underwent prolonged 

hospitalization during which she required intubation mechanical ventilation 

tracheostomy tube insertion PEG tube insertion and following that the [place] 

[place] [facility] [place].  

It seems that the patient regained her mentation and she was communicating well.

 I'm not sure of the details of her presentation at [facility]





The patient came in yesterday to the burst department complaining of worsening 

shortness of breath.  Note that the patient has a tracheostomy tube in place.  

She was quite weak and hypotensive and lethargic.  In the burst department the 

patient was found to have bilateral lower lobe consolidation pleural effusions. 

White cell count was at 10.9.  Lactic acid level was at 2.1.  Blood pressure was

quite low.  The patient was given IV fluids and the patient was started on 

pressors.  She was also started on a combination of Zosyn and vancomycin as 

dosed antibiotics for pneumonia.  Blood cultures of been sent.  Sputum cultures 

of been sent.  The patient subsequently was attached to mechanical ventilator.  

Currently, the patient is mechanically ventilated.  The patient is an assist-

control mode of ventilation.  The patient is an FiO2 of 60% with a tidal volume 

of 350 the respiratory rate of 18 and 5 of PEEP.  The patient's blood gases 

showed a pH of 7.3 with a pCO2 of 53 and pO2 of 74.  The patient received IV 

fluids and currently she is on levo fed running at 0.2 g per KG per minute.  He

r white cell count is 1 is at 18.4.  Despite all this, she is arousable.  He is 

following simple commands.  She is not requiring any sedation.  She is currently

in normal sinus rhythm.  She had a triple lumen catheter in her right femoral 

vein.  Denies having any chest pain.  Her lactic acid level dropped from 2.1 

down to 1.5.





Reevaluated today on 5/28/2019, patient remains on mechanical ventilation, she 

is presently on assist control rate of 18 tidal volume of 350 FiO2 of 50% and 

PEEP of 5.  Remains on Levophed at 5.5 mcg/m, she is also on cefepime and 

vancomycin.  Chest x-ray clearly shows evidence of pneumonia, and some chronic 

parenchymal changes with small bilateral pleural effusions and atelectasis.  

Patient had a WBC count of 16.7 hemoglobin of 9.2 ABG showed a pO2 of 97 pCO2 of

43 pH of 7.39.  Electrolytes and renal profile are normal.





Reevaluated today on 5/29/2019, remains on mechanical ventilation, she is 

presently on tidal volume of 350 assist-control rate of 18 FiO2 is down to 40% 

from 50% earlier, and her PEEP is 5.  Remains on antibiotics, and requiring 

norepinephrine at 0.12 mcg/kg/m.  Surprisingly the patient is on mechanical 

ventilation, but fully awake, responsive, asking to be fed, however I plan to 

have a swallow evaluation on this patient, a bit reluctant to start feeding 

while she is on mechanical ventilation.  Patient is asking for coffee.  She was 

given a trial of weaning yesterday from mechanical ventilation and she was 

placed on a trach collar, lasted about 2 hours and had to be placed back on 

mechanical ventilation.  Sputum today is positive for Streptococcus pneumonia 

and the patient is on antibiotics, being followed by infectious disease.  

Initially placed on vancomycin and Zosyn .  WBC count is 13.6 hemoglobin is 9 

ABG showed a pO2 of 132 pCO2 of 55 pH of 7.28.  Basic metabolic profile is 

relatively normal.  Chest x-ray continues to show significant right lower lobe 

consolidation consistent with pneumonia and her left pleural effusion is 

improved.











Objective





- Vital Signs


Vital signs: 


                                   Vital Signs











Temp  98.3 F   05/29/19 08:00


 


Pulse  72   05/29/19 12:51


 


Resp  14   05/29/19 08:00


 


BP  133/69   05/29/19 08:00


 


Pulse Ox  99   05/29/19 08:00








                                 Intake & Output











 05/28/19 05/29/19 05/29/19





 18:59 06:59 18:59


 


Intake Total 2638.458 2617.281 190


 


Output Total 645 655 75


 


Balance 9661.588 3403.281 115


 


Weight 63.5 kg 65.4 kg 


 


Intake:   


 


  IV 50 380 10


 


    Normal Saline Carrier 50 130 10


 


    Vancomycin 1,250 mg In  250 





    Sodium Chloride 0.9% 250   





    ml @ 125 mls/hr IVPB Q8H   





    [facility] Rx#:[id number]   


 


  Intake, IV Titration 2528.458 1647.281 125





  Amount   


 


    Cefepime 2 gm In Sodium 100  





    Chloride 0.9% 100 ml @   





    200 mls/hr IVPB Q12H [facility]   





    Rx#:[id number]   


 


    Dextrose 5%-0.9% NaCl 1, 1375 1625 125





    000 ml @ 125 mls/hr IV .   





    Q8H [facility] Rx#:[id number]   


 


    Magnesium Sulfate-D5w Pmx 200  





    1 gm In Dextrose/Water 1   





    100ml.bag @ 100 mls/hr   





    IVPB Q1H [facility] Rx#:   





    [id number]   


 


    Norepinephrine 32 mg In 3.458 22.281 





    Sodium Chloride 0.9% 218   





    ml @ 0.05 MCG/KG/MIN 1.   





    305 mls/hr IV .Q24H [facility]   





    Rx#:[id number]   


 


    Potassium Phosphate 10 100  





    mmol In Sodium Chloride 0   





    .9% 100 ml @ 50 mls/hr IV   





    ONCE ONE Rx#:[id number]   


 


    Vancomycin 1,250 mg In 250  





    Sodium Chloride 0.9% 250   





    ml @ 125 mls/hr IVPB Q8H   





    [facility] Rx#:[id number]   


 


    Vancomycin 1,500 mg In 500  





    Sodium Chloride 0.9% 250   





    ml @ 125 mls/hr IVPB Q24H   





    [facility] Rx#:[id number]   


 


  Oral  275 


 


  Tube Feeding  255 25


 


  Other 60 60 30


 


Output:   


 


  Urine 645 655 75


 


Other:   


 


  Voiding Method Indwelling Catheter Indwelling Catheter Indwelling Catheter














- Exam





Physical Exam: Revealed a 63-year-old female, pleasant, on mechanical 

ventilation, however she is awake and follows all simple instructions


Head: Atraumatic, normocephalic,


HEENT:[Neck is supple.] [No neck masses.] [No thyromegaly.] [No JVD.]  PERRLA, 

EOMI, no icterus, tracheostomy is intact.


Chest: [Crackles and rhonchi bilaterally especially at the right base..]


Cardiac Exam: [Normal S1 and S2, no S3 gallop, no murmur.]


Abdomen: [Soft, nontender,  no megaly, no rebound, no guarding, normal bowel 

sounds.]  PEG tube is intact.


Extremities: [No clubbing, no edema, no cyanosis.]


Neurological Exam: [No focal neurologic deficit.


Skin: No rashes.


Psychiatric: Normal mood, affect and mental status examination.


Lymphatics: No lymphadenopathy.


]





- Labs


CBC & Chem 7: 


                                 05/29/19 06:34





                                 05/29/19 06:34


Labs: 


                  Abnormal Lab Results - Last 24 Hours (Table)











  05/28/19 05/29/19 05/29/19 Range/Units





  21:41 00:33 05:46 


 


WBC     (3.8-10.6)  k/uL


 


RBC     (3.80-5.40)  m/uL


 


Hgb     (11.4-16.0)  gm/dL


 


Hct     (34.0-46.0)  %


 


MCV     (80.0-100.0)  fL


 


MCHC     (31.0-37.0)  g/dL


 


RDW     (11.5-15.5)  %


 


Neutrophils #     (1.3-7.7)  k/uL


 


Lymphocytes #     (1.0-4.8)  k/uL


 


ABG pH     (7.35-7.45)  


 


ABG pCO2     (35-45)  mmHg


 


ABG pO2     ()  mmHg


 


ABG HCO3     (21-25)  mmol/L


 


ABG Total CO2     (19-24)  mmol/L


 


ABG O2 Saturation     (94-97)  %


 


Chloride     ()  mmol/L


 


Creatinine     (0.52-1.04)  mg/dL


 


Glucose     (74-99)  mg/dL


 


POC Glucose (mg/dL)  104 H  167 H  148 H  (75-99)  mg/dL














  05/29/19 05/29/19 05/29/19 Range/Units





  06:34 06:34 07:04 


 


WBC  13.6 H    (3.8-10.6)  k/uL


 


RBC  3.09 L    (3.80-5.40)  m/uL


 


Hgb  9.0 L    (11.4-16.0)  gm/dL


 


Hct  31.2 L    (34.0-46.0)  %


 


MCV  101.1 H    (80.0-100.0)  fL


 


MCHC  28.9 L    (31.0-37.0)  g/dL


 


RDW  17.2 H    (11.5-15.5)  %


 


Neutrophils #  12.8 H    (1.3-7.7)  k/uL


 


Lymphocytes #  0.4 L    (1.0-4.8)  k/uL


 


ABG pH    7.28 L  (7.35-7.45)  


 


ABG pCO2    55 H  (35-45)  mmHg


 


ABG pO2    132 H  ()  mmHg


 


ABG HCO3    26 H  (21-25)  mmol/L


 


ABG Total CO2    28 H  (19-24)  mmol/L


 


ABG O2 Saturation    99.1 H  (94-97)  %


 


Chloride   109 H   ()  mmol/L


 


Creatinine   0.32 L   (0.52-1.04)  mg/dL


 


Glucose   151 H   (74-99)  mg/dL


 


POC Glucose (mg/dL)     (75-99)  mg/dL














  05/29/19 Range/Units





  12:09 


 


WBC   (3.8-10.6)  k/uL


 


RBC   (3.80-5.40)  m/uL


 


Hgb   (11.4-16.0)  gm/dL


 


Hct   (34.0-46.0)  %


 


MCV   (80.0-100.0)  fL


 


MCHC   (31.0-37.0)  g/dL


 


RDW   (11.5-15.5)  %


 


Neutrophils #   (1.3-7.7)  k/uL


 


Lymphocytes #   (1.0-4.8)  k/uL


 


ABG pH   (7.35-7.45)  


 


ABG pCO2   (35-45)  mmHg


 


ABG pO2   ()  mmHg


 


ABG HCO3   (21-25)  mmol/L


 


ABG Total CO2   (19-24)  mmol/L


 


ABG O2 Saturation   (94-97)  %


 


Chloride   ()  mmol/L


 


Creatinine   (0.52-1.04)  mg/dL


 


Glucose   (74-99)  mg/dL


 


POC Glucose (mg/dL)  118 H  (75-99)  mg/dL








                      Microbiology - Last 24 Hours (Table)











 05/27/19 09:30 Gram Stain - Preliminary





 Sputum Sputum Culture - Preliminary





    Streptococcus pneumoniae


 


 05/26/19 23:30 Gram Stain - Preliminary





 Sputum Sputum Culture - Preliminary





    Streptococcus pneumoniae


 


 05/26/19 22:52 Blood Culture - Preliminary





 Blood    No Growth after 48 hours














Assessment and Plan


Assessment: 





Impression:





1 acute on chronic hypoxic respiratory failure secondary to bilateral pneumonia 

secondary to Streptococcus pneumonia as documented from the sputum culture.  





2 acute sepsis and septic shock secondary to pneumonia patient required fluid 

boluses and pressors.  Remains on relatively small dose of norepinephrine, will 

titrate accordingly.





3 stage IV non-small cell lung cancer post-chemoradiation therapy and was on 

immunotherapy.





4 severe COPD with acute exacerbation





5 hypertension





6 chronic back pain and fibromyalgia and arthritis





7 history of depression





8 history of recent prolonged course of mechanical ventilation in March of 2019 

requiring tracheostomy and PEG tube placement





Recommendation: Continue present supportive care measures including ventilatory 

support, nutritional support, antibiotics, her antibiotics are being addressed 

by infectious disease on the case, we'll give the patient another weaning trial 

today with trach collar if possible, continue GI and DVT prophylaxis, patient 

was started on PEG tube feedings yesterday, but now she is asking for coffee, I 

did recommend swallow evaluation on this patient.  Continue GI and DVT 

prophylaxis.  Patient remains critically ill, will follow.  Critical care time 

is 32 minutes.











 


Time with Patient: Greater than 30

## 2019-05-29 NOTE — XR
EXAMINATION TYPE: XR chest 1V portable

 

DATE OF EXAM: 5/29/2019

 

COMPARISON: 5/28/2019

 

INDICATION: Tube placement

 

TECHNIQUE: Single frontal view of the chest is obtained.

 

FINDINGS:  

The heart size is normal.  

The pulmonary vasculature is normal.  

Right lower lobe consolidation is present. Correlate for pneumonia. This may have slight improvement.
 Left pleural effusion appears diminished

 

Tracheostomy tube may have been pulled back somewhat, tip appears to be a new tracheostomy opening wi
thin the trachea.  

 

 

IMPRESSION:  

1. Tracheostomy tube may be pulled back from prior exam.

2. Right lower lobe consolidation. Correlate for pneumonia.

3. Improving left pleural effusion

## 2019-05-29 NOTE — PN
PROGRESS NOTE



DATE OF SERVICE:

05/29/2019



REASON FOR FOLLOWUP:

Pneumonia.



INTERVAL HISTORY:

The patient is afebrile.  The patient is currently breathing comfortably on a trach

collar.  No significant chest pain or any cough.  No nausea, vomiting, or any diarrhea

or abdominal pain.



PHYSICAL EXAMINATION:

Blood pressure 117/62 with a pulse of 68, temperature 97.9.  She is 97% on trach

collar.

General description is an elderly female lying in bed in no distress.

RESPIRATORY SYSTEM: Unlabored breathing. Clear to auscultation anteriorly.

HEART: S1, S2.  Regular rate and rhythm.

ABDOMEN: Soft. No tenderness.



LABS:

Hemoglobin 9, white count down to 13.6, BUN of 11, creatinine 0.32.  Sputum with

Streptococcus pneumoniae with sensitivities currently pending.



DIAGNOSTIC IMPRESSION AND PLAN:

Patient admitted to hospital with right lower lobe pneumonia, sputum with Streptococcus

pneumoniae, currently covered with cefepime and vancomycin.  To continue. Vancomycin

trough needs to be cut down; to keep the trough around 15. Adjust antibiotic further

based on the culture report.  Continue with supportive care.





MMODL / IJN: [id number] / Job#: [id number]

## 2019-05-29 NOTE — P.PN
Subjective


Progress Note Date: 05/29/19





This is  a 63-year-old female patient who presented to the hospital with 

worsening shortness of breath.  Patient has a past medical history of recent 

hospital admission in which she was admitted to [facility] hearing transferred

to [facility] which required prolonged intubation and tracheostomy.  Patient was

recently discharged to recent D/C to [facility] week ago.  Patient has had 

increased sputum production and cough.  Patient has past medical history of non-

small cell lung cancer stage III, CVA, COPD, GERD, hyperlipidemia, 

osteoarthritis,chronic back pain and depression.  Chest x-ray completed in ER 

showing right lower lobe consolidation with bilateral pleural effusions.  

Patient's WBC elevated at 10.9.  Patient also have a lactic acid of 2.1.  UA 

negative.  Patient's blood pressure low.  Patient started on Levophed and 

transferred to the ICU.  Dr. Trejo has been consulted for critical care.  

Patient started on vancomycin and Zosyn for antibiotic.  Blood urine and sputum 

cultures ordered.  At this time patient is on mechanical ventilation resting 

comfortably in bed.  Patient is following commands.








On 05/28/2019 patient maintained on mechanical ventilation but awake and alert. 

Patient remains in intensive care unit on Levophed for pressure support.  

Patient reports that she feels better.  Patient currently on vancomycin and 

Maxipime for antibiotics.  Infectious disease and pulmonary services are 

following.  White Blood cell 16.7.  Blood,urine and sputum cultures pending





On 05/29/2019 patient rates on mechanical ventilation but  awake and alert 

following commands. patient reports that she feels overall improved.  white 

blood cell improving to 13.6.  At this time patient denies chest pain or 

shortness breath.  Patient denies nausea vomiting diarrhea denies any urinary b

urning or frequency





Objective





- Vital Signs


Vital signs: 


                                   Vital Signs











Temp  98.3 F   05/29/19 08:00


 


Pulse  71   05/29/19 08:55


 


Resp  14   05/29/19 08:00


 


BP  133/69   05/29/19 08:00


 


Pulse Ox  99   05/29/19 08:00








                                 Intake & Output











 05/28/19 05/29/19 05/29/19





 18:59 06:59 18:59


 


Intake Total 2638.458 2617.281 190


 


Output Total 645 655 75


 


Balance 0284.103 1628.281 115


 


Weight 63.5 kg 65.4 kg 


 


Intake:   


 


  IV 50 380 10


 


    Normal Saline Carrier 50 130 10


 


    Vancomycin 1,250 mg In  250 





    Sodium Chloride 0.9% 250   





    ml @ 125 mls/hr IVPB Q8H   





    [facility] Rx#:[id number]   


 


  Intake, IV Titration 2528.458 1647.281 125





  Amount   


 


    Cefepime 2 gm In Sodium 100  





    Chloride 0.9% 100 ml @   





    200 mls/hr IVPB Q12H [facility]   





    Rx#:[id number]   


 


    Dextrose 5%-0.9% NaCl 1, 1375 1625 125





    000 ml @ 125 mls/hr IV .   





    Q8H [facility] Rx#:[id number]   


 


    Magnesium Sulfate-D5w Pmx 200  





    1 gm In Dextrose/Water 1   





    100ml.bag @ 100 mls/hr   





    IVPB Q1H [facility] Rx#:   





    [id number]   


 


    Norepinephrine 32 mg In 3.458 22.281 





    Sodium Chloride 0.9% 218   





    ml @ 0.05 MCG/KG/MIN 1.   





    305 mls/hr IV .Q24H [facility]   





    Rx#:[id number]   


 


    Potassium Phosphate 10 100  





    mmol In Sodium Chloride 0   





    .9% 100 ml @ 50 mls/hr IV   





    [facility] Rx#:[id number]   


 


    Vancomycin 1,250 mg In 250  





    Sodium Chloride 0.9% 250   





    ml @ 125 mls/hr IVPB Q8H   





    [facility] Rx#:[id number]   


 


    Vancomycin 1,500 mg In 500  





    Sodium Chloride 0.9% 250   





    ml @ 125 mls/hr IVPB Q24H   





    [facility] Rx#:[id number]   


 


  Oral  275 


 


  Tube Feeding  255 25


 


  Other 60 60 30


 


Output:   


 


  Urine 645 655 75


 


Other:   


 


  Voiding Method Indwelling Catheter Indwelling Catheter Indwelling Catheter














- Exam





Head normocephalic


Neck supple. Tracheostomy in place


Lungs diminished with bilateral rhonchi.  


Heart regular rate and rhythm S1-S2, no rub or gallop


Abdomen is soft nontender nondistended positive bowel sounds no h

epatosplenomegaly.  PEG tube in place


Extremities no edema


Neuro follows commands.  On mechanical ventilation





- Labs


CBC & Chem 7: 


                                 05/29/19 06:34





                                 05/29/19 06:34


Labs: 


                  Abnormal Lab Results - Last 24 Hours (Table)











  05/28/19 05/28/19 05/28/19 Range/Units





  12:23 13:15 21:41 


 


WBC     (3.8-10.6)  k/uL


 


RBC     (3.80-5.40)  m/uL


 


Hgb     (11.4-16.0)  gm/dL


 


Hct     (34.0-46.0)  %


 


MCV     (80.0-100.0)  fL


 


MCHC     (31.0-37.0)  g/dL


 


RDW     (11.5-15.5)  %


 


Neutrophils #     (1.3-7.7)  k/uL


 


Lymphocytes #     (1.0-4.8)  k/uL


 


ABG pH     (7.35-7.45)  


 


ABG pCO2     (35-45)  mmHg


 


ABG pO2     ()  mmHg


 


ABG HCO3     (21-25)  mmol/L


 


ABG Total CO2     (19-24)  mmol/L


 


ABG O2 Saturation     (94-97)  %


 


Chloride     ()  mmol/L


 


Creatinine     (0.52-1.04)  mg/dL


 


Glucose     (74-99)  mg/dL


 


POC Glucose (mg/dL)  140 H  149 H  104 H  (75-99)  mg/dL














  05/29/19 05/29/19 05/29/19 Range/Units





  00:33 05:46 06:34 


 


WBC    13.6 H  (3.8-10.6)  k/uL


 


RBC    3.09 L  (3.80-5.40)  m/uL


 


Hgb    9.0 L  (11.4-16.0)  gm/dL


 


Hct    31.2 L  (34.0-46.0)  %


 


MCV    101.1 H  (80.0-100.0)  fL


 


MCHC    28.9 L  (31.0-37.0)  g/dL


 


RDW    17.2 H  (11.5-15.5)  %


 


Neutrophils #    12.8 H  (1.3-7.7)  k/uL


 


Lymphocytes #    0.4 L  (1.0-4.8)  k/uL


 


ABG pH     (7.35-7.45)  


 


ABG pCO2     (35-45)  mmHg


 


ABG pO2     ()  mmHg


 


ABG HCO3     (21-25)  mmol/L


 


ABG Total CO2     (19-24)  mmol/L


 


ABG O2 Saturation     (94-97)  %


 


Chloride     ()  mmol/L


 


Creatinine     (0.52-1.04)  mg/dL


 


Glucose     (74-99)  mg/dL


 


POC Glucose (mg/dL)  167 H  148 H   (75-99)  mg/dL














  05/29/19 05/29/19 Range/Units





  06:34 07:04 


 


WBC    (3.8-10.6)  k/uL


 


RBC    (3.80-5.40)  m/uL


 


Hgb    (11.4-16.0)  gm/dL


 


Hct    (34.0-46.0)  %


 


MCV    (80.0-100.0)  fL


 


MCHC    (31.0-37.0)  g/dL


 


RDW    (11.5-15.5)  %


 


Neutrophils #    (1.3-7.7)  k/uL


 


Lymphocytes #    (1.0-4.8)  k/uL


 


ABG pH   7.28 L  (7.35-7.45)  


 


ABG pCO2   55 H  (35-45)  mmHg


 


ABG pO2   132 H  ()  mmHg


 


ABG HCO3   26 H  (21-25)  mmol/L


 


ABG Total CO2   28 H  (19-24)  mmol/L


 


ABG O2 Saturation   99.1 H  (94-97)  %


 


Chloride  109 H   ()  mmol/L


 


Creatinine  0.32 L   (0.52-1.04)  mg/dL


 


Glucose  151 H   (74-99)  mg/dL


 


POC Glucose (mg/dL)    (75-99)  mg/dL








                      Microbiology - Last 24 Hours (Table)











 05/27/19 09:30 Gram Stain - Preliminary





 Sputum Sputum Culture - Preliminary





    Streptococcus pneumoniae


 


 05/26/19 23:30 Gram Stain - Preliminary





 Sputum Sputum Culture - Preliminary





    Streptococcus pneumoniae


 


 05/26/19 22:52 Blood Culture - Preliminary





 Blood    No Growth after 48 hours


 


 05/27/19 03:05 Urine Culture - Final





 Urine,Catheterized 














Assessment and Plan


Assessment: 





1.  Sepsis present on admission related to pneumonia.  Chest x-ray completed 

showing right lower lobe consolidation.  Bilateral pleural effusions.  Patient 

admitted to the intensive care unit.  Initial lactic acid 2.1.  Blood urine and 

sputum cultures have been ordered.  White blood cell 16.4.  Infectious disease 

following.  Patient on cefepime and Vanco.  Sputum culture currently growing 

Streptococcus pneumoniae





2.  Hypotension related to above.  Patient maintained on Levophed





3.  History of non-small cell lung stage III adenocarcinoma





4.  History of recent prolonged hospital admission in which she was transferred 

to [facility].  Patient was on mechanical ventilation for prolonged 

time requiring tracheostomy





5.  Acute on chronic respiratory failure.  Does have tracheostomy in place 

currently on mechanical ventilation





6.  Bilateral pleural effusions.  Pulmonary services have been consulted





7.  History of COPD





8.  History of CVA





9.  History of osteoarthritis





10.  History of hyperlipidemia





11.  History of fibromyalgia





12.  History of depression





13.  Ex-smoker.  Patient is smoking history of half-pack per day for 30 years





14.  Bilateral lower extremity neuropathy.  Patient started on Lyrica





DVT prophylaxis heparin.  GI prophylaxis Protonix





I performed an examination of the patient and discussed their management with 

the Nurse Practitioner.  I have reviewed the Nurse Practitioner's notes and a

gree with the documented findings and plan of care

## 2019-05-30 LAB
ANION GAP SERPL CALC-SCNC: 3 MMOL/L
BUN SERPL-SCNC: 7 MG/DL (ref 7–17)
CALCIUM SPEC-MCNC: 8.3 MG/DL (ref 8.4–10.2)
CHLORIDE SERPL-SCNC: 111 MMOL/L (ref 98–107)
CO2 SERPL-SCNC: 24 MMOL/L (ref 22–30)
ERYTHROCYTE [DISTWIDTH] IN BLOOD BY AUTOMATED COUNT: 3.12 M/UL (ref 3.8–5.4)
ERYTHROCYTE [DISTWIDTH] IN BLOOD: 16.9 % (ref 11.5–15.5)
GLUCOSE BLD-MCNC: 126 MG/DL (ref 75–99)
GLUCOSE BLD-MCNC: 138 MG/DL (ref 75–99)
GLUCOSE BLD-MCNC: 143 MG/DL (ref 75–99)
GLUCOSE BLD-MCNC: 160 MG/DL (ref 75–99)
GLUCOSE BLD-MCNC: 165 MG/DL (ref 75–99)
GLUCOSE SERPL-MCNC: 152 MG/DL (ref 74–99)
HCT VFR BLD AUTO: 32.5 % (ref 34–46)
HGB BLD-MCNC: 9.4 GM/DL (ref 11.4–16)
MCH RBC QN AUTO: 30.1 PG (ref 25–35)
MCHC RBC AUTO-ENTMCNC: 28.9 G/DL (ref 31–37)
MCV RBC AUTO: 104 FL (ref 80–100)
PLATELET # BLD AUTO: 243 K/UL (ref 150–450)
POTASSIUM SERPL-SCNC: 3.5 MMOL/L (ref 3.5–5.1)
SODIUM SERPL-SCNC: 138 MMOL/L (ref 137–145)
WBC # BLD AUTO: 13.3 K/UL (ref 3.8–10.6)

## 2019-05-30 RX ADMIN — CHLORHEXIDINE GLUCONATE SCH ML: 1.2 RINSE ORAL at 20:05

## 2019-05-30 RX ADMIN — ISODIUM CHLORIDE SCH MG: 0.03 SOLUTION RESPIRATORY (INHALATION) at 08:06

## 2019-05-30 RX ADMIN — DEXTROSE MONOHYDRATE AND SODIUM CHLORIDE SCH MLS/HR: 5; .9 INJECTION, SOLUTION INTRAVENOUS at 08:44

## 2019-05-30 RX ADMIN — ISODIUM CHLORIDE SCH MG: 0.03 SOLUTION RESPIRATORY (INHALATION) at 13:12

## 2019-05-30 RX ADMIN — ISODIUM CHLORIDE SCH MG: 0.03 SOLUTION RESPIRATORY (INHALATION) at 00:45

## 2019-05-30 RX ADMIN — OXYCODONE HYDROCHLORIDE SCH: 10 TABLET, FILM COATED, EXTENDED RELEASE ORAL at 08:45

## 2019-05-30 RX ADMIN — DOCUSATE SODIUM AND SENNOSIDES SCH EACH: 50; 8.6 TABLET ORAL at 20:05

## 2019-05-30 RX ADMIN — DEXTROSE MONOHYDRATE AND SODIUM CHLORIDE SCH MLS/HR: 5; .9 INJECTION, SOLUTION INTRAVENOUS at 20:24

## 2019-05-30 RX ADMIN — INSULIN ASPART SCH UNIT: 100 INJECTION, SOLUTION INTRAVENOUS; SUBCUTANEOUS at 18:39

## 2019-05-30 RX ADMIN — CHLORHEXIDINE GLUCONATE SCH ML: 1.2 RINSE ORAL at 08:44

## 2019-05-30 RX ADMIN — DEXTROSE MONOHYDRATE AND SODIUM CHLORIDE SCH MLS/HR: 5; .9 INJECTION, SOLUTION INTRAVENOUS at 04:06

## 2019-05-30 RX ADMIN — PREGABALIN SCH MG: 50 CAPSULE ORAL at 09:17

## 2019-05-30 RX ADMIN — CEFEPIME HYDROCHLORIDE SCH MLS/HR: 2 INJECTION, POWDER, FOR SOLUTION INTRAVENOUS at 10:09

## 2019-05-30 RX ADMIN — BUDESONIDE SCH MG: 1 SUSPENSION RESPIRATORY (INHALATION) at 08:06

## 2019-05-30 RX ADMIN — FUROSEMIDE SCH MG: 10 INJECTION, SOLUTION INTRAMUSCULAR; INTRAVENOUS at 10:16

## 2019-05-30 RX ADMIN — ISODIUM CHLORIDE SCH MG: 0.03 SOLUTION RESPIRATORY (INHALATION) at 19:38

## 2019-05-30 RX ADMIN — HEPARIN SODIUM SCH UNIT: 5000 INJECTION, SOLUTION INTRAVENOUS; SUBCUTANEOUS at 08:45

## 2019-05-30 RX ADMIN — NOREPINEPHRINE BITARTRATE SCH: 1 INJECTION, SOLUTION, CONCENTRATE INTRAVENOUS at 05:42

## 2019-05-30 RX ADMIN — FUROSEMIDE SCH MG: 10 INJECTION, SOLUTION INTRAMUSCULAR; INTRAVENOUS at 20:06

## 2019-05-30 RX ADMIN — BUDESONIDE SCH MG: 1 SUSPENSION RESPIRATORY (INHALATION) at 19:38

## 2019-05-30 RX ADMIN — PANTOPRAZOLE SODIUM SCH MG: 40 INJECTION, POWDER, FOR SOLUTION INTRAVENOUS at 08:45

## 2019-05-30 RX ADMIN — DOCUSATE SODIUM AND SENNOSIDES SCH EACH: 50; 8.6 TABLET ORAL at 08:45

## 2019-05-30 RX ADMIN — SODIUM CHLORIDE SCH MLS/HR: 9 INJECTION, SOLUTION INTRAVENOUS at 08:45

## 2019-05-30 RX ADMIN — DEXTROSE MONOHYDRATE AND SODIUM CHLORIDE SCH: 5; .9 INJECTION, SOLUTION INTRAVENOUS at 12:39

## 2019-05-30 RX ADMIN — INSULIN ASPART SCH: 100 INJECTION, SOLUTION INTRAVENOUS; SUBCUTANEOUS at 12:38

## 2019-05-30 RX ADMIN — AMIODARONE HYDROCHLORIDE SCH MG: 200 TABLET ORAL at 08:45

## 2019-05-30 RX ADMIN — POTASSIUM BICARBONATE SCH MEQ: 782 TABLET, EFFERVESCENT ORAL at 10:16

## 2019-05-30 RX ADMIN — POTASSIUM BICARBONATE SCH MEQ: 782 TABLET, EFFERVESCENT ORAL at 10:09

## 2019-05-30 RX ADMIN — HEPARIN SODIUM SCH: 5000 INJECTION, SOLUTION INTRAVENOUS; SUBCUTANEOUS at 16:45

## 2019-05-30 RX ADMIN — PREGABALIN SCH MG: 50 CAPSULE ORAL at 20:06

## 2019-05-30 RX ADMIN — LEVOTHYROXINE SODIUM SCH MCG: 50 TABLET ORAL at 05:46

## 2019-05-30 RX ADMIN — OXYCODONE HYDROCHLORIDE SCH: 10 TABLET, FILM COATED, EXTENDED RELEASE ORAL at 20:59

## 2019-05-30 RX ADMIN — INSULIN ASPART SCH UNIT: 100 INJECTION, SOLUTION INTRAVENOUS; SUBCUTANEOUS at 05:46

## 2019-05-30 NOTE — PN
PROGRESS NOTE



DATE OF SERVICE:

05/30/2019.



REASON FOR FOLLOWUP:

Strep pneumoniae pneumonia.



INTERVAL HISTORY:

The patient is currently afebrile.  The patient is breathing comfortably on trach

collar.  Denies having any chest pain or shortness of breath.  Occasional cough.  Has

been eating, drinking normally.  No nausea.  No vomiting or any diarrhea.



PHYSICAL EXAMINATION:

Blood pressure is 132/73 with a pulse of 87, temperature 98.2.  He is 92% on _____

trach collar.

General description shows an elderly female lying in bed in no distress.

Respiratory system:  Unlabored breathing.  Clear to auscultation anteriorly.

Heart S1, S2.  Regular rate and rhythm.

ABDOMEN: Soft. No tenderness.



LABS:

Hemoglobin 9.4, white count 13.3, BUN of 7, creatinine 0.35.  Sputum culture with strep

pneumo which is sensitive to ceftriaxone.



DIAGNOSTIC IMPRESSION AND PLAN:

Patient with right lower lobe pneumonia.  Sputum finalized with strep pneumo.

Antibiotic will be adjusted to Rocephin 2 g daily.  Discontinue cefepime and the

vancomycin and monitor clinical course closely.  Continue supportive care.





MMODL / IJN: [id number] / Job#: [id number]

## 2019-05-30 NOTE — P.PN
Subjective


Progress Note Date: 05/30/19





This is  a 63-year-old female patient who presented to the hospital with 

worsening shortness of breath.  Patient has a past medical history of recent 

hospital admission in which she was admitted to [facility] hearing transferred

to [facility] which required prolonged intubation and tracheostomy.  Patient was

recently discharged to recent D/C to [facility] week ago.  Patient has had 

increased sputum production and cough.  Patient has past medical history of non-

small cell lung cancer stage III, CVA, COPD, GERD, hyperlipidemia, 

osteoarthritis,chronic back pain and depression.  Chest x-ray completed in ER 

showing right lower lobe consolidation with bilateral pleural effusions.  

Patient's WBC elevated at 10.9.  Patient also have a lactic acid of 2.1.  UA 

negative.  Patient's blood pressure low.  Patient started on Levophed and 

transferred to the ICU.  Dr. Trejo has been consulted for critical care.  

Patient started on vancomycin and Zosyn for antibiotic.  Blood urine and sputum 

cultures ordered.  At this time patient is on mechanical ventilation resting 

comfortably in bed.  Patient is following commands.








On 05/28/2019 patient maintained on mechanical ventilation but awake and alert. 

Patient remains in intensive care unit on Levophed for pressure support.  

Patient reports that she feels better.  Patient currently on vancomycin and 

Maxipime for antibiotics.  Infectious disease and pulmonary services are 

following.  White Blood cell 16.7.  Blood,urine and sputum cultures pending





On 05/29/2019 patient rates on mechanical ventilation but  awake and alert 

following commands. patient reports that she feels overall improved.  white 

blood cell improving to 13.6.  At this time patient denies chest pain or 

shortness breath.  Patient denies nausea vomiting diarrhea denies any urinary b

urning or frequency





05/30/2019 patient is alert and awake following commands.  Patient remains on m

echanical ventilation.  Patient has been off pressure support medication for 24 

hours.  Infectious disease and pulmonary following.  At this time patient denies

chest pain or shortness breath.  Patient denies nausea vomiting or diarrhea.  

Patient denies any urinary burning or frequency. 





Objective





- Vital Signs


Vital signs: 


                                   Vital Signs











Temp  98.4 F   05/30/19 09:00


 


Pulse  84   05/30/19 11:00


 


Resp  10 L  05/30/19 11:00


 


BP  104/70   05/30/19 11:00


 


Pulse Ox  97   05/30/19 11:00








                                 Intake & Output











 05/29/19 05/30/19 05/30/19





 18:59 06:59 18:59


 


Intake Total 2388.77 1450 1115


 


Output Total 1270 1315 1120


 


Balance 1118.77 135 -5


 


Weight  69 kg 


 


Intake:   


 


   120 775


 


    Cefepime 2 gm In Sodium   100





    Chloride 0.9% 100 ml @   





    200 mls/hr IVPB Q12H [facility]   





    Rx#:[id number]   


 


    Dextrose 5%-0.9% NaCl 1,   400





    000 ml @ 125 mls/hr IV .   





    Q8H [facility] Rx#:[id number]   


 


    Normal Saline Carrier 90 120 25


 


    Vancomycin 1,250 mg In 250  250





    Sodium Chloride 0.9% 250   





    ml @ 125 mls/hr IVPB Q8H   





    [facility] Rx#:[id number]   


 


  Intake, IV Titration 1523.77 475 





  Amount   


 


    Cefepime 2 gm In Sodium 100 100 





    Chloride 0.9% 100 ml @   





    200 mls/hr IVPB Q12H [facility]   





    Rx#:[id number]   


 


    Dextrose 5%-0.9% NaCl 1, 1375 125 





    000 ml @ 125 mls/hr IV .   





    Q8H [facility] Rx#:[id number]   


 


    Norepinephrine 32 mg In 48.77  





    Sodium Chloride 0.9% 218   





    ml @ 0.05 MCG/KG/MIN 1.   





    305 mls/hr IV .Q24H [facility]   





    Rx#:[id number]   


 


    Vancomycin 1,250 mg In  250 





    Sodium Chloride 0.9% 250   





    ml @ 125 mls/hr IVPB Q8H   





    [facility] Rx#:[id number]   


 


  Oral  250 


 


  Tube Feeding 375 515 220


 


  Other 150 90 120


 


Output:   


 


  Urine 1270 1315 1120


 


Other:   


 


  Voiding Method Indwelling Catheter Indwelling Catheter Indwelling Catheter














- Exam





Head normocephalic


Neck supple. Tracheostomy in place


Lungs diminished with bilateral rhonchi.  


Heart regular rate and rhythm S1-S2, no rub or gallop


Abdomen is soft nontender nondistended positive bowel sounds no 

hepatosplenomegaly.  PEG tube in place


Extremities no edema


Neuro follows commands.  On mechanical ventilation





- Labs


CBC & Chem 7: 


                                 05/30/19 05:45





                                 05/30/19 05:45


Labs: 


                  Abnormal Lab Results - Last 24 Hours (Table)











  05/29/19 05/29/19 05/29/19 Range/Units





  12:09 17:09 23:44 


 


WBC     (3.8-10.6)  k/uL


 


RBC     (3.80-5.40)  m/uL


 


Hgb     (11.4-16.0)  gm/dL


 


Hct     (34.0-46.0)  %


 


MCV     (80.0-100.0)  fL


 


MCHC     (31.0-37.0)  g/dL


 


RDW     (11.5-15.5)  %


 


Chloride     ()  mmol/L


 


Creatinine     (0.52-1.04)  mg/dL


 


Glucose     (74-99)  mg/dL


 


POC Glucose (mg/dL)  118 H  138 H  138 H  (75-99)  mg/dL


 


Calcium     (8.4-10.2)  mg/dL














  05/30/19 05/30/19 05/30/19 Range/Units





  05:32 05:45 05:45 


 


WBC   13.3 H   (3.8-10.6)  k/uL


 


RBC   3.12 L   (3.80-5.40)  m/uL


 


Hgb   9.4 L   (11.4-16.0)  gm/dL


 


Hct   32.5 L   (34.0-46.0)  %


 


MCV   104.0 H   (80.0-100.0)  fL


 


MCHC   28.9 L   (31.0-37.0)  g/dL


 


RDW   16.9 H   (11.5-15.5)  %


 


Chloride    111 H  ()  mmol/L


 


Creatinine    0.35 L  (0.52-1.04)  mg/dL


 


Glucose    152 H  (74-99)  mg/dL


 


POC Glucose (mg/dL)  165 H    (75-99)  mg/dL


 


Calcium    8.3 L  (8.4-10.2)  mg/dL














  05/30/19 Range/Units





  06:03 


 


WBC   (3.8-10.6)  k/uL


 


RBC   (3.80-5.40)  m/uL


 


Hgb   (11.4-16.0)  gm/dL


 


Hct   (34.0-46.0)  %


 


MCV   (80.0-100.0)  fL


 


MCHC   (31.0-37.0)  g/dL


 


RDW   (11.5-15.5)  %


 


Chloride   ()  mmol/L


 


Creatinine   (0.52-1.04)  mg/dL


 


Glucose   (74-99)  mg/dL


 


POC Glucose (mg/dL)  160 H  (75-99)  mg/dL


 


Calcium   (8.4-10.2)  mg/dL








                      Microbiology - Last 24 Hours (Table)











 05/26/19 22:52 Blood Culture - Preliminary





 Blood    No Growth after 72 hours


 


 05/27/19 09:30 Gram Stain - Preliminary





 Sputum Sputum Culture - Preliminary





    Streptococcus pneumoniae


 


 05/26/19 23:30 Gram Stain - Preliminary





 Sputum Sputum Culture - Preliminary





    Streptococcus pneumoniae














Assessment and Plan


Assessment: 





1.  Sepsis present on admission related to pneumonia.  Chest x-ray completed 

showing right lower lobe consolidation.  Bilateral pleural effusions.  Patient 

admitted to the intensive care unit.  Initial lactic acid 2.1.  Blood urine and 

sputum cultures have been ordered.  White blood cell 16.4.  Infectious disease 

following.  Patient on cefepime and Vanco.  Sputum culture currently growing 

Streptococcus pneumoniae.  Repeat chest x-ray completed showing suspected sickle

cell congestive heart failure mild pulmonary vascular congestion stable 

small-to-moderate pleural effusions or airspace disease.  Likely atelectasis 

multifocal pneumonia and parapneumonic effusions are an alernative consideration





2.  Hypotension related to above.  Patient maintained on Levophed.  Resolved





3.  History of non-small cell lung stage III adenocarcinoma





4.  History of recent prolonged hospital admission in which she was transferred 

to [facility].  Patient was on mechanical ventilation for prolonged 

time requiring tracheostomy





5.  Acute on chronic respiratory failure.  Does have tracheostomy in place 

currently on mechanical ventilation





6.  Bilateral pleural effusions.  Pulmonary services have been consulted





7.  History of COPD





8.  History of CVA





9.  History of osteoarthritis





10.  History of hyperlipidemia





11.  History of fibromyalgia





12.  History of depression





13.  Ex-smoker.  Patient is smoking history of half-pack per day for 30 years





14.  Bilateral lower extremity neuropathy.  Patient started on Lyrica





DVT prophylaxis heparin.  GI prophylaxis Protonix





I performed an examination of the patient and discussed their management with 

the Nurse Practitioner.  I have reviewed the Nurse Practitioner's notes and 

agree with the documented findings and plan of care

## 2019-05-30 NOTE — XR
EXAMINATION TYPE: XR chest 1V portable

 

DATE OF EXAM: 5/30/2019

 

COMPARISON: 5/29/2019

 

HISTORY: Shortness of breath.

 

TECHNIQUE: Single frontal view of the chest is obtained.

 

FINDINGS:  Cardiomediastinal silhouette appears enlarged and there is mild pulmonary vascular congest
ion and bilateral small to moderate layering pleural effusions with associated airspace disease. Medi
an tracheostomy is seen. No sizable pneumothorax. No acute osseous pathology is seen.

 

IMPRESSION:  Suspected sequela of congestive heart failure with mild pulmonary vascular congestion an
d stable small to moderate pleural effusions with bibasilar airspace disease, likely atelectasis. Mul
tifocal pneumonia and parapneumonic effusions are an alternative consideration.

## 2019-05-31 LAB
ANION GAP SERPL CALC-SCNC: 3 MMOL/L
BUN SERPL-SCNC: 12 MG/DL (ref 7–17)
CALCIUM SPEC-MCNC: 8.3 MG/DL (ref 8.4–10.2)
CHLORIDE SERPL-SCNC: 95 MMOL/L (ref 98–107)
CO2 SERPL-SCNC: 41 MMOL/L (ref 22–30)
ERYTHROCYTE [DISTWIDTH] IN BLOOD BY AUTOMATED COUNT: 3.3 M/UL (ref 3.8–5.4)
ERYTHROCYTE [DISTWIDTH] IN BLOOD: 16.7 % (ref 11.5–15.5)
GLUCOSE BLD-MCNC: 136 MG/DL (ref 75–99)
GLUCOSE BLD-MCNC: 137 MG/DL (ref 75–99)
GLUCOSE BLD-MCNC: 156 MG/DL (ref 75–99)
GLUCOSE BLD-MCNC: 164 MG/DL (ref 75–99)
GLUCOSE SERPL-MCNC: 139 MG/DL (ref 74–99)
HCT VFR BLD AUTO: 32.3 % (ref 34–46)
HGB BLD-MCNC: 9.6 GM/DL (ref 11.4–16)
MCH RBC QN AUTO: 29.1 PG (ref 25–35)
MCHC RBC AUTO-ENTMCNC: 29.7 G/DL (ref 31–37)
MCV RBC AUTO: 97.8 FL (ref 80–100)
PLATELET # BLD AUTO: 301 K/UL (ref 150–450)
POTASSIUM SERPL-SCNC: 3 MMOL/L (ref 3.5–5.1)
SODIUM SERPL-SCNC: 139 MMOL/L (ref 137–145)
WBC # BLD AUTO: 13.8 K/UL (ref 3.8–10.6)

## 2019-05-31 RX ADMIN — AMIODARONE HYDROCHLORIDE SCH MG: 200 TABLET ORAL at 08:14

## 2019-05-31 RX ADMIN — INSULIN ASPART SCH UNIT: 100 INJECTION, SOLUTION INTRAVENOUS; SUBCUTANEOUS at 17:45

## 2019-05-31 RX ADMIN — CHLORHEXIDINE GLUCONATE SCH ML: 1.2 RINSE ORAL at 20:21

## 2019-05-31 RX ADMIN — ISODIUM CHLORIDE SCH MG: 0.03 SOLUTION RESPIRATORY (INHALATION) at 20:00

## 2019-05-31 RX ADMIN — HEPARIN SODIUM SCH UNIT: 5000 INJECTION, SOLUTION INTRAVENOUS; SUBCUTANEOUS at 08:15

## 2019-05-31 RX ADMIN — PREGABALIN SCH MG: 50 CAPSULE ORAL at 20:21

## 2019-05-31 RX ADMIN — POTASSIUM BICARBONATE SCH MEQ: 782 TABLET, EFFERVESCENT ORAL at 23:55

## 2019-05-31 RX ADMIN — INSULIN ASPART SCH UNIT: 100 INJECTION, SOLUTION INTRAVENOUS; SUBCUTANEOUS at 12:01

## 2019-05-31 RX ADMIN — FUROSEMIDE SCH MG: 10 INJECTION, SOLUTION INTRAMUSCULAR; INTRAVENOUS at 08:15

## 2019-05-31 RX ADMIN — ISODIUM CHLORIDE SCH MG: 0.03 SOLUTION RESPIRATORY (INHALATION) at 07:06

## 2019-05-31 RX ADMIN — OXYCODONE HYDROCHLORIDE SCH MG: 10 TABLET, FILM COATED, EXTENDED RELEASE ORAL at 08:14

## 2019-05-31 RX ADMIN — FUROSEMIDE SCH MG: 10 INJECTION, SOLUTION INTRAMUSCULAR; INTRAVENOUS at 20:21

## 2019-05-31 RX ADMIN — CHLORHEXIDINE GLUCONATE SCH ML: 1.2 RINSE ORAL at 08:15

## 2019-05-31 RX ADMIN — PREGABALIN SCH MG: 50 CAPSULE ORAL at 08:22

## 2019-05-31 RX ADMIN — NOREPINEPHRINE BITARTRATE SCH: 1 INJECTION, SOLUTION, CONCENTRATE INTRAVENOUS at 05:49

## 2019-05-31 RX ADMIN — INSULIN ASPART SCH UNIT: 100 INJECTION, SOLUTION INTRAVENOUS; SUBCUTANEOUS at 05:47

## 2019-05-31 RX ADMIN — INSULIN ASPART SCH UNIT: 100 INJECTION, SOLUTION INTRAVENOUS; SUBCUTANEOUS at 00:26

## 2019-05-31 RX ADMIN — ISODIUM CHLORIDE SCH MG: 0.03 SOLUTION RESPIRATORY (INHALATION) at 00:19

## 2019-05-31 RX ADMIN — LEVOTHYROXINE SODIUM SCH MCG: 50 TABLET ORAL at 05:47

## 2019-05-31 RX ADMIN — DOCUSATE SODIUM AND SENNOSIDES SCH EACH: 50; 8.6 TABLET ORAL at 08:14

## 2019-05-31 RX ADMIN — BUDESONIDE SCH MG: 1 SUSPENSION RESPIRATORY (INHALATION) at 20:00

## 2019-05-31 RX ADMIN — ISODIUM CHLORIDE SCH MG: 0.03 SOLUTION RESPIRATORY (INHALATION) at 11:07

## 2019-05-31 RX ADMIN — ONDANSETRON PRN MG: 2 INJECTION INTRAMUSCULAR; INTRAVENOUS at 07:15

## 2019-05-31 RX ADMIN — BUDESONIDE SCH MG: 1 SUSPENSION RESPIRATORY (INHALATION) at 07:06

## 2019-05-31 RX ADMIN — HEPARIN SODIUM SCH UNIT: 5000 INJECTION, SOLUTION INTRAVENOUS; SUBCUTANEOUS at 00:26

## 2019-05-31 RX ADMIN — OXYCODONE HYDROCHLORIDE SCH: 10 TABLET, FILM COATED, EXTENDED RELEASE ORAL at 23:50

## 2019-05-31 RX ADMIN — DOCUSATE SODIUM AND SENNOSIDES SCH EACH: 50; 8.6 TABLET ORAL at 20:22

## 2019-05-31 RX ADMIN — PANTOPRAZOLE SODIUM SCH MG: 40 INJECTION, POWDER, FOR SOLUTION INTRAVENOUS at 08:14

## 2019-05-31 RX ADMIN — HEPARIN SODIUM SCH UNIT: 5000 INJECTION, SOLUTION INTRAVENOUS; SUBCUTANEOUS at 16:47

## 2019-05-31 NOTE — P.PN
Subjective


Progress Note Date: 05/31/19


Principal diagnosis: 





Acute sepsis, septic shock, and bilateral pneumonia with sepsis.





This is a very pleasant 63-year-old female patient who follows with Dr. Amaya 

as her primary care physician.  She has a history of CVA/TIA, fibromyalgia, 

gastroesophageal reflux disease, hyperlipidemia, hypothyroidism, chronic back 

pain, depression.  She also has a history of chronic tobacco dependence, oxygen 

dependent chronic obstructive pulmonary disease with an FEV1 value of 56% of pre

dicted, and non-small cell lung cancer stage III adenocarcinoma.  She follows 

with Dr. Pastor in our office. This was diagnosed by an FNA of a left upper lobe 

lesion by IR in May 2017.  She had received concurrent chemoradiation with 

subsequent additional chemotherapy and then placed on maintenance mmunotherapy 

of Imfinzi back in June 2018.  Her last PET scan in September 2018 revealed 

continued left upper lobe mass with irregular margins extending to the pleural 

surface, spiculated appearance but was stable compared to previous and April 2018.  The SUV value was only 1.7.  Subsequent computed tomography scan of the 

chest in January 2019 revealed a stable and slightly smaller spiculated mass of 

the left upper lobe measuring 2.5 x 2.4 cm versus 3.0 x 2.6 previous.  No 

evidence of recurrence or metastasis.  The patient was in the hospital back in 

early March 2019 and she was having ongoing issues with nausea vomiting profound

weakness and fatigue.  She decided to stop taking the immunotherapy. She had 

been treated for his COPD exacerbation 02/13/2019 in our office by Dr. Pastor 

where she received steroids and Bactrim.  She did improve for the first week or 

so. She presented to the emergency room yesterday with complaints of increasing 

shortness of breath cough and congestion.  She did have some lower extremity 

edema.  She was still quite weak and fatigued.  ome dyspnea on exertion.  The 

patient was discharged home on 03/11/2019 as the patient shortness of breath 

improved and the patient recovered from her acute COPD exacerbation and she was 

discharged home on her usual routine medications which included also morphine 

sulfate 30 mg by mouth twice a day Percocet 10/03/2025 every 6 hours and a when 

necessary basis.  In terms of her lung medication she was maintained on a combi

nation of Symbicort and Incruse.  She is also on Synthroid 25 g by mouth daily.

 She was given Lasix to be taken on an as needed basis.





The patient subsequently came back to our hospital on 03/23/2019 because of 

altered mental status.   According to the family the patient was acting weird.  

She was acting unusual and she was confused.  She was also very weak and she was

having episodes of fall.  I do see a bruise over her forehead.  The family told 

that she did not have any active had trauma.  No seizure activity.  No neck 

stiffness.  No documented fever.  She was thrashing significantly yesterday.  A 

CAT scan of the head was done that showed no acute process.  CAT scan of abdomen

and pelvis was done that showed urinary bladder distention along with extensive 

colonic stool.  The patient's ammonia level was at 28.  Urine and blood cultures

were ordered.  The influenza screen was negative.   She did not have any neck 

stiffness.  No fever.  She was at times morning.  Other times she would open up 

her eyes and follows some simple commands.  I was told that this is an 

improvement in her condition compared to yesterday.  She was able to follow some

simple commands however she has not been consistent in following orders.  His 

speech is minimal and the patient may safely worse.  She denies being in pain.  

Urine toxin was ordered.  She was given a dose of Narcan here in the ICU without

much improvement.  Based on all this, I intubated the patient at that time and 

moved to the intensive care unit.  Lumbar puncture was done and was negative and

assess the causes for this patient altered mental status was not established.  

The patient was chest at [facility].  She underwent prolonged 

hospitalization during which she required intubation mechanical ventilation 

tracheostomy tube insertion PEG tube insertion and following that the patient 

[place] [facility] [place].  

It seems that the patient regained her mentation and she was communicating well.

 I'm not sure of the details of her presentation at [facility]





The patient came in yesterday to the burst department complaining of worsening 

shortness of breath.  Note that the patient has a tracheostomy tube in place.  

She was quite weak and hypotensive and lethargic.  In the burst department the 

patient was found to have bilateral lower lobe consolidation pleural effusions. 

White cell count was at 10.9.  Lactic acid level was at 2.1.  Blood pressure was

quite low.  The patient was given IV fluids and the patient was started on 

pressors.  She was also started on a combination of Zosyn and vancomycin as 

dosed antibiotics for pneumonia.  Blood cultures of been sent.  Sputum cultures 

of been sent.  The patient subsequently was attached to mechanical ventilator.  

Currently, the patient is mechanically ventilated.  The patient is an assist-

control mode of ventilation.  The patient is an FiO2 of 60% with a tidal volume 

of 350 the respiratory rate of 18 and 5 of PEEP.  The patient's blood gases 

showed a pH of 7.3 with a pCO2 of 53 and pO2 of 74.  The patient received IV 

fluids and currently she is on levo fed running at 0.2 g per KG per minute.  He

r white cell count is 1 is at 18.4.  Despite all this, she is arousable.  He is 

following simple commands.  She is not requiring any sedation.  She is currently

in normal sinus rhythm.  She had a triple lumen catheter in her right femoral 

vein.  Denies having any chest pain.  Her lactic acid level dropped from 2.1 

down to 1.5.





Reevaluated today on 5/28/2019, patient remains on mechanical ventilation, she 

is presently on assist control rate of 18 tidal volume of 350 FiO2 of 50% and 

PEEP of 5.  Remains on Levophed at 5.5 mcg/m, she is also on cefepime and 

vancomycin.  Chest x-ray clearly shows evidence of pneumonia, and some chronic 

parenchymal changes with small bilateral pleural effusions and atelectasis.  

Patient had a WBC count of 16.7 hemoglobin of 9.2 ABG showed a pO2 of 97 pCO2 of

43 pH of 7.39.  Electrolytes and renal profile are normal.





Reevaluated today on 5/29/2019, remains on mechanical ventilation, she is 

presently on tidal volume of 350 assist-control rate of 18 FiO2 is down to 40% 

from 50% earlier, and her PEEP is 5.  Remains on antibiotics, and requiring 

norepinephrine at 0.12 mcg/kg/m.  Surprisingly the patient is on mechanical 

ventilation, but fully awake, responsive, asking to be fed, however I plan to 

have a swallow evaluation on this patient, a bit reluctant to start feeding 

while she is on mechanical ventilation.  Patient is asking for coffee.  She was 

given a trial of weaning yesterday from mechanical ventilation and she was 

placed on a trach collar, lasted about 2 hours and had to be placed back on 

mechanical ventilation.  Sputum today is positive for Streptococcus pneumonia 

and the patient is on antibiotics, being followed by infectious disease.  

Initially placed on vancomycin and Zosyn .  WBC count is 13.6 hemoglobin is 9 

ABG showed a pO2 of 132 pCO2 of 55 pH of 7.28.  Basic metabolic profile is 

relatively normal.  Chest x-ray continues to show significant right lower lobe 

consolidation consistent with pneumonia and her left pleural effusion is 

improved.





Reevaluated today on 5/30/2019, remains on mechanical ventilation, presently on 

tidal volume of 350 assist-control rate 18 FiO2 40% and PEEP of 5.  Patient 

tolerated weaning yesterday for about 10 hours, went on a trach collar.  Early 

this morning she was switched back to mechanical ventilation assist control 

mode, a was noted to be more dyspneic, and more short of breath.  Patient is on 

IV fluids which I plan to cut down since her chest x-ray is showing mild 

interstitial edema, and the patient will receive diuretics today.  She is not 

requiring any pressors.  Hemodynamically stable.  Remains on enteral feeding via

PEG tube.  And that is being well-tolerated.  Chest x-ray is a bit concerning to

me today, hence diuretics were added.  In the meantime the patient remains empir

ically on broad-spectrum antibiotics.  Remains on cefepime and vancomycin.





Reevaluated today on 5/31/2019, patient was on mechanical ventilation until an 

hour ago.  She was switched to a trach collar, and she seems to be tolerating 

the trach collar fairly well.  She is on 50% FiO2.  Seems to be comfortable, 

past her swallow evaluation, and she was given liquids to swallow while on trach

collar.  Overall the patient seems to be much better, breathing easier, much 

more comfortable today.  Chest x-ray is basically about the same continues to 

show good sized infiltrate in the right lower lobe with consolidation, WBC count

remains elevated at 13.3 hemoglobin is 9.4.  Patient is hemodynamically stable. 

And she remains on antibiotics for Streptococcus pneumonia involving the right 

lower lobe.








Objective





- Vital Signs


Vital signs: 


                                   Vital Signs











Temp  98.8 F   05/31/19 09:00


 


Pulse  86   05/31/19 11:39


 


Resp  18   05/31/19 10:00


 


BP  98/65   05/31/19 10:00


 


Pulse Ox  88 L  05/31/19 10:00








                                 Intake & Output











 05/30/19 05/31/19 05/31/19





 18:59 06:59 18:59


 


Intake Total 1735 1200 330


 


Output Total 1081 6250 625


 


Balance -2210 -1270 -295


 


Weight  65.9 kg 


 


Intake:   


 


  IV 1020 415 90


 


    Cefepime 2 gm In Sodium 100  





    Chloride 0.9% 100 ml @   





    200 mls/hr IVPB Q12H MARY   





    Rx#:[id number]   


 


    Dextrose 5%-0.9% NaCl 1, 575 300 75





    000 ml @ 125 mls/hr IV .   





    Q8H MARY Rx#:[id number]   


 


    Normal Saline Carrier 95 65 15


 


    Vancomycin 1,250 mg In 250  





    Sodium Chloride 0.9% 250   





    ml @ 125 mls/hr IVPB Q8H   





    MARY Rx#:[id number]   


 


    cefTRIAXone 2 gm In  50 





    Sodium Chloride 0.9% 50   





    ml @ 100 mls/hr IVPB   





    Q24HR MARY Rx#:[id number]   


 


  Oral  80 120


 


  Tube Feeding 535 585 90


 


  Other 180 120 30


 


Output:   


 


  Urine 3945 9740 625


 


Other:   


 


  Voiding Method Indwelling Catheter Indwelling Catheter Indwelling Catheter














- Exam





Physical Exam: Revealed a 63-year-old female, pleasant, awake, follows instruct

ions, on trach collar at 50% FiO2.


Head: Atraumatic, normocephalic,


HEENT:[Neck is supple.] [No neck masses.] [No thyromegaly.] [No JVD.]  PERRLA, 

EOMI, no icterus, tracheostomy is intact.  Moist mucous membranes.


Chest: [Crackles and rhonchi bilaterally, no wheezes.  Symmetrical chest 

expansion.  No chest wall tenderness.]


Cardiac Exam: [Normal S1 and S2, no S3 gallop, no murmur.]


Abdomen: [Soft, nontender,  no megaly, no rebound, no guarding, normal bowel 

sounds.]  PEG tube is intact.


Extremities: [No clubbing, no edema, no cyanosis.]


Neurological Exam: [No focal neurologic deficit.  Alert and oriented 3, follows

 simple instructions.


Psychiatric: Normal mood, affect and mental status examination.


Lymphatics: No lymphadenopathy.


Skin: No rashes.


]





- Labs


CBC & Chem 7: 


                                 05/30/19 05:45





                                 05/30/19 14:07


Labs: 


                  Abnormal Lab Results - Last 24 Hours (Table)











  05/30/19 05/30/19 05/31/19 Range/Units





  12:19 18:39 00:18 


 


POC Glucose (mg/dL)  126 H  143 H  164 H  (75-99)  mg/dL














  05/31/19 05/31/19 Range/Units





  05:38 11:50 


 


POC Glucose (mg/dL)  156 H  136 H  (75-99)  mg/dL








                      Microbiology - Last 24 Hours (Table)











 05/26/19 22:52 Blood Culture - Preliminary





 Blood    No Growth after 96 hours


 


 05/27/19 09:30 Gram Stain - Final





 Sputum Sputum Culture - Final





    Streptococcus pneumoniae


 


 05/26/19 23:30 Gram Stain - Final





 Sputum Sputum Culture - Final





    Streptococcus pneumoniae














Assessment and Plan


Assessment: 





Impression:





1 acute on chronic hypoxic respiratory failure secondary to bilateral pneumonia 

secondary to Streptococcus pneumonia as documented from the sputum culture.  





2 acute sepsis and septic shock secondary to pneumonia patient required fluid 

boluses and pressors.  Presently off norepinephrine, and hemodynamically stable 





3 stage IV non-small cell lung cancer post-chemoradiation therapy and was on 

immunotherapy.





4 severe COPD with acute exacerbation





5 hypertension





6 chronic back pain and fibromyalgia and arthritis





7 history of depression





8 history of recent prolonged course of mechanical ventilation in March of 2019 

requiring tracheostomy and PEG tube placement





Recommendation: 





Continue on trach collar.  May have to be placed back on mechanical ventilation 

if needed.  Patient will be monitored closely in the IC





Continue antibiotics for Streptococcus pneumonia





Continue nutritional support via PEG tube.





Continue GI and DVT prophylaxis





Continue bronchodilators.





Swallow evaluation was done at bedside, and the patient passed the swallow 

evaluation.  Hence she will be allowed to swallow liquids while on trach collar.





Continue to follow in the ICU closely, prognosis is relatively guarded 

considering her multiple comorbidities.




















 


Time with Patient: Less than 30

## 2019-05-31 NOTE — XR
EXAMINATION TYPE: XR chest 1V portable

 

DATE OF EXAM: 5/31/2019

 

COMPARISON: 5/30/2019

 

HISTORY: SOB, Follow Up

 

FINDINGS:

 

Indwelling tubes and catheters are unchanged. Tracheostomy tube is in place.

 

Persistent right perihilar and right basilar infiltrate with bilateral effusions.

 

Stable appearance of the cardio-mediastinal structures at this time.

 

Pleural effusion unchanged.

 

IMPRESSION:

1.  Stable portable chest.  Clinical correlation and follow up until resolution is recommended.

## 2019-05-31 NOTE — P.PN
Subjective


Progress Note Date: 05/31/19





This is  a 63-year-old female patient who presented to the hospital with 

worsening shortness of breath.  Patient has a past medical history of recent 

hospital admission in which she was admitted to [facility] hearing transferred

to [facility] which required prolonged intubation and tracheostomy.  Patient was

recently discharged to recent D/C to [facility] ago.  Patient has had 

increased sputum production and cough.  Patient has past medical history of non-

small cell lung cancer stage III, CVA, COPD, GERD, hyperlipidemia, 

osteoarthritis,chronic back pain and depression.  Chest x-ray completed in ER 

showing right lower lobe consolidation with bilateral pleural effusions.  

Patient's WBC elevated at 10.9.  Patient also have a lactic acid of 2.1.  UA 

negative.  Patient's blood pressure low.  Patient started on Levophed and 

transferred to the ICU.  Dr. Trejo has been consulted for critical care.  

Patient started on vancomycin and Zosyn for antibiotic.  Blood urine and sputum 

cultures ordered.  At this time patient is on mechanical ventilation resting 

comfortably in bed.  Patient is following commands.








On 05/28/2019 patient maintained on mechanical ventilation but awake and alert. 

Patient remains in intensive care unit on Levophed for pressure support.  

Patient reports that she feels better.  Patient currently on vancomycin and 

Maxipime for antibiotics.  Infectious disease and pulmonary services are 

following.  White Blood cell 16.7.  Blood,urine and sputum cultures pending





On 05/29/2019 patient rates on mechanical ventilation but  awake and alert 

following commands. patient reports that she feels overall improved.  white 

blood cell improving to 13.6.  At this time patient denies chest pain or 

shortness breath.  Patient denies nausea vomiting diarrhea denies any urinary b

urning or frequency





05/30/2019 patient is alert and awake following commands.  Patient remains on m

echanical ventilation.  Patient has been off pressure support medication for 24 

hours.  Infectious disease and pulmonary following.  At this time patient denies

chest pain or shortness breath.  Patient denies nausea vomiting or diarrhea.  

Patient denies any urinary burning or frequency. 





On 05/31/2019 patient is awake alert and awake following commands.  Patient has 

been taken off mechanical ventilation placed on trach mask.  Patient remains in 

the intensive care unit but showing improvement.  Patient has been off pressure 

support medication for over 48 hours.  Infectious disease and pulmonary are 

following.  Antibiotics adjusted per ID.  At this time patient denies chest pain

or shortness of breath.  Patient denies nausea vomiting or diarrhea.  Patient 

denies any urinary burning or frequency





Objective





- Vital Signs


Vital signs: 


                                   Vital Signs











Temp  98.8 F   05/31/19 09:00


 


Pulse  87   05/31/19 09:00


 


Resp  6 L  05/31/19 09:00


 


BP  113/67   05/31/19 09:00


 


Pulse Ox  98   05/31/19 09:00








                                 Intake & Output











 05/30/19 05/31/19 05/31/19





 18:59 06:59 18:59


 


Intake Total 1735 1200 300


 


Output Total 3945 2470 325


 


Balance -2210 -1270 -25


 


Weight  65.9 kg 


 


Intake:   


 


  IV 1020 415 60


 


    Cefepime 2 gm In Sodium 100  





    Chloride 0.9% 100 ml @   





    200 mls/hr IVPB Q12H MARY   





    Rx#:[id number]   


 


    Dextrose 5%-0.9% NaCl 1, 575 300 50





    000 ml @ 125 mls/hr IV .   





    Q8H MARY Rx#:[id number]   


 


    Normal Saline Carrier 95 65 10


 


    Vancomycin 1,250 mg In 250  





    Sodium Chloride 0.9% 250   





    ml @ 125 mls/hr IVPB Q8H   





    MARY Rx#:[id number]   


 


    cefTRIAXone 2 gm In  50 





    Sodium Chloride 0.9% 50   





    ml @ 100 mls/hr IVPB   





    Q24HR MARY Rx#:[id number]   


 


  Oral  80 120


 


  Tube Feeding 535 585 90


 


  Other 180 120 30


 


Output:   


 


  Urine 3945 2470 325


 


Other:   


 


  Voiding Method Indwelling Catheter Indwelling Catheter Indwelling Catheter














- Exam





Head normocephalic


Neck supple. Tracheostomy in place


Lungs diminished with bilateral rhonchi.  


Heart regular rate and rhythm S1-S2, no rub or gallop


Abdomen is soft nontender nondistended positive bowel sounds no 

hepatosplenomegaly.  PEG tube in place


Extremities no edema


Neuro follows commands.  On mechanical ventilation





- Labs


CBC & Chem 7: 


                                 05/30/19 05:45





                                 05/30/19 14:07


Labs: 


                  Abnormal Lab Results - Last 24 Hours (Table)











  05/30/19 05/30/19 05/31/19 Range/Units





  12:19 18:39 00:18 


 


POC Glucose (mg/dL)  126 H  143 H  164 H  (75-99)  mg/dL














  05/31/19 Range/Units





  05:38 


 


POC Glucose (mg/dL)  156 H  (75-99)  mg/dL








                      Microbiology - Last 24 Hours (Table)











 05/26/19 22:52 Blood Culture - Preliminary





 Blood    No Growth after 96 hours


 


 05/27/19 09:30 Gram Stain - Final





 Sputum Sputum Culture - Final





    Streptococcus pneumoniae


 


 05/26/19 23:30 Gram Stain - Final





 Sputum Sputum Culture - Final





    Streptococcus pneumoniae














Assessment and Plan


Assessment: 





1.  Sepsis present on admission related to pneumonia.  Chest x-ray completed 

showing right lower lobe consolidation.  Bilateral pleural effusions.  Patient 

admitted to the intensive care unit.  Initial lactic acid 2.1.  Blood urine and 

sputum cultures have been ordered.  White blood cell 16.4.  Infectious disease 

following.  Patient on cefepime and Vanco.  Sputum culture currently growing 

Streptococcus pneumoniae.  Repeat chest x-ray completed showing suspected sickle

cell congestive heart failure mild pulmonary vascular congestion stable sm

all-to-moderate pleural effusions or airspace disease.  Likely atelectasis 

multifocal pneumonia and parapneumonic effusions are an alernative 

consideration.  Sputum culture growing Streptococcus pneumoniae.  Antibiotics 

adjusted to Rocephin per infectious disease.  Patient taken off mechanical venti

lation and placed on trach collar





2.  Hypotension related to above.  Patient maintained on Levophed.  Resolved





3.  History of non-small cell lung stage III adenocarcinoma





4.  History of recent prolonged hospital admission in which she was transferred 

to [facility].  Patient was on mechanical ventilation for prolonged 

time requiring tracheostomy





5.  Acute on chronic respiratory failure. 





6.  Bilateral pleural effusions.  Pulmonary services have been consulted





7.  History of COPD





8.  History of CVA





9.  History of osteoarthritis





10.  History of hyperlipidemia





11.  History of fibromyalgia





12.  History of depression





13.  Ex-smoker.  Patient is smoking history of half-pack per day for 30 years





14.  Bilateral lower extremity neuropathy.  Patient started on Lyrica





DVT prophylaxis heparin.  GI prophylaxis Protonix





I performed an examination of the patient and discussed their management with 

the Nurse Practitioner.  I have reviewed the Nurse Practitioner's notes and 

agree with the documented findings and plan of care

## 2019-05-31 NOTE — PN
PROGRESS NOTE



DATE OF SERVICE:

05/31/2019.



REASON FOR FOLLOW UP:

Strep pneumo pneumoniae.



INTERVAL HISTORY:

The patient is currently afebrile.  Patient has been breathing comfortably on trach

collar.  No chest pain.  Occasional cough.  Abdominal pain but no diarrhea.  Some mild

abdominal discomfort.  No diarrhea.



PHYSICAL EXAMINATION:

Blood pressure is 129/62 with a pulse of 82, temperature 98.6.  She is 95% on 40% FiO2.

General description is an elderly female up in the bed in no distress.

Respiratory system:  Unlabored breathing.  Clear to auscultation anteriorly.

Heart S1, S2.  Regular rate and rhythm.

Abdomen soft, no tenderness.



LABS:

Hemoglobin 9.1, white count 13.8, BUN of 12, creatinine 0.44.  Blood culture negative.



DIAGNOSTIC IMPRESSION AND PLAN:

Patient admitted to the hospital with acute respiratory distress in this patient who

did have a right lower lobe pneumonia.  Sputum positive for strep pneumo.  The patient

is currently covered with Rocephin 2 g daily to continue while monitoring her clinical

course closely.  Continue supportive care.





MMODL / SARAHN: [id number] / Job#: [id number]

## 2019-06-01 LAB
ANION GAP SERPL CALC-SCNC: 1 MMOL/L
BUN SERPL-SCNC: 13 MG/DL (ref 7–17)
CALCIUM SPEC-MCNC: 8.1 MG/DL (ref 8.4–10.2)
CHLORIDE SERPL-SCNC: 91 MMOL/L (ref 98–107)
CO2 SERPL-SCNC: 42 MMOL/L (ref 22–30)
ERYTHROCYTE [DISTWIDTH] IN BLOOD BY AUTOMATED COUNT: 2.75 M/UL (ref 3.8–5.4)
ERYTHROCYTE [DISTWIDTH] IN BLOOD: 17.3 % (ref 11.5–15.5)
GLUCOSE BLD-MCNC: 136 MG/DL (ref 75–99)
GLUCOSE BLD-MCNC: 148 MG/DL (ref 75–99)
GLUCOSE BLD-MCNC: 163 MG/DL (ref 75–99)
GLUCOSE BLD-MCNC: 172 MG/DL (ref 75–99)
GLUCOSE BLD-MCNC: 177 MG/DL (ref 75–99)
GLUCOSE SERPL-MCNC: 132 MG/DL (ref 74–99)
HCT VFR BLD AUTO: 26.3 % (ref 34–46)
HGB BLD-MCNC: 8.5 GM/DL (ref 11.4–16)
MAGNESIUM SPEC-SCNC: 1.4 MG/DL (ref 1.6–2.3)
MCH RBC QN AUTO: 31 PG (ref 25–35)
MCHC RBC AUTO-ENTMCNC: 32.5 G/DL (ref 31–37)
MCV RBC AUTO: 95.5 FL (ref 80–100)
PLATELET # BLD AUTO: 250 K/UL (ref 150–450)
POTASSIUM SERPL-SCNC: 3.3 MMOL/L (ref 3.5–5.1)
SODIUM SERPL-SCNC: 134 MMOL/L (ref 137–145)
WBC # BLD AUTO: 11.8 K/UL (ref 3.8–10.6)

## 2019-06-01 RX ADMIN — FUROSEMIDE SCH MG: 10 INJECTION, SOLUTION INTRAMUSCULAR; INTRAVENOUS at 20:38

## 2019-06-01 RX ADMIN — LEUCINE, PHENYLALANINE, LYSINE, METHIONINE, ISOLEUCINE, VALINE, HISTIDINE, THREONINE, TRYPTOPHAN, ALANINE, GLYCINE, ARGININE, PROLINE, SERINE, TYROSINE, DEXTROSE SCH MLS/HR: 365; 280; 290; 200; 300; 290; 240; 210; 90; 1035; 515; 575; 340; 250; 20; 15 INJECTION INTRAVENOUS at 09:05

## 2019-06-01 RX ADMIN — PREGABALIN SCH MG: 50 CAPSULE ORAL at 20:40

## 2019-06-01 RX ADMIN — BUDESONIDE SCH MG: 1 SUSPENSION RESPIRATORY (INHALATION) at 05:56

## 2019-06-01 RX ADMIN — INSULIN ASPART SCH UNIT: 100 INJECTION, SOLUTION INTRAVENOUS; SUBCUTANEOUS at 00:36

## 2019-06-01 RX ADMIN — OXYCODONE HYDROCHLORIDE SCH MG: 10 TABLET, FILM COATED, EXTENDED RELEASE ORAL at 09:07

## 2019-06-01 RX ADMIN — INSULIN ASPART SCH UNIT: 100 INJECTION, SOLUTION INTRAVENOUS; SUBCUTANEOUS at 12:17

## 2019-06-01 RX ADMIN — INSULIN ASPART SCH UNIT: 100 INJECTION, SOLUTION INTRAVENOUS; SUBCUTANEOUS at 06:08

## 2019-06-01 RX ADMIN — METHYLPREDNISOLONE SODIUM SUCCINATE SCH MG: 40 INJECTION, POWDER, FOR SOLUTION INTRAMUSCULAR; INTRAVENOUS at 09:21

## 2019-06-01 RX ADMIN — POTASSIUM CHLORIDE SCH MEQ: 20 TABLET, EXTENDED RELEASE ORAL at 11:14

## 2019-06-01 RX ADMIN — OXYCODONE HYDROCHLORIDE SCH MG: 10 TABLET, FILM COATED, EXTENDED RELEASE ORAL at 20:38

## 2019-06-01 RX ADMIN — AMIODARONE HYDROCHLORIDE SCH MG: 200 TABLET ORAL at 09:07

## 2019-06-01 RX ADMIN — MAGNESIUM SULFATE IN DEXTROSE SCH MLS/HR: 10 INJECTION, SOLUTION INTRAVENOUS at 12:13

## 2019-06-01 RX ADMIN — CHLORHEXIDINE GLUCONATE SCH ML: 1.2 RINSE ORAL at 09:05

## 2019-06-01 RX ADMIN — IPRATROPIUM BROMIDE AND ALBUTEROL SULFATE SCH ML: .5; 3 SOLUTION RESPIRATORY (INHALATION) at 20:08

## 2019-06-01 RX ADMIN — ISODIUM CHLORIDE SCH MG: 0.03 SOLUTION RESPIRATORY (INHALATION) at 05:56

## 2019-06-01 RX ADMIN — METHYLPREDNISOLONE SODIUM SUCCINATE SCH MG: 40 INJECTION, POWDER, FOR SOLUTION INTRAMUSCULAR; INTRAVENOUS at 15:56

## 2019-06-01 RX ADMIN — PANTOPRAZOLE SODIUM SCH MG: 40 INJECTION, POWDER, FOR SOLUTION INTRAVENOUS at 09:05

## 2019-06-01 RX ADMIN — IPRATROPIUM BROMIDE AND ALBUTEROL SULFATE SCH ML: .5; 3 SOLUTION RESPIRATORY (INHALATION) at 11:15

## 2019-06-01 RX ADMIN — INSULIN ASPART SCH UNIT: 100 INJECTION, SOLUTION INTRAVENOUS; SUBCUTANEOUS at 18:28

## 2019-06-01 RX ADMIN — HEPARIN SODIUM SCH UNIT: 5000 INJECTION, SOLUTION INTRAVENOUS; SUBCUTANEOUS at 15:56

## 2019-06-01 RX ADMIN — PREGABALIN SCH MG: 50 CAPSULE ORAL at 09:24

## 2019-06-01 RX ADMIN — MAGNESIUM SULFATE IN DEXTROSE SCH MLS/HR: 10 INJECTION, SOLUTION INTRAVENOUS at 11:14

## 2019-06-01 RX ADMIN — DOCUSATE SODIUM AND SENNOSIDES SCH EACH: 50; 8.6 TABLET ORAL at 20:41

## 2019-06-01 RX ADMIN — LEVOTHYROXINE SODIUM SCH MCG: 50 TABLET ORAL at 06:08

## 2019-06-01 RX ADMIN — POTASSIUM CHLORIDE SCH MEQ: 20 TABLET, EXTENDED RELEASE ORAL at 09:06

## 2019-06-01 RX ADMIN — POTASSIUM BICARBONATE SCH MEQ: 782 TABLET, EFFERVESCENT ORAL at 00:36

## 2019-06-01 RX ADMIN — FUROSEMIDE SCH MG: 10 INJECTION, SOLUTION INTRAMUSCULAR; INTRAVENOUS at 09:05

## 2019-06-01 RX ADMIN — CHLORHEXIDINE GLUCONATE SCH ML: 1.2 RINSE ORAL at 20:38

## 2019-06-01 RX ADMIN — MAGNESIUM SULFATE IN DEXTROSE SCH MLS/HR: 10 INJECTION, SOLUTION INTRAVENOUS at 09:22

## 2019-06-01 RX ADMIN — HEPARIN SODIUM SCH UNIT: 5000 INJECTION, SOLUTION INTRAVENOUS; SUBCUTANEOUS at 09:06

## 2019-06-01 RX ADMIN — HEPARIN SODIUM SCH UNIT: 5000 INJECTION, SOLUTION INTRAVENOUS; SUBCUTANEOUS at 00:36

## 2019-06-01 RX ADMIN — BUDESONIDE SCH MG: 1 SUSPENSION RESPIRATORY (INHALATION) at 20:08

## 2019-06-01 RX ADMIN — NOREPINEPHRINE BITARTRATE SCH: 1 INJECTION, SOLUTION, CONCENTRATE INTRAVENOUS at 06:08

## 2019-06-01 RX ADMIN — DOCUSATE SODIUM AND SENNOSIDES SCH EACH: 50; 8.6 TABLET ORAL at 09:06

## 2019-06-01 RX ADMIN — IPRATROPIUM BROMIDE AND ALBUTEROL SULFATE SCH ML: .5; 3 SOLUTION RESPIRATORY (INHALATION) at 15:18

## 2019-06-01 RX ADMIN — ISODIUM CHLORIDE SCH MG: 0.03 SOLUTION RESPIRATORY (INHALATION) at 01:37

## 2019-06-01 RX ADMIN — LEUCINE, PHENYLALANINE, LYSINE, METHIONINE, ISOLEUCINE, VALINE, HISTIDINE, THREONINE, TRYPTOPHAN, ALANINE, GLYCINE, ARGININE, PROLINE, SERINE, TYROSINE, DEXTROSE SCH MLS/HR: 365; 280; 290; 200; 300; 290; 240; 210; 90; 1035; 515; 575; 340; 250; 20; 15 INJECTION INTRAVENOUS at 12:13

## 2019-06-01 RX ADMIN — DEXTROSE MONOHYDRATE AND SODIUM CHLORIDE SCH MLS/HR: 5; .9 INJECTION, SOLUTION INTRAVENOUS at 12:14

## 2019-06-01 NOTE — P.PN
Subjective


Progress Note Date: 06/01/19


Principal diagnosis: 





Acute sepsis, septic shock, and bilateral pneumonia with sepsis.





This is a very pleasant 63-year-old female patient who follows with Dr. Amaya 

as her primary care physician.  She has a history of CVA/TIA, fibromyalgia, 

gastroesophageal reflux disease, hyperlipidemia, hypothyroidism, chronic back 

pain, depression.  She also has a history of chronic tobacco dependence, oxygen 

dependent chronic obstructive pulmonary disease with an FEV1 value of 56% of pre

dicted, and non-small cell lung cancer stage III adenocarcinoma.  She follows 

with Dr. Pastor in our office. This was diagnosed by an FNA of a left upper lobe 

lesion by IR in May 2017.  She had received concurrent chemoradiation with 

subsequent additional chemotherapy and then placed on maintenance mmunotherapy 

of Imfinzi back in June 2018.  Her last PET scan in September 2018 revealed 

continued left upper lobe mass with irregular margins extending to the pleural 

surface, spiculated appearance but was stable compared to previous and April 2018.  The SUV value was only 1.7.  Subsequent computed tomography scan of the 

chest in January 2019 revealed a stable and slightly smaller spiculated mass of 

the left upper lobe measuring 2.5 x 2.4 cm versus 3.0 x 2.6 previous.  No 

evidence of recurrence or metastasis.  The patient was in the hospital back in 

early March 2019 and she was having ongoing issues with nausea vomiting profound

weakness and fatigue.  She decided to stop taking the immunotherapy. She had 

been treated for his COPD exacerbation 02/13/2019 in our office by Dr. Pastor 

where she received steroids and Bactrim.  She did improve for the first week or 

so. She presented to the emergency room yesterday with complaints of increasing 

shortness of breath cough and congestion.  She did have some lower extremity 

edema.  She was still quite weak and fatigued.  ome dyspnea on exertion.  The 

patient was discharged home on 03/11/2019 as the patient shortness of breath 

improved and the patient recovered from her acute COPD exacerbation and she was 

discharged home on her usual routine medications which included also morphine 

sulfate 30 mg by mouth twice a day Percocet 10/03/2025 every 6 hours and a when 

necessary basis.  In terms of her lung medication she was maintained on a combi

nation of Symbicort and Incruse.  She is also on Synthroid 25 g by mouth daily.

 She was given Lasix to be taken on an as needed basis.





The patient subsequently came back to our hospital on 03/23/2019 because of 

altered mental status.   According to the family the patient was acting weird.  

She was acting unusual and she was confused.  She was also very weak and she was

having episodes of fall.  I do see a bruise over her forehead.  The family told 

that she did not have any active had trauma.  No seizure activity.  No neck 

stiffness.  No documented fever.  She was thrashing significantly yesterday.  A 

CAT scan of the head was done that showed no acute process.  CAT scan of abdomen

and pelvis was done that showed urinary bladder distention along with extensive 

colonic stool.  The patient's ammonia level was at 28.  Urine and blood cultures

were ordered.  The influenza screen was negative.   She did not have any neck 

stiffness.  No fever.  She was at times morning.  Other times she would open up 

her eyes and follows some simple commands.  I was told that this is an 

improvement in her condition compared to yesterday.  She was able to follow some

simple commands however she has not been consistent in following orders.  His 

speech is minimal and the patient may safely worse.  She denies being in pain.  

Urine toxin was ordered.  She was given a dose of Narcan here in the ICU without

much improvement.  Based on all this, I intubated the patient at that time and 

moved to the intensive care unit.  Lumbar puncture was done and was negative and

assess the causes for this patient altered mental status was not established.  

The patient was chest at [facility].  She underwent prolonged 

hospitalization during which she required intubation mechanical ventilation 

tracheostomy tube insertion PEG tube insertion and following that the [place] 

[place] [facility] [place].  

It seems that the patient regained her mentation and she was communicating well.

 I'm not sure of the details of her presentation at [facility]





The patient came in yesterday to the burst department complaining of worsening 

shortness of breath.  Note that the patient has a tracheostomy tube in place.  

She was quite weak and hypotensive and lethargic.  In the burst department the 

patient was found to have bilateral lower lobe consolidation pleural effusions. 

White cell count was at 10.9.  Lactic acid level was at 2.1.  Blood pressure was

quite low.  The patient was given IV fluids and the patient was started on 

pressors.  She was also started on a combination of Zosyn and vancomycin as 

dosed antibiotics for pneumonia.  Blood cultures of been sent.  Sputum cultures 

of been sent.  The patient subsequently was attached to mechanical ventilator.  

Currently, the patient is mechanically ventilated.  The patient is an assist-

control mode of ventilation.  The patient is an FiO2 of 60% with a tidal volume 

of 350 the respiratory rate of 18 and 5 of PEEP.  The patient's blood gases 

showed a pH of 7.3 with a pCO2 of 53 and pO2 of 74.  The patient received IV 

fluids and currently she is on levo fed running at 0.2 g per KG per minute.  He

r white cell count is 1 is at 18.4.  Despite all this, she is arousable.  He is 

following simple commands.  She is not requiring any sedation.  She is currently

in normal sinus rhythm.  She had a triple lumen catheter in her right femoral 

vein.  Denies having any chest pain.  Her lactic acid level dropped from 2.1 

down to 1.5.





Reevaluated today on 5/28/2019, patient remains on mechanical ventilation, she 

is presently on assist control rate of 18 tidal volume of 350 FiO2 of 50% and 

PEEP of 5.  Remains on Levophed at 5.5 mcg/m, she is also on cefepime and 

vancomycin.  Chest x-ray clearly shows evidence of pneumonia, and some chronic 

parenchymal changes with small bilateral pleural effusions and atelectasis.  

Patient had a WBC count of 16.7 hemoglobin of 9.2 ABG showed a pO2 of 97 pCO2 of

43 pH of 7.39.  Electrolytes and renal profile are normal.





Reevaluated today on 5/29/2019, remains on mechanical ventilation, she is 

presently on tidal volume of 350 assist-control rate of 18 FiO2 is down to 40% 

from 50% earlier, and her PEEP is 5.  Remains on antibiotics, and requiring 

norepinephrine at 0.12 mcg/kg/m.  Surprisingly the patient is on mechanical 

ventilation, but fully awake, responsive, asking to be fed, however I plan to 

have a swallow evaluation on this patient, a bit reluctant to start feeding 

while she is on mechanical ventilation.  Patient is asking for coffee.  She was 

given a trial of weaning yesterday from mechanical ventilation and she was 

placed on a trach collar, lasted about 2 hours and had to be placed back on 

mechanical ventilation.  Sputum today is positive for Streptococcus pneumonia 

and the patient is on antibiotics, being followed by infectious disease.  

Initially placed on vancomycin and Zosyn .  WBC count is 13.6 hemoglobin is 9 

ABG showed a pO2 of 132 pCO2 of 55 pH of 7.28.  Basic metabolic profile is 

relatively normal.  Chest x-ray continues to show significant right lower lobe 

consolidation consistent with pneumonia and her left pleural effusion is 

improved.





Reevaluated today on 5/30/2019, remains on mechanical ventilation, presently on 

tidal volume of 350 assist-control rate 18 FiO2 40% and PEEP of 5.  Patient 

tolerated weaning yesterday for about 10 hours, went on a trach collar.  Early 

this morning she was switched back to mechanical ventilation assist control 

mode, a was noted to be more dyspneic, and more short of breath.  Patient is on 

IV fluids which I plan to cut down since her chest x-ray is showing mild 

interstitial edema, and the patient will receive diuretics today.  She is not 

requiring any pressors.  Hemodynamically stable.  Remains on enteral feeding via

PEG tube.  And that is being well-tolerated.  Chest x-ray is a bit concerning to

me today, hence diuretics were added.  In the meantime the patient remains empir

ically on broad-spectrum antibiotics.  Remains on cefepime and vancomycin.





Reevaluated today on 5/31/2019, patient was on mechanical ventilation until an 

hour ago.  She was switched to a trach collar, and she seems to be tolerating 

the trach collar fairly well.  She is on 50% FiO2.  Seems to be comfortable, 

past her swallow evaluation, and she was given liquids to swallow while on trach

collar.  Overall the patient seems to be much better, breathing easier, much 

more comfortable today.  Chest x-ray is basically about the same continues to 

show good sized infiltrate in the right lower lobe with consolidation, WBC count

remains elevated at 13.3 hemoglobin is 9.4.  Patient is hemodynamically stable. 

And she remains on antibiotics for Streptococcus pneumonia involving the right 

lower lobe.





Patient was reevaluated today on 6/1/2019, she is back on mechanical 

ventilation, tolerated trach collar for most of the day yesterday and last 

night.  Patient is back on FiO2 of 40% assist control rate of 18 tidal volume of

350 and PEEP of 5.  Patient is afraid to be placed on trach collar today, she 

feels more short of breath, and her chest x-ray showed worsening bibasilar 

airspace disease, and I suspected some small bilateral pleural effusions, hence 

ultrasound of the chest was ordered, may or may not consider thoracentesis 

depending on the size of the fluids.  In the meantime the patient remains on 

diuretics, she is on Lasix at 40 mg IV push every 12 hours.  Today I also 

recommended that we give her steroids.  Solu-Medrol was started.  And this is 

mostly based on the fact that the patient was noted to be more short of breath 

today, more rhonchi and more wheezes noted bilaterally.  Labs were reviewed 

potassium is a bit low at 3.3 otherwise the labs are unremarkable, hemoglobin is

8.5.








Objective





- Vital Signs


Vital signs: 


                                   Vital Signs











Temp  98.2 F   06/01/19 08:00


 


Pulse  83   06/01/19 11:21


 


Resp  18   06/01/19 11:00


 


BP  93/77   06/01/19 11:00


 


Pulse Ox  97   06/01/19 11:00








                                 Intake & Output











 05/31/19 06/01/19 06/01/19





 18:59 06:59 18:59


 


Intake Total 1360 1160 735


 


Output Total 2050 2550 1525


 


Balance -690 -1390 -790


 


Weight 65.9 kg 67.5 kg 


 


Intake:   


 


   350 390


 


    Dextrose 5%-0.9% NaCl 1, 75  





    000 ml @ 125 mls/hr IV .   





    Q8H MARY Rx#:[id number]   


 


    Magnesium Sulfate-D5w Pmx   100





    1 gm In Dextrose/Water 1   





    100ml.bag @ 100 mls/hr   





    IVPB Q1H MARY Rx#:   





    [id number]   


 


    Potassium Phosphate 10   100





    mmol In Sodium Chloride 0   





    .9% 100 ml @ 50 mls/hr IV   





    Q2H MARY Rx#:[id number]   


 


    cefTRIAXone 2 gm In 50  50





    Sodium Chloride 0.9% 50   





    ml @ 100 mls/hr IVPB   





    Q24HR MARY Rx#:[id number]   


 


    d5/.9+kvo 95 350 140


 


  Oral 420  


 


  Tube Feeding 630 720 315


 


  Other 90 90 30


 


Output:   


 


  Urine 2050 2550 1525


 


Other:   


 


  Voiding Method Indwelling Catheter Indwelling Catheter Indwelling Catheter














- Exam





Physical Exam: Revealed a 63-year-old female, pleasant, awake, on ventilatory 

support, follows all instructions.


Head: Atraumatic, normocephalic,


HEENT:[Neck is supple.] [No neck masses.] [No thyromegaly.] [No JVD.]  PERRLA, 

EOMI, no icterus, tracheostomy is intact.  Moist mucous membranes.


Chest: Rhonchi and wheezes noted bilaterally, more so on the right side.


Cardiac Exam: [Normal S1 and S2, no S3 gallop, no murmur.]


Abdomen: [Soft, nontender,  no megaly, no rebound, no guarding, normal bowel 

sounds.]  PEG tube is intact.


Extremities: [No clubbing, no edema, no cyanosis.]


Neurological Exam: [No focal neurologic deficit.  Alert and oriented 3, follows

 simple instructions.


Psychiatric: Normal mood, affect and mental status examination.


Lymphatics: No lymphadenopathy.


Skin: No rashes.


]





- Labs


CBC & Chem 7: 


                                 06/01/19 05:10





                                 06/01/19 05:10


Labs: 


                  Abnormal Lab Results - Last 24 Hours (Table)











  05/31/19 05/31/19 05/31/19 Range/Units





  11:45 11:50 12:20 


 


WBC  13.8 H    (3.8-10.6)  k/uL


 


RBC  3.30 L    (3.80-5.40)  m/uL


 


Hgb  9.6 L    (11.4-16.0)  gm/dL


 


Hct  32.3 L    (34.0-46.0)  %


 


MCHC  29.7 L    (31.0-37.0)  g/dL


 


RDW  16.7 H    (11.5-15.5)  %


 


Sodium     (137-145)  mmol/L


 


Potassium    3.0 L  (3.5-5.1)  mmol/L


 


Chloride    95 L  ()  mmol/L


 


Carbon Dioxide    41 H*  (22-30)  mmol/L


 


Creatinine    0.44 L  (0.52-1.04)  mg/dL


 


Glucose    139 H  (74-99)  mg/dL


 


POC Glucose (mg/dL)   136 H   (75-99)  mg/dL


 


Calcium    8.3 L  (8.4-10.2)  mg/dL


 


Phosphorus     (2.5-4.5)  mg/dL


 


Magnesium     (1.6-2.3)  mg/dL














  05/31/19 05/31/19 06/01/19 Range/Units





  17:40 20:38 00:15 


 


WBC     (3.8-10.6)  k/uL


 


RBC     (3.80-5.40)  m/uL


 


Hgb     (11.4-16.0)  gm/dL


 


Hct     (34.0-46.0)  %


 


MCHC     (31.0-37.0)  g/dL


 


RDW     (11.5-15.5)  %


 


Sodium     (137-145)  mmol/L


 


Potassium   3.0 L   (3.5-5.1)  mmol/L


 


Chloride     ()  mmol/L


 


Carbon Dioxide     (22-30)  mmol/L


 


Creatinine     (0.52-1.04)  mg/dL


 


Glucose     (74-99)  mg/dL


 


POC Glucose (mg/dL)  137 H   148 H  (75-99)  mg/dL


 


Calcium     (8.4-10.2)  mg/dL


 


Phosphorus     (2.5-4.5)  mg/dL


 


Magnesium     (1.6-2.3)  mg/dL














  06/01/19 06/01/19 06/01/19 Range/Units





  05:10 05:10 05:37 


 


WBC  11.8 H    (3.8-10.6)  k/uL


 


RBC  2.75 L    (3.80-5.40)  m/uL


 


Hgb  8.5 L    (11.4-16.0)  gm/dL


 


Hct  26.3 L    (34.0-46.0)  %


 


MCHC     (31.0-37.0)  g/dL


 


RDW  17.3 H    (11.5-15.5)  %


 


Sodium   134 L   (137-145)  mmol/L


 


Potassium   3.3 L   (3.5-5.1)  mmol/L


 


Chloride   91 L   ()  mmol/L


 


Carbon Dioxide   42 H*   (22-30)  mmol/L


 


Creatinine   0.36 L   (0.52-1.04)  mg/dL


 


Glucose   132 H   (74-99)  mg/dL


 


POC Glucose (mg/dL)    136 H  (75-99)  mg/dL


 


Calcium   8.1 L   (8.4-10.2)  mg/dL


 


Phosphorus   1.9 L   (2.5-4.5)  mg/dL


 


Magnesium   1.4 L   (1.6-2.3)  mg/dL








                      Microbiology - Last 24 Hours (Table)











 05/26/19 22:52 Blood Culture - Preliminary





 Blood    No Growth after 120 hours














Assessment and Plan


Assessment: 





Impression:





1 acute on chronic hypoxic respiratory failure secondary to bilateral pneumonia 

secondary to Streptococcus pneumonia as documented from the sputum culture.  





2 acute sepsis and septic shock secondary to pneumonia patient required fluid 

boluses and pressors.  Presently off norepinephrine, and hemodynamically stable 





3 stage IV non-small cell lung cancer post-chemoradiation therapy and was on 

immunotherapy.





4 severe COPD with acute exacerbation





5 hypertension





6 chronic back pain and fibromyalgia and arthritis





7 history of depression





8 history of recent prolonged course of mechanical ventilation in March of 2019 

requiring tracheostomy and PEG tube placement





9 small bilateral pleural effusions, etiology is not clear, will continue 

diuretics for now, based on the ultrasound findings and based on response to 

diuretics, may have to consider at least a diagnostic/therapeutic right-sided 

thoracentesis.  I believe the effusions could be parapneumonic.  Although the 

possibility of malignant pleural effusions not entirely ruled out.





Recommendation: 





Continue mechanical ventilation, patient is not ready today to be placed back on

trach collar.  In the meantime we'll continue diuretics, ordered an ultrasound 

of the chest, and Solu-Medrol was added.  Change her albuterol to DuoNeb 

updrafts every 4 hours





Continue antibiotics for Streptococcus pneumonia





Continue nutritional support via PEG tube.





Continue GI and DVT prophylaxis





Continue bronchodilators.





Swallow evaluation was done at bedside, and the patient passed the swallow 

evaluation.  Hence she will be allowed to swallow liquids while on trach collar.





Continue to follow in the ICU closely, prognosis is relatively guarded 

considering her multiple comorbidities.





No plans to place the patient on trach collar today, she will remain on 

mechanical ventilation.





Prognosis is definitely guarded, patient remains critically ill, will remain in 

the ICU.  Critical care time is 35 minutes




















 


Time with Patient: Greater than 30

## 2019-06-01 NOTE — P.PN
Subjective


Progress Note Date: 06/01/19





This is  a 63-year-old female patient who presented to the hospital with 

worsening shortness of breath.  Patient has a past medical history of recent 

hospital admission in which she was admitted to [facility] hearing transferred

to [facility] which required prolonged intubation and tracheostomy.  Patient was

recently discharged to recent D/C to [facility] week ago.  Patient has had 

increased sputum production and cough.  Patient has past medical history of non-

small cell lung cancer stage III, CVA, COPD, GERD, hyperlipidemia, 

osteoarthritis,chronic back pain and depression.  Chest x-ray completed in ER 

showing right lower lobe consolidation with bilateral pleural effusions.  

Patient's WBC elevated at 10.9.  Patient also have a lactic acid of 2.1.  UA 

negative.  Patient's blood pressure low.  Patient started on Levophed and 

transferred to the ICU.  Dr. Trejo has been consulted for critical care.  

Patient started on vancomycin and Zosyn for antibiotic.  Blood urine and sputum 

cultures ordered.  At this time patient is on mechanical ventilation resting 

comfortably in bed.  Patient is following commands.








On 05/28/2019 patient maintained on mechanical ventilation but awake and alert. 

Patient remains in intensive care unit on Levophed for pressure support.  

Patient reports that she feels better.  Patient currently on vancomycin and 

Maxipime for antibiotics.  Infectious disease and pulmonary services are 

following.  White Blood cell 16.7.  Blood,urine and sputum cultures pending





On 05/29/2019 patient rates on mechanical ventilation but  awake and alert 

following commands. patient reports that she feels overall improved.  white 

blood cell improving to 13.6.  At this time patient denies chest pain or 

shortness breath.  Patient denies nausea vomiting diarrhea denies any urinary b

urning or frequency





05/30/2019 patient is alert and awake following commands.  Patient remains on m

echanical ventilation.  Patient has been off pressure support medication for 24 

hours.  Infectious disease and pulmonary following.  At this time patient denies

chest pain or shortness breath.  Patient denies nausea vomiting or diarrhea.  

Patient denies any urinary burning or frequency. 





On 05/31/2019 patient is awake alert and awake following commands.  Patient has 

been taken off mechanical ventilation placed on trach mask.  Patient remains in 

the intensive care unit but showing improvement.  Patient has been off pressure 

support medication for over 48 hours.  Infectious disease and pulmonary are 

following.  Antibiotics adjusted per ID.  At this time patient denies chest pain

or shortness of breath.  Patient denies nausea vomiting or diarrhea.  Patient 

denies any urinary burning or frequency





On 06/01/2019 patient is awake alert and awake following commands.  Patient is 

back on mechanical ventilation she was also started on IV Solu-Medrol.  Patient 

remains in the intensive care unit but showing improvement.  She denies any pain

or discomfort at this time, lab results were reviewed potassium is a bit low at 

3.3, hemoglobin is 8.5  Otherwise labs are unremarkable.














Objective





- Vital Signs


Vital signs: 


                                   Vital Signs











Temp  98.2 F   06/01/19 12:00


 


Pulse  77   06/01/19 14:00


 


Resp  10 L  06/01/19 14:00


 


BP  101/67   06/01/19 14:00


 


Pulse Ox  96   06/01/19 14:00








                                 Intake & Output











 05/31/19 06/01/19 06/01/19





 18:59 06:59 18:59


 


Intake Total 1360 1160 1360


 


Output Total 2050 2550 2325


 


Balance -690 -1390 -965


 


Weight 65.9 kg 67.5 kg 


 


Intake:   


 


   350 760


 


    Dextrose 5%-0.9% NaCl 1, 75  





    000 ml @ 125 mls/hr IV .   





    Q8H MARY Rx#:[id number]   


 


    Magnesium Sulfate-D5w Pmx   300





    1 gm In Dextrose/Water 1   





    100ml.bag @ 100 mls/hr   





    IVPB Q1H MARY Rx#:   





    [id number]   


 


    Potassium Phosphate 10   200





    mmol In Sodium Chloride 0   





    .9% 100 ml @ 50 mls/hr IV   





    Q2H MARY Rx#:[id number]   


 


    cefTRIAXone 2 gm In 50  50





    Sodium Chloride 0.9% 50   





    ml @ 100 mls/hr IVPB   





    Q24HR MARY Rx#:[id number]   


 


    d5/.9+kvo 95 350 210


 


  Oral 420  


 


  Tube Feeding 630 720 540


 


  Other 90 90 60


 


Output:   


 


  Urine 2050 2550 2325


 


Other:   


 


  Voiding Method Indwelling Catheter Indwelling Catheter Indwelling Catheter














- Exam








Patient is alert and oriented answering questions appropriately, writing down 

some questions, able to communicate well, she is maintained on mechanical 

ventilation through tracheostomy


Head normocephalic and atraumatic


Neck supple. Tracheostomy in place


Lungs diminished with bilateral rhonchi.  


Heart regular rate and rhythm S1-S2, no rub or gallop


Abdomen is soft nontender nondistended positive bowel sounds no 

hepatosplenomegaly.  PEG tube in place


Extremities no edema no cyanosis or clubbing


Neuro follows commands.  On mechanical ventilation








- Labs


CBC & Chem 7: 


                                 06/01/19 05:10





                                 06/01/19 05:10


Labs: 


                  Abnormal Lab Results - Last 24 Hours (Table)











  05/31/19 05/31/19 06/01/19 Range/Units





  17:40 20:38 00:15 


 


WBC     (3.8-10.6)  k/uL


 


RBC     (3.80-5.40)  m/uL


 


Hgb     (11.4-16.0)  gm/dL


 


Hct     (34.0-46.0)  %


 


RDW     (11.5-15.5)  %


 


Sodium     (137-145)  mmol/L


 


Potassium   3.0 L   (3.5-5.1)  mmol/L


 


Chloride     ()  mmol/L


 


Carbon Dioxide     (22-30)  mmol/L


 


Creatinine     (0.52-1.04)  mg/dL


 


Glucose     (74-99)  mg/dL


 


POC Glucose (mg/dL)  137 H   148 H  (75-99)  mg/dL


 


Calcium     (8.4-10.2)  mg/dL


 


Phosphorus     (2.5-4.5)  mg/dL


 


Magnesium     (1.6-2.3)  mg/dL














  06/01/19 06/01/19 06/01/19 Range/Units





  05:10 05:10 05:37 


 


WBC  11.8 H    (3.8-10.6)  k/uL


 


RBC  2.75 L    (3.80-5.40)  m/uL


 


Hgb  8.5 L    (11.4-16.0)  gm/dL


 


Hct  26.3 L    (34.0-46.0)  %


 


RDW  17.3 H    (11.5-15.5)  %


 


Sodium   134 L   (137-145)  mmol/L


 


Potassium   3.3 L   (3.5-5.1)  mmol/L


 


Chloride   91 L   ()  mmol/L


 


Carbon Dioxide   42 H*   (22-30)  mmol/L


 


Creatinine   0.36 L   (0.52-1.04)  mg/dL


 


Glucose   132 H   (74-99)  mg/dL


 


POC Glucose (mg/dL)    136 H  (75-99)  mg/dL


 


Calcium   8.1 L   (8.4-10.2)  mg/dL


 


Phosphorus   1.9 L   (2.5-4.5)  mg/dL


 


Magnesium   1.4 L   (1.6-2.3)  mg/dL














  06/01/19 Range/Units





  11:46 


 


WBC   (3.8-10.6)  k/uL


 


RBC   (3.80-5.40)  m/uL


 


Hgb   (11.4-16.0)  gm/dL


 


Hct   (34.0-46.0)  %


 


RDW   (11.5-15.5)  %


 


Sodium   (137-145)  mmol/L


 


Potassium   (3.5-5.1)  mmol/L


 


Chloride   ()  mmol/L


 


Carbon Dioxide   (22-30)  mmol/L


 


Creatinine   (0.52-1.04)  mg/dL


 


Glucose   (74-99)  mg/dL


 


POC Glucose (mg/dL)  163 H  (75-99)  mg/dL


 


Calcium   (8.4-10.2)  mg/dL


 


Phosphorus   (2.5-4.5)  mg/dL


 


Magnesium   (1.6-2.3)  mg/dL








                      Microbiology - Last 24 Hours (Table)











 05/26/19 22:52 Blood Culture - Preliminary





 Blood    No Growth after 120 hours














Assessment and Plan


Plan: 





1.  Sepsis present on admission related to pneumonia.  Chest x-ray completed 

showing right lower lobe consolidation.  Bilateral pleural effusions.  Patient 

admitted to the intensive care unit.  Initial lactic acid 2.1.  Blood urine and 

sputum cultures have been ordered.  White blood cell 16.4.  Infectious disease 

following.  Patient on cefepime and Vanco.  Sputum culture currently growing 

Streptococcus pneumoniae.  Repeat chest x-ray completed showing suspected sickle

cell congestive heart failure mild pulmonary vascular congestion stable smal

l-to-moderate pleural effusions or airspace disease.  Likely atelectasis 

multifocal pneumonia and parapneumonic effusions are an alernative 

consideration.  Sputum culture growing Streptococcus pneumoniae.  Antibiotics 

adjusted to Rocephin per infectious disease.  Patient taken off mechanical 

ventilation and placed on trach collar





2.  Hypotension related to above.  Patient maintained on Levophed.  Resolved





3.  History of non-small cell lung stage III adenocarcinoma





4.  History of recent prolonged hospital admission in which she was transferred 

to [facility].  Patient was on mechanical ventilation for prolonged 

time requiring tracheostomy





5.  Acute on chronic respiratory failure. 





6.  Bilateral pleural effusions.  Pulmonary services have been consulted





7.  History of COPD





8.  History of CVA





9.  History of osteoarthritis





10.  History of hyperlipidemia





11.  History of fibromyalgia





12.  History of depression





13.  Ex-smoker.  Patient is smoking history of half-pack per day for 30 years





14.  Bilateral lower extremity neuropathy.  Patient started on Lyrica





DVT prophylaxis heparin.  GI prophylaxis Protonix

## 2019-06-01 NOTE — US
EXAMINATION TYPE: US chest

 

DATE OF EXAM: 6/1/2019

 

COMPARISON: x-ray 6/1/2019

 

CLINICAL HISTORY: Markings for thoracentesis by pulmonary staff. Difficult exam-ICU patient 

 

TECHNIQUE:  Targeted ultrasound of the posterior lower bilateral hemithoraces

 

EXAM MEASUREMENTS:

 

Right Pleural Effusion pocket size:  5.4 cm

**  Right skin surface to fluid distance:  1.8 cm

Left Pleural Effusion pocket size:  4.7 cm

**  Left skin surface to fluid distance:  2.1 cm

 

 

Right side marked for possible thoracentesis outside the dept.

 

Left side marked for possible thoracentesis outside the dept.

 

Pulmonologists are able to review the images in the patient?s EMR.  

 

IMPRESSIONS: 

 

BILATERAL PLEURAL EFFUSIONS.

## 2019-06-01 NOTE — XR
EXAMINATION TYPE: XR chest 1V portable

 

DATE OF EXAM: 6/1/2019

 

HISTORY: Tube placement.

 

REFERENCE: Previous study dated 5/31/2019.

 

FINDINGS: There is a tracheostomy tube in place. Its tip overlies the tracheal air column in this sin
gle frontal projection.

 

There is bibasilar airspace disease. There are bilateral effusions. The heart is upper limits of norm
al in size.

 

IMPRESSION: 

 

NO SIGNIFICANT INTERVAL CHANGE IN THE APPEARANCE OF THE CHEST.

## 2019-06-02 LAB
ANION GAP SERPL CALC-SCNC: 3 MMOL/L
BUN SERPL-SCNC: 18 MG/DL (ref 7–17)
CALCIUM SPEC-MCNC: 8.1 MG/DL (ref 8.4–10.2)
CHLORIDE SERPL-SCNC: 88 MMOL/L (ref 98–107)
CO2 SERPL-SCNC: 44 MMOL/L (ref 22–30)
ERYTHROCYTE [DISTWIDTH] IN BLOOD BY AUTOMATED COUNT: 2.83 M/UL (ref 3.8–5.4)
ERYTHROCYTE [DISTWIDTH] IN BLOOD: 17.4 % (ref 11.5–15.5)
GLUCOSE BLD-MCNC: 136 MG/DL (ref 75–99)
GLUCOSE BLD-MCNC: 144 MG/DL (ref 75–99)
GLUCOSE BLD-MCNC: 263 MG/DL (ref 75–99)
GLUCOSE SERPL-MCNC: 227 MG/DL (ref 74–99)
HCT VFR BLD AUTO: 27 % (ref 34–46)
HGB BLD-MCNC: 8.4 GM/DL (ref 11.4–16)
MAGNESIUM SPEC-SCNC: 2.1 MG/DL (ref 1.6–2.3)
MCH RBC QN AUTO: 29.7 PG (ref 25–35)
MCHC RBC AUTO-ENTMCNC: 31.1 G/DL (ref 31–37)
MCV RBC AUTO: 95.5 FL (ref 80–100)
PLATELET # BLD AUTO: 282 K/UL (ref 150–450)
POTASSIUM SERPL-SCNC: 4 MMOL/L (ref 3.5–5.1)
SODIUM SERPL-SCNC: 135 MMOL/L (ref 137–145)
WBC # BLD AUTO: 10.5 K/UL (ref 3.8–10.6)

## 2019-06-02 RX ADMIN — INSULIN ASPART SCH UNIT: 100 INJECTION, SOLUTION INTRAVENOUS; SUBCUTANEOUS at 13:13

## 2019-06-02 RX ADMIN — OXYCODONE HYDROCHLORIDE SCH MG: 10 TABLET, FILM COATED, EXTENDED RELEASE ORAL at 08:52

## 2019-06-02 RX ADMIN — BUDESONIDE SCH MG: 1 SUSPENSION RESPIRATORY (INHALATION) at 20:36

## 2019-06-02 RX ADMIN — PANTOPRAZOLE SODIUM SCH MG: 40 INJECTION, POWDER, FOR SOLUTION INTRAVENOUS at 08:51

## 2019-06-02 RX ADMIN — OXYCODONE HYDROCHLORIDE SCH: 10 TABLET, FILM COATED, EXTENDED RELEASE ORAL at 20:09

## 2019-06-02 RX ADMIN — HEPARIN SODIUM SCH UNIT: 5000 INJECTION, SOLUTION INTRAVENOUS; SUBCUTANEOUS at 17:38

## 2019-06-02 RX ADMIN — DOCUSATE SODIUM AND SENNOSIDES SCH EACH: 50; 8.6 TABLET ORAL at 08:52

## 2019-06-02 RX ADMIN — AMIODARONE HYDROCHLORIDE SCH MG: 200 TABLET ORAL at 08:52

## 2019-06-02 RX ADMIN — INSULIN ASPART SCH UNIT: 100 INJECTION, SOLUTION INTRAVENOUS; SUBCUTANEOUS at 05:52

## 2019-06-02 RX ADMIN — FUROSEMIDE SCH MG: 10 INJECTION, SOLUTION INTRAMUSCULAR; INTRAVENOUS at 08:51

## 2019-06-02 RX ADMIN — IPRATROPIUM BROMIDE AND ALBUTEROL SULFATE SCH ML: .5; 3 SOLUTION RESPIRATORY (INHALATION) at 08:14

## 2019-06-02 RX ADMIN — METHYLPREDNISOLONE SODIUM SUCCINATE SCH MG: 40 INJECTION, POWDER, FOR SOLUTION INTRAMUSCULAR; INTRAVENOUS at 08:51

## 2019-06-02 RX ADMIN — INSULIN ASPART SCH UNIT: 100 INJECTION, SOLUTION INTRAVENOUS; SUBCUTANEOUS at 00:07

## 2019-06-02 RX ADMIN — LEVOTHYROXINE SODIUM SCH MCG: 50 TABLET ORAL at 05:53

## 2019-06-02 RX ADMIN — CHLORHEXIDINE GLUCONATE SCH ML: 1.2 RINSE ORAL at 20:08

## 2019-06-02 RX ADMIN — IPRATROPIUM BROMIDE AND ALBUTEROL SULFATE SCH ML: .5; 3 SOLUTION RESPIRATORY (INHALATION) at 11:58

## 2019-06-02 RX ADMIN — HEPARIN SODIUM SCH UNIT: 5000 INJECTION, SOLUTION INTRAVENOUS; SUBCUTANEOUS at 00:06

## 2019-06-02 RX ADMIN — IPRATROPIUM BROMIDE AND ALBUTEROL SULFATE SCH ML: .5; 3 SOLUTION RESPIRATORY (INHALATION) at 00:14

## 2019-06-02 RX ADMIN — IPRATROPIUM BROMIDE AND ALBUTEROL SULFATE SCH ML: .5; 3 SOLUTION RESPIRATORY (INHALATION) at 03:30

## 2019-06-02 RX ADMIN — METHYLPREDNISOLONE SODIUM SUCCINATE SCH MG: 40 INJECTION, POWDER, FOR SOLUTION INTRAMUSCULAR; INTRAVENOUS at 17:38

## 2019-06-02 RX ADMIN — HEPARIN SODIUM SCH UNIT: 5000 INJECTION, SOLUTION INTRAVENOUS; SUBCUTANEOUS at 08:51

## 2019-06-02 RX ADMIN — NOREPINEPHRINE BITARTRATE SCH: 1 INJECTION, SOLUTION, CONCENTRATE INTRAVENOUS at 05:54

## 2019-06-02 RX ADMIN — INSULIN ASPART SCH UNIT: 100 INJECTION, SOLUTION INTRAVENOUS; SUBCUTANEOUS at 17:38

## 2019-06-02 RX ADMIN — BUDESONIDE SCH MG: 1 SUSPENSION RESPIRATORY (INHALATION) at 08:14

## 2019-06-02 RX ADMIN — DOCUSATE SODIUM AND SENNOSIDES SCH EACH: 50; 8.6 TABLET ORAL at 20:08

## 2019-06-02 RX ADMIN — METHYLPREDNISOLONE SODIUM SUCCINATE SCH MG: 40 INJECTION, POWDER, FOR SOLUTION INTRAMUSCULAR; INTRAVENOUS at 00:06

## 2019-06-02 RX ADMIN — CHLORHEXIDINE GLUCONATE SCH ML: 1.2 RINSE ORAL at 08:52

## 2019-06-02 RX ADMIN — DEXTROSE MONOHYDRATE AND SODIUM CHLORIDE SCH MLS/HR: 5; .9 INJECTION, SOLUTION INTRAVENOUS at 17:42

## 2019-06-02 RX ADMIN — IPRATROPIUM BROMIDE AND ALBUTEROL SULFATE SCH ML: .5; 3 SOLUTION RESPIRATORY (INHALATION) at 16:09

## 2019-06-02 RX ADMIN — PREGABALIN SCH MG: 50 CAPSULE ORAL at 08:52

## 2019-06-02 RX ADMIN — PREGABALIN SCH MG: 50 CAPSULE ORAL at 20:08

## 2019-06-02 RX ADMIN — IPRATROPIUM BROMIDE AND ALBUTEROL SULFATE SCH ML: .5; 3 SOLUTION RESPIRATORY (INHALATION) at 20:36

## 2019-06-02 NOTE — XR
EXAMINATION TYPE: XR chest 1V portable

 

DATE OF EXAM: 6/2/2019

 

HISTORY: Tube placement.

 

REFERENCE: Previous study dated 6/1/2019.

 

FINDINGS: A tracheostomy tube is present. Its tip overlies the tracheal air column in this single fro
ntal projection.

 

The heart is mildly enlarged. There is bibasilar airspace disease. There are bilateral effusions.

 

IMPRESSION: 

 

NO SIGNIFICANT INTERVAL CHANGE IN THE APPEARANCE OF THE CHEST.

## 2019-06-02 NOTE — P.PN
Subjective


Progress Note Date: 06/02/19





This is  a 63-year-old female patient who presented to the hospital with 

worsening shortness of breath.  Patient has a past medical history of recent 

hospital admission in which she was admitted to [facility] hearing transferred

to [facility] which required prolonged intubation and tracheostomy.  Patient was

recently discharged to recent D/C to [facility] week ago.  Patient has had 

increased sputum production and cough.  Patient has past medical history of non-

small cell lung cancer stage III, CVA, COPD, GERD, hyperlipidemia, 

osteoarthritis,chronic back pain and depression.  Chest x-ray completed in ER 

showing right lower lobe consolidation with bilateral pleural effusions.  

Patient's WBC elevated at 10.9.  Patient also have a lactic acid of 2.1.  UA 

negative.  Patient's blood pressure low.  Patient started on Levophed and 

transferred to the ICU.  Dr. Trejo has been consulted for critical care.  

Patient started on vancomycin and Zosyn for antibiotic.  Blood urine and sputum 

cultures ordered.  At this time patient is on mechanical ventilation resting 

comfortably in bed.  Patient is following commands.








On 05/28/2019 patient maintained on mechanical ventilation but awake and alert. 

Patient remains in intensive care unit on Levophed for pressure support.  

Patient reports that she feels better.  Patient currently on vancomycin and 

Maxipime for antibiotics.  Infectious disease and pulmonary services are 

following.  White Blood cell 16.7.  Blood,urine and sputum cultures pending





On 05/29/2019 patient rates on mechanical ventilation but  awake and alert 

following commands. patient reports that she feels overall improved.  white 

blood cell improving to 13.6.  At this time patient denies chest pain or 

shortness breath.  Patient denies nausea vomiting diarrhea denies any urinary b

urning or frequency





05/30/2019 patient is alert and awake following commands.  Patient remains on m

echanical ventilation.  Patient has been off pressure support medication for 24 

hours.  Infectious disease and pulmonary following.  At this time patient denies

chest pain or shortness breath.  Patient denies nausea vomiting or diarrhea.  

Patient denies any urinary burning or frequency. 





On 05/31/2019 patient is awake alert and awake following commands.  Patient has 

been taken off mechanical ventilation placed on trach mask.  Patient remains in 

the intensive care unit but showing improvement.  Patient has been off pressure 

support medication for over 48 hours.  Infectious disease and pulmonary are 

following.  Antibiotics adjusted per ID.  At this time patient denies chest pain

or shortness of breath.  Patient denies nausea vomiting or diarrhea.  Patient 

denies any urinary burning or frequency





On 06/01/2019 patient is awake alert and awake following commands.  Patient is 

back on mechanical ventilation she was also started on IV Solu-Medrol.  Patient 

remains in the intensive care unit but showing improvement.  She denies any pain

or discomfort at this time, lab results were reviewed potassium is a bit low at 

3.3, hemoglobin is 8.5  Otherwise labs are unremarkable.








On 06/02/2019 patient was seen and examined in the ICU she is alert and oriented

3 she is off the ventilator at this time and is maintained on tracheostomy 

shield with high flow oxygen, there is no fever or chills no headache or 

dizziness no chest pain she has occasional cough no nausea or vomiting no 

abdominal pain no diarrhea and no urinary symptoms





Objective





- Vital Signs


Vital signs: 


                                   Vital Signs











Temp  98.7 F   06/02/19 08:00


 


Pulse  80   06/02/19 08:49


 


Resp  19   06/02/19 08:00


 


BP  104/67   06/02/19 08:00


 


Pulse Ox  97   06/02/19 08:13








                                 Intake & Output











 06/01/19 06/02/19 06/02/19





 18:59 06:59 18:59


 


Intake Total 1710 1195 270


 


Output Total 2605 1785 125


 


Balance -895 -590 145


 


Weight  66.7 kg 


 


Intake:   


 


   385 105


 


    Magnesium Sulfate-D5w Pmx 300  





    1 gm In Dextrose/Water 1   





    100ml.bag @ 100 mls/hr   





    IVPB Q1H MARY Rx#:   





    [id number]   


 


    Potassium Phosphate 10 200  





    mmol In Sodium Chloride 0   





    .9% 100 ml @ 50 mls/hr IV   





    Q2H MARY Rx#:[id number]   


 


    cefTRIAXone 2 gm In 50  





    Sodium Chloride 0.9% 50   





    ml @ 100 mls/hr IVPB   





    Q24HR MARY Rx#:[id number]   


 


    d5/.9+kvo 350 385 105


 


  Tube Feeding 720 720 135


 


  Other 90 90 30


 


Output:   


 


  Urine 2605 1785 125


 


Other:   


 


  Voiding Method Indwelling Catheter Indwelling Catheter 














- Exam








Patient is alert and oriented answering questions appropriately, writing down 

some questions, able to communicate well, she is maintained on mechanical 

ventilation through tracheostomy


Head normocephalic and atraumatic


Neck supple. Tracheostomy in place


Lungs diminished with bilateral rhonchi.  


Heart regular rate and rhythm S1-S2, no rub or gallop


Abdomen is soft nontender nondistended positive bowel sounds no 

hepatosplenomegaly.  PEG tube in place


Extremities no edema no cyanosis or clubbing


Neuro follows commands.  On mechanical ventilation








- Labs


CBC & Chem 7: 


                                 06/02/19 05:27





                                 06/02/19 05:27


Labs: 


                  Abnormal Lab Results - Last 24 Hours (Table)











  06/01/19 06/01/19 06/01/19 Range/Units





  11:46 17:59 23:34 


 


RBC     (3.80-5.40)  m/uL


 


Hgb     (11.4-16.0)  gm/dL


 


Hct     (34.0-46.0)  %


 


RDW     (11.5-15.5)  %


 


Sodium     (137-145)  mmol/L


 


Chloride     ()  mmol/L


 


Carbon Dioxide     (22-30)  mmol/L


 


BUN     (7-17)  mg/dL


 


Creatinine     (0.52-1.04)  mg/dL


 


Glucose     (74-99)  mg/dL


 


POC Glucose (mg/dL)  163 H  172 H  177 H  (75-99)  mg/dL


 


Calcium     (8.4-10.2)  mg/dL














  06/02/19 06/02/19 06/02/19 Range/Units





  05:27 05:27 05:46 


 


RBC  2.83 L    (3.80-5.40)  m/uL


 


Hgb  8.4 L    (11.4-16.0)  gm/dL


 


Hct  27.0 L    (34.0-46.0)  %


 


RDW  17.4 H    (11.5-15.5)  %


 


Sodium   135 L   (137-145)  mmol/L


 


Chloride   88 L   ()  mmol/L


 


Carbon Dioxide   44 H*   (22-30)  mmol/L


 


BUN   18 H   (7-17)  mg/dL


 


Creatinine   0.41 L   (0.52-1.04)  mg/dL


 


Glucose   227 H   (74-99)  mg/dL


 


POC Glucose (mg/dL)    263 H  (75-99)  mg/dL


 


Calcium   8.1 L   (8.4-10.2)  mg/dL








                      Microbiology - Last 24 Hours (Table)











 05/26/19 22:52 Blood Culture - Final





 Blood    No Growth after 144 hours














Assessment and Plan


Plan: 





1.  Sepsis present on admission related to pneumonia.  Chest x-ray completed 

showing right lower lobe consolidation.  Bilateral pleural effusions.  Patient 

admitted to the intensive care unit.  Initial lactic acid 2.1.  Blood urine and 

sputum cultures have been ordered.  White blood cell 16.4.  Infectious disease 

following.  Patient on cefepime and Vanco.  Sputum culture currently growing 

Streptococcus pneumoniae.  Repeat chest x-ray completed showing suspected sickle

cell congestive heart failure mild pulmonary vascular congestion stable 

small-to-moderate pleural effusions or airspace disease.  Likely atelectasis 

multifocal pneumonia and parapneumonic effusions are an alernative 

consideration.  Sputum culture growing Streptococcus pneumoniae.  Antibiotics 

adjusted to Rocephin per infectious disease.  Patient taken off mechanical 

ventilation and placed on trach collar





2.  Hypotension related to above.  Patient maintained on Levophed.  Resolved





3.  History of non-small cell lung stage III adenocarcinoma





4.  History of recent prolonged hospital admission in which she was transferred 

to [facility].  Patient was on mechanical ventilation for prolonged 

time requiring tracheostomy





5.  Acute on chronic respiratory failure. 





6.  Bilateral pleural effusions.  Pulmonary services have been consulted





7.  History of COPD





8.  History of CVA





9.  History of osteoarthritis





10.  History of hyperlipidemia





11.  History of fibromyalgia





12.  History of depression





13.  Ex-smoker.  Patient is smoking history of half-pack per day for 30 years





14.  Bilateral lower extremity neuropathy.  Patient started on Lyrica





DVT prophylaxis heparin.  GI prophylaxis Protonix

## 2019-06-02 NOTE — P.PN
Subjective


Progress Note Date: 06/02/19


Principal diagnosis: 





Acute sepsis, septic shock, and bilateral pneumonia with sepsis.





This is a very pleasant 63-year-old female patient who follows with Dr. Amaya 

as her primary care physician.  She has a history of CVA/TIA, fibromyalgia, 

gastroesophageal reflux disease, hyperlipidemia, hypothyroidism, chronic back 

pain, depression.  She also has a history of chronic tobacco dependence, oxygen 

dependent chronic obstructive pulmonary disease with an FEV1 value of 56% of pre

dicted, and non-small cell lung cancer stage III adenocarcinoma.  She follows 

with Dr. Pastor in our office. This was diagnosed by an FNA of a left upper lobe 

lesion by IR in May 2017.  She had received concurrent chemoradiation with 

subsequent additional chemotherapy and then placed on maintenance mmunotherapy 

of Imfinzi back in June 2018.  Her last PET scan in September 2018 revealed 

continued left upper lobe mass with irregular margins extending to the pleural 

surface, spiculated appearance but was stable compared to previous and April 2018.  The SUV value was only 1.7.  Subsequent computed tomography scan of the 

chest in January 2019 revealed a stable and slightly smaller spiculated mass of 

the left upper lobe measuring 2.5 x 2.4 cm versus 3.0 x 2.6 previous.  No 

evidence of recurrence or metastasis.  The patient was in the hospital back in 

early March 2019 and she was having ongoing issues with nausea vomiting profound

weakness and fatigue.  She decided to stop taking the immunotherapy. She had 

been treated for his COPD exacerbation 02/13/2019 in our office by Dr. Pastor 

where she received steroids and Bactrim.  She did improve for the first week or 

so. She presented to the emergency room yesterday with complaints of increasing 

shortness of breath cough and congestion.  She did have some lower extremity 

edema.  She was still quite weak and fatigued.  ome dyspnea on exertion.  The 

patient was discharged home on 03/11/2019 as the patient shortness of breath 

improved and the patient recovered from her acute COPD exacerbation and she was 

discharged home on her usual routine medications which included also morphine 

sulfate 30 mg by mouth twice a day Percocet 10/03/2025 every 6 hours and a when 

necessary basis.  In terms of her lung medication she was maintained on a combi

nation of Symbicort and Incruse.  She is also on Synthroid 25 g by mouth daily.

 She was given Lasix to be taken on an as needed basis.





The patient subsequently came back to our hospital on 03/23/2019 because of 

altered mental status.   According to the family the patient was acting weird.  

She was acting unusual and she was confused.  She was also very weak and she was

having episodes of fall.  I do see a bruise over her forehead.  The family told 

that she did not have any active had trauma.  No seizure activity.  No neck 

stiffness.  No documented fever.  She was thrashing significantly yesterday.  A 

CAT scan of the head was done that showed no acute process.  CAT scan of abdomen

and pelvis was done that showed urinary bladder distention along with extensive 

colonic stool.  The patient's ammonia level was at 28.  Urine and blood cultures

were ordered.  The influenza screen was negative.   She did not have any neck 

stiffness.  No fever.  She was at times morning.  Other times she would open up 

her eyes and follows some simple commands.  I was told that this is an 

improvement in her condition compared to yesterday.  She was able to follow some

simple commands however she has not been consistent in following orders.  His 

speech is minimal and the patient may safely worse.  She denies being in pain.  

Urine toxin was ordered.  She was given a dose of Narcan here in the ICU without

much improvement.  Based on all this, I intubated the patient at that time and 

moved to the intensive care unit.  Lumbar puncture was done and was negative and

assess the causes for this patient altered mental status was not established.  

The patient was chest at [facility].  She underwent prolonged 

hospitalization during which she required intubation mechanical ventilation 

tracheostomy tube insertion PEG tube insertion and following that the patient 

[place] [facility] [place].  

It seems that the patient regained her mentation and she was communicating well.

 I'm not sure of the details of her presentation at [facility]





The patient came in yesterday to the burst department complaining of worsening 

shortness of breath.  Note that the patient has a tracheostomy tube in place.  

She was quite weak and hypotensive and lethargic.  In the burst department the 

patient was found to have bilateral lower lobe consolidation pleural effusions. 

White cell count was at 10.9.  Lactic acid level was at 2.1.  Blood pressure was

quite low.  The patient was given IV fluids and the patient was started on 

pressors.  She was also started on a combination of Zosyn and vancomycin as 

dosed antibiotics for pneumonia.  Blood cultures of been sent.  Sputum cultures 

of been sent.  The patient subsequently was attached to mechanical ventilator.  

Currently, the patient is mechanically ventilated.  The patient is an assist-

control mode of ventilation.  The patient is an FiO2 of 60% with a tidal volume 

of 350 the respiratory rate of 18 and 5 of PEEP.  The patient's blood gases 

showed a pH of 7.3 with a pCO2 of 53 and pO2 of 74.  The patient received IV 

fluids and currently she is on levo fed running at 0.2 g per KG per minute.  He

r white cell count is 1 is at 18.4.  Despite all this, she is arousable.  He is 

following simple commands.  She is not requiring any sedation.  She is currently

in normal sinus rhythm.  She had a triple lumen catheter in her right femoral 

vein.  Denies having any chest pain.  Her lactic acid level dropped from 2.1 

down to 1.5.





Reevaluated today on 5/28/2019, patient remains on mechanical ventilation, she 

is presently on assist control rate of 18 tidal volume of 350 FiO2 of 50% and 

PEEP of 5.  Remains on Levophed at 5.5 mcg/m, she is also on cefepime and 

vancomycin.  Chest x-ray clearly shows evidence of pneumonia, and some chronic 

parenchymal changes with small bilateral pleural effusions and atelectasis.  

Patient had a WBC count of 16.7 hemoglobin of 9.2 ABG showed a pO2 of 97 pCO2 of

43 pH of 7.39.  Electrolytes and renal profile are normal.





Reevaluated today on 5/29/2019, remains on mechanical ventilation, she is 

presently on tidal volume of 350 assist-control rate of 18 FiO2 is down to 40% 

from 50% earlier, and her PEEP is 5.  Remains on antibiotics, and requiring 

norepinephrine at 0.12 mcg/kg/m.  Surprisingly the patient is on mechanical 

ventilation, but fully awake, responsive, asking to be fed, however I plan to 

have a swallow evaluation on this patient, a bit reluctant to start feeding 

while she is on mechanical ventilation.  Patient is asking for coffee.  She was 

given a trial of weaning yesterday from mechanical ventilation and she was 

placed on a trach collar, lasted about 2 hours and had to be placed back on 

mechanical ventilation.  Sputum today is positive for Streptococcus pneumonia 

and the patient is on antibiotics, being followed by infectious disease.  

Initially placed on vancomycin and Zosyn .  WBC count is 13.6 hemoglobin is 9 

ABG showed a pO2 of 132 pCO2 of 55 pH of 7.28.  Basic metabolic profile is 

relatively normal.  Chest x-ray continues to show significant right lower lobe 

consolidation consistent with pneumonia and her left pleural effusion is 

improved.





Reevaluated today on 5/30/2019, remains on mechanical ventilation, presently on 

tidal volume of 350 assist-control rate 18 FiO2 40% and PEEP of 5.  Patient 

tolerated weaning yesterday for about 10 hours, went on a trach collar.  Early 

this morning she was switched back to mechanical ventilation assist control 

mode, a was noted to be more dyspneic, and more short of breath.  Patient is on 

IV fluids which I plan to cut down since her chest x-ray is showing mild 

interstitial edema, and the patient will receive diuretics today.  She is not 

requiring any pressors.  Hemodynamically stable.  Remains on enteral feeding via

PEG tube.  And that is being well-tolerated.  Chest x-ray is a bit concerning to

me today, hence diuretics were added.  In the meantime the patient remains empir

ically on broad-spectrum antibiotics.  Remains on cefepime and vancomycin.





Reevaluated today on 5/31/2019, patient was on mechanical ventilation until an 

hour ago.  She was switched to a trach collar, and she seems to be tolerating 

the trach collar fairly well.  She is on 50% FiO2.  Seems to be comfortable, 

past her swallow evaluation, and she was given liquids to swallow while on trach

collar.  Overall the patient seems to be much better, breathing easier, much 

more comfortable today.  Chest x-ray is basically about the same continues to 

show good sized infiltrate in the right lower lobe with consolidation, WBC count

remains elevated at 13.3 hemoglobin is 9.4.  Patient is hemodynamically stable. 

And she remains on antibiotics for Streptococcus pneumonia involving the right 

lower lobe.





Patient was reevaluated today on 6/1/2019, she is back on mechanical 

ventilation, tolerated trach collar for most of the day yesterday and last 

night.  Patient is back on FiO2 of 40% assist control rate of 18 tidal volume of

350 and PEEP of 5.  Patient is afraid to be placed on trach collar today, she 

feels more short of breath, and her chest x-ray showed worsening bibasilar 

airspace disease, and I suspected some small bilateral pleural effusions, hence 

ultrasound of the chest was ordered, may or may not consider thoracentesis 

depending on the size of the fluids.  In the meantime the patient remains on 

diuretics, she is on Lasix at 40 mg IV push every 12 hours.  Today I also 

recommended that we give her steroids.  Solu-Medrol was started.  And this is 

mostly based on the fact that the patient was noted to be more short of breath 

today, more rhonchi and more wheezes noted bilaterally.  Labs were reviewed 

potassium is a bit low at 3.3 otherwise the labs are unremarkable, hemoglobin is

8.5.





Reevaluated today on 6/2/2019, remains on mechanical ventilation overnight.  

Breathing a bit easier today, less cough and less wheezing less shortness of 

breath.  Ultrasound of the chest showed small bilateral pleural effusions, 

however her chest x-ray today seems to be slightly improved.  Even on physical 

examination, the patient has less rhonchi and wheezes, hence I plan to place the

patient again on a trial of trach collar.  And if she tolerates trach collar, 

the patient will be fed orally with deflation of the tracheostomy tube.  In the 

meantime the patient remains on enteral feeding via PEG tube.  Chest x-ray was 

reviewed and clearly shows improvement compared to yesterday.  Apparently the 

patient improved quite a bit with bronchodilators around the clock and with 

steroids.  I will keep her today on Solu-Medrol.  And I will keep her on 

diuretics.  However I cut down her Lasix to 40 mg daily and I added Diamox.  250

mg IV push every 12 hours.  Labs were reviewed chest x-ray was reviewed.  

Patient seems to be quite emotional today, she is afraid that we may not be able

to feed her today while on trach collar.  But I explained to her that she will 

be fed if she tolerates trach collar well today.








Objective





- Vital Signs


Vital signs: 


                                   Vital Signs











Temp  99 F   06/02/19 04:00


 


Pulse  110 H  06/02/19 08:35


 


Resp  18   06/02/19 05:00


 


BP  95/55   06/02/19 05:00


 


Pulse Ox  97   06/02/19 08:13








                                 Intake & Output











 06/01/19 06/02/19 06/02/19





 18:59 06:59 18:59


 


Intake Total 1710 1195 


 


Output Total 7957 2845 


 


Balance -895 -590 


 


Weight  66.7 kg 


 


Intake:   


 


   385 


 


    Magnesium Sulfate-D5w Pmx 300  





    1 gm In Dextrose/Water 1   





    100ml.bag @ 100 mls/hr   





    IVPB Q1H MARY Rx#:   





    [id number]   


 


    Potassium Phosphate 10 200  





    mmol In Sodium Chloride 0   





    .9% 100 ml @ 50 mls/hr IV   





    Q2H MARY Rx#:[id number]   


 


    cefTRIAXone 2 gm In 50  





    Sodium Chloride 0.9% 50   





    ml @ 100 mls/hr IVPB   





    Q24HR MARY Rx#:[id number]   


 


    d5/.9+kvo 350 385 


 


  Tube Feeding 720 720 


 


  Other 90 90 


 


Output:   


 


  Urine 2605 1785 


 


Other:   


 


  Voiding Method Indwelling Catheter Indwelling Catheter 














- Exam





Physical Exam: Revealed a 63-year-old female, on mechanical ventilation, 

emotional and tearful..


Head: Atraumatic, normocephalic,


HEENT:[Neck is supple.] [No neck masses.] [No thyromegaly.] [No JVD.]  PERRLA, 

EOMI, no icterus, tracheostomy is intact.  Moist mucous membranes.


Chest: Minimal fine crackles at bases, no rhonchi and no wheezes today.  

Symmetrical chest expansion noted chest wall tenderness..


Cardiac Exam: [Normal S1 and S2, no S3 gallop, no murmur.]


Abdomen: [Soft, nontender,  no megaly, no rebound, no guarding, normal bowel 

sounds.]  PEG tube is intact.


Extremities: [No clubbing, no edema, no cyanosis.]


Neurological Exam: [No focal neurologic deficit.  Alert and oriented 3, follows

 simple instructions.


Psychiatric: Emotional, tearful, otherwise unremarkable.  Patient is concerned 

that we may not be able to feed her today.  She likes to have some coffee at 

least.


Lymphatics: No lymphadenopathy.


Skin: No rashes.


]





- Labs


CBC & Chem 7: 


                                 06/02/19 05:27





                                 06/02/19 05:27


Labs: 


                  Abnormal Lab Results - Last 24 Hours (Table)











  06/01/19 06/01/19 06/01/19 Range/Units





  11:46 17:59 23:34 


 


RBC     (3.80-5.40)  m/uL


 


Hgb     (11.4-16.0)  gm/dL


 


Hct     (34.0-46.0)  %


 


RDW     (11.5-15.5)  %


 


Sodium     (137-145)  mmol/L


 


Chloride     ()  mmol/L


 


Carbon Dioxide     (22-30)  mmol/L


 


BUN     (7-17)  mg/dL


 


Creatinine     (0.52-1.04)  mg/dL


 


Glucose     (74-99)  mg/dL


 


POC Glucose (mg/dL)  163 H  172 H  177 H  (75-99)  mg/dL


 


Calcium     (8.4-10.2)  mg/dL














  06/02/19 06/02/19 06/02/19 Range/Units





  05:27 05:27 05:46 


 


RBC  2.83 L    (3.80-5.40)  m/uL


 


Hgb  8.4 L    (11.4-16.0)  gm/dL


 


Hct  27.0 L    (34.0-46.0)  %


 


RDW  17.4 H    (11.5-15.5)  %


 


Sodium   135 L   (137-145)  mmol/L


 


Chloride   88 L   ()  mmol/L


 


Carbon Dioxide   44 H*   (22-30)  mmol/L


 


BUN   18 H   (7-17)  mg/dL


 


Creatinine   0.41 L   (0.52-1.04)  mg/dL


 


Glucose   227 H   (74-99)  mg/dL


 


POC Glucose (mg/dL)    263 H  (75-99)  mg/dL


 


Calcium   8.1 L   (8.4-10.2)  mg/dL








                      Microbiology - Last 24 Hours (Table)











 05/26/19 22:52 Blood Culture - Final





 Blood    No Growth after 144 hours














Assessment and Plan


Assessment: 





Impression:





1 acute on chronic hypoxic respiratory failure secondary to bilateral pneumonia 

secondary to Streptococcus pneumonia as documented from the sputum culture.  





2 acute sepsis and septic shock secondary to pneumonia patient required fluid 

boluses and pressors.  Presently off norepinephrine, and hemodynamically stable 





3 stage IV non-small cell lung cancer post-chemoradiation therapy and was on i

mmunotherapy.





4 severe COPD with acute exacerbation





5 hypertension





6 chronic back pain and fibromyalgia and arthritis





7 history of depression





8 history of recent prolonged course of mechanical ventilation in March of 2019 

requiring tracheostomy and PEG tube placement





9 small bilateral pleural effusions, etiology is not clear, I believe the 

effusions could be parapneumonic.  Although the possibility of malignant pleural

effusions not entirely ruled out.  Considering the improvement on the chest x-

ray today, I have no plans to perform thoracentesis on the patient today.





Recommendation: 





Continue mechanical ventilation, but I would definitely try the patient on trach

collar again today.





Continue antibiotics for Streptococcus pneumonia, continue to monitor the 

parapneumonic effusions in.  In the meantime continue diuretics





Continue nutritional support via PEG tube.





Continue GI and DVT prophylaxis





Continue bronchodilators.





Try patient on trach collar again today, and allowed to feed orally with the 

cuff deflated on the tracheostomy tube.  Patient had a previous swallow evalua

tion and she passed





Continue to follow in the ICU closely, prognosis is relatively guarded consider

ing her multiple comorbidities.





No plans to place the patient on trach collar today, she will remain on 

mechanical ventilation.





Prognosis is definitely guarded, patient remains critically ill, will remain in 

the ICU.  Critical care time is 32 minutes.

















 


Time with Patient: Greater than 30

## 2019-06-03 LAB
ANION GAP SERPL CALC-SCNC: 5 MMOL/L
BUN SERPL-SCNC: 25 MG/DL (ref 7–17)
CALCIUM SPEC-MCNC: 8.9 MG/DL (ref 8.4–10.2)
CHLORIDE SERPL-SCNC: 93 MMOL/L (ref 98–107)
CO2 SERPL-SCNC: 39 MMOL/L (ref 22–30)
ERYTHROCYTE [DISTWIDTH] IN BLOOD BY AUTOMATED COUNT: 2.99 M/UL (ref 3.8–5.4)
ERYTHROCYTE [DISTWIDTH] IN BLOOD: 16.9 % (ref 11.5–15.5)
GLUCOSE BLD-MCNC: 149 MG/DL (ref 75–99)
GLUCOSE BLD-MCNC: 180 MG/DL (ref 75–99)
GLUCOSE BLD-MCNC: 189 MG/DL (ref 75–99)
GLUCOSE BLD-MCNC: 194 MG/DL (ref 75–99)
GLUCOSE BLD-MCNC: 219 MG/DL (ref 75–99)
GLUCOSE BLD-MCNC: 224 MG/DL (ref 75–99)
GLUCOSE SERPL-MCNC: 201 MG/DL (ref 74–99)
HCT VFR BLD AUTO: 29.7 % (ref 34–46)
HGB BLD-MCNC: 8.8 GM/DL (ref 11.4–16)
MCH RBC QN AUTO: 29.5 PG (ref 25–35)
MCHC RBC AUTO-ENTMCNC: 29.8 G/DL (ref 31–37)
MCV RBC AUTO: 99.1 FL (ref 80–100)
PLATELET # BLD AUTO: 345 K/UL (ref 150–450)
POTASSIUM SERPL-SCNC: 3.7 MMOL/L (ref 3.5–5.1)
SODIUM SERPL-SCNC: 137 MMOL/L (ref 137–145)
WBC # BLD AUTO: 13.7 K/UL (ref 3.8–10.6)

## 2019-06-03 RX ADMIN — IPRATROPIUM BROMIDE AND ALBUTEROL SULFATE SCH ML: .5; 3 SOLUTION RESPIRATORY (INHALATION) at 20:15

## 2019-06-03 RX ADMIN — OXYCODONE HYDROCHLORIDE SCH MG: 10 TABLET, FILM COATED, EXTENDED RELEASE ORAL at 20:29

## 2019-06-03 RX ADMIN — INSULIN ASPART SCH UNIT: 100 INJECTION, SOLUTION INTRAVENOUS; SUBCUTANEOUS at 23:23

## 2019-06-03 RX ADMIN — HEPARIN SODIUM SCH UNIT: 5000 INJECTION, SOLUTION INTRAVENOUS; SUBCUTANEOUS at 23:12

## 2019-06-03 RX ADMIN — IPRATROPIUM BROMIDE AND ALBUTEROL SULFATE SCH ML: .5; 3 SOLUTION RESPIRATORY (INHALATION) at 08:16

## 2019-06-03 RX ADMIN — METHYLPREDNISOLONE SODIUM SUCCINATE SCH MG: 40 INJECTION, POWDER, FOR SOLUTION INTRAMUSCULAR; INTRAVENOUS at 00:03

## 2019-06-03 RX ADMIN — IPRATROPIUM BROMIDE AND ALBUTEROL SULFATE SCH ML: .5; 3 SOLUTION RESPIRATORY (INHALATION) at 11:43

## 2019-06-03 RX ADMIN — IPRATROPIUM BROMIDE AND ALBUTEROL SULFATE SCH ML: .5; 3 SOLUTION RESPIRATORY (INHALATION) at 15:41

## 2019-06-03 RX ADMIN — METHYLPREDNISOLONE SODIUM SUCCINATE SCH MG: 40 INJECTION, POWDER, FOR SOLUTION INTRAMUSCULAR; INTRAVENOUS at 23:12

## 2019-06-03 RX ADMIN — DOCUSATE SODIUM AND SENNOSIDES SCH EACH: 50; 8.6 TABLET ORAL at 20:29

## 2019-06-03 RX ADMIN — HEPARIN SODIUM SCH UNIT: 5000 INJECTION, SOLUTION INTRAVENOUS; SUBCUTANEOUS at 15:59

## 2019-06-03 RX ADMIN — SODIUM BICARBONATE SCH MLS/HR: 84 INJECTION, SOLUTION INTRAVENOUS at 09:42

## 2019-06-03 RX ADMIN — IPRATROPIUM BROMIDE AND ALBUTEROL SULFATE SCH ML: .5; 3 SOLUTION RESPIRATORY (INHALATION) at 00:04

## 2019-06-03 RX ADMIN — DOCUSATE SODIUM AND SENNOSIDES SCH EACH: 50; 8.6 TABLET ORAL at 08:45

## 2019-06-03 RX ADMIN — PANTOPRAZOLE SODIUM SCH MG: 40 INJECTION, POWDER, FOR SOLUTION INTRAVENOUS at 08:44

## 2019-06-03 RX ADMIN — INSULIN ASPART SCH UNIT: 100 INJECTION, SOLUTION INTRAVENOUS; SUBCUTANEOUS at 05:28

## 2019-06-03 RX ADMIN — CHLORHEXIDINE GLUCONATE SCH ML: 1.2 RINSE ORAL at 08:43

## 2019-06-03 RX ADMIN — AMIODARONE HYDROCHLORIDE SCH MG: 200 TABLET ORAL at 08:40

## 2019-06-03 RX ADMIN — BUDESONIDE SCH MG: 1 SUSPENSION RESPIRATORY (INHALATION) at 20:17

## 2019-06-03 RX ADMIN — FORMOTEROL FUMARATE DIHYDRATE SCH MCG: 20 SOLUTION RESPIRATORY (INHALATION) at 20:17

## 2019-06-03 RX ADMIN — NOREPINEPHRINE BITARTRATE SCH: 1 INJECTION, SOLUTION, CONCENTRATE INTRAVENOUS at 05:28

## 2019-06-03 RX ADMIN — ONDANSETRON PRN MG: 2 INJECTION INTRAMUSCULAR; INTRAVENOUS at 08:45

## 2019-06-03 RX ADMIN — METHYLPREDNISOLONE SODIUM SUCCINATE SCH MG: 40 INJECTION, POWDER, FOR SOLUTION INTRAMUSCULAR; INTRAVENOUS at 15:59

## 2019-06-03 RX ADMIN — INSULIN ASPART SCH UNIT: 100 INJECTION, SOLUTION INTRAVENOUS; SUBCUTANEOUS at 13:14

## 2019-06-03 RX ADMIN — METHYLPREDNISOLONE SODIUM SUCCINATE SCH MG: 40 INJECTION, POWDER, FOR SOLUTION INTRAMUSCULAR; INTRAVENOUS at 08:38

## 2019-06-03 RX ADMIN — LEVOTHYROXINE SODIUM SCH MCG: 50 TABLET ORAL at 05:28

## 2019-06-03 RX ADMIN — HEPARIN SODIUM SCH UNIT: 5000 INJECTION, SOLUTION INTRAVENOUS; SUBCUTANEOUS at 00:03

## 2019-06-03 RX ADMIN — BUDESONIDE SCH: 1 SUSPENSION RESPIRATORY (INHALATION) at 12:17

## 2019-06-03 RX ADMIN — IPRATROPIUM BROMIDE AND ALBUTEROL SULFATE SCH ML: .5; 3 SOLUTION RESPIRATORY (INHALATION) at 03:29

## 2019-06-03 RX ADMIN — OXYCODONE HYDROCHLORIDE SCH MG: 10 TABLET, FILM COATED, EXTENDED RELEASE ORAL at 08:37

## 2019-06-03 RX ADMIN — INSULIN ASPART SCH UNIT: 100 INJECTION, SOLUTION INTRAVENOUS; SUBCUTANEOUS at 00:03

## 2019-06-03 RX ADMIN — CHLORHEXIDINE GLUCONATE SCH ML: 1.2 RINSE ORAL at 20:30

## 2019-06-03 RX ADMIN — INSULIN ASPART SCH UNIT: 100 INJECTION, SOLUTION INTRAVENOUS; SUBCUTANEOUS at 18:25

## 2019-06-03 RX ADMIN — HEPARIN SODIUM SCH UNIT: 5000 INJECTION, SOLUTION INTRAVENOUS; SUBCUTANEOUS at 08:37

## 2019-06-03 RX ADMIN — PREGABALIN SCH MG: 50 CAPSULE ORAL at 20:28

## 2019-06-03 RX ADMIN — FUROSEMIDE SCH MG: 10 INJECTION, SOLUTION INTRAMUSCULAR; INTRAVENOUS at 08:44

## 2019-06-03 RX ADMIN — PREGABALIN SCH MG: 50 CAPSULE ORAL at 08:53

## 2019-06-03 NOTE — XR
EXAMINATION TYPE: XR chest 1V portable

 

DATE OF EXAM: 6/3/2019

 

COMPARISON: 6/2/2019

 

HISTORY: Shortness of breath. Follow-up exam.

 

TECHNIQUE: Single frontal view of the chest is obtained.

 

FINDINGS:  There is an enlarged cardiac mediastinal silhouette is partially obscured. Layering pleura
l effusions are seen similar to the prior, right greater than left with associated bibasilar airspace
 disease. There is mild pulmonary vascular congestion throughout. Median tracheostomy tube is again s
een. There is mild osseous demineralization. No pneumothorax is appreciated.

 

IMPRESSION:  Similar small pleural effusions, right greater than left and associated bibasilar airspa
ce disease with mild pulmonary vascular congestion in comparison to the exam of 6/2/2019.

## 2019-06-03 NOTE — PN
PROGRESS NOTE



DATE OF SERVICE:

Charmaine 3, 2019



This is a 63-year-old patient with a history of acute on chronic hypoxemic respiratory

failure secondary to bilateral pneumonia caused by Streptococcus pneumoniae.  The

patient apparently was admitted back on May 27th with septic shock and right lower lobe

pneumonia.  She was previously at this hospital between March 22nd and March 27th for

an episode of mental status change.  She was shipped to [facility].  She spent

34 days at [facility] and ended up with a tracheostomy and PEG tube placement.  From

there, she went to [facility] and then to [facility].  She

was sent to this facility [place] [facility] [place] subsequent to development of what

appears to be pneumonia and lower saturations on pulse oximetry testing.  The patient

was recently taken off the vent on June 2nd.  The patient is currently on trach collar

at 40%.  She is getting D5 0.9 IV at 25 mL an hour and she has got Vital AF at 45 with

a goal of 45 mL an hour.  Sputum showed evidence of Streptococcus pneumoniae.  The

patient in addition has a history of stage IV non-small cell lung cancer, status post

chemotherapy chemoradiation and previous immune therapy.  She has a history of

underlying COPD, hypertension, chronic back pain, depression, and a number of

comorbidities.  Currently, she is resting comfortably.  She is sitting up in bed.  She

feels like she is improved.



The patient denies any chest pain or chest discomfort.  States that her breathing is

stable.  Denies any cough or phlegm production.  Denies fever, chills.  Denies any GI

or  complaints at this time.



PHYSICAL EXAMINATION:

VITAL SIGNS: Current vital signs are reviewed.  Temperature is 98.2, heart rate 84,

respiratory rate 17, blood pressure 138/83, mean 101, saturations are about 96% on the

40% trach collar.

GENERAL: She appears in no acute distress.  She is mildly tachypneic.  No audible

wheezing or use of accessory muscles.

HEENT: Examination is grossly unremarkable.

NECK:  Supple.  Full range of motion.  There is midline tracheostomy with a Passy-Ephraim

valve.  Trach collar in place.  No neck vein distention.

CARDIOVASCULAR: Examination reveals regular rhythm and rate.  Heart rate about 80 beats

per minute.  S1, S2 normal.  No S3, S4, or murmur.  Heart sounds are distant, though.

LUNGS:  Reveal a few scattered rhonchi.  No wheezes.  No crackles.  Breath sounds

equal.

ABDOMEN:  Soft.  Bowel sounds are heard.

EXTREMITIES: Are intact.  No cyanosis, clubbing, or edema.

SKIN: Without rash.

NEUROLOGIC: Examination reveals she moves all 4 extremities.  She is alert and

oriented.  She does have some muscle weakness, though.



MICROBIOLOGIC STUDY:

Microbiologic study shows evidence of sputum with evidence of Streptococcus pneumoniae

both on May 27 and May 26.  Blood cultures have been negative.  Urine is negative.



LAB DATA:

Lab data is reviewed.  White count 13.7, hemoglobin 8.8, hematocrit 29.7, platelet

count 345,000. Sodium 137, potassium 3.7, chloride 93, CO2 of 39.  BUN and creatinine

were 25 and 0.51.



X-RAY:

Chest x-ray shows a well-positioned tracheostomy tube.  There is some fluid at the

right base with some volume loss of the right base.  There is probably some pleural

effusion at the left base as well.



Microbiology is previously mentioned.



MEDICATIONS:

Medications are reviewed.  She is on Diamox, Cordarone, Pulmicort Rocephin,

chlorhexidine, fentanyl, Lasix, subcu heparin, insulin, updrafts, lactulose,

levothyroxine, Ativan, magnesium replacement, Solu-Medrol, Versed, Narcan, Levophed,

Zofran, oxycodone, Protonix, phosphorus replacement, MiraLAX,  potassium replacement,

Lyrica, Seroquel, Senokot.



ASSESSMENT:

1. Acute on chronic hypoxemic respiratory failure secondary to bilateral pneumonia

    caused by Streptococcus pneumoniae.

2. Acute sepsis and septic shock initially requiring fluid resuscitation and

    norepinephrine.

3. Stage IV non-small cell lung cancer, status post chemoradiation therapy and now on

    immunotherapy.

4. Severe chronic obstructive pulmonary disease with chronic obstructive pulmonary

    disease exacerbation.

5. Benign essential hypertension.

6. Chronic back pain.

7. Fibromyalgia.

8. Arthritis.

9. History of depression.

10.Status post recent prolonged course of mechanical ventilation for 34 days at [facility], status post tracheostomy and PEG tube placement.

11.Bilateral pleural effusions, which may be malignant in etiology.



PLAN:

Currently, the patient seemed to be relatively stable.  She is on a 40% trach collar.

Chest x-ray does show bilateral pleural effusions.  She is getting D5 0.9 IV at 25 mL

an hour and tube feeds.  She seems to think that she has been doing better.  Will

continue to follow closely.  Her overall prognosis is poor though given her diagnosis

of stage IV non-small cell lung cancer.  She has already gone through chemoradiation

therapy and was on immunotherapy.  Additional recommendations and suggestions are

forthcoming.  We will continue to follow.



CRITICAL CARE TIME: 34 minutes.



HAYDEE / CLAUDIA: [id number] / Job#: [id number]

## 2019-06-03 NOTE — PN
PROGRESS NOTE



DATE OF SERVICE:

06/03/2019.



REASON FOR FOLLOW UP:

Strep pneumo pneumonia.



INTERVAL HISTORY:

The patient is currently afebrile.  Patient is breathing comfortably.  Denies having

any chest pain.  Occasional cough.  No abdominal pain.  No nausea, vomiting or

diarrhea.  Currently on trach collar.



On examination, blood pressure 141/80 with a pulse of 83, temperature 98.5.  She is 93%

on trach collar. General description is an elderly female lying in bed in no distress.

Respiratory system:  Unlabored breathing.  Some coarse breath sounds bilaterally.  No

wheeze.  Heart S1, S2.  Regular rate and rhythm. Abdomen soft.  No tenderness.



LABS:

Hemoglobin 8.8, white count 13.7, BUN of 25, creatinine 0.51.



DIAGNOSTIC IMPRESSION AND PLAN:

1. Patient with right lower lobe pneumonia, likely chronic strep pneumo, currently

    covered with Rocephin 2 g daily with the plan to finish therapy with oral

    antibiotics.

2. The patient with leukocytosis, more likely steroid related. Will monitor closely.

    The patient not showing any evidence of worsening of clinical condition.

3. Continue supportive care.





MMODL / IJN: [id number] / Job#: [id number]

## 2019-06-03 NOTE — P.PN
Subjective


Progress Note Date: 06/03/19





This is  a 63-year-old female patient who presented to the hospital with 

worsening shortness of breath.  Patient has a past medical history of recent 

hospital admission in which she was admitted to [facility] hearing transferred

to [facility] which required prolonged intubation and tracheostomy.  Patient was

recently discharged to recent D/C to [facility] week ago.  Patient has had 

increased sputum production and cough.  Patient has past medical history of non-

small cell lung cancer stage III, CVA, COPD, GERD, hyperlipidemia, 

osteoarthritis,chronic back pain and depression.  Chest x-ray completed in ER 

showing right lower lobe consolidation with bilateral pleural effusions.  

Patient's WBC elevated at 10.9.  Patient also have a lactic acid of 2.1.  UA 

negative.  Patient's blood pressure low.  Patient started on Levophed and 

transferred to the ICU.  Dr. Trejo has been consulted for critical care.  

Patient started on vancomycin and Zosyn for antibiotic.  Blood urine and sputum 

cultures ordered.  At this time patient is on mechanical ventilation resting 

comfortably in bed.  Patient is following commands.








On 05/28/2019 patient maintained on mechanical ventilation but awake and alert. 

Patient remains in intensive care unit on Levophed for pressure support.  

Patient reports that she feels better.  Patient currently on vancomycin and 

Maxipime for antibiotics.  Infectious disease and pulmonary services are 

following.  White Blood cell 16.7.  Blood,urine and sputum cultures pending





On 05/29/2019 patient rates on mechanical ventilation but  awake and alert 

following commands. patient reports that she feels overall improved.  white 

blood cell improving to 13.6.  At this time patient denies chest pain or 

shortness breath.  Patient denies nausea vomiting diarrhea denies any urinary b

urning or frequency





05/30/2019 patient is alert and awake following commands.  Patient remains on m

echanical ventilation.  Patient has been off pressure support medication for 24 

hours.  Infectious disease and pulmonary following.  At this time patient denies

chest pain or shortness breath.  Patient denies nausea vomiting or diarrhea.  

Patient denies any urinary burning or frequency. 





On 05/31/2019 patient is awake alert and awake following commands.  Patient has 

been taken off mechanical ventilation placed on trach mask.  Patient remains in 

the intensive care unit but showing improvement.  Patient has been off pressure 

support medication for over 48 hours.  Infectious disease and pulmonary are 

following.  Antibiotics adjusted per ID.  At this time patient denies chest pain

or shortness of breath.  Patient denies nausea vomiting or diarrhea.  Patient 

denies any urinary burning or frequency








On 06/01/2019 patient is awake alert and awake following commands.  Patient is 

back on mechanical ventilation she was also started on IV Solu-Medrol.  Patient 

remains in the intensive care unit but showing improvement.  She denies any pain

or discomfort at this time, lab results were reviewed potassium is a bit low at 

3.3, hemoglobin is 8.5  Otherwise labs are unremarkable.








On 06/02/2019 patient was seen and examined in the ICU she is alert and oriented

3 she is off the ventilator at this time and is maintained on tracheostomy 

shield with high flow oxygen, there is no fever or chills no headache or 

dizziness no chest pain she has occasional cough no nausea or vomiting no 

abdominal pain no diarrhea and no urinary symptoms








On 06/03/2019 patient alert and oriented to 3.  Patient remains on trach 

collar.  Patient is showing improvement.  At this time patient denies any chest 

pain or shortness breath.  Patient denies nausea vomiting or diarrhea.  Patient 

denies any urinary burning or frequency. 





Objective





- Vital Signs


Vital signs: 


                                   Vital Signs











Temp  98.2 F   06/03/19 04:00


 


Pulse  84   06/03/19 08:30


 


Resp  17   06/03/19 06:00


 


BP  138/83   06/03/19 06:00


 


Pulse Ox  96   06/03/19 06:12








                                 Intake & Output











 06/02/19 06/03/19 06/03/19





 18:59 06:59 18:59


 


Intake Total 1185 1100 


 


Output Total 1415 1205 


 


Balance -230 -105 


 


Weight  64.5 kg 


 


Intake:   


 


   425 


 


    d5/.9+kvo 420 425 


 


  Tube Feeding 675 585 


 


  Other 90 90 


 


Output:   


 


  Urine 1415 1205 


 


Other:   


 


  Voiding Method Indwelling Catheter Indwelling Catheter 














- Exam





Head normocephalic


Neck supple. Tracheostomy in place.  Trach collar with shield


Lungs diminished with bilateral rhonchi.  


Heart regular rate and rhythm S1-S2, no rub or gallop


Abdomen is soft nontender nondistended positive bowel sounds no 

hepatosplenomegaly.  PEG tube in place


Extremities no edema


Neuro follows commands.  On mechanical ventilation





- Labs


CBC & Chem 7: 


                                 06/03/19 06:10





                                 06/03/19 06:10


Labs: 


                  Abnormal Lab Results - Last 24 Hours (Table)











  06/02/19 06/02/19 06/02/19 Range/Units





  12:01 17:35 23:59 


 


WBC     (3.8-10.6)  k/uL


 


RBC     (3.80-5.40)  m/uL


 


Hgb     (11.4-16.0)  gm/dL


 


Hct     (34.0-46.0)  %


 


MCHC     (31.0-37.0)  g/dL


 


RDW     (11.5-15.5)  %


 


Chloride     ()  mmol/L


 


Carbon Dioxide     (22-30)  mmol/L


 


BUN     (7-17)  mg/dL


 


Creatinine     (0.52-1.04)  mg/dL


 


Glucose     (74-99)  mg/dL


 


POC Glucose (mg/dL)  144 H  136 H  219 H  (75-99)  mg/dL














  06/03/19 06/03/19 06/03/19 Range/Units





  05:21 06:10 06:10 


 


WBC   13.7 H   (3.8-10.6)  k/uL


 


RBC   2.99 L   (3.80-5.40)  m/uL


 


Hgb   8.8 L   (11.4-16.0)  gm/dL


 


Hct   29.7 L   (34.0-46.0)  %


 


MCHC   29.8 L   (31.0-37.0)  g/dL


 


RDW   16.9 H   (11.5-15.5)  %


 


Chloride    93 L  ()  mmol/L


 


Carbon Dioxide    39 H  (22-30)  mmol/L


 


BUN    25 H  (7-17)  mg/dL


 


Creatinine    0.51 L  (0.52-1.04)  mg/dL


 


Glucose    201 H  (74-99)  mg/dL


 


POC Glucose (mg/dL)  224 H    (75-99)  mg/dL














Assessment and Plan


Assessment: 





1.  Sepsis present on admission related to pneumonia.  Chest x-ray completed 

showing right lower lobe consolidation.  Bilateral pleural effusions.  Patient 

admitted to the intensive care unit.  Initial lactic acid 2.1.  Blood urine and 

sputum cultures have been ordered.  White blood cell 16.4.  Infectious disease 

following.  Patient on cefepime and Vanco.  Sputum culture currently growing 

Streptococcus pneumoniae.  Repeat chest x-ray completed showing suspected sickle

cell congestive heart failure mild pulmonary vascular congestion stable 

small-to-moderate pleural effusions or airspace disease.  Likely atelectasis 

multifocal pneumonia and parapneumonic effusions are an alernative 

consideration.  Sputum culture growing Streptococcus pneumoniae.  Antibiotics 

adjusted to Rocephin per infectious disease.  Patient taken off mechanical 

ventilation and placed on trach collar





2.  Hypotension related to above.  Patient maintained on Levophed.  Resolved





3.  History of non-small cell lung stage III adenocarcinoma





4.  History of recent prolonged hospital admission in which she was transferred 

to [facility].  Patient was on mechanical ventilation for prolonged 

time requiring tracheostomy





5.  Acute on chronic respiratory failure. 





6.  Bilateral pleural effusions.  Pulmonary services have been consulted





7.  History of COPD





8.  History of CVA





9.  History of osteoarthritis





10.  History of hyperlipidemia





11.  History of fibromyalgia





12.  History of depression





13.  Ex-smoker.  Patient is smoking history of half-pack per day for 30 years





14.  Bilateral lower extremity neuropathy.  Patient started on Lyrica





DVT prophylaxis heparin.  GI prophylaxis Protonix





I performed an examination of the patient and discussed their management with 

the Nurse Practitioner.  I have reviewed the Nurse Practitioner's notes and 

agree with the documented findings and plan of care

## 2019-06-04 LAB
ANION GAP SERPL CALC-SCNC: 1 MMOL/L
BUN SERPL-SCNC: 32 MG/DL (ref 7–17)
CALCIUM SPEC-MCNC: 9.1 MG/DL (ref 8.4–10.2)
CHLORIDE SERPL-SCNC: 96 MMOL/L (ref 98–107)
CO2 SERPL-SCNC: 42 MMOL/L (ref 22–30)
ERYTHROCYTE [DISTWIDTH] IN BLOOD BY AUTOMATED COUNT: 3.24 M/UL (ref 3.8–5.4)
ERYTHROCYTE [DISTWIDTH] IN BLOOD: 16.7 % (ref 11.5–15.5)
GLUCOSE BLD-MCNC: 168 MG/DL (ref 75–99)
GLUCOSE BLD-MCNC: 190 MG/DL (ref 75–99)
GLUCOSE BLD-MCNC: 236 MG/DL (ref 75–99)
GLUCOSE SERPL-MCNC: 178 MG/DL (ref 74–99)
HCT VFR BLD AUTO: 32.2 % (ref 34–46)
HGB BLD-MCNC: 9.6 GM/DL (ref 11.4–16)
MAGNESIUM SPEC-SCNC: 2.1 MG/DL (ref 1.6–2.3)
MCH RBC QN AUTO: 29.5 PG (ref 25–35)
MCHC RBC AUTO-ENTMCNC: 29.7 G/DL (ref 31–37)
MCV RBC AUTO: 99.3 FL (ref 80–100)
PLATELET # BLD AUTO: 394 K/UL (ref 150–450)
POTASSIUM SERPL-SCNC: 4.1 MMOL/L (ref 3.5–5.1)
SODIUM SERPL-SCNC: 139 MMOL/L (ref 137–145)
WBC # BLD AUTO: 16.2 K/UL (ref 3.8–10.6)

## 2019-06-04 RX ADMIN — LACTULOSE PRN GM: 20 SOLUTION ORAL at 17:08

## 2019-06-04 RX ADMIN — HEPARIN SODIUM SCH UNIT: 5000 INJECTION, SOLUTION INTRAVENOUS; SUBCUTANEOUS at 22:56

## 2019-06-04 RX ADMIN — METHYLPREDNISOLONE SODIUM SUCCINATE SCH MG: 40 INJECTION, POWDER, FOR SOLUTION INTRAMUSCULAR; INTRAVENOUS at 22:57

## 2019-06-04 RX ADMIN — OXYCODONE HYDROCHLORIDE SCH MG: 10 TABLET, FILM COATED, EXTENDED RELEASE ORAL at 20:49

## 2019-06-04 RX ADMIN — PREGABALIN SCH MG: 50 CAPSULE ORAL at 20:49

## 2019-06-04 RX ADMIN — IPRATROPIUM BROMIDE AND ALBUTEROL SULFATE SCH ML: .5; 3 SOLUTION RESPIRATORY (INHALATION) at 11:44

## 2019-06-04 RX ADMIN — BUDESONIDE SCH MG: 1 SUSPENSION RESPIRATORY (INHALATION) at 08:12

## 2019-06-04 RX ADMIN — PANTOPRAZOLE SODIUM SCH MG: 40 INJECTION, POWDER, FOR SOLUTION INTRAVENOUS at 09:28

## 2019-06-04 RX ADMIN — IPRATROPIUM BROMIDE AND ALBUTEROL SULFATE SCH ML: .5; 3 SOLUTION RESPIRATORY (INHALATION) at 03:29

## 2019-06-04 RX ADMIN — DOCUSATE SODIUM AND SENNOSIDES SCH EACH: 50; 8.6 TABLET ORAL at 09:28

## 2019-06-04 RX ADMIN — METHYLPREDNISOLONE SODIUM SUCCINATE SCH MG: 40 INJECTION, POWDER, FOR SOLUTION INTRAMUSCULAR; INTRAVENOUS at 09:23

## 2019-06-04 RX ADMIN — IPRATROPIUM BROMIDE AND ALBUTEROL SULFATE SCH ML: .5; 3 SOLUTION RESPIRATORY (INHALATION) at 16:41

## 2019-06-04 RX ADMIN — FUROSEMIDE SCH MG: 10 INJECTION, SOLUTION INTRAMUSCULAR; INTRAVENOUS at 09:27

## 2019-06-04 RX ADMIN — IPRATROPIUM BROMIDE AND ALBUTEROL SULFATE SCH: .5; 3 SOLUTION RESPIRATORY (INHALATION) at 00:22

## 2019-06-04 RX ADMIN — FORMOTEROL FUMARATE DIHYDRATE SCH MCG: 20 SOLUTION RESPIRATORY (INHALATION) at 08:12

## 2019-06-04 RX ADMIN — METHYLPREDNISOLONE SODIUM SUCCINATE SCH MG: 40 INJECTION, POWDER, FOR SOLUTION INTRAMUSCULAR; INTRAVENOUS at 17:07

## 2019-06-04 RX ADMIN — HEPARIN SODIUM SCH UNIT: 5000 INJECTION, SOLUTION INTRAVENOUS; SUBCUTANEOUS at 17:07

## 2019-06-04 RX ADMIN — IPRATROPIUM BROMIDE AND ALBUTEROL SULFATE SCH ML: .5; 3 SOLUTION RESPIRATORY (INHALATION) at 08:12

## 2019-06-04 RX ADMIN — INSULIN ASPART SCH UNIT: 100 INJECTION, SOLUTION INTRAVENOUS; SUBCUTANEOUS at 12:30

## 2019-06-04 RX ADMIN — SODIUM BICARBONATE SCH MLS/HR: 84 INJECTION, SOLUTION INTRAVENOUS at 09:23

## 2019-06-04 RX ADMIN — OXYCODONE HYDROCHLORIDE SCH MG: 10 TABLET, FILM COATED, EXTENDED RELEASE ORAL at 09:27

## 2019-06-04 RX ADMIN — AMIODARONE HYDROCHLORIDE SCH MG: 200 TABLET ORAL at 09:26

## 2019-06-04 RX ADMIN — INSULIN ASPART SCH UNIT: 100 INJECTION, SOLUTION INTRAVENOUS; SUBCUTANEOUS at 20:54

## 2019-06-04 RX ADMIN — IPRATROPIUM BROMIDE AND ALBUTEROL SULFATE SCH ML: .5; 3 SOLUTION RESPIRATORY (INHALATION) at 20:10

## 2019-06-04 RX ADMIN — PREGABALIN SCH MG: 50 CAPSULE ORAL at 06:17

## 2019-06-04 RX ADMIN — HEPARIN SODIUM SCH UNIT: 5000 INJECTION, SOLUTION INTRAVENOUS; SUBCUTANEOUS at 09:23

## 2019-06-04 RX ADMIN — BUDESONIDE SCH MG: 1 SUSPENSION RESPIRATORY (INHALATION) at 20:10

## 2019-06-04 RX ADMIN — CHLORHEXIDINE GLUCONATE SCH: 1.2 RINSE ORAL at 20:40

## 2019-06-04 RX ADMIN — FORMOTEROL FUMARATE DIHYDRATE SCH MCG: 20 SOLUTION RESPIRATORY (INHALATION) at 20:24

## 2019-06-04 RX ADMIN — LISINOPRIL SCH MG: 10 TABLET ORAL at 09:27

## 2019-06-04 RX ADMIN — INSULIN ASPART SCH: 100 INJECTION, SOLUTION INTRAVENOUS; SUBCUTANEOUS at 18:17

## 2019-06-04 RX ADMIN — DOCUSATE SODIUM AND SENNOSIDES SCH EACH: 50; 8.6 TABLET ORAL at 20:49

## 2019-06-04 RX ADMIN — CHLORHEXIDINE GLUCONATE SCH ML: 1.2 RINSE ORAL at 09:26

## 2019-06-04 RX ADMIN — LEVOTHYROXINE SODIUM SCH MCG: 50 TABLET ORAL at 06:16

## 2019-06-04 RX ADMIN — INSULIN ASPART SCH UNIT: 100 INJECTION, SOLUTION INTRAVENOUS; SUBCUTANEOUS at 06:16

## 2019-06-04 NOTE — XR
EXAMINATION TYPE: XR chest 1V portable

 

DATE OF EXAM: 6/4/2019

 

COMPARISON: 6/3/2019

 

HISTORY: Shortness of breath

 

TECHNIQUE: Single frontal view of the chest is obtained.

 

FINDINGS:  There is redemonstration of an enlarged partially visualized cardia mediastinal silhouette
. Median sternotomy tracheostomy is again noted. Pleural effusions are similar, right greater the lef
t with associated bibasilar airspace disease. Pulmonary vascular congestion is also similar in mild. 
Diffuse osseous demineralization is again seen.

 

IMPRESSION:  Overall stable exam from the prior of 6/3/2019 stable pleural effusions and bibasilar ai
rspace disease as well as mild pulmonary vascular congestion.

## 2019-06-04 NOTE — P.PN
Subjective


Progress Note Date: 06/04/19





This is  a 63-year-old female patient who presented to the hospital with 

worsening shortness of breath.  Patient has a past medical history of recent 

hospital admission in which she was admitted to [facility] hearing transferred

to [facility] which required prolonged intubation and tracheostomy.  Patient was

recently discharged to recent D/C to [facility] week ago.  Patient has had 

increased sputum production and cough.  Patient has past medical history of non-

small cell lung cancer stage III, CVA, COPD, GERD, hyperlipidemia, 

osteoarthritis,chronic back pain and depression.  Chest x-ray completed in ER 

showing right lower lobe consolidation with bilateral pleural effusions.  

Patient's WBC elevated at 10.9.  Patient also have a lactic acid of 2.1.  UA 

negative.  Patient's blood pressure low.  Patient started on Levophed and 

transferred to the ICU.  Dr. Trejo has been consulted for critical care.  

Patient started on vancomycin and Zosyn for antibiotic.  Blood urine and sputum 

cultures ordered.  At this time patient is on mechanical ventilation resting 

comfortably in bed.  Patient is following commands.








On 05/28/2019 patient maintained on mechanical ventilation but awake and alert. 

Patient remains in intensive care unit on Levophed for pressure support.  

Patient reports that she feels better.  Patient currently on vancomycin and 

Maxipime for antibiotics.  Infectious disease and pulmonary services are 

following.  White Blood cell 16.7.  Blood,urine and sputum cultures pending





On 05/29/2019 patient rates on mechanical ventilation but  awake and alert 

following commands. patient reports that she feels overall improved.  white 

blood cell improving to 13.6.  At this time patient denies chest pain or 

shortness breath.  Patient denies nausea vomiting diarrhea denies any urinary b

urning or frequency





05/30/2019 patient is alert and awake following commands.  Patient remains on m

echanical ventilation.  Patient has been off pressure support medication for 24 

hours.  Infectious disease and pulmonary following.  At this time patient denies

chest pain or shortness breath.  Patient denies nausea vomiting or diarrhea.  

Patient denies any urinary burning or frequency. 





On 05/31/2019 patient is awake alert and awake following commands.  Patient has 

been taken off mechanical ventilation placed on trach mask.  Patient remains in 

the intensive care unit but showing improvement.  Patient has been off pressure 

support medication for over 48 hours.  Infectious disease and pulmonary are 

following.  Antibiotics adjusted per ID.  At this time patient denies chest pain

or shortness of breath.  Patient denies nausea vomiting or diarrhea.  Patient 

denies any urinary burning or frequency








On 06/01/2019 patient is awake alert and awake following commands.  Patient is 

back on mechanical ventilation she was also started on IV Solu-Medrol.  Patient 

remains in the intensive care unit but showing improvement.  She denies any pain

or discomfort at this time, lab results were reviewed potassium is a bit low at 

3.3, hemoglobin is 8.5  Otherwise labs are unremarkable.








On 06/02/2019 patient was seen and examined in the ICU she is alert and oriented

3 she is off the ventilator at this time and is maintained on tracheostomy 

shield with high flow oxygen, there is no fever or chills no headache or 

dizziness no chest pain she has occasional cough no nausea or vomiting no 

abdominal pain no diarrhea and no urinary symptoms








On 06/03/2019 patient alert and oriented to 3.  Patient remains on trach 

collar.  Patient is showing improvement.  At this time patient denies any chest 

pain or shortness breath.  Patient denies nausea vomiting or diarrhea.  Patient 

denies any urinary burning or frequency. 





On 06/04/2019 patient is alert and oriented 3.  Patient is continuing to show i

mprovement.  White blood cell increasing blood per infectious disease likely 

reactive from steroids.  Patient remains on Rocephin.  Continue physical 

therapy.  At this time patient denies chest pain or shortness of breath.  

Patient denies nausea vomiting or diarrhea.  Patient denies any urinary burning 

or frequency. 





Objective





- Vital Signs


Vital signs: 


                                   Vital Signs











Temp  98.3 F   06/04/19 08:00


 


Pulse  74   06/04/19 10:00


 


Resp  13   06/04/19 10:00


 


BP  160/90   06/04/19 10:00


 


Pulse Ox  97   06/04/19 10:00








                                 Intake & Output











 06/03/19 06/04/19 06/04/19





 18:59 06:59 18:59


 


Intake Total 1160 840 360


 


Output Total 2295 920 315


 


Balance -1135 -80 45


 


Weight  67 kg 


 


Intake:   


 


  IV 25  


 


    d5/.9+kvo 25  


 


  Intake, IV Titration 250 300 150





  Amount   


 


    Sodium Chloride 0.45% 1, 250 300 100





    000 ml @ 25 mls/hr IV .   





    Q24H MARY Rx#:[id number]   


 


    cefTRIAXone 2 gm In   50





    Sodium Chloride 0.9% 50   





    ml @ 100 mls/hr IVPB   





    Q24HR MARY Rx#:[id number]   


 


  Oral 300  


 


  Tube Feeding 495 540 180


 


  Other 90  30


 


Output:   


 


  Urine 2295 920 315


 


Other:   


 


  Voiding Method Indwelling Catheter Indwelling Catheter Indwelling Catheter














- Exam





Head normocephalic


Neck supple. Tracheostomy in place.  Trach collar with shield


Lungs diminished with bilateral rhonchi.  


Heart regular rate and rhythm S1-S2, no rub or gallop


Abdomen is soft nontender nondistended positive bowel sounds no 

hepatosplenomegaly.  PEG tube in place


Extremities no edema


Neuro follows commands.  On mechanical ventilation





- Labs


CBC & Chem 7: 


                                 06/04/19 04:20





                                 06/04/19 04:20


Labs: 


                  Abnormal Lab Results - Last 24 Hours (Table)











  06/03/19 06/03/19 06/03/19 Range/Units





  12:28 17:40 23:20 


 


WBC     (3.8-10.6)  k/uL


 


RBC     (3.80-5.40)  m/uL


 


Hgb     (11.4-16.0)  gm/dL


 


Hct     (34.0-46.0)  %


 


MCHC     (31.0-37.0)  g/dL


 


RDW     (11.5-15.5)  %


 


Chloride     ()  mmol/L


 


Carbon Dioxide     (22-30)  mmol/L


 


BUN     (7-17)  mg/dL


 


Glucose     (74-99)  mg/dL


 


POC Glucose (mg/dL)  180 H  149 H  194 H  (75-99)  mg/dL














  06/03/19 06/04/19 06/04/19 Range/Units





  23:40 04:20 04:20 


 


WBC   16.2 H   (3.8-10.6)  k/uL


 


RBC   3.24 L   (3.80-5.40)  m/uL


 


Hgb   9.6 L   (11.4-16.0)  gm/dL


 


Hct   32.2 L   (34.0-46.0)  %


 


MCHC   29.7 L   (31.0-37.0)  g/dL


 


RDW   16.7 H   (11.5-15.5)  %


 


Chloride    96 L  ()  mmol/L


 


Carbon Dioxide    42 H*  (22-30)  mmol/L


 


BUN    32 H  (7-17)  mg/dL


 


Glucose    178 H  (74-99)  mg/dL


 


POC Glucose (mg/dL)  189 H    (75-99)  mg/dL














  06/04/19 Range/Units





  05:36 


 


WBC   (3.8-10.6)  k/uL


 


RBC   (3.80-5.40)  m/uL


 


Hgb   (11.4-16.0)  gm/dL


 


Hct   (34.0-46.0)  %


 


MCHC   (31.0-37.0)  g/dL


 


RDW   (11.5-15.5)  %


 


Chloride   ()  mmol/L


 


Carbon Dioxide   (22-30)  mmol/L


 


BUN   (7-17)  mg/dL


 


Glucose   (74-99)  mg/dL


 


POC Glucose (mg/dL)  168 H  (75-99)  mg/dL














Assessment and Plan


Assessment: 





1.  Sepsis present on admission related to pneumonia.  Chest x-ray completed 

showing right lower lobe consolidation.  Bilateral pleural effusions.  Patient 

admitted to the intensive care unit.  Initial lactic acid 2.1.  Blood urine and 

sputum cultures have been ordered.  White blood cell 16.4.  Infectious disease 

following.  Patient on cefepime and Vanco.  Sputum culture currently growing 

Streptococcus pneumoniae.  Repeat chest x-ray completed showing suspected sickle

cell congestive heart failure mild pulmonary vascular congestion stable 

small-to-moderate pleural effusions or airspace disease.  Likely atelectasis 

multifocal pneumonia and parapneumonic effusions are an alernative 

consideration.  Sputum culture growing Streptococcus pneumoniae.  Antibiotics 

adjusted to Rocephin per infectious disease.  Patient taken off mechanical 

ventilation and placed on trach collar





2.  Hypotension related to above.  Patient maintained on Levophed.  Resolved





3.  History of non-small cell lung stage III adenocarcinoma





4.  History of recent prolonged hospital admission in which she was transferred 

to [facility].  Patient was on mechanical ventilation for prolonged 

time requiring tracheostomy





5.  Acute on chronic respiratory failure. 





6.  Bilateral pleural effusions.  Pulmonary services have been consulted





7.  History of COPD





8.  History of CVA





9.  History of osteoarthritis





10.  History of hyperlipidemia





11.  History of fibromyalgia





12.  History of depression





13.  Ex-smoker.  Patient is smoking history of half-pack per day for 30 years





14.  Bilateral lower extremity neuropathy.  Patient started on Lyrica





DVT prophylaxis heparin.  GI prophylaxis Protonix


Physical therapy and  consulted for discharge planning





I performed an examination of the patient and discussed their management with 

the Nurse Practitioner.  I have reviewed the Nurse Practitioner's notes and 

agree with the documented findings and plan of care

## 2019-06-04 NOTE — PN
PROGRESS NOTE



DATE OF SERVICE:

June 4, 2019.



REASON FOR FOLLOW UP:

Strep pneumo pneumonia.



INTERVAL HISTORY:

The patient is currently afebrile.  The patient has been breathing comfortably.  Denies

having any chest pain or shortness of breath.  Occasional cough.  No nausea, vomiting,

abdominal pain, no diarrhea.



PHYSICAL EXAMINATION:

Blood pressure is 160/90 with a pulse of 74, temperature 98.  She is 97% on trach

collar.

General description is an elderly female up in the bed in no distress.  Respiratory

system:  Unlabored breathing.  Clear to auscultation anteriorly.  Heart S1, S2.

Regular rate and rhythm.

ABDOMEN:  Soft, no tenderness.



LABS:

White count slightly elevated today 16,000.



DIAGNOSTIC IMPRESSION AND PLAN:

1. Patient is strep pneumo bacteremia for which the patient is currently covered with

    IV Rocephin to continue transition to oral Ceftin on discharge.

2. Patient with elevated white count more likely secondary to oropharyngeal

    candidiasis with candida showing up in the sputum.  Diflucan could not be added

    because of multiple drug interaction and _____will be added and white count will be

    monitored closely.





MMODL / IJN: [id number] / Job#: [id number]

## 2019-06-04 NOTE — PN
PROGRESS NOTE



DATE OF SERVICE:

06/04/2019.



A 63-year-old patient with history of acute on chronic hypoxemic respiratory failure

secondary to Streptococcus pneumoniae.  The patient apparently was admitted back on May

27 with septic shock and right lower lobe pneumonia.  She was previously at this

hospital between March 22 and 27 for an episode of mental status change.  She was

transferred to [facility] and she spent 34 days at [facility], ended up

with a tracheostomy and PEG tube placement.  From there, she went to [facility]

and then to [facility] on [facility].  She was sent to this facility from

[facility] on [facility] subsequent to development of what appears to be pneumonia with

lower saturations on pulse oximetry.  The patient was taken off the ventilator on June 2.  She is currently on trach collar at 40%.  Doing relatively well.  She has no

particular complaints.  She denies any shortness of breath, chest pain, chest

discomfort, cough, wheezing, or phlegm production.  The patient is also getting a 0.45

IV at 25 mL an hour.



Her other medical problems include stage IV non-small cell lung cancer, status post

chemotherapy, status post concurrent chemoradiation therapy and immune therapy.  She

also has a history of COPD, hypertension, chronic back pain, depression, and a number

of other comorbidities.  Again, she is resting comfortably today.



PHYSICAL EXAMINATION:

Current vital signs are reviewed.  Current temperature is 98.1, heart rate 72,

respiratory rate 15, blood pressure 158/94 with mean of 115 and saturations are 97%.

That is on 40% trach collar.  She appears in no acute distress.

HEENT:  Examination is grossly unremarkable.  Mucous membranes are moist.

NECK:  Supple.  Full range of motion.  There is a midline tracheostomy.  Trach collar

in place.  No adenopathy, thyromegaly or neck vein distention.

CARDIOVASCULAR:  Examination reveals regular rhythm rate.  S1, S2 normal.  No S3, S4,

or murmur.

LUNGS:  Reveal coarse rhonchi.  Breath sounds equal bilaterally.  They are diminished

throughout.  No wheezes.  Slight prolongation on forced maneuver.  No crackles.

ABDOMEN:  Soft.  Bowel sounds are heard.  PEG tube noted.

EXTREMITIES: Intact.  No cyanosis, clubbing, or significant edema.

SKIN:  Without rash.

NEUROLOGIC:  Examination is brief but nonfocal.



Microbiologic studies show sputum samples both on May 26 and May 27 showed positive for

Streptococcus pneumoniae.  Her most recent chest x-ray from today shows a possible

right lower lobe infiltrate.



LABS:

Reviewed.  White count 16.2, hemoglobin 9.6, hematocrit 32.2, platelet count 294,000.

Sodium 139, potassium 4.1, chloride 96, CO2 is 42, anion gap is 1, BUN and creatinine

were 32 and 0.65.



Medications are reviewed.  She is currently on Pulmicort 1 mg, ceftriaxone 2 g a day,

formoterol 20 mcg twice a day, albuterol and Atrovent updrafts q.i.d. and p.r.n. and

Solu-Medrol 40 mg q.8 hours.



The rest of the medications appear appropriate.



ASSESSMENT:

1. Acute on chronic hypoxemic respiratory failure secondary to bilateral pneumonia

    caused by Streptococcus pneumoniae.

2. Acute sepsis and septic shock, initially requiring fluid resuscitation and

    norepinephrine, resolved.

3. Stage IV non-small cell lung cancer, status post concurrent chemoradiation and

    immunotherapy.

4. Severe chronic obstructive pulmonary disease with COPD exacerbation, improved.

5. Benign essential hypertension.

6. Chronic back pain.

7. Fibromyalgia.

8. Arthritis.

9. History of depression.

10.Status post recent prolonged course of mechanical ventilation for 34 days at [facility] with subsequent tracheostomy and PEG tube placement.

11.Bilateral pleural effusions, right greater than left, which may be malignant in

    origin.



PLAN:

Currently, the patient seemed relatively stable.  Will add back her lisinopril and

Apresoline for blood pressure control.  The patient can be transferred to [address] [place] [address].  No

additional recommendations are made.  Prognosis is guarded.  Will continue to follow

closely.  Additional recommendations and suggestions are forthcoming.





MMODL / IJN: [id number] / Job#: [id number]

## 2019-06-05 LAB
ANION GAP SERPL CALC-SCNC: 5 MMOL/L
BUN SERPL-SCNC: 33 MG/DL (ref 7–17)
CALCIUM SPEC-MCNC: 9.1 MG/DL (ref 8.4–10.2)
CHLORIDE SERPL-SCNC: 98 MMOL/L (ref 98–107)
CO2 SERPL-SCNC: 37 MMOL/L (ref 22–30)
ERYTHROCYTE [DISTWIDTH] IN BLOOD BY AUTOMATED COUNT: 3.31 M/UL (ref 3.8–5.4)
ERYTHROCYTE [DISTWIDTH] IN BLOOD: 17.5 % (ref 11.5–15.5)
GLUCOSE BLD-MCNC: 156 MG/DL (ref 75–99)
GLUCOSE BLD-MCNC: 191 MG/DL (ref 75–99)
GLUCOSE BLD-MCNC: 214 MG/DL (ref 75–99)
GLUCOSE BLD-MCNC: 316 MG/DL (ref 75–99)
GLUCOSE SERPL-MCNC: 198 MG/DL (ref 74–99)
HCT VFR BLD AUTO: 32.7 % (ref 34–46)
HGB BLD-MCNC: 9.9 GM/DL (ref 11.4–16)
MCH RBC QN AUTO: 29.8 PG (ref 25–35)
MCHC RBC AUTO-ENTMCNC: 30.2 G/DL (ref 31–37)
MCV RBC AUTO: 98.8 FL (ref 80–100)
PLATELET # BLD AUTO: 428 K/UL (ref 150–450)
POTASSIUM SERPL-SCNC: 4.1 MMOL/L (ref 3.5–5.1)
SODIUM SERPL-SCNC: 140 MMOL/L (ref 137–145)
WBC # BLD AUTO: 18 K/UL (ref 3.8–10.6)

## 2019-06-05 RX ADMIN — PANTOPRAZOLE SODIUM SCH MG: 40 TABLET, DELAYED RELEASE ORAL at 06:19

## 2019-06-05 RX ADMIN — OXYCODONE HYDROCHLORIDE SCH MG: 10 TABLET, FILM COATED, EXTENDED RELEASE ORAL at 09:51

## 2019-06-05 RX ADMIN — INSULIN ASPART SCH UNIT: 100 INJECTION, SOLUTION INTRAVENOUS; SUBCUTANEOUS at 00:06

## 2019-06-05 RX ADMIN — BUDESONIDE SCH MG: 1 SUSPENSION RESPIRATORY (INHALATION) at 19:13

## 2019-06-05 RX ADMIN — INSULIN ASPART SCH UNIT: 100 INJECTION, SOLUTION INTRAVENOUS; SUBCUTANEOUS at 06:19

## 2019-06-05 RX ADMIN — FORMOTEROL FUMARATE DIHYDRATE SCH MCG: 20 SOLUTION RESPIRATORY (INHALATION) at 19:27

## 2019-06-05 RX ADMIN — PREGABALIN SCH MG: 50 CAPSULE ORAL at 09:52

## 2019-06-05 RX ADMIN — DOCUSATE SODIUM AND SENNOSIDES SCH EACH: 50; 8.6 TABLET ORAL at 09:52

## 2019-06-05 RX ADMIN — HEPARIN SODIUM SCH UNIT: 5000 INJECTION, SOLUTION INTRAVENOUS; SUBCUTANEOUS at 16:24

## 2019-06-05 RX ADMIN — DOCUSATE SODIUM AND SENNOSIDES SCH EACH: 50; 8.6 TABLET ORAL at 20:34

## 2019-06-05 RX ADMIN — AMIODARONE HYDROCHLORIDE SCH MG: 200 TABLET ORAL at 09:52

## 2019-06-05 RX ADMIN — HEPARIN SODIUM SCH UNIT: 5000 INJECTION, SOLUTION INTRAVENOUS; SUBCUTANEOUS at 09:54

## 2019-06-05 RX ADMIN — HEPARIN SODIUM SCH UNIT: 5000 INJECTION, SOLUTION INTRAVENOUS; SUBCUTANEOUS at 23:13

## 2019-06-05 RX ADMIN — METHYLPREDNISOLONE SODIUM SUCCINATE SCH MG: 40 INJECTION, POWDER, FOR SOLUTION INTRAMUSCULAR; INTRAVENOUS at 23:13

## 2019-06-05 RX ADMIN — IPRATROPIUM BROMIDE AND ALBUTEROL SULFATE SCH ML: .5; 3 SOLUTION RESPIRATORY (INHALATION) at 00:33

## 2019-06-05 RX ADMIN — FORMOTEROL FUMARATE DIHYDRATE SCH MCG: 20 SOLUTION RESPIRATORY (INHALATION) at 07:44

## 2019-06-05 RX ADMIN — CHLORHEXIDINE GLUCONATE SCH ML: 1.2 RINSE ORAL at 14:26

## 2019-06-05 RX ADMIN — LISINOPRIL SCH MG: 10 TABLET ORAL at 09:52

## 2019-06-05 RX ADMIN — BUDESONIDE SCH MG: 1 SUSPENSION RESPIRATORY (INHALATION) at 07:44

## 2019-06-05 RX ADMIN — IPRATROPIUM BROMIDE AND ALBUTEROL SULFATE SCH ML: .5; 3 SOLUTION RESPIRATORY (INHALATION) at 15:00

## 2019-06-05 RX ADMIN — OXYCODONE HYDROCHLORIDE SCH MG: 10 TABLET, FILM COATED, EXTENDED RELEASE ORAL at 20:34

## 2019-06-05 RX ADMIN — IPRATROPIUM BROMIDE AND ALBUTEROL SULFATE SCH ML: .5; 3 SOLUTION RESPIRATORY (INHALATION) at 10:55

## 2019-06-05 RX ADMIN — INSULIN ASPART SCH UNIT: 100 INJECTION, SOLUTION INTRAVENOUS; SUBCUTANEOUS at 12:33

## 2019-06-05 RX ADMIN — ANIDULAFUNGIN SCH MLS/HR: 100 INJECTION, POWDER, LYOPHILIZED, FOR SOLUTION INTRAVENOUS at 16:20

## 2019-06-05 RX ADMIN — IPRATROPIUM BROMIDE AND ALBUTEROL SULFATE SCH ML: .5; 3 SOLUTION RESPIRATORY (INHALATION) at 23:55

## 2019-06-05 RX ADMIN — METHYLPREDNISOLONE SODIUM SUCCINATE SCH MG: 40 INJECTION, POWDER, FOR SOLUTION INTRAMUSCULAR; INTRAVENOUS at 16:23

## 2019-06-05 RX ADMIN — FUROSEMIDE SCH MG: 10 INJECTION, SOLUTION INTRAMUSCULAR; INTRAVENOUS at 09:53

## 2019-06-05 RX ADMIN — SODIUM BICARBONATE SCH: 84 INJECTION, SOLUTION INTRAVENOUS at 14:26

## 2019-06-05 RX ADMIN — ONDANSETRON PRN MG: 2 INJECTION INTRAMUSCULAR; INTRAVENOUS at 06:20

## 2019-06-05 RX ADMIN — INSULIN ASPART SCH UNIT: 100 INJECTION, SOLUTION INTRAVENOUS; SUBCUTANEOUS at 17:07

## 2019-06-05 RX ADMIN — LEVOTHYROXINE SODIUM SCH MCG: 50 TABLET ORAL at 06:19

## 2019-06-05 RX ADMIN — IPRATROPIUM BROMIDE AND ALBUTEROL SULFATE SCH ML: .5; 3 SOLUTION RESPIRATORY (INHALATION) at 19:11

## 2019-06-05 RX ADMIN — IPRATROPIUM BROMIDE AND ALBUTEROL SULFATE SCH ML: .5; 3 SOLUTION RESPIRATORY (INHALATION) at 07:44

## 2019-06-05 RX ADMIN — CHLORHEXIDINE GLUCONATE SCH: 1.2 RINSE ORAL at 20:35

## 2019-06-05 RX ADMIN — PREGABALIN SCH MG: 50 CAPSULE ORAL at 20:34

## 2019-06-05 RX ADMIN — METHYLPREDNISOLONE SODIUM SUCCINATE SCH MG: 40 INJECTION, POWDER, FOR SOLUTION INTRAMUSCULAR; INTRAVENOUS at 09:53

## 2019-06-05 RX ADMIN — FUROSEMIDE SCH MG: 10 INJECTION, SOLUTION INTRAMUSCULAR; INTRAVENOUS at 20:34

## 2019-06-05 RX ADMIN — IPRATROPIUM BROMIDE AND ALBUTEROL SULFATE SCH ML: .5; 3 SOLUTION RESPIRATORY (INHALATION) at 04:41

## 2019-06-05 NOTE — PN
PROGRESS NOTE



DATE OF SERVICE:

06/05/2019.



REASON FOR FOLLOWUP:

1. Strep pneumo pneumoniae.

2. Oropharyngeal candidiasis.



INTERVAL HISTORY:

The patient is currently afebrile.  The patient has been breathing comfortably.  The

patient denies having any chest pain.  Occasional cough.  No abdominal pain.  No

nausea, vomiting, or any diarrhea.



PHYSICAL EXAMINATION:

Blood pressure is 154/71 with a pulse of 84, temperature 98.6. He is 95% on room air.

General  description is an elderly female, lying in bed in no distress.  Respiratory

system:  Unlabored breathing.  Clear to auscultation anteriorly.  Heart S1, S2.

Regular rate and rhythm. Abdomen soft.  No tenderness.



LABS:

Hemoglobin 9.1, white count 63888, BUN of 33, creatinine 0.62.



DIAGNOSTIC IMPRESSION AND PLAN:

1. Patient with strep pneumo pneumoniae for which the patient currently covered with

    Rocephin.  Transition to oral Ceftin on discharge.

2. The patient with elevated white count, likely component of steroid effect plus-

    minus oropharyngeal candidiasis for which the patient currently covered with _____.

    However, white count still elevated despite being on _____ more likely indicating

    steroid effect as currently no other clinical focus of infection.

3. Monitor clinical course closely.  Continue supportive care.





MMODL / IJN: [id number] / Job#: [id number]

## 2019-06-05 NOTE — P.PN
Subjective


Progress Note Date: 06/05/19





This is  a 63-year-old female patient who presented to the hospital with 

worsening shortness of breath.  Patient has a past medical history of recent 

hospital admission in which she was admitted to [facility] hearing transferred

to [facility] which required prolonged intubation and tracheostomy.  Patient was

recently discharged to recent D/C to [facility] week ago.  Patient has had 

increased sputum production and cough.  Patient has past medical history of non-

small cell lung cancer stage III, CVA, COPD, GERD, hyperlipidemia, 

osteoarthritis,chronic back pain and depression.  Chest x-ray completed in ER 

showing right lower lobe consolidation with bilateral pleural effusions.  

Patient's WBC elevated at 10.9.  Patient also have a lactic acid of 2.1.  UA 

negative.  Patient's blood pressure low.  Patient started on Levophed and 

transferred to the ICU.  Dr. Trejo has been consulted for critical care.  

Patient started on vancomycin and Zosyn for antibiotic.  Blood urine and sputum 

cultures ordered.  At this time patient is on mechanical ventilation resting 

comfortably in bed.  Patient is following commands.








On 05/28/2019 patient maintained on mechanical ventilation but awake and alert. 

Patient remains in intensive care unit on Levophed for pressure support.  

Patient reports that she feels better.  Patient currently on vancomycin and 

Maxipime for antibiotics.  Infectious disease and pulmonary services are 

following.  White Blood cell 16.7.  Blood,urine and sputum cultures pending





On 05/29/2019 patient rates on mechanical ventilation but  awake and alert 

following commands. patient reports that she feels overall improved.  white 

blood cell improving to 13.6.  At this time patient denies chest pain or 

shortness breath.  Patient denies nausea vomiting diarrhea denies any urinary b

urning or frequency





05/30/2019 patient is alert and awake following commands.  Patient remains on m

echanical ventilation.  Patient has been off pressure support medication for 24 

hours.  Infectious disease and pulmonary following.  At this time patient denies

chest pain or shortness breath.  Patient denies nausea vomiting or diarrhea.  

Patient denies any urinary burning or frequency. 





On 05/31/2019 patient is awake alert and awake following commands.  Patient has 

been taken off mechanical ventilation placed on trach mask.  Patient remains in 

the intensive care unit but showing improvement.  Patient has been off pressure 

support medication for over 48 hours.  Infectious disease and pulmonary are 

following.  Antibiotics adjusted per ID.  At this time patient denies chest pain

or shortness of breath.  Patient denies nausea vomiting or diarrhea.  Patient 

denies any urinary burning or frequency








On 06/01/2019 patient is awake alert and awake following commands.  Patient is 

back on mechanical ventilation she was also started on IV Solu-Medrol.  Patient 

remains in the intensive care unit but showing improvement.  She denies any pain

or discomfort at this time, lab results were reviewed potassium is a bit low at 

3.3, hemoglobin is 8.5  Otherwise labs are unremarkable.








On 06/02/2019 patient was seen and examined in the ICU she is alert and oriented

3 she is off the ventilator at this time and is maintained on tracheostomy 

shield with high flow oxygen, there is no fever or chills no headache or 

dizziness no chest pain she has occasional cough no nausea or vomiting no 

abdominal pain no diarrhea and no urinary symptoms








On 06/03/2019 patient alert and oriented to 3.  Patient remains on trach 

collar.  Patient is showing improvement.  At this time patient denies any chest 

pain or shortness breath.  Patient denies nausea vomiting or diarrhea.  Patient 

denies any urinary burning or frequency. 





On 06/04/2019 patient is alert and oriented 3.  Patient is continuing to show i

mprovement.  White blood cell increasing blood per infectious disease likely 

reactive from steroids.  Patient remains on Rocephin.  Continue physical 

therapy.  At this time patient denies chest pain or shortness of breath.  

Patient denies nausea vomiting or diarrhea.  Patient denies any urinary burning 

or frequency. 





On 06/05/2019 patient is alert and oriented 3.  Patient has been moved out of [facility] intensive care unit.  Lasix have been increased per pulmonary continue 

lateral pleural effusion.  We'll continue to monitor.  At this time patient 

denies any chest pain or shortness breath.  Patient denies nausea vomiting or 

diarrhea.  Patient denies any urinary burning or frequency





Objective





- Vital Signs


Vital signs: 


                                   Vital Signs











Temp  98.6 F   06/05/19 08:35


 


Pulse  80   06/05/19 11:11


 


Resp  18   06/05/19 08:35


 


BP  154/71   06/05/19 08:35


 


Pulse Ox  95   06/05/19 08:35








                                 Intake & Output











 06/04/19 06/05/19 06/05/19





 18:59 06:59 18:59


 


Intake Total 1210 520 378


 


Output Total 1137 776 8167


 


Balance -225 -230 -872


 


Weight 67 kg 62 kg 


 


Intake:   


 


  Intake, IV Titration 610 250 





  Amount   


 


    Anidulafungin 200 mg In 260  





    Sodium Chloride 0.9% 200   





    ml @ 84 mls/hr IVPB ONCE   





    ONE Rx#:[id number]   


 


    Sodium Chloride 0.45% 1, 300 250 





    000 ml @ 25 mls/hr IV .   





    Q24H [facility] Rx#:[id number]   


 


    cefTRIAXone 2 gm In 50  





    Sodium Chloride 0.9% 50   





    ml @ 100 mls/hr IVPB   





    Q24HR [facility] Rx#:[id number]   


 


  Oral   378


 


  Tube Feeding 540 270 


 


  Other 60  


 


Output:   


 


  Urine 0626 511 0211


 


Other:   


 


  Voiding Method Indwelling Catheter Indwelling Catheter 


 


  # Bowel Movements   1














- Exam





Head normocephalic


Neck supple. Tracheostomy in place.  Trach collar with shield


Lungs diminished with bilateral rhonchi.  


Heart regular rate and rhythm S1-S2, no rub or gallop


Abdomen is soft nontender nondistended positive bowel sounds no 

hepatosplenomegaly.  PEG tube in place


Extremities no edema


Neuro follows commands.  On mechanical ventilation





- Labs


CBC & Chem 7: 


                                 06/05/19 06:12





                                 06/05/19 06:12


Labs: 


                  Abnormal Lab Results - Last 24 Hours (Table)











  06/04/19 06/04/19 06/05/19 Range/Units





  20:33 23:58 06:03 


 


WBC     (3.8-10.6)  k/uL


 


RBC     (3.80-5.40)  m/uL


 


Hgb     (11.4-16.0)  gm/dL


 


Hct     (34.0-46.0)  %


 


MCHC     (31.0-37.0)  g/dL


 


RDW     (11.5-15.5)  %


 


Carbon Dioxide     (22-30)  mmol/L


 


BUN     (7-17)  mg/dL


 


Glucose     (74-99)  mg/dL


 


POC Glucose (mg/dL)  236 H  191 H  214 H  (75-99)  mg/dL














  06/05/19 06/05/19 06/05/19 Range/Units





  06:12 06:12 11:48 


 


WBC  18.0 H    (3.8-10.6)  k/uL


 


RBC  3.31 L    (3.80-5.40)  m/uL


 


Hgb  9.9 L    (11.4-16.0)  gm/dL


 


Hct  32.7 L    (34.0-46.0)  %


 


MCHC  30.2 L    (31.0-37.0)  g/dL


 


RDW  17.5 H    (11.5-15.5)  %


 


Carbon Dioxide   37 H   (22-30)  mmol/L


 


BUN   33 H   (7-17)  mg/dL


 


Glucose   198 H   (74-99)  mg/dL


 


POC Glucose (mg/dL)    156 H  (75-99)  mg/dL














Assessment and Plan


Assessment: 





1.  Sepsis present on admission related to pneumonia.  Chest x-ray completed 

showing right lower lobe consolidation.  Bilateral pleural effusions.  Patient 

admitted to the intensive care unit.  Initial lactic acid 2.1.  Blood urine and 

sputum cultures have been ordered.  White blood cell 16.4.  Infectious disease 

following.  Patient on cefepime and Vanco.  Sputum culture currently growing 

Streptococcus pneumoniae.  Repeat chest x-ray completed showing suspected sickle

cell congestive heart failure mild pulmonary vascular congestion stable 

small-to-moderate pleural effusions or airspace disease.  Likely atelectasis m

ultifocal pneumonia and parapneumonic effusions are an alernative consideration.

 Sputum culture growing Streptococcus pneumoniae.  Antibiotics adjusted to 

Rocephin per infectious disease.  Patient taken off mechanical ventilation and 

placed on trach collar.  Antibiotics adjusted to Eraxis and Rocephin per 

pulmonary





2.  Hypotension related to above.  Patient maintained on Levophed.  Resolved





3.  History of non-small cell lung stage III adenocarcinoma





4.  History of recent prolonged hospital admission in which she was transferred 

to [facility].  Patient was on mechanical ventilation for prolonged 

time requiring tracheostomy





5.  Acute on chronic respiratory failure. 





6.  History of COPD





7.  History of CVA





8.  History of osteoarthritis





9.  History of hyperlipidemia





10.  History of fibromyalgia





11.  History of depression





12.  Ex-smoker.  Patient is smoking history of half-pack per day for 30 years





13.  Bilateral lower extremity neuropathy.  Patient started on Lyrica





14.  Small pleural effusion.  Pulmonary has increased Lasix 40 mg every 12 

hours.  Repeat chest x-ray has been ordered





DVT prophylaxis heparin.  GI prophylaxis Protonix


Physical therapy and  consulted for discharge planning





I performed an examination of the patient and discussed their management with 

the Nurse Practitioner.  I have reviewed the Nurse Practitioner's notes and 

agree with the documented findings and plan of care

## 2019-06-06 VITALS — RESPIRATION RATE: 20 BRPM

## 2019-06-06 VITALS — DIASTOLIC BLOOD PRESSURE: 80 MMHG | TEMPERATURE: 98 F | SYSTOLIC BLOOD PRESSURE: 146 MMHG

## 2019-06-06 VITALS — HEART RATE: 74 BPM

## 2019-06-06 LAB
ANION GAP SERPL CALC-SCNC: 4 MMOL/L
BUN SERPL-SCNC: 37 MG/DL (ref 7–17)
CALCIUM SPEC-MCNC: 9.4 MG/DL (ref 8.4–10.2)
CHLORIDE SERPL-SCNC: 99 MMOL/L (ref 98–107)
CO2 SERPL-SCNC: 36 MMOL/L (ref 22–30)
ERYTHROCYTE [DISTWIDTH] IN BLOOD BY AUTOMATED COUNT: 3.52 M/UL (ref 3.8–5.4)
ERYTHROCYTE [DISTWIDTH] IN BLOOD: 17.1 % (ref 11.5–15.5)
GLUCOSE BLD-MCNC: 139 MG/DL (ref 75–99)
GLUCOSE BLD-MCNC: 158 MG/DL (ref 75–99)
GLUCOSE BLD-MCNC: 228 MG/DL (ref 75–99)
GLUCOSE SERPL-MCNC: 172 MG/DL (ref 74–99)
HCT VFR BLD AUTO: 34 % (ref 34–46)
HGB BLD-MCNC: 10.1 GM/DL (ref 11.4–16)
MCH RBC QN AUTO: 28.7 PG (ref 25–35)
MCHC RBC AUTO-ENTMCNC: 29.7 G/DL (ref 31–37)
MCV RBC AUTO: 96.6 FL (ref 80–100)
PLATELET # BLD AUTO: 479 K/UL (ref 150–450)
POTASSIUM SERPL-SCNC: 4.7 MMOL/L (ref 3.5–5.1)
SODIUM SERPL-SCNC: 139 MMOL/L (ref 137–145)
WBC # BLD AUTO: 16.6 K/UL (ref 3.8–10.6)

## 2019-06-06 RX ADMIN — HEPARIN SODIUM SCH UNIT: 5000 INJECTION, SOLUTION INTRAVENOUS; SUBCUTANEOUS at 08:19

## 2019-06-06 RX ADMIN — INSULIN ASPART SCH: 100 INJECTION, SOLUTION INTRAVENOUS; SUBCUTANEOUS at 13:08

## 2019-06-06 RX ADMIN — LISINOPRIL SCH MG: 10 TABLET ORAL at 08:17

## 2019-06-06 RX ADMIN — SODIUM BICARBONATE SCH: 84 INJECTION, SOLUTION INTRAVENOUS at 11:11

## 2019-06-06 RX ADMIN — INSULIN ASPART SCH UNIT: 100 INJECTION, SOLUTION INTRAVENOUS; SUBCUTANEOUS at 05:55

## 2019-06-06 RX ADMIN — HEPARIN SODIUM SCH UNIT: 5000 INJECTION, SOLUTION INTRAVENOUS; SUBCUTANEOUS at 17:18

## 2019-06-06 RX ADMIN — PREGABALIN SCH MG: 50 CAPSULE ORAL at 08:19

## 2019-06-06 RX ADMIN — ANIDULAFUNGIN SCH MLS/HR: 100 INJECTION, POWDER, LYOPHILIZED, FOR SOLUTION INTRAVENOUS at 15:51

## 2019-06-06 RX ADMIN — INSULIN ASPART SCH UNIT: 100 INJECTION, SOLUTION INTRAVENOUS; SUBCUTANEOUS at 00:18

## 2019-06-06 RX ADMIN — IPRATROPIUM BROMIDE AND ALBUTEROL SULFATE SCH ML: .5; 3 SOLUTION RESPIRATORY (INHALATION) at 03:37

## 2019-06-06 RX ADMIN — PANTOPRAZOLE SODIUM SCH MG: 40 TABLET, DELAYED RELEASE ORAL at 05:55

## 2019-06-06 RX ADMIN — METHYLPREDNISOLONE SODIUM SUCCINATE SCH MG: 40 INJECTION, POWDER, FOR SOLUTION INTRAMUSCULAR; INTRAVENOUS at 08:19

## 2019-06-06 RX ADMIN — LACTULOSE PRN GM: 20 SOLUTION ORAL at 05:47

## 2019-06-06 RX ADMIN — FORMOTEROL FUMARATE DIHYDRATE SCH MCG: 20 SOLUTION RESPIRATORY (INHALATION) at 07:29

## 2019-06-06 RX ADMIN — IPRATROPIUM BROMIDE AND ALBUTEROL SULFATE SCH ML: .5; 3 SOLUTION RESPIRATORY (INHALATION) at 15:35

## 2019-06-06 RX ADMIN — CHLORHEXIDINE GLUCONATE SCH ML: 1.2 RINSE ORAL at 08:20

## 2019-06-06 RX ADMIN — IPRATROPIUM BROMIDE AND ALBUTEROL SULFATE SCH ML: .5; 3 SOLUTION RESPIRATORY (INHALATION) at 07:29

## 2019-06-06 RX ADMIN — OXYCODONE HYDROCHLORIDE SCH MG: 10 TABLET, FILM COATED, EXTENDED RELEASE ORAL at 08:18

## 2019-06-06 RX ADMIN — AMIODARONE HYDROCHLORIDE SCH MG: 200 TABLET ORAL at 08:18

## 2019-06-06 RX ADMIN — METHYLPREDNISOLONE SODIUM SUCCINATE SCH MG: 40 INJECTION, POWDER, FOR SOLUTION INTRAMUSCULAR; INTRAVENOUS at 17:18

## 2019-06-06 RX ADMIN — FUROSEMIDE SCH MG: 10 INJECTION, SOLUTION INTRAMUSCULAR; INTRAVENOUS at 08:20

## 2019-06-06 RX ADMIN — DOCUSATE SODIUM AND SENNOSIDES SCH EACH: 50; 8.6 TABLET ORAL at 08:18

## 2019-06-06 RX ADMIN — IPRATROPIUM BROMIDE AND ALBUTEROL SULFATE SCH ML: .5; 3 SOLUTION RESPIRATORY (INHALATION) at 10:58

## 2019-06-06 RX ADMIN — LEVOTHYROXINE SODIUM SCH MCG: 50 TABLET ORAL at 05:47

## 2019-06-06 RX ADMIN — BUDESONIDE SCH MG: 1 SUSPENSION RESPIRATORY (INHALATION) at 07:29

## 2019-06-06 NOTE — P.PN
Subjective


Progress Note Date: 06/06/19


Principal diagnosis: 





Acute sepsis, septic shock, bilateral pneumonia with sepsis.





The patient is seen today 06/06/2019 in follow-up on the selective care unit.  

Awake and alert in no acute distress.  Resting comfortably in bed.  Denies any 

worsening shortness of breath, cough or congestion.  Maintaining good O2 

saturations in the 90s on 35% trach collar.  Sputum cultures positive for 

Streptococcus pneumoniae.  Remains on ceftriaxone.  Continue DuoNeb inhalations 

along with Perforomist and Pulmicort inhalations.  Currently on IV diuretics.  

Remains in a negative balance.  White count 16.6.  Hemoglobin 10.1.  Creatinine 

0.69.





Objective





- Vital Signs


Vital signs: 


                                   Vital Signs











Temp  97.8 F   06/06/19 03:45


 


Pulse  76   06/06/19 11:12


 


Resp  14   06/05/19 23:07


 


BP  158/76   06/06/19 03:45


 


Pulse Ox  99   06/06/19 03:45








                                 Intake & Output











 06/05/19 06/06/19 06/06/19





 18:59 06:59 18:59


 


Intake Total 528 610 240


 


Output Total 1250 2200 1450


 


Balance -722 -1590 -1210


 


Weight  62 kg 


 


Intake:   


 


  Intake, IV Titration  250 





  Amount   


 


    Anidulafungin 100 mg In  100 





    Sodium Chloride 0.9% 100   





    ml @ 84 mls/hr IVPB Q24H   





    [facility] Rx#:[id number]   


 


    Sodium Chloride 0.45% 1,  150 





    000 ml @ 25 mls/hr IV .   





    Q24H [facility] Rx#:[id number]   


 


  Oral 528  240


 


  Tube Feeding  360 


 


Output:   


 


  Urine 1250 2200 1450


 


Other:   


 


  Voiding Method Indwelling Catheter Indwelling Catheter Indwelling Catheter


 


  # Bowel Movements 1  0














- Exam





GENERAL EXAM: Alert, active, comfortable in no apparent distress.  On 35% trach 

collar.


HEAD: Normocephalic.


EYES: Normal reaction of pupils, equal size.


NOSE: Clear with pink turbinates.


THROAT: The cast me tube in place.  No erythema or exudates.


NECK: No masses, no JVD.


CHEST: No chest wall deformity.


LUNGS: Equal air entry with pickles in the bilateral posterior bases.


CVS: S1 and S2 normal with no audible murmur, regular rhythm.


ABDOMEN: PEG tube exit site clean and dry.  No hepatosplenomegaly, normal bowel 

sounds, no guarding or rigidity.


SPINE: No scoliosis or deformity


SKIN: No rashes


CENTRAL NERVOUS SYSTEM: No focal deficits, tone is normal in all 4 extremities.


EXTREMITIES: There is 1-2+ peripheral edema.  No clubbing, no cyanosis.  

Peripheral pulses are intact.





- Labs


CBC & Chem 7: 


                                 06/06/19 06:07





                                 06/06/19 06:07


Labs: 


                  Abnormal Lab Results - Last 24 Hours (Table)











  06/05/19 06/05/19 06/06/19 Range/Units





  16:44 16:44 00:13 


 


WBC     (3.8-10.6)  k/uL


 


RBC     (3.80-5.40)  m/uL


 


Hgb     (11.4-16.0)  gm/dL


 


MCHC     (31.0-37.0)  g/dL


 


RDW     (11.5-15.5)  %


 


Plt Count     (150-450)  k/uL


 


Carbon Dioxide     (22-30)  mmol/L


 


BUN     (7-17)  mg/dL


 


Glucose     (74-99)  mg/dL


 


POC Glucose (mg/dL)  316 H  253 H  228 H  (75-99)  mg/dL














  06/06/19 06/06/19 06/06/19 Range/Units





  05:51 06:07 06:07 


 


WBC   16.6 H   (3.8-10.6)  k/uL


 


RBC   3.52 L   (3.80-5.40)  m/uL


 


Hgb   10.1 L   (11.4-16.0)  gm/dL


 


MCHC   29.7 L   (31.0-37.0)  g/dL


 


RDW   17.1 H   (11.5-15.5)  %


 


Plt Count   479 H   (150-450)  k/uL


 


Carbon Dioxide    36 H  (22-30)  mmol/L


 


BUN    37 H  (7-17)  mg/dL


 


Glucose    172 H  (74-99)  mg/dL


 


POC Glucose (mg/dL)  158 H    (75-99)  mg/dL














  06/06/19 Range/Units





  12:03 


 


WBC   (3.8-10.6)  k/uL


 


RBC   (3.80-5.40)  m/uL


 


Hgb   (11.4-16.0)  gm/dL


 


MCHC   (31.0-37.0)  g/dL


 


RDW   (11.5-15.5)  %


 


Plt Count   (150-450)  k/uL


 


Carbon Dioxide   (22-30)  mmol/L


 


BUN   (7-17)  mg/dL


 


Glucose   (74-99)  mg/dL


 


POC Glucose (mg/dL)  139 H  (75-99)  mg/dL














Assessment and Plan


Assessment: 





Impression:





1 acute on chronic hypoxic respiratory failure secondary to bilateral pneumonia 

secondary to Streptococcus pneumonia as documented from the sputum culture.  





2 acute sepsis and septic shock secondary to pneumonia patient required fluid 

boluses and pressors.  Recovered, hemodynamically stable 





3 stage IV non-small cell lung cancer post-chemoradiation therapy and was on 

immunotherapy.





4 severe COPD with acute exacerbation





5 hypertension





6 chronic back pain and fibromyalgia and arthritis





7 history of depression





8 history of recent prolonged course of mechanical ventilation in March of 2019 

requiring tracheostomy and PEG tube placement





9 small bilateral pleural effusions, etiology is not clear, I believe the 

effusions could be parapneumonic.  Although the possibility of malignant pleural

effusions not entirely ruled out.  





Recommendation: 





The patient was seen and evaluated by Dr. Pastor.  Chest x-ray and labs were 

reviewed.  Improved right lower lobe consolidation, small bilateral pleural 

effusions continue.  Continue her current treatment plan with bronchodilators, 

IV Solu medrol, antibiotics.  We will continue to follow and make further 

recommendations based on her clinical status.  Discharge planning is in place.





I, the cosigning physician, performed a history & physical examination of the 

patient. Lungs sounds with crackles in the bilateral posterior bases.  

Maintaining good O2 saturations in the 90s on 35% FiO2 via trach collar. I 

discussed the assessment and plan of care with my nurse practitioner, Nguyen Guaman. I attest to the above note as dictated by her.

## 2019-06-06 NOTE — XR
EXAMINATION TYPE: XR chest 1V portable

 

DATE OF EXAM: 6/6/2019

 

COMPARISON: 6/5/2019

 

INDICATION: Short of breath

 

TECHNIQUE: Single frontal view of the chest is obtained.

 

FINDINGS:  

The heart size is normal.  

The pulmonary vasculature is normal.  

Tracheostomy tube is in the midline right a consolidation is at the right base. This may have slight 
improvement. Small right and minimal left pleural effusions remain present.  

 

 

IMPRESSION:  

1. Improving right lower lobe consolidation.

2. Small bilateral pleural effusions

## 2019-06-06 NOTE — P.DS
Providers


Date of admission: 


05/27/19 04:53





Expected date of discharge: 06/06/19


Attending physician: 


Miguel Garza





Consults: 





                                        





05/27/19 04:53


Consult Physician Stat 


   Consulting Provider: Kenna Love


   Consult Reason/Comments: ICU management


   Do you want consulting provider notified?: Already Contacted





05/27/19 12:10


Consult Physician Routine 


   Consulting Provider: Darya Mendenhall


   Consult Reason/Comments: Sepsis pneumonia


   Do you want consulting provider notified?: Yes











Primary care physician: 


Ten Dejesus





Hospital Course: 





Discharge diagnosis


1.  Sepsis present on admission related to pneumonia.  Chest x-ray completed 

showing right lower lobe consolidation.  Bilateral pleural effusions.  Patient 

admitted to the intensive care unit.  Initial lactic acid 2.1.  Blood urine and 

sputum cultures have been ordered.  White blood cell 16.4.  Infectious disease 

following.  Patient on cefepime and Vanco.  Sputum culture currently growing 

Streptococcus pneumoniae.  Repeat chest x-ray completed showing suspected sickle

cell congestive heart failure mild pulmonary vascular congestion stable 

small-to-moderate pleural effusions or airspace disease.  Likely atelectasis 

multifocal pneumonia and parapneumonic effusions are an alernative 

consideration.  Sputum culture growing Streptococcus pneumoniae.  Antibiotics 

adjusted to Rocephin per infectious disease.  Patient taken off mechanical 

ventilation and placed on trach collar.  Antibiotics adjusted to Eraxis and 

Rocephin per pulmonary.  Discussed with pulmonary services patient has been 

cleared for discharge.  Also discussed with infectious disease.  Patient has 

been cleared for discharge.  He'll be DC'd on Ceftin and nystatin





2.  Hypotension related to above.  Patient maintained on Levophed.  Resolved





3.  History of non-small cell lung stage III adenocarcinoma





4.  History of recent prolonged hospital admission in which she was transferred 

to [facility].  Patient was on mechanical ventilation for prolonged 

time requiring tracheostomy





5.  Acute on chronic respiratory failure. 





6.  History of COPD





7.  History of CVA





8.  History of osteoarthritis





9.  History of hyperlipidemia





10.  History of fibromyalgia





11.  History of depression





12.  Ex-smoker.  Patient is smoking history of half-pack per day for 30 years





13.  Bilateral lower extremity neuropathy.  Patient started on Lyrica





14.  Small pleural effusion.  Pulmonary has increased Lasix 40 mg every 12 

hours.  Patient resumed on home Lasix dose.  Cleared for discharge from 

pulmonary standpoint











Hospital course





This is  a 63-year-old female patient who presented to the hospital with 

worsening shortness of breath.  Patient has a past medical history of recent 

hospital admission in which she was admitted to [facility] hearing transferred

to [facility] which required prolonged intubation and tracheostomy.  Patient was

recently discharged to [facility] D/C to [facility] week ago.  Patient has had 

increased sputum production and cough.  Patient has past medical history of non-

small cell lung cancer stage III, CVA, COPD, GERD, hyperlipidemia, 

osteoarthritis,chronic back pain and depression.  Chest x-ray completed in ER 

showing right lower lobe consolidation with bilateral pleural effusions.  

Patient's WBC elevated at 10.9.  Patient also have a lactic acid of 2.1.  UA 

negative.  Patient's blood pressure low.  Patient started on Levophed and 

transferred to the ICU.  Dr. Trejo has been consulted for critical care.  

Patient started on vancomycin and Zosyn for antibiotic.  Blood urine and sputum 

cultures ordered.  At this time patient is on mechanical ventilation resting 

comfortably in bed.  Patient is following commands.








On 05/28/2019 patient maintained on mechanical ventilation but awake and alert. 

Patient remains in intensive care unit on Levophed for pressure support.  

Patient reports that she feels better.  Patient currently on vancomycin and 

Maxipime for antibiotics.  Infectious disease and pulmonary services are henrique dennis.  White Blood cell 16.7.  Blood,urine and sputum cultures pending





On 05/29/2019 patient rates on mechanical ventilation but  awake and alert 

following commands. patient reports that she feels overall improved.  white 

blood cell improving to 13.6.  At this time patient denies chest pain or 

shortness breath.  Patient denies nausea vomiting diarrhea denies any urinary 

burning or frequency





05/30/2019 patient is alert and awake following commands.  Patient remains on 

mechanical ventilation.  Patient has been off pressure support medication for 24

hours.  Infectious disease and pulmonary following.  At this time patient denies

chest pain or shortness breath.  Patient denies nausea vomiting or diarrhea.  

Patient denies any urinary burning or frequency. 





On 05/31/2019 patient is awake alert and awake following commands.  Patient has 

been taken off mechanical ventilation placed on trach mask.  Patient remains in 

the intensive care unit but showing improvement.  Patient has been off pressure 

support medication for over 48 hours.  Infectious disease and pulmonary are 

following.  Antibiotics adjusted per ID.  At this time patient denies chest pain

or shortness of breath.  Patient denies nausea vomiting or diarrhea.  Patient 

denies any urinary burning or frequency








On 06/01/2019 patient is awake alert and awake following commands.  Patient is 

back on mechanical ventilation she was also started on IV Solu-Medrol.  Patient 

remains in the intensive care unit but showing improvement.  She denies any pain

or discomfort at this time, lab results were reviewed potassium is a bit low at 

3.3, hemoglobin is 8.5  Otherwise labs are unremarkable.








On 06/02/2019 patient was seen and examined in the ICU she is alert and oriented

3 she is off the ventilator at this time and is maintained on tracheostomy 

shield with high flow oxygen, there is no fever or chills no headache or 

dizziness no chest pain she has occasional cough no nausea or vomiting no 

abdominal pain no diarrhea and no urinary symptoms








On 06/03/2019 patient alert and oriented to 3.  Patient remains on trach 

collar.  Patient is showing improvement.  At this time patient denies any chest 

pain or shortness breath.  Patient denies nausea vomiting or diarrhea.  Patient 

denies any urinary burning or frequency. 





On 06/04/2019 patient is alert and oriented 3.  Patient is continuing to show 

improvement.  White blood cell increasing blood per infectious disease likely 

reactive from steroids.  Patient remains on Rocephin.  Continue physical 

therapy.  At this time patient denies chest pain or shortness of breath.  

Patient denies nausea vomiting or diarrhea.  Patient denies any urinary burning 

or frequency. 





On 06/05/2019 patient is alert and oriented 3.  Patient has been moved out of 

the intensive care unit.  Lasix have been increased per pulmonary continue 

lateral pleural effusion.  We'll continue to monitor.  At this time patient 

denies any chest pain or shortness breath.  Patient denies nausea vomiting or 

diarrhea.  Patient denies any urinary burning or frequency





On 06/06/2019 patient has been cleared for discharge from pulmonary and 

infectious disease.  Patient alert and oriented 3.  Patient feels ready to be 

discharged back to [facility].  Patient will be DC'd on Ceftin and nystatin per ID 

recommendation.  White blood cell remains elevated but trending down.  At this 

time patient denies chest pain or shortness of breath.  Patient denies nausea 

vomiting or diarrhea.  Patient denies any urinary burning or frequency





I performed an examination of the patient and discussed their management with 

the Nurse Practitioner.  I have reviewed the Nurse Practitioner's notes and 

agree with the documented findings and plan of care


Patient Condition at Discharge: Stable





Plan - Discharge Summary


Discharge Rx Participant: Yes


New Discharge Prescriptions: 


New


   Cefuroxime Axetil [Ceftin] 500 mg PO BID 7 Days #14 tab


   Pregabalin [Lyrica] 50 mg PO BID #0 cap


   Nystatin 100,000 unit PO QID 7 Days #7 oral.susp





Continue


   Furosemide [Lasix] 40 mg PO DAILY@0900


   Levothyroxine Sodium [Synthroid] 25 mcg PO DAILY@1100


   Ondansetron [Zofran ODT] 4 mg PO Q6H PRN


     PRN Reason: Nausea And Vomiting


   Simethicone 80 mg PO DAILY PRN


     PRN Reason: GAS


   Polyethylene Glycol 3350 [Miralax] 17 gm PO DAILY PRN


     PRN Reason: Constipation


   Lactulose 20 gm PO Q8H PRN


     PRN Reason: Constipation


   Acetaminophen [Tylenol Arthritis] 650 mg PO Q6H PRN


     PRN Reason: Pain


   Levalbuterol Nebulized [Xopenex Nebulized] 1.25 mg INHALATION RT-Q6H


   hydrALAZINE HCL 25 mg PO TID@0600,1400,2200


   Sennosides/Docusate Sodium [Senna-S Laxative Tablet] 2 tab PO BID@0900,2100


   QUEtiapine FUMARATE 50 mg PO BID@0900,2100


   Budesonide [Pulmicort] 1 mg INHALATION RT-BID@0900,2100


   Chlorhexidine Gluconate [Periogard] 15 ml PO BID@0900,2100


   Amiodarone [Cordarone] 200 mg PO DAILY@0900


   Lisinopril [Zestril] 10 mg PO DAILY@0900


   Pantoprazole Sodium [Protonix] 40 mg PO DAILY@0600


   Levothyroxine Sodium [Synthroid] 50 mcg PO DAILY@0600


   LORazepam [Ativan] 0.5 mg PO Q6H PRN 14 Days #56 tab


     PRN Reason: Anxiety


   oxyCODONE HCL [oxyCODONE HCL (IR)] 10 mg PO Q4H PRN 14 Days #54 tablet


     PRN Reason: Pain


   oxyCODONE ER [OxyCONTIN] 10 mg PO BID@0900,2100 14 Days #28 tab.er.12h


Discharge Medication List





Furosemide [Lasix] 40 mg PO DAILY@0900 03/07/19 [History]


Levothyroxine Sodium [Synthroid] 25 mcg PO DAILY@1100 03/07/19 [History]


Acetaminophen [Tylenol Arthritis] 650 mg PO Q6H PRN 05/26/19 [History]


Amiodarone [Cordarone] 200 mg PO DAILY@0900 05/26/19 [History]


Budesonide [Pulmicort] 1 mg INHALATION RT-BID@0900,2100 05/26/19 [History]


Chlorhexidine Gluconate [Periogard] 15 ml PO BID@0900,2100 05/26/19 [History]


Lactulose 20 gm PO Q8H PRN 05/26/19 [History]


Levalbuterol Nebulized [Xopenex Nebulized] 1.25 mg INHALATION RT-Q6H 05/26/19 

[History]


Levothyroxine Sodium [Synthroid] 50 mcg PO DAILY@0600 05/26/19 [History]


Lisinopril [Zestril] 10 mg PO DAILY@0900 05/26/19 [History]


Ondansetron [Zofran ODT] 4 mg PO Q6H PRN 05/26/19 [History]


Pantoprazole Sodium [Protonix] 40 mg PO DAILY@0600 05/26/19 [History]


Polyethylene Glycol 3350 [Miralax] 17 gm PO DAILY PRN 05/26/19 [History]


QUEtiapine FUMARATE 50 mg PO BID@0900,2100 05/26/19 [History]


Sennosides/Docusate Sodium [Senna-S Laxative Tablet] 2 tab PO BID@0900,2100 05/26/19 [History]


Simethicone 80 mg PO DAILY PRN 05/26/19 [History]


hydrALAZINE HCL 25 mg PO TID@0600,1400,2200 05/26/19 [History]


Cefuroxime Axetil [Ceftin] 500 mg PO BID 7 Days #14 tab 06/06/19 [Rx]


LORazepam [Ativan] 0.5 mg PO Q6H PRN 14 Days #56 tab 06/06/19 [Rx]


Nystatin 100,000 unit PO QID 7 Days #7 oral.susp 06/06/19 [Rx]


Pregabalin [Lyrica] 50 mg PO BID #0 cap 06/06/19 [Rx]


oxyCODONE ER [OxyCONTIN] 10 mg PO BID@0900,2100 14 Days #28 tab.er.12h 06/06/19 

[Rx]


oxyCODONE HCL [oxyCODONE HCL (IR)] 10 mg PO Q4H PRN 14 Days #54 tablet 06/06/19 

[Rx]








Follow up Appointment(s)/Referral(s): 


Ten Dejesus MD [Primary Care Provider] - 1-2 days


Naresh Pastor DO [Doctor of Osteopathic Medicine] - 1 Week


Activity/Diet/Wound Care/Special Instructions: 


[facility]





CBC, CMP and TSH level first lab draw at [facility]


Discharge Disposition: TRANSFER TO SNF/ECF

## 2019-06-06 NOTE — PN
PROGRESS NOTE



DATE OF SERVICE:

06/06/2019.



REASON FOR FOLLOWUP:

1. Strep pneumo pneumonia.

2. Oropharyngeal candidiasis.



INTERVAL HISTORY:

The patient is currently afebrile.  Patient has been breathing comfortably.  Denies

having any chest pain.  Occasional cough.  No nausea, vomiting.  Has been tolerating

her diet.  No choking on food.  No abdominal pain.  No diarrhea.



PHYSICAL EXAMINATION:

Blood pressure is 158/76, pulse of 78, temperature 97.8.  She is 99% on trach collar.

General description is an elderly female lying in bed in no distress.

Respiratory system:  Unlabored breathing, clear to auscultation anteriorly.

HEART S1, S2.  Regular rate and rhythm.

ABDOMEN: Soft, no tenderness.



LABS:

Hemoglobin is 10.1 with a white count of 16.6, BUN of 37, creatinine 2.69.



DIAGNOSTIC IMPRESSION AND PLAN:

1. Patient with Strep pneumo pneumoniae.  Patient has seem to shown clinical

    improvement.  Has received more than a week of IV antibiotic.  She will be given

    another week of oral Ceftin to finish a course of therapy.

2. Patient with oropharyngeal candidiasis and inability to use Diflucan because of

    interaction with her medication.  We will give nystatin swish and swallow and

    monitor clinical course closely outpatient.

3. Continue supportive care.





MMODL / IJN: [id number] / Job#: [id number]

## 2019-06-08 ENCOUNTER — HOSPITAL ENCOUNTER (EMERGENCY)
Dept: HOSPITAL 47 - EC | Age: 64
LOS: 1 days | Discharge: HOME | End: 2019-06-09
Payer: MEDICARE

## 2019-06-08 DIAGNOSIS — Z86.73: ICD-10-CM

## 2019-06-08 DIAGNOSIS — Z79.51: ICD-10-CM

## 2019-06-08 DIAGNOSIS — Z79.890: ICD-10-CM

## 2019-06-08 DIAGNOSIS — Z92.21: ICD-10-CM

## 2019-06-08 DIAGNOSIS — Z85.118: ICD-10-CM

## 2019-06-08 DIAGNOSIS — K21.9: ICD-10-CM

## 2019-06-08 DIAGNOSIS — I48.91: ICD-10-CM

## 2019-06-08 DIAGNOSIS — J95.00: Primary | ICD-10-CM

## 2019-06-08 DIAGNOSIS — F32.9: ICD-10-CM

## 2019-06-08 DIAGNOSIS — Z79.899: ICD-10-CM

## 2019-06-08 DIAGNOSIS — J44.9: ICD-10-CM

## 2019-06-08 DIAGNOSIS — K59.09: ICD-10-CM

## 2019-06-08 DIAGNOSIS — Z87.891: ICD-10-CM

## 2019-06-08 DIAGNOSIS — E03.9: ICD-10-CM

## 2019-06-08 DIAGNOSIS — Z85.038: ICD-10-CM

## 2019-06-08 LAB
ALBUMIN SERPL-MCNC: 3.3 G/DL (ref 3.5–5)
ALP SERPL-CCNC: 97 U/L (ref 38–126)
ALT SERPL-CCNC: 28 U/L (ref 9–52)
ANION GAP SERPL CALC-SCNC: 1 MMOL/L
AST SERPL-CCNC: 31 U/L (ref 14–36)
BASOPHILS # BLD AUTO: 0 K/UL (ref 0–0.2)
BASOPHILS NFR BLD AUTO: 0 %
BUN SERPL-SCNC: 30 MG/DL (ref 7–17)
CALCIUM SPEC-MCNC: 8.7 MG/DL (ref 8.4–10.2)
CHLORIDE SERPL-SCNC: 97 MMOL/L (ref 98–107)
CO2 SERPL-SCNC: 40 MMOL/L (ref 22–30)
EOSINOPHIL # BLD AUTO: 0.1 K/UL (ref 0–0.7)
EOSINOPHIL NFR BLD AUTO: 1 %
ERYTHROCYTE [DISTWIDTH] IN BLOOD BY AUTOMATED COUNT: 4.1 M/UL (ref 3.8–5.4)
ERYTHROCYTE [DISTWIDTH] IN BLOOD: 18.2 % (ref 11.5–15.5)
GLUCOSE SERPL-MCNC: 159 MG/DL (ref 74–99)
HCT VFR BLD AUTO: 39.8 % (ref 34–46)
HGB BLD-MCNC: 12.4 GM/DL (ref 11.4–16)
LYMPHOCYTES # SPEC AUTO: 1 K/UL (ref 1–4.8)
LYMPHOCYTES NFR SPEC AUTO: 8 %
MCH RBC QN AUTO: 30.3 PG (ref 25–35)
MCHC RBC AUTO-ENTMCNC: 31.2 G/DL (ref 31–37)
MCV RBC AUTO: 97.1 FL (ref 80–100)
MONOCYTES # BLD AUTO: 0.4 K/UL (ref 0–1)
MONOCYTES NFR BLD AUTO: 3 %
NEUTROPHILS # BLD AUTO: 11.3 K/UL (ref 1.3–7.7)
NEUTROPHILS NFR BLD AUTO: 88 %
PLATELET # BLD AUTO: 409 K/UL (ref 150–450)
PROT SERPL-MCNC: 6.3 G/DL (ref 6.3–8.2)
SODIUM SERPL-SCNC: 138 MMOL/L (ref 137–145)
WBC # BLD AUTO: 12.8 K/UL (ref 3.8–10.6)

## 2019-06-08 PROCEDURE — 80053 COMPREHEN METABOLIC PANEL: CPT

## 2019-06-08 PROCEDURE — 71046 X-RAY EXAM CHEST 2 VIEWS: CPT

## 2019-06-08 PROCEDURE — 99285 EMERGENCY DEPT VISIT HI MDM: CPT

## 2019-06-08 PROCEDURE — 85025 COMPLETE CBC W/AUTO DIFF WBC: CPT

## 2019-06-08 PROCEDURE — 36415 COLL VENOUS BLD VENIPUNCTURE: CPT

## 2019-06-08 NOTE — XR
EXAM:

  XR Chest, 2 Views

 

CLINICAL HISTORY:

  ITS.REASON XR Reason: GARRY, trached - GARRY resolved after suctioning

 

TECHNIQUE:

  Frontal and lateral views of the chest.

 

COMPARISON:

  No relevant prior studies available.

 

FINDINGS:

  Lungs:  Right lower lobe consolidation is nonspecific.

  Pleural space:  Bilateral pleural effusions.  No pneumothorax.

  Heart:  No suspicious enlargement.

  Mediastinum:  Unremarkable.

  Bones/joints:  No acute fracture.

 

IMPRESSION:     

1.  Bilateral pleural effusions.

2.  Right lower lobe consolidation is nonspecific.

## 2019-06-09 ENCOUNTER — HOSPITAL ENCOUNTER (EMERGENCY)
Dept: HOSPITAL 47 - EC | Age: 64
End: 2019-06-09
Payer: MEDICARE

## 2019-06-09 VITALS
RESPIRATION RATE: 19 BRPM | DIASTOLIC BLOOD PRESSURE: 74 MMHG | HEART RATE: 85 BPM | SYSTOLIC BLOOD PRESSURE: 124 MMHG | TEMPERATURE: 98 F

## 2019-06-09 DIAGNOSIS — I48.91: ICD-10-CM

## 2019-06-09 DIAGNOSIS — Z85.118: ICD-10-CM

## 2019-06-09 DIAGNOSIS — F32.9: ICD-10-CM

## 2019-06-09 DIAGNOSIS — Z79.890: ICD-10-CM

## 2019-06-09 DIAGNOSIS — E78.5: ICD-10-CM

## 2019-06-09 DIAGNOSIS — J44.9: ICD-10-CM

## 2019-06-09 DIAGNOSIS — K21.9: ICD-10-CM

## 2019-06-09 DIAGNOSIS — E03.9: ICD-10-CM

## 2019-06-09 DIAGNOSIS — Z87.891: ICD-10-CM

## 2019-06-09 DIAGNOSIS — Z79.51: ICD-10-CM

## 2019-06-09 DIAGNOSIS — Z79.899: ICD-10-CM

## 2019-06-09 DIAGNOSIS — M19.90: ICD-10-CM

## 2019-06-09 DIAGNOSIS — I46.9: Primary | ICD-10-CM

## 2019-06-09 DIAGNOSIS — M79.7: ICD-10-CM

## 2019-06-09 LAB — POTASSIUM SERPL-SCNC: 4.5 MMOL/L (ref 3.5–5.1)

## 2019-06-09 PROCEDURE — 99285 EMERGENCY DEPT VISIT HI MDM: CPT

## 2019-06-09 PROCEDURE — 92950 HEART/LUNG RESUSCITATION CPR: CPT

## 2019-06-09 NOTE — ED
General Adult HPI





- General


Stated complaint: CPR


Time Seen by Provider: 19 07:08


Source: RN notes reviewed





- History of Present Illness


Initial comments: 





This is a 63-year-old female who presents to the emergency department via EMS as

a priority 1 CPR in progress.  According to EMS she was in respiratory distress 

when they arrived they inflated or cough and suctioned her out she was doing 

considerably better a were taken out the front door she coded and had no pulses 

and was asystole.  According to be a continued CPR for a 30 minute ride in and 

gave her 6 epinephrines and she remained pulseless and in asystole.  No family 

is with the patient at this time no further history is available this time other

than she is a lung cancer patient with a trach.





- Related Data


                                Home Medications











 Medication  Instructions  Recorded  Confirmed


 


Furosemide [Lasix] 40 mg PO DAILY@0919


 


Acetaminophen [Tylenol Arthritis] 650 mg PO Q6H PRN 19


 


Amiodarone [Cordarone] 200 mg PO DAILY@19


 


Budesonide [Pulmicort] 1 mg INHALATION RT-BID@09,19


 


Chlorhexidine Gluconate [Periogard] 15 ml PO BID@09,19


 


Lactulose 20 gm PO Q8H PRN 19


 


Levalbuterol Nebulized [Xopenex 1.25 mg INHALATION RT-Q6H 19





Nebulized]   


 


Levothyroxine Sodium [Synthroid] 50 mcg PO DAILY@19


 


Lisinopril [Zestril] 10 mg PO DAILY@19


 


Ondansetron [Zofran ODT] 4 mg PO Q6H PRN 19


 


Pantoprazole Sodium [Protonix] 40 mg PO DAILY@19


 


Polyethylene Glycol 3350 [Miralax] 17 gm PO DAILY PRN 19


 


QUEtiapine FUMARATE 50 mg PO BID@0900,19


 


Sennosides/Docusate Sodium 2 tab PO BID@0900,2100 19





[Senna-S Laxative Tablet]   


 


Simethicone 80 mg PO DAILY PRN 19


 


hydrALAZINE HCL 25 mg PO TID@0600,1400,2200 19


 


Nystatin 500,000 unit PO QID 19








                                  Previous Rx's











 Medication  Instructions  Recorded


 


Cefuroxime Axetil [Ceftin] 500 mg PO BID 7 Days #14 tab 19


 


LORazepam [Ativan] 0.5 mg PO Q6H PRN 14 Days #56 tab 19


 


Pregabalin [Lyrica] 50 mg PO BID #0 cap 19


 


oxyCODONE ER [OxyCONTIN] 10 mg PO BID@0900,2100 14 Days #28 19





 tab.er.12h 


 


oxyCODONE HCL [oxyCODONE HCL (IR)] 10 mg PO Q4H PRN 14 Days #54 tablet 19











                                    Allergies











Allergy/AdvReac Type Severity Reaction Status Date / Time


 


No Known Allergies Allergy   Verified 19 07:17














Review of Systems


ROS Statement: 


Those systems with pertinent positive or pertinent negative responses have been 

documented in the HPI.





ROS Other: All systems not noted in ROS Statement are negative.





Past Medical History


Past Medical History: Atrial Fibrillation, Cancer, COPD, CVA/TIA, Fibromyalgia, 

GERD/Reflux, Hyperlipidemia, Osteoarthritis (OA), Pneumonia, Thyroid Disorder


Additional Past Medical History / Comment(s): Non-small cell lung cancer stage 3

adenocarcinoma, COPD, fibromyalgia, acid reflux, hyperlipidemia, osteoarthritis,

adrenal mass that has remained stable over some time, TIA history of, hiatal 

hernia, chronic constipation, chronic back pain (2 herniated discs, 3 disc 

fractures), degenerative arthritis, hiatal hernia; hypothyroidism started after 

chemo; Last Chemo 2017 then had immunotherapy for 1 year; first instance of 

AFib was at [facility]; Here 3/22/19-3/27/19 then transferred to [facility] for 34 

days & she got her trach (4/15/19) and peg (19), then she was transferred 

to [facility] where she was for 1 month & per spouse she was dropped at 

[facility] and this is where she also received the current pressure ulcer 

that she has) she was then transferred to [facility] "2019"


History of Any Multi-Drug Resistant Organisms: None Reported


Additional Past Surgical History / Comment(s): SINUS surgery, insertion of a 

tracheostomy tube and PEG tube "end of April, beginning of May"


Past Anesthesia/Blood Transfusion Reactions: No Reported Reaction


Past Psychological History: Depression


Smoking Status: Former smoker


Past Alcohol Use History: None Reported


Past Drug Use History: None Reported





- Past Family History


  ** Mother


Family Medical History: Cancer





  ** Father


Family Medical History: Cancer





General Exam





- General Exam Comments


Initial Comments: 





GENERAL:


Patient is unresponsive and has no movement or respiratory activity 





EYES:


Pupils are fixed and nonreactive





PULMONARY:


Patient has no spontaneous respirations





CARDIOVASCULAR:


Patient has no heart rate and no pulses





ABDOMEN:


No gross abnormalities 





NEUROLOGIC:


Patient is unresponsive GCS is 3 





MUSCULOSKELETAL:


No gross abnormalities are noted








Course


                                   Vital Signs











  19





  07:17


 


Pulse Rate 0 L


 


Respiratory 0 L





Rate 


 


O2 Sat by Pulse 0 L





Oximetry 














Medical Decision Making





- Medical Decision Making





Patient was pronounced at 709 after she remained in asystole and she had no 

pulses.  CPR door even in progress for 31 minutes and 6 epinephrines had been 

given





I spoke with [facility] and she released the body





Disposition


Clinical Impression: 


 Cardiac arrest





Disposition: 


Referrals: 


Ten Dejesus MD [Primary Care Provider] - 1-2 days


Time of Disposition: 07:25

## 2019-06-09 NOTE — ED
SOB HPI





- General


Chief Complaint: Shortness of Breath


Stated Complaint: GARRY


Time Seen by Provider: 06/08/19 22:50


Source: patient


Mode of arrival: EMS





- History of Present Illness


Initial Comments: 


Hemalatha is a pleasant 63-year-old female with known colon cancer who has a trach 

in place due to prolonged intubation previously.  Patient was recently admitted 

to our hospital for septic shock at which time she was on the ventilator.  She 

spent the past 2 days at [facility].  She reports that for the [facility] no baby had 

been able to suction her trachea today she began having difficulty breathing.  

EMS and arrived on scene they suctioned her trach and she had immediate 

resolution of her discomfort.  Her heart rate improved her oxygen saturation 

saturations remained in the high 90s on trach collar and upon arrival emergency 

permission no further complaints.  








- Related Data


                                Home Medications











 Medication  Instructions  Recorded  Confirmed


 


Furosemide [Lasix] 40 mg PO DAILY@0900 03/07/19 06/08/19


 


Acetaminophen [Tylenol Arthritis] 650 mg PO Q6H PRN 05/26/19 06/08/19


 


Amiodarone [Cordarone] 200 mg PO DAILY@0900 05/26/19 06/08/19


 


Budesonide [Pulmicort] 1 mg INHALATION RT-BID@0900,2100 05/26/19 06/08/19


 


Chlorhexidine Gluconate [Periogard] 15 ml PO BID@0900,2100 05/26/19 06/08/19


 


Lactulose 20 gm PO Q8H PRN 05/26/19 06/08/19


 


Levalbuterol Nebulized [Xopenex 1.25 mg INHALATION RT-Q6H 05/26/19 06/08/19





Nebulized]   


 


Levothyroxine Sodium [Synthroid] 50 mcg PO DAILY@0600 05/26/19 06/08/19


 


Lisinopril [Zestril] 10 mg PO DAILY@0900 05/26/19 06/08/19


 


Ondansetron [Zofran ODT] 4 mg PO Q6H PRN 05/26/19 06/08/19


 


Pantoprazole Sodium [Protonix] 40 mg PO DAILY@0600 05/26/19 06/08/19


 


Polyethylene Glycol 3350 [Miralax] 17 gm PO DAILY PRN 05/26/19 06/08/19


 


QUEtiapine FUMARATE 50 mg PO BID@0900,2100 05/26/19 06/08/19


 


Sennosides/Docusate Sodium 2 tab PO BID@0900,2100 05/26/19 06/08/19





[Senna-S Laxative Tablet]   


 


Simethicone 80 mg PO DAILY PRN 05/26/19 06/08/19


 


hydrALAZINE HCL 25 mg PO TID@0600,1400,2200 05/26/19 06/08/19


 


Nystatin 500,000 unit PO QID 06/08/19 06/08/19








                                  Previous Rx's











 Medication  Instructions  Recorded


 


Cefuroxime Axetil [Ceftin] 500 mg PO BID 7 Days #14 tab 06/06/19


 


LORazepam [Ativan] 0.5 mg PO Q6H PRN 14 Days #56 tab 06/06/19


 


Pregabalin [Lyrica] 50 mg PO BID #0 cap 06/06/19


 


oxyCODONE ER [OxyCONTIN] 10 mg PO BID@0900,2100 14 Days #28 06/06/19





 tab.er.12h 


 


oxyCODONE HCL [oxyCODONE HCL (IR)] 10 mg PO Q4H PRN 14 Days #54 tablet 06/06/19











                                    Allergies











Allergy/AdvReac Type Severity Reaction Status Date / Time


 


No Known Allergies Allergy   Verified 06/08/19 23:24














Review of Systems


ROS Statement: 


Those systems with pertinent positive or pertinent negative responses have been 

documented in the HPI.





ROS Other: All systems not noted in ROS Statement are negative.





Past Medical History


Past Medical History: Atrial Fibrillation, Cancer, COPD, CVA/TIA, Fibromyalgia, 

GERD/Reflux, Hyperlipidemia, Osteoarthritis (OA), Pneumonia, Thyroid Disorder


Additional Past Medical History / Comment(s): Non-small cell lung cancer stage 3

adenocarcinoma, COPD, fibromyalgia, acid reflux, hyperlipidemia, osteoarthritis,

adrenal mass that has remained stable over some time, TIA history of, hiatal 

hernia, chronic constipation, chronic back pain (2 herniated discs, 3 disc 

fractures), degenerative arthritis, hiatal hernia; hypothyroidism started after 

chemo; Last Chemo 12/2017 then had immunotherapy for 1 year; first instance of 

AFib was at [facility]; Here 3/22/19-3/27/19 then transferred to [facility] for 34 

days & she got her trach (4/15/19) and peg (4/16/19), then she was transferred 

to [facility] where she was for 1 month & per spouse she was dropped at 

[facility] and this is where she also received the current pressure ulcer 

that she has) she was then transferred to [facility] "05/20/2019"


History of Any Multi-Drug Resistant Organisms: None Reported


Additional Past Surgical History / Comment(s): SINUS surgery, insertion of a 

tracheostomy tube and PEG tube "end of April, beginning of May"


Past Anesthesia/Blood Transfusion Reactions: No Reported Reaction


Past Psychological History: Depression


Smoking Status: Former smoker


Past Alcohol Use History: None Reported


Past Drug Use History: None Reported





- Past Family History


  ** Mother


Family Medical History: Cancer





  ** Father


Family Medical History: Cancer





General Exam





- General Exam Comments


Initial Comments: 


Physical Exam


GENERAL:


Chronically ill-appearing, appears older than stated age


Tracheostomy in place


Acute distress





HENT:


Normocephalic, Atraumatic. 





EYES:


PERRL, EOMI





PULMONARY:


Unlabored respirations.  


No audible rales rhonchi or wheezing was noted.





CARDIOVASCULAR:


RRR





ABDOMEN:


Soft and nontender with normal bowel sounds. 





SKIN:


Multiple bruises in multiple state of healing in bilateral upper and lower





: 


Deferred





NEUROLOGIC:


Patient is alert and oriented x3.  Moving all extremities spontaneously





MUSCULOSKELETAL:


Normal extremities with adequate strength and full range of motion.  No lower 

extremity swelling or edema.  No calf tenderness.  





PSYCHIATRIC:


Normal psychiatric evaluation








Course


                                   Vital Signs











  06/08/19





  22:25


 


Pulse Rate 74


 


Respiratory 18





Rate 


 


Blood Pressure 122/81


 


O2 Sat by Pulse 96





Oximetry 














Medical Decision Making





- Medical Decision Making


She was seen and evaluated history is obtained from patient and wife at bedside


Patient has known lung cancer she's currently trach due to a recent radius 

prolonged intubation


Patient reports her trach without suction for 2 days and she is having trouble 

breathing however after large mucous plug was suctioned from the trach she's 

feeling much better she has no complaints vital signs are stable she does have 

some her abdomen discomfort basic labs chest x-ray were ordered


Labs are patient's baseline is no elevation of AST ALT her bili


Chest x-ray with chronic elevation of right lower lobe are likely related to 

cancer not significantly changed from previous


Patient has remained hemodynamically stable no hypoxia no shortness of breath 

since being in the emergency department she is comfortable with plan to return 

to [facility] at this time.








- Lab Data


Result diagrams: 


                                 06/08/19 23:20





                                 06/08/19 23:20


                                   Lab Results











  06/08/19 06/08/19 Range/Units





  23:20 23:20 


 


WBC  12.8 H   (3.8-10.6)  k/uL


 


RBC  4.10   (3.80-5.40)  m/uL


 


Hgb  12.4   (11.4-16.0)  gm/dL


 


Hct  39.8   (34.0-46.0)  %


 


MCV  97.1   (80.0-100.0)  fL


 


MCH  30.3   (25.0-35.0)  pg


 


MCHC  31.2   (31.0-37.0)  g/dL


 


RDW  18.2 H   (11.5-15.5)  %


 


Plt Count  409   (150-450)  k/uL


 


Neutrophils %  88   %


 


Lymphocytes %  8   %


 


Monocytes %  3   %


 


Eosinophils %  1   %


 


Basophils %  0   %


 


Neutrophils #  11.3 H   (1.3-7.7)  k/uL


 


Lymphocytes #  1.0   (1.0-4.8)  k/uL


 


Monocytes #  0.4   (0-1.0)  k/uL


 


Eosinophils #  0.1   (0-0.7)  k/uL


 


Basophils #  0.0   (0-0.2)  k/uL


 


Hypochromasia  Slight   


 


Anisocytosis  Slight   


 


Macrocytosis  Slight   


 


Sodium   138  (137-145)  mmol/L


 


Potassium   4.5  (3.5-5.1)  mmol/L


 


Chloride   97 L  ()  mmol/L


 


Carbon Dioxide   40 H  (22-30)  mmol/L


 


Anion Gap   1  mmol/L


 


BUN   30 H  (7-17)  mg/dL


 


Creatinine   0.60  (0.52-1.04)  mg/dL


 


Est GFR (CKD-EPI)AfAm   >90  (>60 ml/min/1.73 sqM)  


 


Est GFR (CKD-EPI)NonAf   >90  (>60 ml/min/1.73 sqM)  


 


Glucose   159 H  (74-99)  mg/dL


 


Calcium   8.7  (8.4-10.2)  mg/dL


 


Total Bilirubin   0.6  (0.2-1.3)  mg/dL


 


AST   31  (14-36)  U/L


 


ALT   28  (9-52)  U/L


 


Alkaline Phosphatase   97  ()  U/L


 


Total Protein   6.3  (6.3-8.2)  g/dL


 


Albumin   3.3 L  (3.5-5.0)  g/dL














Disposition


Clinical Impression: 


 Tracheostomy complication





Disposition: HOME SELF-CARE


Condition: Stable


Is patient prescribed a controlled substance at d/c from ED?: No


Referrals: 


Ten Dejesus MD [Primary Care Provider] - 1-2 days

## 2023-05-31 NOTE — XR
EXAMINATION TYPE: XR chest 2V

 

DATE OF EXAM: 7/15/2018

 

HISTORY: pneumonia.

 

REFERENCE: Previous study dated 7/14/2018.

 

FINDINGS: The lungs are overinflated. There is a stable left upper lobe mass. There is worsening atel
ectasis at the left lung base. There is a left-sided effusion. The right lung is clear. The heart is 
not enlarged.

 

IMPRESSION: 

1. COPD.

2. STABLE LEFT UPPER LOBE MASS.

3. WORSENING LEFT BASILAR ATELECTASIS.

4. LEFT-SIDED EFFUSION Home